# Patient Record
Sex: MALE | Race: WHITE | Employment: FULL TIME | URBAN - METROPOLITAN AREA
[De-identification: names, ages, dates, MRNs, and addresses within clinical notes are randomized per-mention and may not be internally consistent; named-entity substitution may affect disease eponyms.]

---

## 2017-07-28 ENCOUNTER — OP HISTORICAL/CONVERTED ENCOUNTER (OUTPATIENT)
Dept: OTHER | Age: 57
End: 2017-07-28

## 2018-04-28 ENCOUNTER — ED HISTORICAL/CONVERTED ENCOUNTER (OUTPATIENT)
Dept: OTHER | Age: 58
End: 2018-04-28

## 2018-05-31 ENCOUNTER — OP HISTORICAL/CONVERTED ENCOUNTER (OUTPATIENT)
Dept: OTHER | Age: 58
End: 2018-05-31

## 2018-07-23 ENCOUNTER — HOSPITAL ENCOUNTER (OUTPATIENT)
Dept: PREADMISSION TESTING | Age: 58
Discharge: HOME | End: 2018-07-23
Payer: OTHER MISCELLANEOUS

## 2018-07-23 VITALS
DIASTOLIC BLOOD PRESSURE: 76 MMHG | TEMPERATURE: 98.1 F | RESPIRATION RATE: 20 BRPM | HEART RATE: 62 BPM | HEIGHT: 71 IN | SYSTOLIC BLOOD PRESSURE: 137 MMHG | BODY MASS INDEX: 29.84 KG/M2 | WEIGHT: 213.13 LBS

## 2018-07-23 LAB
ABO + RH BLD: NORMAL
ANION GAP SERPL CALC-SCNC: 7 MMOL/L (ref 5–15)
APPEARANCE UR: CLEAR
ATRIAL RATE: 65 BPM
BACTERIA URNS QL MICRO: NEGATIVE /HPF
BILIRUB UR QL: NEGATIVE
BLOOD GROUP ANTIBODIES SERPL: NORMAL
BUN SERPL-MCNC: 12 MG/DL (ref 6–20)
BUN/CREAT SERPL: 13 (ref 12–20)
CALCIUM SERPL-MCNC: 8.4 MG/DL (ref 8.5–10.1)
CALCULATED P AXIS, ECG09: 16 DEGREES
CALCULATED R AXIS, ECG10: -7 DEGREES
CALCULATED T AXIS, ECG11: 28 DEGREES
CHLORIDE SERPL-SCNC: 106 MMOL/L (ref 97–108)
CO2 SERPL-SCNC: 29 MMOL/L (ref 21–32)
COLOR UR: NORMAL
CREAT SERPL-MCNC: 0.9 MG/DL (ref 0.7–1.3)
DIAGNOSIS, 93000: NORMAL
EPITH CASTS URNS QL MICRO: NORMAL /LPF
ERYTHROCYTE [DISTWIDTH] IN BLOOD BY AUTOMATED COUNT: 15.4 % (ref 11.5–14.5)
EST. AVERAGE GLUCOSE BLD GHB EST-MCNC: 108 MG/DL
GLUCOSE SERPL-MCNC: 89 MG/DL (ref 65–100)
GLUCOSE UR STRIP.AUTO-MCNC: NEGATIVE MG/DL
HBA1C MFR BLD: 5.4 % (ref 4.2–6.3)
HCT VFR BLD AUTO: 51.2 % (ref 36.6–50.3)
HGB BLD-MCNC: 16.4 G/DL (ref 12.1–17)
HGB UR QL STRIP: NEGATIVE
HYALINE CASTS URNS QL MICRO: NORMAL /LPF (ref 0–5)
INR PPP: 1 (ref 0.9–1.1)
KETONES UR QL STRIP.AUTO: NEGATIVE MG/DL
LEUKOCYTE ESTERASE UR QL STRIP.AUTO: NEGATIVE
MCH RBC QN AUTO: 31.3 PG (ref 26–34)
MCHC RBC AUTO-ENTMCNC: 32 G/DL (ref 30–36.5)
MCV RBC AUTO: 97.7 FL (ref 80–99)
NITRITE UR QL STRIP.AUTO: NEGATIVE
NRBC # BLD: 0 K/UL (ref 0–0.01)
NRBC BLD-RTO: 0 PER 100 WBC
P-R INTERVAL, ECG05: 166 MS
PH UR STRIP: 7 [PH] (ref 5–8)
PLATELET # BLD AUTO: 121 K/UL (ref 150–400)
POTASSIUM SERPL-SCNC: 4.4 MMOL/L (ref 3.5–5.1)
PROT UR STRIP-MCNC: NEGATIVE MG/DL
PROTHROMBIN TIME: 10 SEC (ref 9–11.1)
Q-T INTERVAL, ECG07: 386 MS
QRS DURATION, ECG06: 104 MS
QTC CALCULATION (BEZET), ECG08: 401 MS
RBC # BLD AUTO: 5.24 M/UL (ref 4.1–5.7)
RBC #/AREA URNS HPF: NORMAL /HPF (ref 0–5)
SODIUM SERPL-SCNC: 142 MMOL/L (ref 136–145)
SP GR UR REFRACTOMETRY: 1.02 (ref 1–1.03)
SPECIMEN EXP DATE BLD: NORMAL
UA: UC IF INDICATED,UAUC: NORMAL
UROBILINOGEN UR QL STRIP.AUTO: 0.2 EU/DL (ref 0.2–1)
VENTRICULAR RATE, ECG03: 65 BPM
WBC # BLD AUTO: 5.3 K/UL (ref 4.1–11.1)
WBC URNS QL MICRO: NORMAL /HPF (ref 0–4)

## 2018-07-23 PROCEDURE — 93005 ELECTROCARDIOGRAM TRACING: CPT

## 2018-07-23 PROCEDURE — 85610 PROTHROMBIN TIME: CPT | Performed by: ORTHOPAEDIC SURGERY

## 2018-07-23 PROCEDURE — 36415 COLL VENOUS BLD VENIPUNCTURE: CPT | Performed by: ORTHOPAEDIC SURGERY

## 2018-07-23 PROCEDURE — 81001 URINALYSIS AUTO W/SCOPE: CPT | Performed by: ORTHOPAEDIC SURGERY

## 2018-07-23 PROCEDURE — 83036 HEMOGLOBIN GLYCOSYLATED A1C: CPT | Performed by: ORTHOPAEDIC SURGERY

## 2018-07-23 PROCEDURE — 80048 BASIC METABOLIC PNL TOTAL CA: CPT | Performed by: ORTHOPAEDIC SURGERY

## 2018-07-23 PROCEDURE — 85027 COMPLETE CBC AUTOMATED: CPT | Performed by: ORTHOPAEDIC SURGERY

## 2018-07-23 PROCEDURE — 86900 BLOOD TYPING SEROLOGIC ABO: CPT | Performed by: ORTHOPAEDIC SURGERY

## 2018-07-23 RX ORDER — PRAMIPEXOLE DIHYDROCHLORIDE 0.5 MG/1
0.5 TABLET ORAL
COMMUNITY

## 2018-07-23 RX ORDER — OMEPRAZOLE 40 MG/1
40 CAPSULE, DELAYED RELEASE ORAL
COMMUNITY
End: 2020-01-15

## 2018-07-23 RX ORDER — AZATHIOPRINE 50 MG/1
100 TABLET ORAL
Status: ON HOLD | COMMUNITY
End: 2019-02-06

## 2018-07-24 LAB
BACTERIA SPEC CULT: NORMAL
BACTERIA SPEC CULT: NORMAL
SERVICE CMNT-IMP: NORMAL

## 2018-07-24 NOTE — PERIOP NOTES
Faxed preadmission testing reports (and fax confirmation received) to Dr. Tiffanie Hauser. Called on 7-24-18 at 1110 am ( left message with Emigdio Frost) RE: abnormal CBC.

## 2018-07-25 NOTE — H&P
[][facility] for attribution information  PCP: Gregoria Mckeon MD      Problem List      None          Subjective    Subjective: There is no problem list on file for this patient.        Chief Complaint: Follow-up of the Right Knee        HPI: Nena Bishop is a 65 y.o. male who comes back follow up of his right knee osteoarthritis. He suffered a work related injury in 2012 and underwent right knee arthroscopy. He was found to have significant degenerative arthritis at that time. He has continued to have difficulties with the knee and is had multiple steroid injections as well as HA injections. He is now developing similar symptoms in his left knee as well. He states that his knees limit his everyday activities. He is no longer able to walk for exercise. The right knee is worse than the left. He describes a sharp shooting pain medially. This occasionally causes his knee to buckle on him. He has intermittent swelling and stiffness.              Objective:      There were no vitals filed for this visit. Height: 5' 11\"       Wt Readings from Last 3 Encounters:   04/13/18 235 lb      Body mass index is 32.78 kg/m².     Musculoskeletal :  He has a full range of motion of bilateral hips and knees. He has varus deformity of bilateral knees with medial crepitus. He has a positive patellofemoral grind bilaterally. There is no significant effusion in either knee. His ligaments are stable. His skin is healthy and intact bilaterally. He has no apparent atrophy. He has a strong pedal pulse. He has no pedal edema.        Radiographs:       X-ray Knee Right 3 Views     Result Date: 4/13/2018  Views: AP, Lat, [place]. Notes Impression: Three view x-rays of the right knee show varus deformity with bone on bone arthritis of the medial compartment with subchondral sclerosis and medial osteophytes.   There are also severe degenerative changes of the patellofemoral joint.        Assessment:      1. Primary localized osteoarthritis of knees, bilateral          Plan:   I reviewed his x-ray findings with him and his wife. He has end-stage osteoarthritis of the right knee. He is exhausted all conservative management. It now affects his quality of life. I discuss knee replacement surgery at length including expected outcomes and postop recovery as well as risks and complications specifically DVT, PE, infection. After much discussion he would like to proceed.   We will set him up for right total knee replacement at his convenience.         Orders Placed This Encounter    X-ray knee right 3 views    BMI >=25 PATIENT INSTRUCTIONS & EDUCATION      Return for scheduled surgery.      Guillermo Fink MD   4/13/2018

## 2018-08-03 ENCOUNTER — ANESTHESIA EVENT (OUTPATIENT)
Dept: SURGERY | Age: 58
DRG: 470 | End: 2018-08-03
Payer: OTHER MISCELLANEOUS

## 2018-08-06 ENCOUNTER — ANESTHESIA (OUTPATIENT)
Dept: SURGERY | Age: 58
DRG: 470 | End: 2018-08-06
Payer: OTHER MISCELLANEOUS

## 2018-08-06 ENCOUNTER — HOSPITAL ENCOUNTER (INPATIENT)
Age: 58
LOS: 2 days | Discharge: HOME HEALTH | DRG: 470 | End: 2018-08-08
Attending: ORTHOPAEDIC SURGERY | Admitting: ORTHOPAEDIC SURGERY
Payer: OTHER MISCELLANEOUS

## 2018-08-06 VITALS
OXYGEN SATURATION: 93 % | RESPIRATION RATE: 16 BRPM | HEART RATE: 80 BPM | SYSTOLIC BLOOD PRESSURE: 104 MMHG | TEMPERATURE: 95.7 F | DIASTOLIC BLOOD PRESSURE: 61 MMHG

## 2018-08-06 DIAGNOSIS — M17.11 PRIMARY OSTEOARTHRITIS OF RIGHT KNEE: Primary | ICD-10-CM

## 2018-08-06 LAB
GLUCOSE BLD STRIP.AUTO-MCNC: 81 MG/DL (ref 65–100)
SERVICE CMNT-IMP: NORMAL

## 2018-08-06 PROCEDURE — 77030035236 HC SUT PDS STRATFX BARB J&J -B: Performed by: ORTHOPAEDIC SURGERY

## 2018-08-06 PROCEDURE — 77030018836 HC SOL IRR NACL ICUM -A: Performed by: ORTHOPAEDIC SURGERY

## 2018-08-06 PROCEDURE — 77030031139 HC SUT VCRL2 J&J -A: Performed by: ORTHOPAEDIC SURGERY

## 2018-08-06 PROCEDURE — 74011250636 HC RX REV CODE- 250/636: Performed by: ANESTHESIOLOGY

## 2018-08-06 PROCEDURE — 77030008467 HC STPLR SKN COVD -B: Performed by: ORTHOPAEDIC SURGERY

## 2018-08-06 PROCEDURE — 77030011640 HC PAD GRND REM COVD -A: Performed by: ORTHOPAEDIC SURGERY

## 2018-08-06 PROCEDURE — C1713 ANCHOR/SCREW BN/BN,TIS/BN: HCPCS | Performed by: ORTHOPAEDIC SURGERY

## 2018-08-06 PROCEDURE — C1776 JOINT DEVICE (IMPLANTABLE): HCPCS | Performed by: ORTHOPAEDIC SURGERY

## 2018-08-06 PROCEDURE — 82962 GLUCOSE BLOOD TEST: CPT

## 2018-08-06 PROCEDURE — 74011250637 HC RX REV CODE- 250/637: Performed by: PHYSICIAN ASSISTANT

## 2018-08-06 PROCEDURE — 77030028224 HC PDNG CST BSNM -A: Performed by: ORTHOPAEDIC SURGERY

## 2018-08-06 PROCEDURE — 74011250636 HC RX REV CODE- 250/636: Performed by: ORTHOPAEDIC SURGERY

## 2018-08-06 PROCEDURE — 74011000250 HC RX REV CODE- 250: Performed by: ORTHOPAEDIC SURGERY

## 2018-08-06 PROCEDURE — 77010033678 HC OXYGEN DAILY

## 2018-08-06 PROCEDURE — 65270000029 HC RM PRIVATE

## 2018-08-06 PROCEDURE — 77030038020 HC MANFLD NEPTUNE STRY -B: Performed by: ORTHOPAEDIC SURGERY

## 2018-08-06 PROCEDURE — 77030018846 HC SOL IRR STRL H20 ICUM -A: Performed by: ORTHOPAEDIC SURGERY

## 2018-08-06 PROCEDURE — 77030020782 HC GWN BAIR PAWS FLX 3M -B

## 2018-08-06 PROCEDURE — 76060000035 HC ANESTHESIA 2 TO 2.5 HR: Performed by: ORTHOPAEDIC SURGERY

## 2018-08-06 PROCEDURE — 77030034850: Performed by: ORTHOPAEDIC SURGERY

## 2018-08-06 PROCEDURE — 74011250636 HC RX REV CODE- 250/636: Performed by: PHYSICIAN ASSISTANT

## 2018-08-06 PROCEDURE — 77030006822 HC BLD SAW SAG BRSM -B: Performed by: ORTHOPAEDIC SURGERY

## 2018-08-06 PROCEDURE — 77030014077 HC TOWER MX CEM J&J -C: Performed by: ORTHOPAEDIC SURGERY

## 2018-08-06 PROCEDURE — 77030018846 HC SOL IRR STRL H20 ICUM -A

## 2018-08-06 PROCEDURE — 77030018822 HC SLV COMPR FT COVD -B

## 2018-08-06 PROCEDURE — 76210000016 HC OR PH I REC 1 TO 1.5 HR: Performed by: ORTHOPAEDIC SURGERY

## 2018-08-06 PROCEDURE — 0SRC0J9 REPLACEMENT OF RIGHT KNEE JOINT WITH SYNTHETIC SUBSTITUTE, CEMENTED, OPEN APPROACH: ICD-10-PCS | Performed by: ORTHOPAEDIC SURGERY

## 2018-08-06 PROCEDURE — 94760 N-INVAS EAR/PLS OXIMETRY 1: CPT

## 2018-08-06 PROCEDURE — 74011000250 HC RX REV CODE- 250: Performed by: PHYSICIAN ASSISTANT

## 2018-08-06 PROCEDURE — 76010000171 HC OR TIME 2 TO 2.5 HR INTENSV-TIER 1: Performed by: ORTHOPAEDIC SURGERY

## 2018-08-06 PROCEDURE — 74011250636 HC RX REV CODE- 250/636

## 2018-08-06 PROCEDURE — 77030003601 HC NDL NRV BLK BBMI -A

## 2018-08-06 DEVICE — Z DUP USE 2437985 COMPONENT FEM SZ G R KNEE POST STBL COMPATIBLE W/ LPS-FLEX: Type: IMPLANTABLE DEVICE | Site: KNEE | Status: FUNCTIONAL

## 2018-08-06 DEVICE — IMPLANTABLE DEVICE: Type: IMPLANTABLE DEVICE | Site: KNEE | Status: FUNCTIONAL

## 2018-08-06 DEVICE — Z DUP USE 2503045 BASEPLATE TIB SZ 7 AP51MM ML82MM KNEE PC STEM COMPLT SOL: Type: IMPLANTABLE DEVICE | Site: KNEE | Status: FUNCTIONAL

## 2018-08-06 DEVICE — CEMENT BNE 40GM FULL DOSE PMMA W/O ANTIBIO HI VISC N RADPQ: Type: IMPLANTABLE DEVICE | Site: KNEE | Status: FUNCTIONAL

## 2018-08-06 DEVICE — PLUG STEM TIV FLX TAPR FOR MG II ST BNE SCR NXGN: Type: IMPLANTABLE DEVICE | Site: KNEE | Status: FUNCTIONAL

## 2018-08-06 DEVICE — Z DUP USE 2438083 COMPONENT PAT DIA41MM THK10MM STD UHMWPE NXGN: Type: IMPLANTABLE DEVICE | Site: KNEE | Status: FUNCTIONAL

## 2018-08-06 DEVICE — KIT TKR CEM FLX: Type: IMPLANTABLE DEVICE | Site: KNEE | Status: FUNCTIONAL

## 2018-08-06 RX ORDER — SODIUM CHLORIDE 9 MG/ML
125 INJECTION, SOLUTION INTRAVENOUS CONTINUOUS
Status: DISPENSED | OUTPATIENT
Start: 2018-08-06 | End: 2018-08-07

## 2018-08-06 RX ORDER — MIDAZOLAM HYDROCHLORIDE 1 MG/ML
0.5 INJECTION, SOLUTION INTRAMUSCULAR; INTRAVENOUS
Status: DISCONTINUED | OUTPATIENT
Start: 2018-08-06 | End: 2018-08-06 | Stop reason: HOSPADM

## 2018-08-06 RX ORDER — PREGABALIN 75 MG/1
75 CAPSULE ORAL ONCE
Status: COMPLETED | OUTPATIENT
Start: 2018-08-06 | End: 2018-08-06

## 2018-08-06 RX ORDER — SODIUM CHLORIDE 0.9 % (FLUSH) 0.9 %
5-10 SYRINGE (ML) INJECTION AS NEEDED
Status: DISCONTINUED | OUTPATIENT
Start: 2018-08-06 | End: 2018-08-06 | Stop reason: HOSPADM

## 2018-08-06 RX ORDER — ONDANSETRON 2 MG/ML
4 INJECTION INTRAMUSCULAR; INTRAVENOUS AS NEEDED
Status: DISCONTINUED | OUTPATIENT
Start: 2018-08-06 | End: 2018-08-06 | Stop reason: HOSPADM

## 2018-08-06 RX ORDER — SODIUM CHLORIDE, SODIUM LACTATE, POTASSIUM CHLORIDE, CALCIUM CHLORIDE 600; 310; 30; 20 MG/100ML; MG/100ML; MG/100ML; MG/100ML
100 INJECTION, SOLUTION INTRAVENOUS CONTINUOUS
Status: DISCONTINUED | OUTPATIENT
Start: 2018-08-06 | End: 2018-08-06 | Stop reason: HOSPADM

## 2018-08-06 RX ORDER — OXYCODONE HYDROCHLORIDE 5 MG/1
5 TABLET ORAL
Status: DISCONTINUED | OUTPATIENT
Start: 2018-08-06 | End: 2018-08-08 | Stop reason: HOSPADM

## 2018-08-06 RX ORDER — DEXMEDETOMIDINE HYDROCHLORIDE 4 UG/ML
INJECTION, SOLUTION INTRAVENOUS
Status: DISCONTINUED | OUTPATIENT
Start: 2018-08-06 | End: 2018-08-06 | Stop reason: HOSPADM

## 2018-08-06 RX ORDER — FENTANYL CITRATE 50 UG/ML
50 INJECTION, SOLUTION INTRAMUSCULAR; INTRAVENOUS AS NEEDED
Status: DISCONTINUED | OUTPATIENT
Start: 2018-08-06 | End: 2018-08-06 | Stop reason: HOSPADM

## 2018-08-06 RX ORDER — MIDAZOLAM HYDROCHLORIDE 1 MG/ML
1 INJECTION, SOLUTION INTRAMUSCULAR; INTRAVENOUS AS NEEDED
Status: DISCONTINUED | OUTPATIENT
Start: 2018-08-06 | End: 2018-08-06 | Stop reason: HOSPADM

## 2018-08-06 RX ORDER — CEFAZOLIN SODIUM/WATER 2 G/20 ML
2 SYRINGE (ML) INTRAVENOUS EVERY 8 HOURS
Status: COMPLETED | OUTPATIENT
Start: 2018-08-06 | End: 2018-08-07

## 2018-08-06 RX ORDER — DEXAMETHASONE SODIUM PHOSPHATE 4 MG/ML
INJECTION, SOLUTION INTRA-ARTICULAR; INTRALESIONAL; INTRAMUSCULAR; INTRAVENOUS; SOFT TISSUE AS NEEDED
Status: DISCONTINUED | OUTPATIENT
Start: 2018-08-06 | End: 2018-08-06 | Stop reason: HOSPADM

## 2018-08-06 RX ORDER — PRAMIPEXOLE DIHYDROCHLORIDE 0.25 MG/1
0.5 TABLET ORAL
Status: DISCONTINUED | OUTPATIENT
Start: 2018-08-06 | End: 2018-08-08 | Stop reason: HOSPADM

## 2018-08-06 RX ORDER — HYDROXYZINE HYDROCHLORIDE 10 MG/1
10 TABLET, FILM COATED ORAL
Status: DISCONTINUED | OUTPATIENT
Start: 2018-08-06 | End: 2018-08-08 | Stop reason: HOSPADM

## 2018-08-06 RX ORDER — FENTANYL CITRATE 50 UG/ML
25 INJECTION, SOLUTION INTRAMUSCULAR; INTRAVENOUS
Status: DISCONTINUED | OUTPATIENT
Start: 2018-08-06 | End: 2018-08-06 | Stop reason: HOSPADM

## 2018-08-06 RX ORDER — NALOXONE HYDROCHLORIDE 0.4 MG/ML
0.4 INJECTION, SOLUTION INTRAMUSCULAR; INTRAVENOUS; SUBCUTANEOUS AS NEEDED
Status: DISCONTINUED | OUTPATIENT
Start: 2018-08-06 | End: 2018-08-08 | Stop reason: HOSPADM

## 2018-08-06 RX ORDER — SODIUM CHLORIDE 0.9 % (FLUSH) 0.9 %
5-10 SYRINGE (ML) INJECTION AS NEEDED
Status: DISCONTINUED | OUTPATIENT
Start: 2018-08-06 | End: 2018-08-08 | Stop reason: HOSPADM

## 2018-08-06 RX ORDER — BUPIVACAINE HYDROCHLORIDE 5 MG/ML
INJECTION, SOLUTION EPIDURAL; INTRACAUDAL AS NEEDED
Status: DISCONTINUED | OUTPATIENT
Start: 2018-08-06 | End: 2018-08-06 | Stop reason: HOSPADM

## 2018-08-06 RX ORDER — OMEPRAZOLE 40 MG/1
40 CAPSULE, DELAYED RELEASE ORAL
Status: DISCONTINUED | OUTPATIENT
Start: 2018-08-06 | End: 2018-08-06 | Stop reason: CLARIF

## 2018-08-06 RX ORDER — CELECOXIB 200 MG/1
200 CAPSULE ORAL 2 TIMES DAILY
Status: DISCONTINUED | OUTPATIENT
Start: 2018-08-06 | End: 2018-08-07

## 2018-08-06 RX ORDER — ONDANSETRON 2 MG/ML
4 INJECTION INTRAMUSCULAR; INTRAVENOUS
Status: ACTIVE | OUTPATIENT
Start: 2018-08-06 | End: 2018-08-07

## 2018-08-06 RX ORDER — MORPHINE SULFATE 10 MG/ML
2 INJECTION, SOLUTION INTRAMUSCULAR; INTRAVENOUS
Status: DISCONTINUED | OUTPATIENT
Start: 2018-08-06 | End: 2018-08-06 | Stop reason: HOSPADM

## 2018-08-06 RX ORDER — LIDOCAINE HYDROCHLORIDE 10 MG/ML
0.1 INJECTION, SOLUTION EPIDURAL; INFILTRATION; INTRACAUDAL; PERINEURAL AS NEEDED
Status: DISCONTINUED | OUTPATIENT
Start: 2018-08-06 | End: 2018-08-06 | Stop reason: HOSPADM

## 2018-08-06 RX ORDER — DIPHENHYDRAMINE HYDROCHLORIDE 50 MG/ML
12.5 INJECTION, SOLUTION INTRAMUSCULAR; INTRAVENOUS AS NEEDED
Status: DISCONTINUED | OUTPATIENT
Start: 2018-08-06 | End: 2018-08-06 | Stop reason: HOSPADM

## 2018-08-06 RX ORDER — MELOXICAM 7.5 MG/1
15 TABLET ORAL DAILY
Status: DISCONTINUED | OUTPATIENT
Start: 2018-08-07 | End: 2018-08-07

## 2018-08-06 RX ORDER — CELECOXIB 200 MG/1
200 CAPSULE ORAL ONCE
Status: COMPLETED | OUTPATIENT
Start: 2018-08-06 | End: 2018-08-06

## 2018-08-06 RX ORDER — SODIUM CHLORIDE 9 MG/ML
25 INJECTION, SOLUTION INTRAVENOUS CONTINUOUS
Status: DISCONTINUED | OUTPATIENT
Start: 2018-08-06 | End: 2018-08-06 | Stop reason: HOSPADM

## 2018-08-06 RX ORDER — CEFAZOLIN SODIUM/WATER 2 G/20 ML
2 SYRINGE (ML) INTRAVENOUS
Status: COMPLETED | OUTPATIENT
Start: 2018-08-06 | End: 2018-08-06

## 2018-08-06 RX ORDER — SODIUM CHLORIDE, SODIUM LACTATE, POTASSIUM CHLORIDE, CALCIUM CHLORIDE 600; 310; 30; 20 MG/100ML; MG/100ML; MG/100ML; MG/100ML
INJECTION, SOLUTION INTRAVENOUS
Status: DISCONTINUED | OUTPATIENT
Start: 2018-08-06 | End: 2018-08-06 | Stop reason: HOSPADM

## 2018-08-06 RX ORDER — DEXAMETHASONE SODIUM PHOSPHATE 4 MG/ML
4 INJECTION, SOLUTION INTRA-ARTICULAR; INTRALESIONAL; INTRAMUSCULAR; INTRAVENOUS; SOFT TISSUE
Status: DISCONTINUED | OUTPATIENT
Start: 2018-08-06 | End: 2018-08-08

## 2018-08-06 RX ORDER — SODIUM CHLORIDE 0.9 % (FLUSH) 0.9 %
5-10 SYRINGE (ML) INJECTION EVERY 8 HOURS
Status: DISCONTINUED | OUTPATIENT
Start: 2018-08-06 | End: 2018-08-06 | Stop reason: HOSPADM

## 2018-08-06 RX ORDER — HYDROMORPHONE HYDROCHLORIDE 1 MG/ML
0.5 INJECTION, SOLUTION INTRAMUSCULAR; INTRAVENOUS; SUBCUTANEOUS
Status: DISCONTINUED | OUTPATIENT
Start: 2018-08-06 | End: 2018-08-06 | Stop reason: HOSPADM

## 2018-08-06 RX ORDER — ACETAMINOPHEN 500 MG
1000 TABLET ORAL EVERY 6 HOURS
Status: DISCONTINUED | OUTPATIENT
Start: 2018-08-06 | End: 2018-08-08 | Stop reason: HOSPADM

## 2018-08-06 RX ORDER — ACETAMINOPHEN 500 MG
1000 TABLET ORAL ONCE
Status: COMPLETED | OUTPATIENT
Start: 2018-08-06 | End: 2018-08-06

## 2018-08-06 RX ORDER — MIDAZOLAM HYDROCHLORIDE 1 MG/ML
INJECTION, SOLUTION INTRAMUSCULAR; INTRAVENOUS AS NEEDED
Status: DISCONTINUED | OUTPATIENT
Start: 2018-08-06 | End: 2018-08-06 | Stop reason: HOSPADM

## 2018-08-06 RX ORDER — SODIUM CHLORIDE, SODIUM LACTATE, POTASSIUM CHLORIDE, CALCIUM CHLORIDE 600; 310; 30; 20 MG/100ML; MG/100ML; MG/100ML; MG/100ML
50 INJECTION, SOLUTION INTRAVENOUS CONTINUOUS
Status: DISCONTINUED | OUTPATIENT
Start: 2018-08-06 | End: 2018-08-06 | Stop reason: HOSPADM

## 2018-08-06 RX ORDER — FENTANYL CITRATE 50 UG/ML
10 INJECTION, SOLUTION INTRAMUSCULAR; INTRAVENOUS
Status: DISCONTINUED | OUTPATIENT
Start: 2018-08-06 | End: 2018-08-08

## 2018-08-06 RX ORDER — OXYCODONE HYDROCHLORIDE 5 MG/1
10 TABLET ORAL
Status: DISCONTINUED | OUTPATIENT
Start: 2018-08-06 | End: 2018-08-08 | Stop reason: HOSPADM

## 2018-08-06 RX ORDER — POLYETHYLENE GLYCOL 3350 17 G/17G
17 POWDER, FOR SOLUTION ORAL DAILY
Status: DISCONTINUED | OUTPATIENT
Start: 2018-08-07 | End: 2018-08-08 | Stop reason: HOSPADM

## 2018-08-06 RX ORDER — PROPOFOL 10 MG/ML
INJECTION, EMULSION INTRAVENOUS
Status: DISCONTINUED | OUTPATIENT
Start: 2018-08-06 | End: 2018-08-06 | Stop reason: HOSPADM

## 2018-08-06 RX ORDER — PANTOPRAZOLE SODIUM 40 MG/1
40 TABLET, DELAYED RELEASE ORAL
Status: DISCONTINUED | OUTPATIENT
Start: 2018-08-06 | End: 2018-08-08 | Stop reason: HOSPADM

## 2018-08-06 RX ORDER — AMOXICILLIN 250 MG
1 CAPSULE ORAL 2 TIMES DAILY
Status: DISCONTINUED | OUTPATIENT
Start: 2018-08-06 | End: 2018-08-08 | Stop reason: HOSPADM

## 2018-08-06 RX ORDER — AZATHIOPRINE 50 MG/1
100 TABLET ORAL
Status: DISCONTINUED | OUTPATIENT
Start: 2018-08-06 | End: 2018-08-08 | Stop reason: HOSPADM

## 2018-08-06 RX ORDER — SODIUM CHLORIDE 0.9 % (FLUSH) 0.9 %
5-10 SYRINGE (ML) INJECTION EVERY 8 HOURS
Status: DISCONTINUED | OUTPATIENT
Start: 2018-08-07 | End: 2018-08-08 | Stop reason: HOSPADM

## 2018-08-06 RX ORDER — FAMOTIDINE 20 MG/1
20 TABLET, FILM COATED ORAL 2 TIMES DAILY
Status: DISCONTINUED | OUTPATIENT
Start: 2018-08-06 | End: 2018-08-08 | Stop reason: HOSPADM

## 2018-08-06 RX ORDER — ONDANSETRON 2 MG/ML
INJECTION INTRAMUSCULAR; INTRAVENOUS AS NEEDED
Status: DISCONTINUED | OUTPATIENT
Start: 2018-08-06 | End: 2018-08-06 | Stop reason: HOSPADM

## 2018-08-06 RX ORDER — FACIAL-BODY WIPES
10 EACH TOPICAL DAILY PRN
Status: DISCONTINUED | OUTPATIENT
Start: 2018-08-08 | End: 2018-08-08 | Stop reason: HOSPADM

## 2018-08-06 RX ORDER — ROPIVACAINE HYDROCHLORIDE 5 MG/ML
30 INJECTION, SOLUTION EPIDURAL; INFILTRATION; PERINEURAL AS NEEDED
Status: DISCONTINUED | OUTPATIENT
Start: 2018-08-06 | End: 2018-08-06 | Stop reason: HOSPADM

## 2018-08-06 RX ORDER — DEXMEDETOMIDINE HYDROCHLORIDE 4 UG/ML
INJECTION, SOLUTION INTRAVENOUS AS NEEDED
Status: DISCONTINUED | OUTPATIENT
Start: 2018-08-06 | End: 2018-08-06 | Stop reason: HOSPADM

## 2018-08-06 RX ADMIN — SODIUM CHLORIDE, SODIUM LACTATE, POTASSIUM CHLORIDE, AND CALCIUM CHLORIDE 50 ML/HR: 600; 310; 30; 20 INJECTION, SOLUTION INTRAVENOUS at 11:40

## 2018-08-06 RX ADMIN — MIDAZOLAM HYDROCHLORIDE 2 MG: 1 INJECTION, SOLUTION INTRAMUSCULAR; INTRAVENOUS at 12:13

## 2018-08-06 RX ADMIN — FENTANYL CITRATE 50 MCG: 50 INJECTION, SOLUTION INTRAMUSCULAR; INTRAVENOUS at 11:46

## 2018-08-06 RX ADMIN — PANTOPRAZOLE SODIUM 40 MG: 40 TABLET, DELAYED RELEASE ORAL at 23:57

## 2018-08-06 RX ADMIN — STANDARDIZED SENNA CONCENTRATE AND DOCUSATE SODIUM 1 TABLET: 8.6; 5 TABLET, FILM COATED ORAL at 17:07

## 2018-08-06 RX ADMIN — Medication 2 G: at 19:34

## 2018-08-06 RX ADMIN — ONDANSETRON 4 MG: 2 INJECTION INTRAMUSCULAR; INTRAVENOUS at 12:37

## 2018-08-06 RX ADMIN — RIVAROXABAN 10 MG: 10 TABLET, FILM COATED ORAL at 23:57

## 2018-08-06 RX ADMIN — OXYCODONE HYDROCHLORIDE 10 MG: 5 TABLET ORAL at 23:57

## 2018-08-06 RX ADMIN — ACETAMINOPHEN 1000 MG: 500 TABLET, FILM COATED ORAL at 17:07

## 2018-08-06 RX ADMIN — LIDOCAINE HYDROCHLORIDE 0.1 ML: 10 INJECTION, SOLUTION EPIDURAL; INFILTRATION; INTRACAUDAL; PERINEURAL at 11:40

## 2018-08-06 RX ADMIN — OXYCODONE HYDROCHLORIDE 5 MG: 5 TABLET ORAL at 19:18

## 2018-08-06 RX ADMIN — SODIUM CHLORIDE, SODIUM LACTATE, POTASSIUM CHLORIDE, CALCIUM CHLORIDE: 600; 310; 30; 20 INJECTION, SOLUTION INTRAVENOUS at 12:11

## 2018-08-06 RX ADMIN — SODIUM CHLORIDE, SODIUM LACTATE, POTASSIUM CHLORIDE, CALCIUM CHLORIDE: 600; 310; 30; 20 INJECTION, SOLUTION INTRAVENOUS at 12:33

## 2018-08-06 RX ADMIN — PROPOFOL 40 MCG/KG/MIN: 10 INJECTION, EMULSION INTRAVENOUS at 12:15

## 2018-08-06 RX ADMIN — DEXMEDETOMIDINE HYDROCHLORIDE 4 MCG: 4 INJECTION, SOLUTION INTRAVENOUS at 12:19

## 2018-08-06 RX ADMIN — DEXAMETHASONE SODIUM PHOSPHATE 4 MG: 4 INJECTION, SOLUTION INTRA-ARTICULAR; INTRALESIONAL; INTRAMUSCULAR; INTRAVENOUS; SOFT TISSUE at 12:30

## 2018-08-06 RX ADMIN — BUPIVACAINE HYDROCHLORIDE 12 MG: 5 INJECTION, SOLUTION EPIDURAL; INTRACAUDAL at 12:17

## 2018-08-06 RX ADMIN — ACETAMINOPHEN 1000 MG: 500 TABLET, FILM COATED ORAL at 11:28

## 2018-08-06 RX ADMIN — PREGABALIN 75 MG: 75 CAPSULE ORAL at 11:28

## 2018-08-06 RX ADMIN — MIDAZOLAM HYDROCHLORIDE 2 MG: 1 INJECTION, SOLUTION INTRAMUSCULAR; INTRAVENOUS at 12:11

## 2018-08-06 RX ADMIN — CELECOXIB 200 MG: 200 CAPSULE ORAL at 11:28

## 2018-08-06 RX ADMIN — DEXMEDETOMIDINE HYDROCHLORIDE 4 MCG: 4 INJECTION, SOLUTION INTRAVENOUS at 12:15

## 2018-08-06 RX ADMIN — SODIUM CHLORIDE 125 ML/HR: 900 INJECTION, SOLUTION INTRAVENOUS at 15:23

## 2018-08-06 RX ADMIN — FAMOTIDINE 20 MG: 20 TABLET ORAL at 17:07

## 2018-08-06 RX ADMIN — PRAMIPEXOLE DIHYDROCHLORIDE 0.5 MG: 0.25 TABLET ORAL at 23:57

## 2018-08-06 RX ADMIN — Medication 2 G: at 12:15

## 2018-08-06 RX ADMIN — MIDAZOLAM HYDROCHLORIDE 4 MG: 1 INJECTION, SOLUTION INTRAMUSCULAR; INTRAVENOUS at 11:56

## 2018-08-06 RX ADMIN — DEXMEDETOMIDINE HYDROCHLORIDE 0.4 MCG/KG/HR: 4 INJECTION, SOLUTION INTRAVENOUS at 12:15

## 2018-08-06 RX ADMIN — DEXMEDETOMIDINE HYDROCHLORIDE 4 MCG: 4 INJECTION, SOLUTION INTRAVENOUS at 12:17

## 2018-08-06 NOTE — ANESTHESIA PROCEDURE NOTES
Peripheral Block    Start time: 8/6/2018 11:47 AM  End time: 8/6/2018 11:54 AM  Performed by: Carina Marroquin  Authorized by: Carina Marroquin       Pre-procedure: Indications: at surgeon's request and post-op pain management    Preanesthetic Checklist: patient identified, risks and benefits discussed, site marked, timeout performed, anesthesia consent given and patient being monitored      Block Type:   Block Type:   Adductor canal  Laterality:  Right  Monitoring:  Standard ASA monitoring, responsive to questions, oxygen, continuous pulse ox, frequent vital sign checks and heart rate  Injection Technique:  Single shot  Procedures: ultrasound guided    Patient Position: supine  Prep: chlorhexidine    Location:  Mid thigh  Needle Type:  Stimuplex  Needle Gauge:  22 G  Needle Localization:  Ultrasound guidance  Medication Injected:  0.5%  ropivacaine    Assessment:  Number of attempts:  1  Injection Assessment:  Incremental injection every 5 mL, local visualized surrounding nerve on ultrasound, negative aspiration for blood, no intravascular symptoms, no paresthesia and ultrasound image on chart  Patient tolerance:  Patient tolerated the procedure well with no immediate complications

## 2018-08-06 NOTE — BRIEF OP NOTE
BRIEF OPERATIVE NOTE    Date of Procedure: 8/6/2018   Preoperative Diagnosis: DJD RIGHT KNEE  Postoperative Diagnosis: DJD RIGHT KNEE    Procedure(s):  RIGHT TOTAL KNEE ARTHROPLASTY  (CHOICE ANESTH)  Surgeon(s) and Role:     * Jennifer Hampton MD - Primary         Surgical Assistant: Assistant: Simeon Siegel, [facility]    Surgical Staff:  Circ-1: Celestina Vanessa RN  Circ-Relief: Lino Elizalde RN  Physician Assistant: Christopher Chiang PA-C  Scrub Tech-1: Themarilynn Cushing  Surg Asst-1: Haslinda Ferrnolberto  Surg Asst-Relief: Recardoralia Nicol  Float Staff: Ever Mallory RN  Event Time In   Incision Start 1256   Incision Close      Anesthesia: Spinal   Estimated Blood Loss: 100cc  Specimens: * No specimens in log *   Findings: DJD   Complications: none  Implants:   Implant Name Type Inv.  Item Serial No.  Lot No. LRB No. Used Action   CEMENT BNE SMARTSET HV 40GM -- ORDER IN SETS OF 20 - SN/A  CEMENT BNE SMARTSET HV 40GM -- ORDER IN SETS OF 20 N/A [facility] [employer] [id number] Right 2 Implanted   BASEPLT TIB STEM PC NEXGN 7 --  - SN/A  BASEPLT TIB STEM PC NEXGN 7 --  N/A [employer] [id number] Right 1 Implanted   PLUG STEM TAPR NEXGEN --  - SN/A  PLUG STEM TAPR NEXGEN --  N/A [employer] [id number] Right 1 Implanted   INSERT TIB LPSFLX GH 7-10 12MM -- NEXGEN PROLONG ARTC SURF - SN/A  INSERT TIB LPSFLX GH 7-10 12MM -- NEXGEN PROLONG ARTC SURF N/A [employer] [id number] Right 1 Implanted   PAT INST NXGN 41MM POLYETH --  - SN/A  PAT INST NXGN 41MM POLYETH --  N/A [employer] [id number] Right 1 Implanted

## 2018-08-06 NOTE — PROGRESS NOTES
TRANSFER - IN REPORT:    Verbal report received from [employer]) on Alf Dec  being received from [employer]) for routine post - op      Report consisted of patients Situation, Background, Assessment and   Recommendations(SBAR). Information from the following report(s) SBAR, Kardex, Intake/Output, MAR and Recent Results was reviewed with the receiving nurse. Opportunity for questions and clarification was provided. Assessment completed upon patients arrival to unit and care assumed.

## 2018-08-06 NOTE — OP NOTES
8/6/2018  OPERATIVE REPORT      PREOPERATIVE DIAGNOSIS: Osteoarthritis, right knee. POSTOPERATIVE DIAGNOSIS: Osteoarthritis, right knee. OPERATIVE PROCEDURE: right total knee replacement. SURGEON: Emeli Sheldon MD    ASSISTANT SURGEON: Keyana Goldsmith, [facility]    ANESTHESIA: Spinal.    INDICATIONS: : A 63 y.o.  male  with end stage osteoarthritis to the right knee, not responsive to conservative management. COMPLICATIONS:None    PROCEDURE: The patient was taken to the operating room, underwent  placement of spinal anesthesia. The knee and leg were prepped and  draped in the usual fashion. The leg was exsanguinated with the Esmarch  bandage and tourniquet inflated to 350 mmHg. A longitudinal midline  incision made down through skin and subcutaneous tissue. A medial  parapatellar arthrotomy was performed, and extended proximally along the  medial border of the quadriceps tendon, distally along the medial border of  the insertion of the patella tendon. Medial release was performed. Retropatellar fat pad was sharply excised. The patella was subluxated  laterally, the knee was flexed to 90 degrees. Drill was used to enter the  intramedullary canal of the distal femur. The 5 degree intramedullary guide  was applied and the distal femoral cut was performed. The femur was sized  to a G. A G cutting block was applied, and the anterior, posterior,  chamfer cuts were performed. The posterior stabilized cut was performed. The extramedullary tibial guide was applied, and the tibia was cut in  neutral alignment. The tibia was sized to a 7. Knee was balanced to varus  and valgus stress. Flexion and extension gaps were equal. Trial reduction  was performed, using 10 mm insert. Excellent range of motion and stability  were noted. The patella was everted, and the patella was cut using freehand  technique. Patella was sized to a 41. Drill holes were placed, and patella  button applied.  The patella tracked normally. All trial components were  removed, and surfaces were prepared using drill and punch. All residual  meniscal tissue was sharply excised. Hemostasis was obtained using Bovie  apparatus. All components were cemented in place, taking care to remove all  excess cement. The knee was maintained in full extension while the cement  hardened. The tourniquet was let down after a total tourniquet time of 56  minutes. Hemostasis was obtained using the Bovie apparatus. The joint was  extensively irrigated with antibiotic solution. The arthrotomy was repaired  using #2 Vicryl in interrupted figure-of-eight fashion. Subcutaneous tissue  was approximated using 2-0 Vicryl in interrupted fashion. Skin edges were  approximated using stapling apparatus. Joint was infiltrated with 30 mL of  0.5% Marcaine with epinephrine. Bulky sterile dressing was applied, and the  patient was transferred to recovery room in stable condition. There were no  complications. ESTIMATED BLOOD LOSS: 100 cc.     SPECIMEN: Bone Dorian Essex, M.D.  8/6/2018  2:03 PM

## 2018-08-06 NOTE — ANESTHESIA PREPROCEDURE EVALUATION
Anesthetic History   No history of anesthetic complications            Review of Systems / Medical History  Patient summary reviewed, nursing notes reviewed and pertinent labs reviewed    Pulmonary        Sleep apnea    Asthma        Neuro/Psych   Within defined limits           Cardiovascular    Hypertension              Exercise tolerance: >4 METS     GI/Hepatic/Renal     GERD      Liver disease     Endo/Other        Arthritis     Other Findings            Physical Exam    Airway  Mallampati: II  TM Distance: 4 - 6 cm  Neck ROM: normal range of motion   Mouth opening: Normal     Cardiovascular  Regular rate and rhythm,  S1 and S2 normal,  no murmur, click, rub, or gallop             Dental  No notable dental hx       Pulmonary  Breath sounds clear to auscultation               Abdominal  GI exam deferred       Other Findings            Anesthetic Plan    ASA: 2  Anesthesia type: spinal      Post-op pain plan if not by surgeon: peripheral nerve block single    Induction: Intravenous  Anesthetic plan and risks discussed with: Patient

## 2018-08-06 NOTE — PERIOP NOTES
TRANSFER - OUT REPORT:    Verbal report given to Oliva Hart on Calderon Geiger  being transferred to [phone] for routine post - op       Report consisted of patients Situation, Background, Assessment and   Recommendations(SBAR). Time Pre op antibiotic given:1215  Anesthesia Stop time: 1430  Lamar Present on Transfer to floor:yes  Order for Lamar on Chart:yes  Discharge Prescriptions with Chart:no    Information from the following report(s) SBAR, OR Summary, Intake/Output and MAR was reviewed with the receiving nurse. Opportunity for questions and clarification was provided. Is the patient on 02? YES       L/Min 1       Other no    Is the patient on a monitor? NO    Is the nurse transporting with the patient? NO    Surgical Waiting Area notified of patient's transfer from PACU?  YES      The following personal items collected during your admission accompanied patient upon transfer:   Dental Appliance: Dental Appliances: None  Vision:    Hearing Aid:    Jewelry:    Clothing: Clothing:  (glasses on)  Other Valuables:    Valuables sent to safe:

## 2018-08-06 NOTE — IP AVS SNAPSHOT
[address] [facility] [address]son [address] 
[phone] Patient: Edwige Werner MRN: [id number] :1960 About your hospitalization You were admitted on:  2018 [facility]  [facility] Why you were hospitalized Your primary diagnosis was:  Primary Osteoarthritis Of Right Knee Follow-up Information Follow up With Details Comments Contact Info Larry Mahmood MD   [address] [facility] [address] [place], [place] [id number] [phone] Discharge Orders None A check jimena indicates which time of day the medication should be taken. My Medications START taking these medications Instructions Each Dose to Equal  
 Morning Noon Evening Bedtime  
 acetaminophen 500 mg tablet Commonly known as:  TYLENOL Your last dose was: Your next dose is: Take 2 Tabs by mouth every six (6) hours. 1000 mg  
    
   
   
   
  
 oxyCODONE IR 5 mg immediate release tablet Commonly known as:  Charlotte Rincon Your last dose was: Your next dose is: Take 1-2 Tabs by mouth every four (4) hours as needed. Max Daily Amount: 60 mg.  
 5-10 mg  
    
   
   
   
  
 rivaroxaban 10 mg tablet Commonly known as:  Remigio Phan Your last dose was: Your next dose is: Take 1 Tab by mouth daily (with lunch). Indications: KNEE REPLACEMENT DEEP VEIN THROMBOSIS PREVENTION  
 10 mg CONTINUE taking these medications Instructions Each Dose to Equal  
 Morning Noon Evening Bedtime  
 azaTHIOprine 50 mg tablet Commonly known as:  The Pepsi Your last dose was: Your next dose is: Take 100 mg by mouth nightly. 100 mg MESALAMINE PO Your last dose was: Your next dose is: Take 1,200 mg by mouth daily. 4.8 GRAMS DAILY  
 1200 mg  
    
   
   
   
  
 omeprazole 40 mg capsule Commonly known as:  PRILOSEC Your last dose was: Your next dose is: Take 40 mg by mouth nightly. 40 mg  
    
   
   
   
  
 pramipexole 0.5 mg tablet Commonly known as:  MIRAPEX Your last dose was: Your next dose is: Take 0.5 mg by mouth nightly. 0.5 mg  
    
   
   
   
  
 TESTOSTERONE IM Your last dose was: Your next dose is:    
   
   
 by IntraMUSCular route every seven (7) days. SUNDAYS Where to Get Your Medications Information on where to get these meds will be given to you by the nurse or doctor. ! Ask your nurse or doctor about these medications  
  acetaminophen 500 mg tablet  
 oxyCODONE IR 5 mg immediate release tablet  
 rivaroxaban 10 mg tablet Opioid Education Prescription Opioids: What You Need to Know: 
 
 
Follow up appointments 
-follow up with Dr. Zaid Sprague in 2 weeks. Call [phone] to make an appointment. Home Health Agency: _________________________   phone: ___________________ The agency will contact you to arrange dates/times for visits. Please call them if you do not hear from them within 24 hours after you are discharged. When to call your Orthopaedic Surgeon: 
-unrelieved pain 
-Signs of infection-if your incision is red; continues to have drainage; drainage has a foul odor or if you have a persistent fever over 101 degrees 
-Signs of a blood clot in your leg-calf pain, tenderness, redness, swelling of lower leg When to call your Primary Care Physician: 
-Concerns about medical conditions such as diabetes, high blood pressure, asthma, congestive heart failure 
-Call if blood sugars are elevated, persistent headache or dizziness, coughing or congestion, constipation or diarrhea, burning with urination, abnormal heart rate When to call 911and go to the nearest emergency room 
-acute onset of chest pain, shortness of breath, difficulty breathing Activity 
-weight bearing as tolerated with your walker or crutches. Refer to pages 23-31 of your handbook for instructions and pictures.  
-20 repetitions of each exercise as instructed by therapist 3 times each day. Refer to pages 32-40 of your handbook for exercise instructions and pictures 
-get up every one hour and walk (except at night when sleeping) 
-do not drive or operate heavy machinery Incision Care 
-you will have staples in your knee incision; they will be removed at the surgeons office visit in 2 weeks 
-Keep a dressing (ABD and paper tape) on your incision and change daily. Wash your hands thoroughly before changing the dressing. Once you incision is not draining, you may leave it open to air 
-You may shower and get your incision wet but do not submerge your incision under water in a bath tub, hot tub or swimming pool for 6 weeks after surgery. Preventing blood clots 
-take Xarelto at 12 noon daily as prescribed by Dr. Lux Deep Pain management 
-take pain medication as prescribed; decrease the amount you use as your pain lessens 
-avoid alcoholic beverages while taking pain medication 
-Please be aware that many medications contain Tylenol.   We do not want you to over medicate so please read the information below as a guide. Do not take more than 4 Grams of Tylenol in a 24 hour period. (There are 1000 milligrams in one Gram) -You may place an ice bag on your knee for 15-20 minutes after exercising Diet 
-resume usual diet; drink plenty of fluids; eat foods high in fiber 
-you may want to take a stool softener (such as Senokot-S or Colace) to prevent constipation while you are taking pain medication. If constipation occurs, take a laxative (such as Dulcolax tablets, Milk of Magnesia, or a suppository) [facility] Health Care [facility] Nursing-per physicians order 
-complete head to toe assessment, vital signs 
-staples will be removed in the surgeons office at 2 week visit 
-medication reconciliation 
-review pain management 
-manage chronic medical conditions Physical Therapy-per physicians order Weight bearing status: 
Precautions at Admission: WBAT, Fall Left Side Weight Bearing: Full Right Side Weight Bearing: As tolerated ? Mobility Status: 
Supine to Sit: Modified independent, Adaptive equipment, Additional time (using strap to assist RLE) Sit to Stand: Supervision, Adaptive equipment, Additional time Sit to Supine: Modified independent, Adaptive equipment, Additional time (using strap to assist RLE) Gait: 
Distance (ft): 120 Feet (ft) Ambulation - Level of Assistance: Contact guard assistance, Adaptive equipment, Additional time Assistive Device: Gait belt, Walker, rolling Gait Abnormalities: Antalgic, Decreased step clearance, Step to gait (partial step through with practice) ADL status overall composite: 
  
  
  
  
 
-Home Therapy 
-assessment and evaluation-bed mobility; functional transfers (bed, chair, bathroom, stairs); ambulation with equipment, car transfers, shower transfers, safety and ability to get out of house in the event of an emergency -review weight bearing as tolerated, wean from walker or crutches as tolerated 
-discuss pain management 
-review how to do ADLs 
 
-Home Exercise Program-refer to nvcq82-56 of the patient handbook for exercises 
-supine exercises-ankle pumps, hamstring sets, quad sets, heel slides, short arc quad sets, long arc quads-prop heel on pillow for stretch, straight leg raise-sitting exercises-active knee flexion, passive knee flexion, active knee extension, ankle pumps, bicep curls (use weights as appropriate), triceps pushups, shoulder flexion (use weights as appropriate) -standing exercises holding onto supportive counter-toe raises, heel raises, mini-squats, heel/toe touches with knee bend, marching in place, hamstring curls JumpIn Announcement We are excited to announce that we are making your provider's discharge notes available to you in JumpIn. You will see these notes when they are completed and signed by the physician that discharged you from your recent hospital stay. If you have any questions or concerns about any information you see in JumpIn, please call the Health Information Department where you were seen or reach out to your Primary Care Provider for more information about your plan of care. Introducing [facility]! Dear Dana Jones: 
Thank you for requesting a JumpIn account. Our records indicate that you already have an active JumpIn account. You can access your account anytime at [email or web address] Did you know that you can access your hospital and ER discharge instructions at any time in JumpIn? You can also review all of your test results from your hospital stay or ER visit. Additional Information If you have questions, please visit the Frequently Asked Questions section of the JumpIn website at [email or web address] Remember, JumpIn is NOT to be used for urgent needs. For medical emergencies, dial 911. Now available from your iPhone and Android! Introducing Esteban Dexter As a Nichole Malcolm patient, I wanted to make you aware of our electronic visit tool called Esteban Guerita. Nichole Malcolm 24/7 allows you to connect within minutes with a medical provider 24 hours a day, seven days a week via a mobile device or tablet or logging into a secure website from your computer. You can access Esteban Jose Davidrossifin from anywhere in the [place]. A virtual visit might be right for you when you have a simple condition and feel like you just dont want to get out of bed, or cant get away from work for an appointment, when your regular Nichole Malcolm provider is not available (evenings, weekends or holidays), or when youre out of town and need minor care. Electronic visits cost only $49 and if the Nichole Malcolm 24/7 provider determines a prescription is needed to treat your condition, one can be electronically transmitted to a nearby pharmacy*. Please take a moment to enroll today if you have not already done so. The enrollment process is free and takes just a few minutes. To enroll, please download the Nichole Drnolberto 24/7 sarah to your tablet or phone, or visit [email or web address] to enroll on your computer. And, as an [address] [place] [address] patient with a [employer] account, the results of your visits will be scanned into your electronic medical record and your primary care provider will be able to view the scanned results. We urge you to continue to see your regular Nichole Malcolm provider for your ongoing medical care. And while your primary care provider may not be the one available when you seek a Esteban Mainrossifin virtual visit, the peace of mind you get from getting a real diagnosis real time can be priceless. For more information on Esteban Jose Davidrossifin, view our Frequently Asked Questions (FAQs) at [email or web address]. Sincerely, 
 
Nohelia Mendes MD 
Chief Medical Officer [employer] *:  certain medications cannot be prescribed via Esteban Dexter Providers Seen During Your Hospitalization Provider Specialty Primary office phone Ney Valverde MD Orthopedic Surgery [phone] Your Primary Care Physician (PCP) Primary Care Physician Office Phone Office Fax Brooks Chavez [phone] You are allergic to the following No active allergies Recent Documentation Height Weight BMI Smoking Status 1.803 m 96.7 kg 29.73 kg/m2 Never Smoker Emergency Contacts Name Discharge Info Relation Home Work Mobile RosyHayley DISCHARGE CAREGIVER [3] Spouse [3] [phone] Patient Belongings The following personal items are in your possession at time of discharge: 
  Dental Appliances: None  Visual Aid: Glasses   Hearing Aids/Status: Does not own         Clothing:  (glasses on) Please provide this summary of care documentation to your next provider. Signatures-by signing, you are acknowledging that this After Visit Summary has been reviewed with you and you have received a copy. Patient Signature:  ____________________________________________________________ Date:  ____________________________________________________________  
  
Marbella Vial Provider Signature:  ____________________________________________________________ Date:  ____________________________________________________________

## 2018-08-06 NOTE — PROGRESS NOTES
Checked back- pt's legs still numb. Nurse's to mobilize pt later on this evening. Will initiate PT tomorrow morning.

## 2018-08-06 NOTE — PROGRESS NOTES
Patient recently arrived on the floor. Nurse stated that pt's legs are very numb at present. Will plan to check back on pt later this afternoon to see if it will be safe to mobilize pt POD #0.

## 2018-08-06 NOTE — PERIOP NOTES
1155: RT leg adductor canal nerve block done by Dr Neo Sharp using 30cc of 0.5% ropivicaine with US guidance, with pt monitored, with O2 @ 2l/m/nc and IV sedation given. Pt tolerated procedure fairly well. VSS post block: 128/82-80-14, SaO2=96% on 2l/m/nc. See anesthesia note.

## 2018-08-07 LAB
ANION GAP SERPL CALC-SCNC: 10 MMOL/L (ref 5–15)
BUN SERPL-MCNC: 11 MG/DL (ref 6–20)
BUN/CREAT SERPL: 11 (ref 12–20)
CALCIUM SERPL-MCNC: 7.7 MG/DL (ref 8.5–10.1)
CHLORIDE SERPL-SCNC: 108 MMOL/L (ref 97–108)
CO2 SERPL-SCNC: 22 MMOL/L (ref 21–32)
CREAT SERPL-MCNC: 0.97 MG/DL (ref 0.7–1.3)
GLUCOSE SERPL-MCNC: 141 MG/DL (ref 65–100)
HGB BLD-MCNC: 14.5 G/DL (ref 12.1–17)
POTASSIUM SERPL-SCNC: 4.2 MMOL/L (ref 3.5–5.1)
SODIUM SERPL-SCNC: 140 MMOL/L (ref 136–145)

## 2018-08-07 PROCEDURE — 80048 BASIC METABOLIC PNL TOTAL CA: CPT | Performed by: PHYSICIAN ASSISTANT

## 2018-08-07 PROCEDURE — G8988 SELF CARE GOAL STATUS: HCPCS

## 2018-08-07 PROCEDURE — 74011250636 HC RX REV CODE- 250/636: Performed by: PHYSICIAN ASSISTANT

## 2018-08-07 PROCEDURE — 97116 GAIT TRAINING THERAPY: CPT

## 2018-08-07 PROCEDURE — 85018 HEMOGLOBIN: CPT | Performed by: PHYSICIAN ASSISTANT

## 2018-08-07 PROCEDURE — G8987 SELF CARE CURRENT STATUS: HCPCS

## 2018-08-07 PROCEDURE — 65270000029 HC RM PRIVATE

## 2018-08-07 PROCEDURE — 36415 COLL VENOUS BLD VENIPUNCTURE: CPT | Performed by: PHYSICIAN ASSISTANT

## 2018-08-07 PROCEDURE — 97535 SELF CARE MNGMENT TRAINING: CPT

## 2018-08-07 PROCEDURE — 74011250637 HC RX REV CODE- 250/637: Performed by: PHYSICIAN ASSISTANT

## 2018-08-07 PROCEDURE — 97530 THERAPEUTIC ACTIVITIES: CPT

## 2018-08-07 PROCEDURE — 97161 PT EVAL LOW COMPLEX 20 MIN: CPT

## 2018-08-07 PROCEDURE — 97110 THERAPEUTIC EXERCISES: CPT

## 2018-08-07 PROCEDURE — 97165 OT EVAL LOW COMPLEX 30 MIN: CPT

## 2018-08-07 RX ORDER — OXYCODONE HYDROCHLORIDE 5 MG/1
5-10 TABLET ORAL
Qty: 50 TAB | Refills: 0 | Status: ON HOLD | OUTPATIENT
Start: 2018-08-07 | End: 2019-02-06

## 2018-08-07 RX ORDER — ACETAMINOPHEN 500 MG
1000 TABLET ORAL EVERY 6 HOURS
Qty: 120 TAB | Refills: 0 | Status: ON HOLD | OUTPATIENT
Start: 2018-08-07 | End: 2019-02-06

## 2018-08-07 RX ORDER — MESALAMINE 800 MG/1
1600 TABLET, DELAYED RELEASE ORAL 3 TIMES DAILY
Status: DISCONTINUED | OUTPATIENT
Start: 2018-08-07 | End: 2018-08-08 | Stop reason: HOSPADM

## 2018-08-07 RX ADMIN — STANDARDIZED SENNA CONCENTRATE AND DOCUSATE SODIUM 1 TABLET: 8.6; 5 TABLET, FILM COATED ORAL at 18:06

## 2018-08-07 RX ADMIN — FAMOTIDINE 20 MG: 20 TABLET ORAL at 08:51

## 2018-08-07 RX ADMIN — OXYCODONE HYDROCHLORIDE 10 MG: 5 TABLET ORAL at 22:12

## 2018-08-07 RX ADMIN — ACETAMINOPHEN 1000 MG: 500 TABLET, FILM COATED ORAL at 12:18

## 2018-08-07 RX ADMIN — MESALAMINE 1600 MG: 800 TABLET, DELAYED RELEASE ORAL at 18:06

## 2018-08-07 RX ADMIN — ACETAMINOPHEN 1000 MG: 500 TABLET, FILM COATED ORAL at 06:27

## 2018-08-07 RX ADMIN — OXYCODONE HYDROCHLORIDE 10 MG: 5 TABLET ORAL at 19:07

## 2018-08-07 RX ADMIN — OXYCODONE HYDROCHLORIDE 5 MG: 5 TABLET ORAL at 16:18

## 2018-08-07 RX ADMIN — PRAMIPEXOLE DIHYDROCHLORIDE 0.5 MG: 0.25 TABLET ORAL at 22:01

## 2018-08-07 RX ADMIN — SODIUM CHLORIDE 125 ML/HR: 900 INJECTION, SOLUTION INTRAVENOUS at 07:53

## 2018-08-07 RX ADMIN — PANTOPRAZOLE SODIUM 40 MG: 40 TABLET, DELAYED RELEASE ORAL at 22:01

## 2018-08-07 RX ADMIN — OXYCODONE HYDROCHLORIDE 5 MG: 5 TABLET ORAL at 08:51

## 2018-08-07 RX ADMIN — RIVAROXABAN 10 MG: 10 TABLET, FILM COATED ORAL at 12:19

## 2018-08-07 RX ADMIN — Medication 10 ML: at 05:08

## 2018-08-07 RX ADMIN — ACETAMINOPHEN 1000 MG: 500 TABLET, FILM COATED ORAL at 00:07

## 2018-08-07 RX ADMIN — OXYCODONE HYDROCHLORIDE 10 MG: 5 TABLET ORAL at 12:18

## 2018-08-07 RX ADMIN — OXYCODONE HYDROCHLORIDE 5 MG: 5 TABLET ORAL at 05:08

## 2018-08-07 RX ADMIN — Medication 2 G: at 05:08

## 2018-08-07 RX ADMIN — FAMOTIDINE 20 MG: 20 TABLET ORAL at 18:06

## 2018-08-07 RX ADMIN — Medication 10 ML: at 22:04

## 2018-08-07 RX ADMIN — ACETAMINOPHEN 1000 MG: 500 TABLET, FILM COATED ORAL at 18:05

## 2018-08-07 NOTE — PROGRESS NOTES
Problem: Mobility Impaired (Adult and Pediatric)  Goal: *Acute Goals and Plan of Care (Insert Text)  Physical Therapy Goals  Initiated 8/7/2018    1. Patient will move from supine to sit and sit to supine  in bed with modified independence within 4 days. 2. Patient will perform sit to stand with modified independence within 4 days. 3. Patient will ambulate with modified independence for 150 feet with the least restrictive device within 4 days. 4. Patient will ascend/descend 5 stairs with 1 handrail(s) with modified independence within 4 days. 5. Patient will perform home exercise program per protocol with modified independence within 4 days. 6. Patient will demonstrate AROM 0-90 degrees in operative joint within 4 days. physical Therapy TREATMENT  Patient: Ladean Brunner (56 y.o. male)  Date: 8/7/2018  Diagnosis: DJD RIGHT KNEE  Primary osteoarthritis of right knee  Primary osteoarthritis of right knee Primary osteoarthritis of right knee  Procedure(s) (LRB):  RIGHT TOTAL KNEE ARTHROPLASTY  (CHOICE ANESTH) (Right) 1 Day Post-Op  Precautions: WBAT, Fall  Chart, physical therapy assessment, plan of care and goals were reviewed. ASSESSMENT:  Patient making continued progress with his mobility and ROM this afternoon with improved pain control. He did have some minimal oozing noted through the dressing after therapy session, and RN is aware. He was able to ambulate with a step through gait pattern and was trained on stairs. He was able to asc/desc 4 steps with railing and cane without difficulty. Performed all exercises in bed prior to gait and his ROM was -10 to 80 with AAROM in sitting. Patient is clear for D/C home from a mobility standpoint once all DME is here. Discussed activity recommendations and restrictions with patient and wife and they have education binder from pre-op class.   If there is MD concern about drainage, and he is not discharged tonight, we will follow up tomorrow morning to ensure good incisional integrity is maintained with activity and exercises. Progression toward goals:  [x]      Improving appropriately and progressing toward goals  []      Improving slowly and progressing toward goals  []      Not making progress toward goals and plan of care will be adjusted     PLAN:  Patient continues to benefit from skilled intervention to address the above impairments. Continue treatment per established plan of care. Discharge Recommendations:  Home Health  Further Equipment Recommendations for Discharge: All DME has been ordered     SUBJECTIVE:   I feel better with the higher dose of pain medicine    OBJECTIVE DATA SUMMARY:   Range of Motion:  AROM:  (R knee -10 to 80 with AAROM)                       Functional Mobility Training:  Bed Mobility:     Supine to Sit: Modified independent; Adaptive equipment; Additional time (using strap to assist RLE)  Sit to Supine: Modified independent; Adaptive equipment; Additional time (using strap to assist RLE)  Scooting: Modified independent        Transfers:  Sit to Stand: Modified independent; Adaptive equipment; Additional time  Stand to Sit: Modified independent; Adaptive equipment; Additional time                             Ambulation/Gait Training:  Distance (ft): 175 Feet (ft)  Assistive Device: Gait belt;Walker, rolling  Ambulation - Level of Assistance: Contact guard assistance; Adaptive equipment; Additional time        Gait Abnormalities: Antalgic;Decreased step clearance; Step to gait (partial step through with practice)  Right Side Weight Bearing: As tolerated  Left Side Weight Bearing: Full  Base of Support: Widened;Shift to left  Stance: Right decreased  Speed/Sophia: Pace decreased (<100 feet/min)  Step Length: Left shortened;Right shortened  Swing Pattern: Right asymmetrical     Interventions: Safety awareness training; Tactile cues; Verbal cues; Visual/Demos           Stairs:  Number of Stairs Trained: 4  Stairs - Level of Assistance: Supervision; Adaptive equipment; Additional time  Rail Use: Left  (plus cane)  Therapeutic Exercises:   Exercises performed per protocol. See morning treatment note for description. Pain:  Pain Scale 1: Numeric (0 - 10)  Pain Intensity 1: 5  Pain Location 1: Knee  Pain Orientation 1: Right  Pain Description 1: Aching  Pain Intervention(s) 1: Medication (see MAR)  Activity Tolerance:   Good  Please refer to the flowsheet for vital signs taken during this treatment.   After treatment:   [] Patient left in no apparent distress sitting up in chair  [x] Patient left in no apparent distress in bed, ice in place  [x] Call bell left within reach  [x] Nursing notified  [x] Caregiver present  [] Bed alarm activated    COMMUNICATION/COLLABORATION:   The patients plan of care was discussed with: Registered Nurse and     Tammy Woods PT   Time Calculation: 40 mins

## 2018-08-07 NOTE — PROGRESS NOTES
Bedside report given to Jung Church by Tami Mcfarlane RN. Information from the following report(s) SBAR, Kardex, Intake/Output and MAR.

## 2018-08-07 NOTE — PROGRESS NOTES
Care Management Interventions  PCP Verified by CM: Yes Ariela Lewis MD)  Palliative Care Criteria Met (RRAT>21 & CHF Dx)?: No  Mode of Transport at Discharge: Other (see comment) (family)  Transition of Care Consult (CM Consult): Home Health, Discharge Planning  [facility] [address] [address]: No  Reason Outside [place]: Managed care specific requirement (Patient is worker's comp and must use agency's that they contract with)  MyChart Signup: No  Discharge Durable Medical Equipment: Yes (worker's comp to order DME)  Health Maintenance Reviewed: Yes  Physical Therapy Consult: Yes  Occupational Therapy Consult: Yes  Speech Therapy Consult: No  Current Support Network: Lives with Spouse, Own Home  Confirm Follow Up Transport: Family  Plan discussed with Pt/Family/Caregiver: Yes  Freedom of Choice Offered: Yes   Resource Information Provided?: No  Discharge Location  Discharge Placement: Home with home health     Reason for Admission:RIGHT TOTAL KNEE ARTHROPLASTY                       RRAT Score:       2              Plan for utilizing home health:  Patient is worker's comp and must use the agency that worker's comp contracts with. Likelihood of Readmission:  no                         Transition of Care Plan:  Home with home health. CM met with patient and wife Damian Bal at bedside. CM obtained worker's comp information. Company is [employer]. Claim #[id number]. Worker's comp [place] is Bronwyn Gutierrez ([phone]). Heriberto Nelson is out of the office this week, so covering for her is Kandice Briones (YO#[phone], ZXM#[phone]). CM called [employer] and left voicemail message. CM faxed home health orders and DME orders to [facility]. Awaiting response. 3:05 PM: No response from worker's comp CM as of yet. CM called again and left voicemail to find out if they received the fax containing orders for equipment and home care that was sent this morning. Awaiting response.     Huy Hardin BSW/CRM

## 2018-08-07 NOTE — PROGRESS NOTES
Problem: Mobility Impaired (Adult and Pediatric)  Goal: *Acute Goals and Plan of Care (Insert Text)  Physical Therapy Goals  Initiated 8/7/2018    1. Patient will move from supine to sit and sit to supine  in bed with modified independence within 4 days. 2. Patient will perform sit to stand with modified independence within 4 days. 3. Patient will ambulate with modified independence for 150 feet with the least restrictive device within 4 days. 4. Patient will ascend/descend 5 stairs with 1 handrail(s) with modified independence within 4 days. 5. Patient will perform home exercise program per protocol with modified independence within 4 days. 6. Patient will demonstrate AROM 0-90 degrees in operative joint within 4 days. physical Therapy knee EVALUATION  Patient: Darius Wilkerson (89 y.o. male)  Date: 8/7/2018  Primary Diagnosis: DJD RIGHT KNEE  Primary osteoarthritis of right knee  Primary osteoarthritis of right knee  Procedure(s) (LRB):  RIGHT TOTAL KNEE ARTHROPLASTY  (CHOICE ANESTH) (Right) 1 Day Post-Op   Precautions:   WBAT, Fall    ASSESSMENT :  Based on the objective data described below, the patient presents with decreased mobility after R TKA POD1. Patient maintained stable VS during mobility assessment but was limited by pain in the R knee after mobility. He has a moderate amount of drainage from the R knee through the post op dressing, but RN and MD are aware. His extension is quite limited on the R and has been for years, but worked on educating wife and patient about optimal positioning to promote extension. Deferred flexion stretching at this time until post op ace bandage is removed and the draining areas can be visualized. Expect that he will progress well with his mobility as long as pain is adequately managed. He will need a RW, cane, elevated commode seat and tub bench for home use and he will need to be able to manage 5 steps to enter the home.     Patient will benefit from skilled intervention to address the above impairments. Patients rehabilitation potential is considered to be Good  Factors which may influence rehabilitation potential include:   []         None noted  []         Mental ability/status  [x]         Medical condition  []         Home/family situation and support systems  []         Safety awareness  [x]         Pain tolerance/management  []         Other:      PLAN :  Recommendations and Planned Interventions:  [x]           Bed Mobility Training             []    Neuromuscular Re-Education  [x]           Transfer Training                   []    Orthotic/Prosthetic Training  [x]           Gait Training                         []    Modalities  [x]           Therapeutic Exercises           []    Edema Management/Control  [x]           Therapeutic Activities            [x]    Patient and Family Training/Education  []           Other (comment):    Frequency/Duration: Patient will be followed by physical therapy twice daily to address goals. Discharge Recommendations: Home Health  Further Equipment Recommendations for Discharge: straight cane, rolling walker and tub bench and elevated commode seat     SUBJECTIVE:   Patient stated I feel better getting up, I think.     OBJECTIVE DATA SUMMARY:   HISTORY:    Past Medical History:   Diagnosis Date    Asthma     Autoimmune disease ([facility] [address]     CROHNS DISEASE    Chronic pain     KNEES AND BACK    Fatty liver     GERD (gastroesophageal reflux disease)     HTN (hypertension)     NO MEDS    Hyperlipidemia     Ill-defined condition     HIATAL HERNIA    Migraine     Sleep apnea     USES CPAP     Past Surgical History:   Procedure Laterality Date    HX HERNIA REPAIR Left 1986    INGUINAL    HX KNEE ARTHROSCOPY Right 2010     Prior Level of Function/Home Situation: independent mobility, lives with wife and 5 steps to enter, has no DME at home  Personal factors and/or comorbidities impacting plan of care: R TKA with post op pain and weakness and limited ROM         EXAMINATION/PRESENTATION/DECISION MAKING:   Critical Behavior:  Neurologic State: Alert  Orientation Level: Oriented X4  Cognition: Appropriate for age attention/concentration     Hearing:     Skin:  Moderate serosanguinous drainage R knee  Range Of Motion:  AROM: Within functional limits (-20 ext R knee, flex not tested due to drainage)                       Strength:    Strength: Within functional limits (RLE limited by pain, otherwise [facility])                    Tone & Sensation:   Tone: Normal              Sensation: Intact               Coordination:  Coordination: Within functional limits  Vision:      Functional Mobility:  Bed Mobility:     Supine to Sit: Modified independent; Adaptive equipment; Additional time (using strap to assist RLE)  Sit to Supine: Modified independent; Adaptive equipment; Additional time (using strap to assist RLE)  Scooting: Modified independent  Transfers:  Sit to Stand: Supervision; Adaptive equipment; Additional time  Stand to Sit: Supervision; Adaptive equipment; Additional time                       Balance:   Sitting: Intact; Without support  Standing: Intact; With support  Ambulation/Gait Training:  Distance (ft): 120 Feet (ft)  Assistive Device: Gait belt;Walker, rolling  Ambulation - Level of Assistance: Contact guard assistance; Adaptive equipment; Additional time        Gait Abnormalities: Antalgic;Decreased step clearance; Step to gait (partial step through with practice)  Right Side Weight Bearing: As tolerated  Left Side Weight Bearing: Full  Base of Support: Widened;Shift to left  Stance: Right decreased  Speed/Sophia: Pace decreased (<100 feet/min)  Step Length: Left shortened;Right shortened  Swing Pattern: Right asymmetrical     Interventions: Safety awareness training; Tactile cues; Verbal cues; Visual/Demos            Stairs: Therapeutic Exercises:    Ankle pumps, quad sets    Functional Measure:  Barthel Index:    Bathing: 0  Bladder: 10  Bowels: 10  Groomin  Dressin  Feeding: 10  Mobility: 0  Stairs: 0  Toilet Use: 5  Transfer (Bed to Chair and Back): 10  Total: 55       Barthel and G-code impairment scale:  Percentage of impairment CH  0% CI  1-19% CJ  20-39% CK  40-59% CL  60-79% CM  80-99% CN  100%   Barthel Score 0-100 100 99-80 79-60 59-40 20-39 1-19   0   Barthel Score 0-20 20 17-19 13-16 9-12 5-8 1-4 0      The Barthel ADL Index: Guidelines  1. The index should be used as a record of what a patient does, not as a record of what a patient could do. 2. The main aim is to establish degree of independence from any help, physical or verbal, however minor and for whatever reason. 3. The need for supervision renders the patient not independent. 4. A patient's performance should be established using the best available evidence. Asking the patient, friends/relatives and nurses are the usual sources, but direct observation and common sense are also important. However direct testing is not needed. 5. Usually the patient's performance over the preceding 24-48 hours is important, but occasionally longer periods will be relevant. 6. Middle categories imply that the patient supplies over 50 per cent of the effort. 7. Use of aids to be independent is allowed. Fabrizio Linares., Barthel, D.W. (0695). Functional evaluation: the Barthel Index. [facility] (14)2. CAROLINA DejesusF, Alice Nolen., Roberto Marie., [place], [address] [place] [address] (). Measuring the change indisability after inpatient rehabilitation; comparison of the responsiveness of the Barthel Index and Functional [place] Measure. Journal of Neurology, Neurosurgery, and Psychiatry, 66(4), 499-494. Dong Grewal, N.RE.A, Tiki Delcid  W.J.M, & Elvis Chacon MFADI. (2004.) Assessment of post-stroke quality of life in cost-effectiveness studies: The usefulness of the Barthel Index and the EuroQoL-5D. Quality of Life Research, 13, 862-03         G codes:   In compliance with CMSs Claims Based Outcome Reporting, the following G-code set was chosen for this patient based on their primary functional limitation being treated: The outcome measure chosen to determine the severity of the functional limitation was the Barthel with a score of 55/100 which was correlated with the impairment scale. ? Mobility - Walking and Moving Around:     - CURRENT STATUS: CK - 40%-59% impaired, limited or restricted    - GOAL STATUS: CI - 1%-19% impaired, limited or restricted    - D/C STATUS:  ---------------To be determined---------------        Physical Therapy Evaluation Charge Determination   History Examination Presentation Decision-Making   MEDIUM  Complexity : 1-2 comorbidities / personal factors will impact the outcome/ POC  MEDIUM Complexity : 3 Standardized tests and measures addressing body structure, function, activity limitation and / or participation in recreation  MEDIUM Complexity : Evolving with changing characteristics  LOW Complexity : FOTO score of       Based on the above components, the patient evaluation is determined to be of the following complexity level: LOW     Pain:  Pain Scale 1: Numeric (0 - 10)  Pain Intensity 1: 3  Pain Location 1: Knee  Pain Orientation 1: Right  Pain Description 1: Sore  Pain Intervention(s) 1: Medication (see MAR)  Activity Tolerance:   Good, limited by pain  Please refer to the flowsheet for vital signs taken during this treatment. After treatment:   []         Patient left in no apparent distress sitting up in chair  [x]         Patient left in no apparent distress in bed, ice in place  [x]         Call bell left within reach  [x]         Nursing notified  [x]         Caregiver present  []         Bed alarm activated    COMMUNICATION/EDUCATION:   The patients plan of care was discussed with: Occupational Therapist, Registered Nurse,  and Rehabilitation Attendant.   [x]         Fall prevention education was provided and the patient/caregiver indicated understanding. [x]         Patient/family have participated as able in goal setting and plan of care. [x]         Patient/family agree to work toward stated goals and plan of care. []         Patient understands intent and goals of therapy, but is neutral about his/her participation. []         Patient is unable to participate in goal setting and plan of care.     Thank you for this referral.  Tg Álvarez, PT   Time Calculation: 35 mins

## 2018-08-07 NOTE — PROGRESS NOTES
Problem: Self Care Deficits Care Plan (Adult)  Goal: *Acute Goals and Plan of Care (Insert Text)  Occupational Therapy Goals  Initiated: 8/7/2018   1. Patient will perform grooming with supervision/set-up standing at sink within 3 day(s). 2.  Patient will perform bathing with supervision/set-up from chair within 3 day(s). 3.  Patient will perform upper body dressing and lower body dressing with supervision/set-up within 3 day(s). 4.  Patient will perform toilet transfers with supervision/set-up within 3 day(s). 5.  Patient will perform all aspects of toileting with supervision/set-up within 3 day(s). Occupational Therapy EVALUATION  Patient: Hansa Pool (30 y.o. male)  Date: 8/7/2018  Primary Diagnosis: DJD RIGHT KNEE  Primary osteoarthritis of right knee  Primary osteoarthritis of right knee  Procedure(s) (LRB):  RIGHT TOTAL KNEE ARTHROPLASTY  (CHOICE ANESTH) (Right) 1 Day Post-Op   Precautions:   Fall, WBAT    ASSESSMENT :  Based on the objective data described below, the patient presents with decreased independence with self care and functional mobility following R TKR. Pt with increased drainage in right knee following PT intervention and declined to progress OOB again as nursing just complete another dressing change. Pt discussed tub/transfer for home and use of tub transfer bench. Pt reports understanding of education provided. Pt in agreement for OT to follow up tomorrow for lower body dressing and toileting training. Recommend home with family support and assistance. Patient will benefit from skilled intervention to address the above impairments.   Patients rehabilitation potential is considered to be Good  Factors which may influence rehabilitation potential include:   [x]             None noted  []             Mental ability/status  []             Medical condition  []             Home/family situation and support systems  []             Safety awareness  []             Pain tolerance/management  []             Other:      PLAN :  Recommendations and Planned Interventions:  [x]               Self Care Training                  [x]        Therapeutic Activities  [x]               Functional Mobility Training    []        Cognitive Retraining  [x]               Therapeutic Exercises           [x]        Endurance Activities  [x]               Balance Training                   []        Neuromuscular Re-Education  []               Visual/Perceptual Training     [x]   Home Safety Training  [x]               Patient Education                 [x]        Family Training/Education  []               Other (comment):    Frequency/Duration: Patient will be followed by occupational therapy 5 times a week to address goals. Discharge Recommendations: None  Further Equipment Recommendations for Discharge: TBD     SUBJECTIVE:   Patient stated I am feeling alright.     OBJECTIVE DATA SUMMARY:   HISTORY:   Past Medical History:   Diagnosis Date    Asthma     Autoimmune disease ([facility] [address]     CROHNS DISEASE    Chronic pain     KNEES AND BACK    Fatty liver     GERD (gastroesophageal reflux disease)     HTN (hypertension)     NO MEDS    Hyperlipidemia     Ill-defined condition     HIATAL HERNIA    Migraine     Sleep apnea     USES CPAP     Past Surgical History:   Procedure Laterality Date    HX HERNIA REPAIR Left 1986    INGUINAL    HX KNEE ARTHROSCOPY Right 2010       Prior Level of Function/Environment/Context: pt was independent at home prior to surgery.       Expanded or extensive additional review of patient history:     Home Situation  Home Environment: Private residence  One/Two Story Residence: One story  Living Alone: No  Support Systems: Spouse/Significant Other/Partner  Patient Expects to be Discharged to[de-identified] Private residence  Current DME Used/Available at Home: None    Hand dominance: Right    EXAMINATION OF PERFORMANCE DEFICITS:  Cognitive/Behavioral Status:  Neurologic State: Alert  Orientation Level: Oriented X4  Cognition: Appropriate for age attention/concentration  Perception: Appears intact  Perseveration: No perseveration noted  Safety/Judgement: Good awareness of safety precautions    Skin: dressing intact; just changed prior to entry    Edema:  None noted    Hearing: Auditory  Auditory Impairment: None    Vision/Perceptual:                           Acuity: Within Defined Limits         Range of Motion:    AROM: Within functional limits  PROM: Within functional limits                      Strength:    Strength: Within functional limits                Coordination:  Coordination: Within functional limits  Fine Motor Skills-Upper: Right Intact; Left Intact    Gross Motor Skills-Upper: Right Intact; Left Intact    Tone & Sensation:    Tone: Normal  Sensation: Intact                      Balance:  Sitting: Intact  Standing:  (declined OOB due to drainage in knee)    Functional Mobility and Transfers for ADLs:  Bed Mobility:  Supine to Sit:  (declined OOB due to drainage in knee)  Sit to Supine:  (declined OOB due to drainage in knee)  Scooting: Modified independent    Transfers:  Sit to Stand:  (declined OOB due to drainage in knee)  Stand to Sit:  (declined OOB due to drainage in knee)  Bed to Chair:  (declined OOB due to drainage in knee)  Toilet Transfer :  (declined OOB due to drainage in knee)    ADL Assessment:  Feeding: Supervision    Oral Facial Hygiene/Grooming: Supervision    Bathing: Moderate assistance    Upper Body Dressing: Supervision    Lower Body Dressing: Moderate assistance    Toileting:  Moderate assistance                ADL Intervention and task modifications:                                     Cognitive Retraining  Safety/Judgement: Good awareness of safety precautions    Functional Measure:  Barthel Index:    Bathin  Bladder: 10  Bowels: 10  Groomin  Dressin  Feeding: 10  Mobility: 0  Stairs: 0  Toilet Use: 5  Transfer (Bed to Chair and Back): 10  Total: 55       Barthel and G-code impairment scale:  Percentage of impairment CH  0% CI  1-19% CJ  20-39% CK  40-59% CL  60-79% CM  80-99% CN  100%   Barthel Score 0-100 100 99-80 79-60 59-40 20-39 1-19   0   Barthel Score 0-20 20 17-19 13-16 9-12 5-8 1-4 0      The Barthel ADL Index: Guidelines  1. The index should be used as a record of what a patient does, not as a record of what a patient could do. 2. The main aim is to establish degree of independence from any help, physical or verbal, however minor and for whatever reason. 3. The need for supervision renders the patient not independent. 4. A patient's performance should be established using the best available evidence. Asking the patient, friends/relatives and nurses are the usual sources, but direct observation and common sense are also important. However direct testing is not needed. 5. Usually the patient's performance over the preceding 24-48 hours is important, but occasionally longer periods will be relevant. 6. Middle categories imply that the patient supplies over 50 per cent of the effort. 7. Use of aids to be independent is allowed. Sarah Almendarez., Barthel, D.W. ([id number]). Functional evaluation: the Barthel Index. [address] [facility] (14)2. Samara Sher maria luisa CAROLINA HirschF, Jess Garcia., Juan Wagoner., [place], [address] [place] [address] (1999). Measuring the change indisability after inpatient rehabilitation; comparison of the responsiveness of the Barthel Index and Functional [place] Measure. Journal of Neurology, Neurosurgery, and Psychiatry, 66(4), 723-306. Evie German, KATIE.RE.A, TOMASA Groves, & Cisco Yang, MTraeA. (2004.) Assessment of post-stroke quality of life in cost-effectiveness studies: The usefulness of the Barthel Index and the EuroQoL-5D. Quality of Life Research, 13, 904-78     G codes:   In compliance with CMSs Claims Based Outcome Reporting, the following G-code set was chosen for this patient based on their primary functional limitation being treated: The outcome measure chosen to determine the severity of the functional limitation was the Barthel Index with a score of 55/100 which was correlated with the impairment scale. ? Self Care:     - CURRENT STATUS: CK - 40%-59% impaired, limited or restricted    - GOAL STATUS: CI - 1%-19% impaired, limited or restricted    - D/C STATUS:  ---------------To be determined---------------     Occupational Therapy Evaluation Charge Determination   History Examination Decision-Making   LOW Complexity : Brief history review  MEDIUM Complexity : 3-5 performance deficits relating to physical, cognitive , or psychosocial skils that result in activity limitations and / or participation restrictions MEDIUM Complexity : Patient may present with comorbidities that affect occupational performnce. Miniml to moderate modification of tasks or assistance (eg, physical or verbal ) with assesment(s) is necessary to enable patient to complete evaluation       Based on the above components, the patient evaluation is determined to be of the following complexity level: LOW   Pain:  Pain Scale 1: Numeric (0 - 10)  Pain Intensity 1: 5  Pain Location 1: Knee  Pain Orientation 1: Right  Pain Description 1: Aching  Pain Intervention(s) 1: Medication (see MAR)  Activity Tolerance:   VSS throughout session. After treatment:   [x] Patient left in no apparent distress sitting up in chair  [] Patient left in no apparent distress in bed  [x] Call bell left within reach  [x] Nursing notified  [] Caregiver present  [] Bed alarm activated    COMMUNICATION/EDUCATION:   The patients plan of care was discussed with: Physical Therapist and Registered Nurse. [x] Home safety education was provided and the patient/caregiver indicated understanding. [x] Patient/family have participated as able in goal setting and plan of care. [] Patient/family agree to work toward stated goals and plan of care.   [] Patient understands intent and goals of therapy, but is neutral about his/her participation. [] Patient is unable to participate in goal setting and plan of care. This patients plan of care is appropriate for delegation to RE[facility].     Thank you for this referral.  eDbbie Gerber OT  Time Calculation: 13 mins

## 2018-08-07 NOTE — PROGRESS NOTES
Ortho Progress Note  1 Day Post-Op  Procedure(s):  RIGHT TOTAL KNEE ARTHROPLASTY  (CHOICE ANESTH)    Subjective: Pt doing well this am, c/o pain after working with PT and drainage from incision after working with PT. Pt otherwise doing well and pain is well controlled on current regimen. Pt denies N/V, lightheadedness, dizziness, SOB, or abdominal pain. Physical Exam:   Visit Vitals    /74 (BP 1 Location: Left arm, BP Patient Position: At rest)    Pulse 73    Temp 97.5 °F (36.4 °C)    Resp 16    Ht 5' 11\" (1.803 m)    Wt 96.7 kg (213 lb 3 oz)    SpO2 99%    BMI 29.73 kg/m2     General appearance: alert, cooperative, no distress, appears stated age  Abdomen: soft, non-tender.  Bowel sounds normal. No masses,  no organomegaly  Extremities: Homans sign is negative, no sign of DVT, limited ROM R knee 2/2 pain and bulky dressing, thigh is soft and nontender, calf is soft and nontender  Pulses: 2+ and symmetric  Wound: bulky dressing c/d/i, moderate sanguinous drainage noted on anterior bandage  Neurologic: Grossly normal, ankle dorsi/plantar flexion intact, heel raise intact    Recent Results (from the past 24 hour(s))   GLUCOSE, POC    Collection Time: 08/06/18 11:41 AM   Result Value Ref Range    Glucose (POC) 81 65 - 100 mg/dL    [facility]    METABOLIC PANEL, BASIC    Collection Time: 08/07/18  5:07 AM   Result Value Ref Range    Sodium 140 136 - 145 mmol/L    Potassium 4.2 3.5 - 5.1 mmol/L    Chloride 108 97 - 108 mmol/L    CO2 22 21 - 32 mmol/L    Anion gap 10 5 - 15 mmol/L    Glucose 141 (H) 65 - 100 mg/dL    BUN 11 6 - 20 MG/DL    Creatinine 0.97 0.70 - 1.30 MG/DL    BUN/Creatinine ratio 11 (L) 12 - 20      GFR est AA >60 >60 ml/min/1.73m2    GFR est non-AA >60 >60 ml/min/1.73m2    Calcium 7.7 (L) 8.5 - 10.1 MG/DL   HEMOGLOBIN    Collection Time: 08/07/18  5:07 AM   Result Value Ref Range    HGB 14.5 12.1 - 17.0 g/dL       Assessment:  Stable Post Op    Plan:  DVT Prophylaxis:Xarelto x 2 weeks  Physical Therapy: WBAT                 Pt will require extensive PT prior to discharge home due to pain, gait instability, and limited social support.   Discharge Planning: home with home health, possibly today if cleared by PT                                     F/u in office in 2 weeks  Pain control: tylenol, mobic, oxycodone 5-10mg

## 2018-08-07 NOTE — PROGRESS NOTES
Bedside and Verbal shift change report given to Aileen Maldonado (oncoming nurse) by Trixie Riedel (offgoing nurse). Report included the following information SBAR, Kardex, Intake/Output, MAR and Recent Results.

## 2018-08-08 VITALS
WEIGHT: 213.19 LBS | HEIGHT: 71 IN | HEART RATE: 87 BPM | DIASTOLIC BLOOD PRESSURE: 90 MMHG | SYSTOLIC BLOOD PRESSURE: 152 MMHG | RESPIRATION RATE: 16 BRPM | OXYGEN SATURATION: 97 % | TEMPERATURE: 98 F | BODY MASS INDEX: 29.85 KG/M2

## 2018-08-08 LAB — HGB BLD-MCNC: 13.2 G/DL (ref 12.1–17)

## 2018-08-08 PROCEDURE — 74011250637 HC RX REV CODE- 250/637: Performed by: PHYSICIAN ASSISTANT

## 2018-08-08 PROCEDURE — 85018 HEMOGLOBIN: CPT | Performed by: PHYSICIAN ASSISTANT

## 2018-08-08 PROCEDURE — 36415 COLL VENOUS BLD VENIPUNCTURE: CPT | Performed by: PHYSICIAN ASSISTANT

## 2018-08-08 PROCEDURE — 97535 SELF CARE MNGMENT TRAINING: CPT

## 2018-08-08 PROCEDURE — 97116 GAIT TRAINING THERAPY: CPT

## 2018-08-08 RX ADMIN — OXYCODONE HYDROCHLORIDE 10 MG: 5 TABLET ORAL at 07:31

## 2018-08-08 RX ADMIN — OXYCODONE HYDROCHLORIDE 10 MG: 5 TABLET ORAL at 01:31

## 2018-08-08 RX ADMIN — FAMOTIDINE 20 MG: 20 TABLET ORAL at 09:00

## 2018-08-08 RX ADMIN — OXYCODONE HYDROCHLORIDE 10 MG: 5 TABLET ORAL at 10:53

## 2018-08-08 RX ADMIN — STANDARDIZED SENNA CONCENTRATE AND DOCUSATE SODIUM 1 TABLET: 8.6; 5 TABLET, FILM COATED ORAL at 17:05

## 2018-08-08 RX ADMIN — RIVAROXABAN 10 MG: 10 TABLET, FILM COATED ORAL at 11:53

## 2018-08-08 RX ADMIN — STANDARDIZED SENNA CONCENTRATE AND DOCUSATE SODIUM 1 TABLET: 8.6; 5 TABLET, FILM COATED ORAL at 09:00

## 2018-08-08 RX ADMIN — ACETAMINOPHEN 1000 MG: 500 TABLET, FILM COATED ORAL at 14:07

## 2018-08-08 RX ADMIN — OXYCODONE HYDROCHLORIDE 10 MG: 5 TABLET ORAL at 17:05

## 2018-08-08 RX ADMIN — MESALAMINE 1600 MG: 800 TABLET, DELAYED RELEASE ORAL at 09:00

## 2018-08-08 RX ADMIN — ACETAMINOPHEN 1000 MG: 500 TABLET, FILM COATED ORAL at 00:26

## 2018-08-08 RX ADMIN — POLYETHYLENE GLYCOL 3350 17 G: 17 POWDER, FOR SOLUTION ORAL at 09:02

## 2018-08-08 RX ADMIN — OXYCODONE HYDROCHLORIDE 10 MG: 5 TABLET ORAL at 14:07

## 2018-08-08 RX ADMIN — OXYCODONE HYDROCHLORIDE 10 MG: 5 TABLET ORAL at 04:35

## 2018-08-08 RX ADMIN — Medication 10 ML: at 14:23

## 2018-08-08 RX ADMIN — Medication 10 ML: at 07:31

## 2018-08-08 RX ADMIN — ACETAMINOPHEN 1000 MG: 500 TABLET, FILM COATED ORAL at 07:31

## 2018-08-08 NOTE — PROGRESS NOTES
Family expressed concern that home medication dose and times are not the same as the hospital schedule. Family would like to have medications evaluated and adjusted to the home dose and times, if possible.

## 2018-08-08 NOTE — PROGRESS NOTES
Problem: Mobility Impaired (Adult and Pediatric)  Goal: *Acute Goals and Plan of Care (Insert Text)  Physical Therapy Goals  Initiated 8/7/2018    1. Patient will move from supine to sit and sit to supine  in bed with modified independence within 4 days. 2. Patient will perform sit to stand with modified independence within 4 days. 3. Patient will ambulate with modified independence for 150 feet with the least restrictive device within 4 days. 4. Patient will ascend/descend 5 stairs with 1 handrail(s) with modified independence within 4 days. 5. Patient will perform home exercise program per protocol with modified independence within 4 days. 6. Patient will demonstrate AROM 0-90 degrees in operative joint within 4 days. physical Therapy TREATMENT  Patient: Albina Babinski (64 y.o. male)  Date: 8/8/2018  Diagnosis: DJD RIGHT KNEE  Primary osteoarthritis of right knee  Primary osteoarthritis of right knee Primary osteoarthritis of right knee  Procedure(s) (LRB):  RIGHT TOTAL KNEE ARTHROPLASTY  (CHOICE ANESTH) (Right) 2 Days Post-Op  Precautions: Fall, WBAT  Chart, physical therapy assessment, plan of care and goals were reviewed. ASSESSMENT:  Patient received standing in the room with RW with spouse present in room. Patient agreeable to therapy despite complaints of increased stiffness in R knee. Patient has been up ad forest in the room with RW. Patient ambulated in the hallway with CGA demonstrated decreased liu, and varied between step to and step through gait pattern. Verbal cues provided for knee extension during R stance phase. Patient declined stair training as he was cleared yesterday and felt comfortable. Patient performed seated LE therex and able to recall supine therex. Patient has made excellent progress with acute PT goals. Pt educated on importance of mobilizing 1x every hour he is at home and continue HEP. Patient is cleared from a PT standpoint to discharge home with HHPT.       Progression toward goals:  [x]      Improving appropriately and progressing toward goals  []      Improving slowly and progressing toward goals  []      Not making progress toward goals and plan of care will be adjusted     PLAN:  Patient continues to benefit from skilled intervention to address the above impairments. Continue treatment per established plan of care. Discharge Recommendations:  Home Health  Further Equipment Recommendations for Discharge:  Equipment has been ordered     SUBJECTIVE:   Patient stated I'm more stiff today than I was yesterday.     OBJECTIVE DATA SUMMARY:   Critical Behavior:  Neurologic State: Alert, Appropriate for age  Orientation Level: Oriented X4  Cognition: Appropriate safety awareness, Appropriate decision making, Appropriate for age attention/concentration  Safety/Judgement: Good awareness of safety precautions  Range of Motion:                          Functional Mobility Training:  Bed Mobility:                    Transfers:  Sit to Stand: Stand-by assistance  Stand to Sit: Stand-by assistance                             Balance:  Sitting: Intact  Standing: Intact; With support  Ambulation/Gait Training:  Distance (ft): 100 Feet (ft)  Assistive Device: Gait belt;Walker, rolling  Ambulation - Level of Assistance: Stand-by assistance        Gait Abnormalities: Decreased step clearance; Antalgic        Base of Support: Widened;Shift to left  Stance: Right decreased  Speed/Sophia: Pace decreased (<100 feet/min)  Step Length: Right shortened;Left shortened                    Stairs:            Therapeutic Exercises:     EXERCISE   Sets   Reps   Active Active Assist   Passive Self ROM   Comments   Ankle Pumps  10 [x]                                        []                                        []                                        []                                           Short Arc Quads  10 [x]                                        []                                        [] []                                           Heel Slides  10 [x]                                        []                                        []                                        []                                             Pain:  Pain Scale 1: Numeric (0 - 10)  Pain Intensity 1: 5  Pain Location 1: Knee  Pain Orientation 1: Right  Pain Description 1: Aching  Pain Intervention(s) 1: Medication (see MAR); Distraction; Family Support; Exercise  Activity Tolerance:   Good. VSS despite c/o pain  Please refer to the flowsheet for vital signs taken during this treatment.   After treatment:   [x] Patient left in no apparent distress sitting up in chair  [] Patient left in no apparent distress in bed  [x] Call bell left within reach  [x] Nursing notified  [x] Caregiver present  [] Bed alarm activated    COMMUNICATION/COLLABORATION:   The patients plan of care was discussed with: Occupational Therapist and Registered Nurse    Morro Gonzales, PT, DPT   Time Calculation: 10 mins

## 2018-08-08 NOTE — PROGRESS NOTES
Ortho Progress Note  2 Days Post-Op  Procedure(s):  RIGHT TOTAL KNEE ARTHROPLASTY  (CHOICE ANESTH)    Subjective: Complains of pain    Physical Exam:   Visit Vitals    /87 (BP 1 Location: Right arm, BP Patient Position: At rest)    Pulse 83    Temp 98.1 °F (36.7 °C)    Resp 16    Ht 5' 11\" (1.803 m)    Wt 96.7 kg (213 lb 3 oz)    SpO2 97%    BMI 29.73 kg/m2     General appearance: alert, cooperative, no distress, appears stated age  Abdomen: soft, non-tender.  Bowel sounds normal. No masses,  no organomegaly  Extremities: no edema, redness or tenderness in the calves or thighs,  Pulses: 2+ and symmetric  Wound: Dressing intact  Neurologic: Grossly normal    Recent Results (from the past 24 hour(s))   HEMOGLOBIN    Collection Time: 08/08/18  3:31 AM   Result Value Ref Range    HGB 13.2 12.1 - 17.0 g/dL       Assessment:  Stable Post Op    Plan:  DVT Prophylaxis:Xarelto  Physical Therapy: WBAT  Discharge Planning  Pain control: Adequate

## 2018-08-08 NOTE — PROGRESS NOTES
NUTRITION       Nutrition screening referral was triggered based on results obtained during nursing admission assessment for 14-23# wt loss, poor appetite. S/p DJD right knee on 8/6. Wt has been relatively stable. Around 213-19# over past 4 years. Wt Readings from Last 4 Encounters:   08/07/18 96.7 kg (213 lb 3 oz)   07/23/18 96.7 kg (213 lb 2 oz)   07/24/14 99.3 kg (219 lb)   01/23/14 105.7 kg (233 lb)     Appetite has been good this admit. Patient Vitals for the past 100 hrs:   % Diet Eaten   08/08/18 0852 100 %   08/07/18 1618 100 %   08/07/18 0856 100 %     The patient's chart was reviewed and nutrition assessment is not indicated at this time. Plan to see patient for rescreen as indicated. Thank you.      Mathieu Marie RD

## 2018-08-08 NOTE — DISCHARGE SUMMARY
Orthopedic Service Discharge Summary    Patient ID:  Rk Villalobos  [id number]  male  63 y.o.  1960    Admit date: 8/6/2018    Discharge date and time: No discharge date for patient encounter. Admitting Physician: Yanni Vega MD     Discharge Physician: Yanni Vega MD    Consulting Physician(s): Treatment Team: Attending Provider: Yanni Vega MD; Care Manager: Chandni Pretty    Date of Surgery: 8/6/2018     Preoperative Diagnosis:  DJD RIGHT KNEE    Postoperative Diagnosis: DJD RIGHT KNEE    Procedure(s): Procedure(s):  RIGHT TOTAL KNEE ARTHROPLASTY  (CHOICE ANESTH)    Surgeon: Surgeon(s) and Role:     Marleni Farrell MD - Primary      Anesthesia:  Spinal    Preoperative Medical Clearance:                           HPI:  Pt is a 63 y.o. male who has a history of DJD RIGHT KNEE  Primary osteoarthritis of right knee  Primary osteoarthritis of right knee  with pain and limitations of activities of daily living who presents at this time for a right TKR following the failure of conservative management. PMH:   Past Medical History:   Diagnosis Date    Asthma     Autoimmune disease ([facility] [address]     CROHNS DISEASE    Chronic pain     KNEES AND BACK    Fatty liver     GERD (gastroesophageal reflux disease)     HTN (hypertension)     NO MEDS    Hyperlipidemia     Ill-defined condition     HIATAL HERNIA    Migraine     Sleep apnea     USES CPAP       Medications upon admission :   Prior to Admission Medications   Prescriptions Last Dose Informant Patient Reported? Taking? MESALAMINE PO 8/5/2018 at Unknown time  Yes Yes   Sig: Take 1,200 mg by mouth daily. 4.8 GRAMS DAILY    TESTOSTERONE IM 8/5/2018 at Unknown time  Yes Yes   Sig: by IntraMUSCular route every seven (7) days. SUNDAYS   azaTHIOprine (IMURAN) 50 mg tablet 8/5/2018 at Unknown time  Yes Yes   Sig: Take 100 mg by mouth nightly.    omeprazole (PRILOSEC) 40 mg capsule 8/5/2018 at Unknown time  Yes Yes   Sig: Take 40 mg by mouth nightly. pramipexole (MIRAPEX) 0.5 mg tablet 8/5/2018 at Unknown time  Yes Yes   Sig: Take 0.5 mg by mouth nightly. Facility-Administered Medications: None        Allergies:  No Known Allergies     Hospital Course: The patient underwent surgery. Complications:  None; patient tolerated the procedure well. Was taken to the PACU in stable condition and then transferred to the Orthopedics floor. Condition on discharge:  Stable    Postoperative Pain Management:  Oxycodone, Meloxicam    DVT Prophylaxis: Xarelto Sequential Compression Devices    Postoperative transfusions:     none Banked PRBCs     Post Op complications: none    Hemoglobin at discharge:    Lab Results   Component Value Date/Time    HGB 13.2 08/08/2018 03:31 AM    INR 1.0 07/23/2018 11:30 AM         Incision - clean, dry and intact. No significant erythema or swelling. Neurovascular exam within normal limits. Wound appears to be healing without any evidence of infection. Physical Therapy started on the day following surgery and progressed to ambulation with the aid of a rolling walker for distances of **50 feet with modified independence. Range of motion  limited by pain. At the time of discharge, able to go up and down stairs and had understanding of precautions needed following surgery. Discharged to: Home. Discharge instructions:  -See Full Summary of discharge instructions attached  -Anticoagulate with Xarelto  -Resume pre hospital diet            -Resume home medications   Current Discharge Medication List      START taking these medications    Details   acetaminophen (TYLENOL) 500 mg tablet Take 2 Tabs by mouth every six (6) hours. Qty: 120 Tab, Refills: 0    Associated Diagnoses: Primary osteoarthritis of right knee      oxyCODONE IR (ROXICODONE) 5 mg immediate release tablet Take 1-2 Tabs by mouth every four (4) hours as needed.  Max Daily Amount: 60 mg.  Qty: 50 Tab, Refills: 0    Associated Diagnoses: Primary osteoarthritis of right knee      rivaroxaban (XARELTO) 10 mg tablet Take 1 Tab by mouth daily (with lunch). Indications: KNEE REPLACEMENT DEEP VEIN THROMBOSIS PREVENTION  Qty: 13 Tab, Refills: 0    Associated Diagnoses: Primary osteoarthritis of right knee         CONTINUE these medications which have NOT CHANGED    Details   azaTHIOprine (IMURAN) 50 mg tablet Take 100 mg by mouth nightly. MESALAMINE PO Take 1,200 mg by mouth daily. 4.8 GRAMS DAILY       pramipexole (MIRAPEX) 0.5 mg tablet Take 0.5 mg by mouth nightly. omeprazole (PRILOSEC) 40 mg capsule Take 40 mg by mouth nightly. TESTOSTERONE IM by IntraMUSCular route every seven (7) days.  SUNDAYS          per medical continuation form  -Discharge activity: Activity as tolerated  -Ambulate with Walkers, Type:  Walker, appropriate total joint protocol  -Wound Care Keep wound clean and dry, Reinforce dressing PRN, Ice to area for comfort and As directed  -Follow up in office in 2 weeks      Signed:  Abilio Hernandez MD  8/8/2018  8:06 Morgan Del Toro MD

## 2018-08-08 NOTE — PROGRESS NOTES
Problem: Self Care Deficits Care Plan (Adult)  Goal: *Acute Goals and Plan of Care (Insert Text)  Occupational Therapy Goals  Initiated: 8/7/2018   1. Patient will perform grooming with supervision/set-up standing at sink within 3 day(s). 2.  Patient will perform bathing with supervision/set-up from chair within 3 day(s). 3.  Patient will perform upper body dressing and lower body dressing with supervision/set-up within 3 day(s). 4.  Patient will perform toilet transfers with supervision/set-up within 3 day(s). 5.  Patient will perform all aspects of toileting with supervision/set-up within 3 day(s). Occupational Therapy TREATMENT  Patient: Kelly Rodgers (42 y.o. male)  Date: 8/8/2018  Diagnosis: DJD RIGHT KNEE  Primary osteoarthritis of right knee  Primary osteoarthritis of right knee Primary osteoarthritis of right knee  Procedure(s) (LRB):  RIGHT TOTAL KNEE ARTHROPLASTY  (CHOICE ANESTH) (Right) 2 Days Post-Op  Precautions: Fall, WBAT  Chart, occupational therapy assessment, plan of care, and goals were reviewed. ASSESSMENT:  Pt received supine in bed and agreeable to sitting EOB for lower body dressing training and education. Pt reports increased pain overnight and limited sleep. Pt does have ice to knee and is resting comfortably at this time. Pt reporting ADL activity did help to loosen his knee and help with some pain from stiffness. Pt completed dressing with min A and toileting with supervision. Pt did good with all activities. Recommend home with family support. He will continue to require raised toilet seat for home due to difficulty with low commode home and pain management. He will also require a tub transfer bench for home to maximize independence and safety for shower transfers for home as he is not yet able to step over the edge of the tub independently.    Progression toward goals:  [x]       Improving appropriately and progressing toward goals  []       Improving slowly and progressing toward goals  []       Not making progress toward goals and plan of care will be adjusted     PLAN:  Patient continues to benefit from skilled intervention to address the above impairments. Continue treatment per established plan of care. Discharge Recommendations:  None  Further Equipment Recommendations for Discharge:  transfer bench and raised toilet seat     SUBJECTIVE:   Patient stated I am having a lot of pain.     OBJECTIVE DATA SUMMARY:   Cognitive/Behavioral Status:  Neurologic State: Alert  Orientation Level: Oriented X4  Cognition: Appropriate for age attention/concentration  Perception: Appears intact  Perseveration: No perseveration noted  Safety/Judgement: Good awareness of safety precautions    Functional Mobility and Transfers for ADLs:  Bed Mobility:  Supine to Sit: Supervision  Sit to Supine:  (remained sitting up in chair)    Transfers:  Sit to Stand: Stand-by assistance     Bed to Chair: Supervision    Balance:  Sitting: Intact  Standing: Intact; With support    ADL Intervention:       Grooming  Grooming Assistance: Supervision/set up (standing at the sink)              Upper Body Dressing Assistance  Dressing Assistance: Supervision/set-up  Pullover Shirt: Supervision/set-up    Lower Body Dressing Assistance  Dressing Assistance: Minimum assistance (decreased ROM and access to RLE)  Underpants: Minimum assistance  Pants With Elastic Waist: Minimum assistance  Socks: Moderate assistance  Position Performed: Seated edge of bed  Cues: Verbal cues provided    Toileting  Toileting Assistance: Supervision/set up  Bladder Hygiene: Supervision/set-up  Clothing Management: Supervision/set-up    Cognitive Retraining  Safety/Judgement: Good awareness of safety precautions    Pain:  Pain Scale 1: Numeric (0 - 10)  Pain Intensity 1: 5  Pain Location 1: Knee  Pain Orientation 1: Right  Pain Description 1: Aching  Pain Intervention(s) 1: Medication (see MAR); Distraction; Family Support; Exercise  Activity Tolerance:   VSS throughout session.      After treatment:   [x] Patient left in no apparent distress sitting up in chair  [] Patient left in no apparent distress in bed  [x] Call bell left within reach  [x] Nursing notified  [] Caregiver present  [] Bed alarm activated    COMMUNICATION/COLLABORATION:   The patients plan of care was discussed with: Physical Therapist and Registered Nurse    Damian Garcia OT  Time Calculation: 8 mins

## 2018-08-08 NOTE — PROGRESS NOTES
5:20 PM  The Joint Replacement Discharge Education video was reviewed by the patient/family. The content was discussed utilizing teach back, questions were answered. The patient verbalized an understanding of the instructions. Patient dc home with spouse, rx, and all personal belongings.

## 2018-08-08 NOTE — PROGRESS NOTES
Bedside shift change report given to Angeles LEMUS (oncoming nurse) by Carolyn Johnson (offgoing nurse). Report included the following information SBAR, Kardex, Intake/Output and MAR.

## 2018-08-08 NOTE — PROGRESS NOTES
Problem: Falls - Risk of  Goal: *Absence of Falls  Document Owen Fall Risk and appropriate interventions in the flowsheet.    Outcome: Progressing Towards Goal  Fall Risk Interventions:  Mobility Interventions: Patient to call before getting OOB, Utilize walker, cane, or other assistive device         Medication Interventions: Patient to call before getting OOB, Teach patient to arise slowly    Elimination Interventions: Call light in reach, Patient to call for help with toileting needs, Toileting schedule/hourly rounds

## 2018-08-08 NOTE — PROGRESS NOTES
Spiritual Care Partner Volunteer visited patient in [phone] on 8.8. 18.   Documented by:    Patrick Gibbons M.Div, M.S, Steff 601 available at [address] [place] [address]([phone])

## 2018-08-08 NOTE — PROGRESS NOTES
Chart reviewed. CM spoke with Frandy Comer (#[phone]), worker's comp CM. Confirmed she received the orders yesterday for home health and DME. Gerlean Holstein is working on arranging the home health. [employer] ([phone]) will be the company to supply patient's DME. Gerlean Holstein stated that she will forward the orders to [employer] [facility] along with this CM's contact info and they will be calling this CM today. Updated patient/wife. Spoke with Rolly Bautista with [employer] (#[phone]). She is working on arranging home care and DME. CM requested that Rolly Bautista notify CM who these providers will be. Updated patient/wife. CM received call from Petrona with [facility] ([phone]) who stated that they will be ordering the patient's DME. DME will be delivered to the hospital prior to discharge. Petrona will follow-up with this CM regarding status.     Ruthann Cesar, RAYNEW/CRM

## 2018-08-08 NOTE — ROUTINE PROCESS
Bedside shift change report given to Lee Ann (oncoming nurse) by Elliott Gee (offgoing nurse). Report included the following information SBAR.

## 2019-01-03 ENCOUNTER — IP HISTORICAL/CONVERTED ENCOUNTER (OUTPATIENT)
Dept: OTHER | Age: 59
End: 2019-01-03

## 2019-02-04 ENCOUNTER — OP HISTORICAL/CONVERTED ENCOUNTER (OUTPATIENT)
Dept: OTHER | Age: 59
End: 2019-02-04

## 2019-02-05 ENCOUNTER — HOSPITAL ENCOUNTER (INPATIENT)
Age: 59
LOS: 9 days | Discharge: HOME HEALTH | DRG: 486 | End: 2019-02-14
Attending: ORTHOPAEDIC SURGERY | Admitting: ORTHOPAEDIC SURGERY
Payer: OTHER MISCELLANEOUS

## 2019-02-05 DIAGNOSIS — M17.11 PRIMARY OSTEOARTHRITIS OF RIGHT KNEE: Primary | ICD-10-CM

## 2019-02-05 PROBLEM — T84.59XA INFECTION OF TOTAL KNEE REPLACEMENT (HCC): Status: ACTIVE | Noted: 2019-02-05

## 2019-02-05 PROBLEM — T84.53XA INFECTION OF TOTAL RIGHT KNEE REPLACEMENT (HCC): Status: ACTIVE | Noted: 2019-02-05

## 2019-02-05 PROBLEM — Z96.659 INFECTION OF TOTAL KNEE REPLACEMENT (HCC): Status: ACTIVE | Noted: 2019-02-05

## 2019-02-05 PROCEDURE — 77030032490 HC SLV COMPR SCD KNE COVD -B

## 2019-02-05 PROCEDURE — 65270000029 HC RM PRIVATE

## 2019-02-05 RX ORDER — ONDANSETRON 2 MG/ML
4 INJECTION INTRAMUSCULAR; INTRAVENOUS
Status: DISCONTINUED | OUTPATIENT
Start: 2019-02-05 | End: 2019-02-14 | Stop reason: HOSPADM

## 2019-02-05 RX ORDER — SODIUM CHLORIDE 0.9 % (FLUSH) 0.9 %
5-40 SYRINGE (ML) INJECTION EVERY 8 HOURS
Status: DISCONTINUED | OUTPATIENT
Start: 2019-02-06 | End: 2019-02-14 | Stop reason: HOSPADM

## 2019-02-05 RX ORDER — NALOXONE HYDROCHLORIDE 0.4 MG/ML
0.4 INJECTION, SOLUTION INTRAMUSCULAR; INTRAVENOUS; SUBCUTANEOUS AS NEEDED
Status: DISCONTINUED | OUTPATIENT
Start: 2019-02-05 | End: 2019-02-14 | Stop reason: HOSPADM

## 2019-02-05 RX ORDER — SODIUM CHLORIDE 0.9 % (FLUSH) 0.9 %
5-40 SYRINGE (ML) INJECTION AS NEEDED
Status: DISCONTINUED | OUTPATIENT
Start: 2019-02-05 | End: 2019-02-14 | Stop reason: HOSPADM

## 2019-02-05 RX ORDER — HYDROMORPHONE HYDROCHLORIDE 2 MG/ML
2 INJECTION, SOLUTION INTRAMUSCULAR; INTRAVENOUS; SUBCUTANEOUS
Status: DISCONTINUED | OUTPATIENT
Start: 2019-02-05 | End: 2019-02-08 | Stop reason: SDUPTHER

## 2019-02-06 ENCOUNTER — ANESTHESIA EVENT (OUTPATIENT)
Dept: SURGERY | Age: 59
DRG: 486 | End: 2019-02-06
Payer: OTHER MISCELLANEOUS

## 2019-02-06 ENCOUNTER — APPOINTMENT (OUTPATIENT)
Dept: GENERAL RADIOLOGY | Age: 59
DRG: 486 | End: 2019-02-06
Attending: HOSPITALIST
Payer: OTHER MISCELLANEOUS

## 2019-02-06 ENCOUNTER — ANESTHESIA (OUTPATIENT)
Dept: SURGERY | Age: 59
DRG: 486 | End: 2019-02-06
Payer: OTHER MISCELLANEOUS

## 2019-02-06 LAB
ALBUMIN SERPL-MCNC: 2.3 G/DL (ref 3.5–5)
ALBUMIN/GLOB SERPL: 0.5 {RATIO} (ref 1.1–2.2)
ALP SERPL-CCNC: 54 U/L (ref 45–117)
ALT SERPL-CCNC: 35 U/L (ref 12–78)
ANION GAP SERPL CALC-SCNC: 8 MMOL/L (ref 5–15)
APTT PPP: 37 SEC (ref 22.1–32)
AST SERPL-CCNC: 32 U/L (ref 15–37)
ATRIAL RATE: 114 BPM
BASOPHILS # BLD: 0 K/UL (ref 0–0.1)
BASOPHILS NFR BLD: 0 % (ref 0–1)
BILIRUB DIRECT SERPL-MCNC: 0.4 MG/DL (ref 0–0.2)
BILIRUB SERPL-MCNC: 1.4 MG/DL (ref 0.2–1)
BUN SERPL-MCNC: 13 MG/DL (ref 6–20)
BUN/CREAT SERPL: 15 (ref 12–20)
CALCIUM SERPL-MCNC: 7.8 MG/DL (ref 8.5–10.1)
CALCULATED P AXIS, ECG09: 44 DEGREES
CALCULATED R AXIS, ECG10: -14 DEGREES
CALCULATED T AXIS, ECG11: 48 DEGREES
CHLORIDE SERPL-SCNC: 108 MMOL/L (ref 97–108)
CO2 SERPL-SCNC: 21 MMOL/L (ref 21–32)
CREAT SERPL-MCNC: 0.88 MG/DL (ref 0.7–1.3)
CRP SERPL-MCNC: 18.4 MG/DL (ref 0–0.6)
DIAGNOSIS, 93000: NORMAL
DIFFERENTIAL METHOD BLD: ABNORMAL
EOSINOPHIL # BLD: 0 K/UL (ref 0–0.4)
EOSINOPHIL NFR BLD: 0 % (ref 0–7)
ERYTHROCYTE [DISTWIDTH] IN BLOOD BY AUTOMATED COUNT: 17.9 % (ref 11.5–14.5)
ERYTHROCYTE [SEDIMENTATION RATE] IN BLOOD: 37 MM/HR (ref 0–20)
GLOBULIN SER CALC-MCNC: 4.5 G/DL (ref 2–4)
GLUCOSE SERPL-MCNC: 125 MG/DL (ref 65–100)
HCT VFR BLD AUTO: 38.4 % (ref 36.6–50.3)
HGB BLD-MCNC: 12.8 G/DL (ref 12.1–17)
IMM GRANULOCYTES # BLD AUTO: 0 K/UL
IMM GRANULOCYTES NFR BLD AUTO: 0 %
LYMPHOCYTES # BLD: 0.3 K/UL (ref 0.8–3.5)
LYMPHOCYTES NFR BLD: 7 % (ref 12–49)
MCH RBC QN AUTO: 31.3 PG (ref 26–34)
MCHC RBC AUTO-ENTMCNC: 33.3 G/DL (ref 30–36.5)
MCV RBC AUTO: 93.9 FL (ref 80–99)
MONOCYTES # BLD: 0.2 K/UL (ref 0–1)
MONOCYTES NFR BLD: 4 % (ref 5–13)
NEUTS BAND NFR BLD MANUAL: 1 % (ref 0–6)
NEUTS SEG # BLD: 3.9 K/UL (ref 1.8–8)
NEUTS SEG NFR BLD: 88 % (ref 32–75)
NRBC # BLD: 0 K/UL (ref 0–0.01)
NRBC BLD-RTO: 0 PER 100 WBC
P-R INTERVAL, ECG05: 172 MS
PLATELET # BLD AUTO: 101 K/UL (ref 150–400)
PLATELET COMMENTS,PCOM: ABNORMAL
PMV BLD AUTO: 10.9 FL (ref 8.9–12.9)
POTASSIUM SERPL-SCNC: 3.8 MMOL/L (ref 3.5–5.1)
PROT SERPL-MCNC: 6.8 G/DL (ref 6.4–8.2)
Q-T INTERVAL, ECG07: 320 MS
QRS DURATION, ECG06: 90 MS
QTC CALCULATION (BEZET), ECG08: 441 MS
RBC # BLD AUTO: 4.09 M/UL (ref 4.1–5.7)
RBC MORPH BLD: ABNORMAL
SODIUM SERPL-SCNC: 137 MMOL/L (ref 136–145)
THERAPEUTIC RANGE,PTTT: ABNORMAL SECS (ref 58–77)
VENTRICULAR RATE, ECG03: 114 BPM
WBC # BLD AUTO: 4.4 K/UL (ref 4.1–11.1)

## 2019-02-06 PROCEDURE — C1713 ANCHOR/SCREW BN/BN,TIS/BN: HCPCS | Performed by: ORTHOPAEDIC SURGERY

## 2019-02-06 PROCEDURE — 65270000029 HC RM PRIVATE

## 2019-02-06 PROCEDURE — 76060000035 HC ANESTHESIA 2 TO 2.5 HR: Performed by: ORTHOPAEDIC SURGERY

## 2019-02-06 PROCEDURE — 0SUV09Z SUPPLEMENT RIGHT KNEE JOINT, TIBIAL SURFACE WITH LINER, OPEN APPROACH: ICD-10-PCS | Performed by: ORTHOPAEDIC SURGERY

## 2019-02-06 PROCEDURE — 74011250636 HC RX REV CODE- 250/636: Performed by: PHYSICIAN ASSISTANT

## 2019-02-06 PROCEDURE — 80048 BASIC METABOLIC PNL TOTAL CA: CPT

## 2019-02-06 PROCEDURE — 74011000250 HC RX REV CODE- 250: Performed by: ORTHOPAEDIC SURGERY

## 2019-02-06 PROCEDURE — C1776 JOINT DEVICE (IMPLANTABLE): HCPCS | Performed by: ORTHOPAEDIC SURGERY

## 2019-02-06 PROCEDURE — 77030019908 HC STETH ESOPH SIMS -A: Performed by: ANESTHESIOLOGY

## 2019-02-06 PROCEDURE — 77030026438 HC STYL ET INTUB CARD -A: Performed by: ANESTHESIOLOGY

## 2019-02-06 PROCEDURE — 74011000258 HC RX REV CODE- 258: Performed by: INTERNAL MEDICINE

## 2019-02-06 PROCEDURE — 87077 CULTURE AEROBIC IDENTIFY: CPT

## 2019-02-06 PROCEDURE — 94660 CPAP INITIATION&MGMT: CPT

## 2019-02-06 PROCEDURE — 87102 FUNGUS ISOLATION CULTURE: CPT

## 2019-02-06 PROCEDURE — 87205 SMEAR GRAM STAIN: CPT

## 2019-02-06 PROCEDURE — 74011250637 HC RX REV CODE- 250/637: Performed by: HOSPITALIST

## 2019-02-06 PROCEDURE — 76210000016 HC OR PH I REC 1 TO 1.5 HR: Performed by: ORTHOPAEDIC SURGERY

## 2019-02-06 PROCEDURE — 85730 THROMBOPLASTIN TIME PARTIAL: CPT

## 2019-02-06 PROCEDURE — 76010000171 HC OR TIME 2 TO 2.5 HR INTENSV-TIER 1: Performed by: ORTHOPAEDIC SURGERY

## 2019-02-06 PROCEDURE — 87186 SC STD MICRODIL/AGAR DIL: CPT

## 2019-02-06 PROCEDURE — 74011250637 HC RX REV CODE- 250/637: Performed by: PHYSICIAN ASSISTANT

## 2019-02-06 PROCEDURE — 74011250637 HC RX REV CODE- 250/637: Performed by: ANESTHESIOLOGY

## 2019-02-06 PROCEDURE — 74011000250 HC RX REV CODE- 250

## 2019-02-06 PROCEDURE — 77030008467 HC STPLR SKN COVD -B: Performed by: ORTHOPAEDIC SURGERY

## 2019-02-06 PROCEDURE — 77030031139 HC SUT VCRL2 J&J -A: Performed by: ORTHOPAEDIC SURGERY

## 2019-02-06 PROCEDURE — 85025 COMPLETE CBC W/AUTO DIFF WBC: CPT

## 2019-02-06 PROCEDURE — 36415 COLL VENOUS BLD VENIPUNCTURE: CPT

## 2019-02-06 PROCEDURE — 87075 CULTR BACTERIA EXCEPT BLOOD: CPT

## 2019-02-06 PROCEDURE — 74011250636 HC RX REV CODE- 250/636: Performed by: HOSPITALIST

## 2019-02-06 PROCEDURE — 77030018822 HC SLV COMPR FT COVD -B

## 2019-02-06 PROCEDURE — 85652 RBC SED RATE AUTOMATED: CPT

## 2019-02-06 PROCEDURE — 77030018836 HC SOL IRR NACL ICUM -A: Performed by: ORTHOPAEDIC SURGERY

## 2019-02-06 PROCEDURE — 93005 ELECTROCARDIOGRAM TRACING: CPT

## 2019-02-06 PROCEDURE — 74011250636 HC RX REV CODE- 250/636: Performed by: ANESTHESIOLOGY

## 2019-02-06 PROCEDURE — 77030008684 HC TU ET CUF COVD -B: Performed by: ANESTHESIOLOGY

## 2019-02-06 PROCEDURE — P9045 ALBUMIN (HUMAN), 5%, 250 ML: HCPCS

## 2019-02-06 PROCEDURE — 77030035236 HC SUT PDS STRATFX BARB J&J -B: Performed by: ORTHOPAEDIC SURGERY

## 2019-02-06 PROCEDURE — 0SPC09Z REMOVAL OF LINER FROM RIGHT KNEE JOINT, OPEN APPROACH: ICD-10-PCS | Performed by: ORTHOPAEDIC SURGERY

## 2019-02-06 PROCEDURE — 74011250636 HC RX REV CODE- 250/636: Performed by: ORTHOPAEDIC SURGERY

## 2019-02-06 PROCEDURE — 74011250636 HC RX REV CODE- 250/636

## 2019-02-06 PROCEDURE — 77030034850: Performed by: ORTHOPAEDIC SURGERY

## 2019-02-06 PROCEDURE — 77030011640 HC PAD GRND REM COVD -A: Performed by: ORTHOPAEDIC SURGERY

## 2019-02-06 PROCEDURE — 71045 X-RAY EXAM CHEST 1 VIEW: CPT

## 2019-02-06 PROCEDURE — 80076 HEPATIC FUNCTION PANEL: CPT

## 2019-02-06 PROCEDURE — 74011250636 HC RX REV CODE- 250/636: Performed by: INTERNAL MEDICINE

## 2019-02-06 PROCEDURE — 86140 C-REACTIVE PROTEIN: CPT

## 2019-02-06 DEVICE — STIMULAN® RAPID CURE PROVIDED STERILE FOR SINGLE PATIENT USE. STIMULAN® RAPID CURE CONTAINS CALCIUM SULFATE POWDER AND MIXING SOLUTION IN PRE-MEASURED QUANTITIES SO THAT WHEN MIXED TOGETHER IN A STERILE MIXING BOWL, THE RESULTANT PASTE IS TO BE DIGITALLY PACKED INTO OPEN BONE VOID/GAP TO SET INSITU OR PLACED INTO THE MOULD PROVIDED, THE MIXTURE SETS TO FORM BEADS. THE BIODEGRADABLE, RADIOPAQUE BEADS ARE RESORBED IN APPROXIMATELY 30 – 60 DAYS WHEN USED IN ACCORDANCE WITH THE DEVICE LABELLING. STIMULAN® RAPID CURE IS MANUFACTURED FROM SYNTHETIC IMPLANT GRADE CALCIUM SULFATE DIHYDRATE(CASO4.2H2O) THAT RESORBS AND IS REPLACED WITH BONE DURING THE HEALING PROCESS. ALSO, AS THE BONE VOID FILLER BEADS ARE BIODEGRADABLE AND BIOCOMPATIBLE, THEY MAY BE USED AT AN INFECTED SITE.
Type: IMPLANTABLE DEVICE | Site: KNEE | Status: FUNCTIONAL
Brand: STIMULAN® RAPID CURE

## 2019-02-06 RX ORDER — GLYCOPYRROLATE 0.2 MG/ML
INJECTION INTRAMUSCULAR; INTRAVENOUS AS NEEDED
Status: DISCONTINUED | OUTPATIENT
Start: 2019-02-06 | End: 2019-02-06 | Stop reason: HOSPADM

## 2019-02-06 RX ORDER — SODIUM CHLORIDE, SODIUM LACTATE, POTASSIUM CHLORIDE, CALCIUM CHLORIDE 600; 310; 30; 20 MG/100ML; MG/100ML; MG/100ML; MG/100ML
125 INJECTION, SOLUTION INTRAVENOUS CONTINUOUS
Status: DISCONTINUED | OUTPATIENT
Start: 2019-02-06 | End: 2019-02-06 | Stop reason: HOSPADM

## 2019-02-06 RX ORDER — PREDNISONE 10 MG/1
10 TABLET ORAL
COMMUNITY
End: 2019-07-10

## 2019-02-06 RX ORDER — SODIUM CHLORIDE 0.9 % (FLUSH) 0.9 %
5-40 SYRINGE (ML) INJECTION AS NEEDED
Status: DISCONTINUED | OUTPATIENT
Start: 2019-02-06 | End: 2019-02-06 | Stop reason: HOSPADM

## 2019-02-06 RX ORDER — SODIUM CHLORIDE 0.9 % (FLUSH) 0.9 %
5-40 SYRINGE (ML) INJECTION EVERY 8 HOURS
Status: DISCONTINUED | OUTPATIENT
Start: 2019-02-06 | End: 2019-02-14 | Stop reason: HOSPADM

## 2019-02-06 RX ORDER — FENTANYL CITRATE 50 UG/ML
25 INJECTION, SOLUTION INTRAMUSCULAR; INTRAVENOUS
Status: DISCONTINUED | OUTPATIENT
Start: 2019-02-06 | End: 2019-02-06 | Stop reason: HOSPADM

## 2019-02-06 RX ORDER — PHENYLEPHRINE HCL IN 0.9% NACL 0.4MG/10ML
SYRINGE (ML) INTRAVENOUS AS NEEDED
Status: DISCONTINUED | OUTPATIENT
Start: 2019-02-06 | End: 2019-02-06 | Stop reason: HOSPADM

## 2019-02-06 RX ORDER — OXYCODONE HYDROCHLORIDE 5 MG/1
5 TABLET ORAL
Status: DISCONTINUED | OUTPATIENT
Start: 2019-02-06 | End: 2019-02-08 | Stop reason: SDUPTHER

## 2019-02-06 RX ORDER — VANCOMYCIN 2 GRAM/500 ML IN 0.9 % SODIUM CHLORIDE INTRAVENOUS
2000 ONCE
Status: COMPLETED | OUTPATIENT
Start: 2019-02-06 | End: 2019-02-06

## 2019-02-06 RX ORDER — ONDANSETRON 4 MG/1
4 TABLET, ORALLY DISINTEGRATING ORAL
Status: DISCONTINUED | OUTPATIENT
Start: 2019-02-06 | End: 2019-02-14 | Stop reason: HOSPADM

## 2019-02-06 RX ORDER — FENTANYL CITRATE 50 UG/ML
INJECTION, SOLUTION INTRAMUSCULAR; INTRAVENOUS AS NEEDED
Status: DISCONTINUED | OUTPATIENT
Start: 2019-02-06 | End: 2019-02-06 | Stop reason: HOSPADM

## 2019-02-06 RX ORDER — FENTANYL CITRATE 50 UG/ML
25 INJECTION, SOLUTION INTRAMUSCULAR; INTRAVENOUS
Status: DISCONTINUED | OUTPATIENT
Start: 2019-02-06 | End: 2019-02-14 | Stop reason: HOSPADM

## 2019-02-06 RX ORDER — SUCCINYLCHOLINE CHLORIDE 20 MG/ML
INJECTION INTRAMUSCULAR; INTRAVENOUS AS NEEDED
Status: DISCONTINUED | OUTPATIENT
Start: 2019-02-06 | End: 2019-02-06 | Stop reason: HOSPADM

## 2019-02-06 RX ORDER — ONDANSETRON 2 MG/ML
INJECTION INTRAMUSCULAR; INTRAVENOUS AS NEEDED
Status: DISCONTINUED | OUTPATIENT
Start: 2019-02-06 | End: 2019-02-06 | Stop reason: HOSPADM

## 2019-02-06 RX ORDER — VANCOMYCIN HYDROCHLORIDE 1 G/20ML
INJECTION, POWDER, LYOPHILIZED, FOR SOLUTION INTRAVENOUS AS NEEDED
Status: DISCONTINUED | OUTPATIENT
Start: 2019-02-06 | End: 2019-02-06 | Stop reason: HOSPADM

## 2019-02-06 RX ORDER — SODIUM CHLORIDE 0.9 % (FLUSH) 0.9 %
5-40 SYRINGE (ML) INJECTION AS NEEDED
Status: DISCONTINUED | OUTPATIENT
Start: 2019-02-06 | End: 2019-02-14 | Stop reason: HOSPADM

## 2019-02-06 RX ORDER — OXYCODONE HYDROCHLORIDE 5 MG/1
5-10 TABLET ORAL
Status: DISCONTINUED | OUTPATIENT
Start: 2019-02-06 | End: 2019-02-14 | Stop reason: HOSPADM

## 2019-02-06 RX ORDER — AMOXICILLIN 250 MG
1 CAPSULE ORAL 2 TIMES DAILY
Status: DISCONTINUED | OUTPATIENT
Start: 2019-02-06 | End: 2019-02-14 | Stop reason: HOSPADM

## 2019-02-06 RX ORDER — SODIUM CHLORIDE 0.9 % (FLUSH) 0.9 %
5-40 SYRINGE (ML) INJECTION EVERY 8 HOURS
Status: DISCONTINUED | OUTPATIENT
Start: 2019-02-06 | End: 2019-02-06 | Stop reason: HOSPADM

## 2019-02-06 RX ORDER — SODIUM CHLORIDE 9 MG/ML
125 INJECTION, SOLUTION INTRAVENOUS CONTINUOUS
Status: DISCONTINUED | OUTPATIENT
Start: 2019-02-06 | End: 2019-02-07

## 2019-02-06 RX ORDER — CALCIUM CHLORIDE INJECTION 100 MG/ML
INJECTION, SOLUTION INTRAVENOUS AS NEEDED
Status: DISCONTINUED | OUTPATIENT
Start: 2019-02-06 | End: 2019-02-06 | Stop reason: HOSPADM

## 2019-02-06 RX ORDER — SODIUM CHLORIDE, SODIUM LACTATE, POTASSIUM CHLORIDE, CALCIUM CHLORIDE 600; 310; 30; 20 MG/100ML; MG/100ML; MG/100ML; MG/100ML
INJECTION, SOLUTION INTRAVENOUS
Status: DISCONTINUED | OUTPATIENT
Start: 2019-02-06 | End: 2019-02-06 | Stop reason: HOSPADM

## 2019-02-06 RX ORDER — OXYCODONE AND ACETAMINOPHEN 5; 325 MG/1; MG/1
1 TABLET ORAL AS NEEDED
Status: DISCONTINUED | OUTPATIENT
Start: 2019-02-06 | End: 2019-02-06 | Stop reason: HOSPADM

## 2019-02-06 RX ORDER — MIDAZOLAM HYDROCHLORIDE 1 MG/ML
INJECTION, SOLUTION INTRAMUSCULAR; INTRAVENOUS AS NEEDED
Status: DISCONTINUED | OUTPATIENT
Start: 2019-02-06 | End: 2019-02-06 | Stop reason: HOSPADM

## 2019-02-06 RX ORDER — HYDROCORTISONE SODIUM SUCCINATE 100 MG/2ML
INJECTION, POWDER, FOR SOLUTION INTRAMUSCULAR; INTRAVENOUS AS NEEDED
Status: DISCONTINUED | OUTPATIENT
Start: 2019-02-06 | End: 2019-02-06 | Stop reason: HOSPADM

## 2019-02-06 RX ORDER — MIDAZOLAM HYDROCHLORIDE 1 MG/ML
0.5 INJECTION, SOLUTION INTRAMUSCULAR; INTRAVENOUS
Status: DISCONTINUED | OUTPATIENT
Start: 2019-02-06 | End: 2019-02-06 | Stop reason: HOSPADM

## 2019-02-06 RX ORDER — TESTOSTERONE CYPIONATE 200 MG/ML
100 INJECTION INTRAMUSCULAR
COMMUNITY

## 2019-02-06 RX ORDER — CYCLOBENZAPRINE HCL 10 MG
5 TABLET ORAL
Status: DISCONTINUED | OUTPATIENT
Start: 2019-02-06 | End: 2019-02-14 | Stop reason: HOSPADM

## 2019-02-06 RX ORDER — SODIUM CHLORIDE 9 MG/ML
75 INJECTION, SOLUTION INTRAVENOUS CONTINUOUS
Status: DISCONTINUED | OUTPATIENT
Start: 2019-02-06 | End: 2019-02-14 | Stop reason: HOSPADM

## 2019-02-06 RX ORDER — NYSTATIN 100000 [USP'U]/ML
500000 SUSPENSION ORAL 4 TIMES DAILY
Status: DISCONTINUED | OUTPATIENT
Start: 2019-02-06 | End: 2019-02-14 | Stop reason: HOSPADM

## 2019-02-06 RX ORDER — ACETAMINOPHEN 500 MG
1000 TABLET ORAL ONCE
Status: COMPLETED | OUTPATIENT
Start: 2019-02-06 | End: 2019-02-06

## 2019-02-06 RX ORDER — NALOXONE HYDROCHLORIDE 0.4 MG/ML
0.4 INJECTION, SOLUTION INTRAMUSCULAR; INTRAVENOUS; SUBCUTANEOUS AS NEEDED
Status: DISCONTINUED | OUTPATIENT
Start: 2019-02-06 | End: 2019-02-06 | Stop reason: SDUPTHER

## 2019-02-06 RX ORDER — MORPHINE SULFATE 10 MG/ML
2 INJECTION, SOLUTION INTRAMUSCULAR; INTRAVENOUS
Status: DISCONTINUED | OUTPATIENT
Start: 2019-02-06 | End: 2019-02-06 | Stop reason: HOSPADM

## 2019-02-06 RX ORDER — FAMOTIDINE 20 MG/1
20 TABLET, FILM COATED ORAL 2 TIMES DAILY
Status: DISCONTINUED | OUTPATIENT
Start: 2019-02-06 | End: 2019-02-14 | Stop reason: HOSPADM

## 2019-02-06 RX ORDER — LIDOCAINE HYDROCHLORIDE 10 MG/ML
0.1 INJECTION, SOLUTION EPIDURAL; INFILTRATION; INTRACAUDAL; PERINEURAL AS NEEDED
Status: DISCONTINUED | OUTPATIENT
Start: 2019-02-06 | End: 2019-02-06 | Stop reason: HOSPADM

## 2019-02-06 RX ORDER — PRAMIPEXOLE DIHYDROCHLORIDE 1 MG/1
0.5 TABLET ORAL
Status: DISCONTINUED | OUTPATIENT
Start: 2019-02-07 | End: 2019-02-14 | Stop reason: HOSPADM

## 2019-02-06 RX ORDER — NEOSTIGMINE METHYLSULFATE 1 MG/ML
INJECTION INTRAVENOUS AS NEEDED
Status: DISCONTINUED | OUTPATIENT
Start: 2019-02-06 | End: 2019-02-06 | Stop reason: HOSPADM

## 2019-02-06 RX ORDER — MIDAZOLAM HYDROCHLORIDE 1 MG/ML
1 INJECTION, SOLUTION INTRAMUSCULAR; INTRAVENOUS AS NEEDED
Status: DISCONTINUED | OUTPATIENT
Start: 2019-02-06 | End: 2019-02-06 | Stop reason: HOSPADM

## 2019-02-06 RX ORDER — OXYCODONE HYDROCHLORIDE 5 MG/1
10 TABLET ORAL
Status: DISCONTINUED | OUTPATIENT
Start: 2019-02-06 | End: 2019-02-14 | Stop reason: HOSPADM

## 2019-02-06 RX ORDER — PREDNISONE 10 MG/1
10 TABLET ORAL
Status: DISCONTINUED | OUTPATIENT
Start: 2019-02-08 | End: 2019-02-06 | Stop reason: SDUPTHER

## 2019-02-06 RX ORDER — PROPOFOL 10 MG/ML
INJECTION, EMULSION INTRAVENOUS AS NEEDED
Status: DISCONTINUED | OUTPATIENT
Start: 2019-02-06 | End: 2019-02-06 | Stop reason: HOSPADM

## 2019-02-06 RX ORDER — ALBUMIN HUMAN 50 G/1000ML
SOLUTION INTRAVENOUS AS NEEDED
Status: DISCONTINUED | OUTPATIENT
Start: 2019-02-06 | End: 2019-02-06 | Stop reason: HOSPADM

## 2019-02-06 RX ORDER — POLYETHYLENE GLYCOL 3350 17 G/17G
17 POWDER, FOR SOLUTION ORAL DAILY
Status: DISCONTINUED | OUTPATIENT
Start: 2019-02-07 | End: 2019-02-14 | Stop reason: HOSPADM

## 2019-02-06 RX ORDER — ONDANSETRON 2 MG/ML
4 INJECTION INTRAMUSCULAR; INTRAVENOUS
Status: ACTIVE | OUTPATIENT
Start: 2019-02-06 | End: 2019-02-07

## 2019-02-06 RX ORDER — ONDANSETRON 2 MG/ML
4 INJECTION INTRAMUSCULAR; INTRAVENOUS AS NEEDED
Status: DISCONTINUED | OUTPATIENT
Start: 2019-02-06 | End: 2019-02-06 | Stop reason: HOSPADM

## 2019-02-06 RX ORDER — THERA TABS 400 MCG
1 TAB ORAL DAILY
COMMUNITY

## 2019-02-06 RX ORDER — VANCOMYCIN/0.9 % SOD CHLORIDE 1.5G/250ML
1500 PLASTIC BAG, INJECTION (ML) INTRAVENOUS EVERY 12 HOURS
Status: DISCONTINUED | OUTPATIENT
Start: 2019-02-06 | End: 2019-02-07

## 2019-02-06 RX ORDER — HYDROXYZINE HYDROCHLORIDE 10 MG/1
10 TABLET, FILM COATED ORAL
Status: DISCONTINUED | OUTPATIENT
Start: 2019-02-06 | End: 2019-02-14 | Stop reason: HOSPADM

## 2019-02-06 RX ORDER — CETIRIZINE HCL 10 MG
10 TABLET ORAL
COMMUNITY

## 2019-02-06 RX ORDER — CETIRIZINE HCL 10 MG
10 TABLET ORAL
Status: DISCONTINUED | OUTPATIENT
Start: 2019-02-06 | End: 2019-02-14 | Stop reason: HOSPADM

## 2019-02-06 RX ORDER — PREDNISONE 10 MG/1
10 TABLET ORAL
Status: DISCONTINUED | OUTPATIENT
Start: 2019-02-07 | End: 2019-02-14 | Stop reason: HOSPADM

## 2019-02-06 RX ORDER — LIDOCAINE HYDROCHLORIDE 20 MG/ML
INJECTION, SOLUTION EPIDURAL; INFILTRATION; INTRACAUDAL; PERINEURAL AS NEEDED
Status: DISCONTINUED | OUTPATIENT
Start: 2019-02-06 | End: 2019-02-06 | Stop reason: HOSPADM

## 2019-02-06 RX ORDER — ROCURONIUM BROMIDE 10 MG/ML
INJECTION, SOLUTION INTRAVENOUS AS NEEDED
Status: DISCONTINUED | OUTPATIENT
Start: 2019-02-06 | End: 2019-02-06 | Stop reason: HOSPADM

## 2019-02-06 RX ORDER — FENTANYL CITRATE 50 UG/ML
50 INJECTION, SOLUTION INTRAMUSCULAR; INTRAVENOUS AS NEEDED
Status: DISCONTINUED | OUTPATIENT
Start: 2019-02-06 | End: 2019-02-06 | Stop reason: HOSPADM

## 2019-02-06 RX ORDER — SODIUM CHLORIDE 9 MG/ML
25 INJECTION, SOLUTION INTRAVENOUS CONTINUOUS
Status: DISCONTINUED | OUTPATIENT
Start: 2019-02-06 | End: 2019-02-06 | Stop reason: HOSPADM

## 2019-02-06 RX ORDER — ACETAMINOPHEN 325 MG/1
650 TABLET ORAL EVERY 6 HOURS
Status: DISCONTINUED | OUTPATIENT
Start: 2019-02-07 | End: 2019-02-14 | Stop reason: HOSPADM

## 2019-02-06 RX ORDER — FLUCONAZOLE 2 MG/ML
200 INJECTION, SOLUTION INTRAVENOUS EVERY 24 HOURS
Status: COMPLETED | OUTPATIENT
Start: 2019-02-06 | End: 2019-02-08

## 2019-02-06 RX ORDER — FACIAL-BODY WIPES
10 EACH TOPICAL DAILY PRN
Status: DISCONTINUED | OUTPATIENT
Start: 2019-02-08 | End: 2019-02-14 | Stop reason: HOSPADM

## 2019-02-06 RX ADMIN — FENTANYL CITRATE 50 MCG: 50 INJECTION, SOLUTION INTRAMUSCULAR; INTRAVENOUS at 17:08

## 2019-02-06 RX ADMIN — CALCIUM CHLORIDE INJECTION 200 MG: 100 INJECTION, SOLUTION INTRAVENOUS at 17:52

## 2019-02-06 RX ADMIN — ROCURONIUM BROMIDE 5 MG: 10 INJECTION, SOLUTION INTRAVENOUS at 17:08

## 2019-02-06 RX ADMIN — NYSTATIN 500000 UNITS: 100000 SUSPENSION ORAL at 12:53

## 2019-02-06 RX ADMIN — VANCOMYCIN HYDROCHLORIDE 1500 MG: 10 INJECTION, POWDER, LYOPHILIZED, FOR SOLUTION INTRAVENOUS at 12:53

## 2019-02-06 RX ADMIN — SODIUM CHLORIDE 500 ML: 0.9 INJECTION, SOLUTION INTRAVENOUS at 16:49

## 2019-02-06 RX ADMIN — SODIUM CHLORIDE, SODIUM LACTATE, POTASSIUM CHLORIDE, CALCIUM CHLORIDE: 600; 310; 30; 20 INJECTION, SOLUTION INTRAVENOUS at 17:04

## 2019-02-06 RX ADMIN — ONDANSETRON 4 MG: 2 INJECTION INTRAMUSCULAR; INTRAVENOUS at 18:45

## 2019-02-06 RX ADMIN — OXYCODONE HYDROCHLORIDE 10 MG: 5 TABLET ORAL at 09:01

## 2019-02-06 RX ADMIN — FAMOTIDINE 20 MG: 20 TABLET ORAL at 21:05

## 2019-02-06 RX ADMIN — ROCURONIUM BROMIDE 25 MG: 10 INJECTION, SOLUTION INTRAVENOUS at 17:22

## 2019-02-06 RX ADMIN — ROCURONIUM BROMIDE 20 MG: 10 INJECTION, SOLUTION INTRAVENOUS at 18:25

## 2019-02-06 RX ADMIN — PROPOFOL 80 MG: 10 INJECTION, EMULSION INTRAVENOUS at 17:08

## 2019-02-06 RX ADMIN — SODIUM CHLORIDE 75 ML/HR: 900 INJECTION, SOLUTION INTRAVENOUS at 08:50

## 2019-02-06 RX ADMIN — CYCLOBENZAPRINE HYDROCHLORIDE 5 MG: 10 TABLET, FILM COATED ORAL at 09:01

## 2019-02-06 RX ADMIN — SUCCINYLCHOLINE CHLORIDE 140 MG: 20 INJECTION INTRAMUSCULAR; INTRAVENOUS at 17:08

## 2019-02-06 RX ADMIN — OXYCODONE HYDROCHLORIDE 10 MG: 5 TABLET ORAL at 21:05

## 2019-02-06 RX ADMIN — OXYCODONE HYDROCHLORIDE 10 MG: 5 TABLET ORAL at 12:53

## 2019-02-06 RX ADMIN — ROCURONIUM BROMIDE 20 MG: 10 INJECTION, SOLUTION INTRAVENOUS at 18:00

## 2019-02-06 RX ADMIN — SODIUM CHLORIDE, SODIUM LACTATE, POTASSIUM CHLORIDE, CALCIUM CHLORIDE: 600; 310; 30; 20 INJECTION, SOLUTION INTRAVENOUS at 17:15

## 2019-02-06 RX ADMIN — METHYLPREDNISOLONE SODIUM SUCCINATE 20 MG: 40 INJECTION, POWDER, FOR SOLUTION INTRAMUSCULAR; INTRAVENOUS at 12:53

## 2019-02-06 RX ADMIN — HYDROMORPHONE HYDROCHLORIDE 0.5 MG: 2 INJECTION, SOLUTION INTRAMUSCULAR; INTRAVENOUS; SUBCUTANEOUS at 18:50

## 2019-02-06 RX ADMIN — FENTANYL CITRATE 50 MCG: 50 INJECTION, SOLUTION INTRAMUSCULAR; INTRAVENOUS at 17:50

## 2019-02-06 RX ADMIN — LIDOCAINE HYDROCHLORIDE 60 MG: 20 INJECTION, SOLUTION EPIDURAL; INFILTRATION; INTRACAUDAL; PERINEURAL at 17:08

## 2019-02-06 RX ADMIN — HYDROCORTISONE SODIUM SUCCINATE 100 MG: 100 INJECTION, POWDER, FOR SOLUTION INTRAMUSCULAR; INTRAVENOUS at 17:29

## 2019-02-06 RX ADMIN — Medication 10 ML: at 21:05

## 2019-02-06 RX ADMIN — CYCLOBENZAPRINE HYDROCHLORIDE 5 MG: 10 TABLET, FILM COATED ORAL at 12:53

## 2019-02-06 RX ADMIN — PROPOFOL 40 MG: 10 INJECTION, EMULSION INTRAVENOUS at 17:10

## 2019-02-06 RX ADMIN — SODIUM CHLORIDE 125 ML/HR: 900 INJECTION, SOLUTION INTRAVENOUS at 19:00

## 2019-02-06 RX ADMIN — ALBUMIN HUMAN 250 ML: 50 SOLUTION INTRAVENOUS at 17:36

## 2019-02-06 RX ADMIN — CALCIUM CHLORIDE INJECTION 300 MG: 100 INJECTION, SOLUTION INTRAVENOUS at 17:08

## 2019-02-06 RX ADMIN — GLYCOPYRROLATE 0.4 MG: 0.2 INJECTION INTRAMUSCULAR; INTRAVENOUS at 18:56

## 2019-02-06 RX ADMIN — NYSTATIN 500000 UNITS: 100000 SUSPENSION ORAL at 21:05

## 2019-02-06 RX ADMIN — NEOSTIGMINE METHYLSULFATE 2.5 MG: 1 INJECTION INTRAVENOUS at 18:56

## 2019-02-06 RX ADMIN — ACETAMINOPHEN 650 MG: 325 TABLET ORAL at 23:02

## 2019-02-06 RX ADMIN — FENTANYL CITRATE 50 MCG: 50 INJECTION, SOLUTION INTRAMUSCULAR; INTRAVENOUS at 17:34

## 2019-02-06 RX ADMIN — ACETAMINOPHEN 1000 MG: 500 TABLET ORAL at 16:48

## 2019-02-06 RX ADMIN — VANCOMYCIN HYDROCHLORIDE 2000 MG: 10 INJECTION, POWDER, LYOPHILIZED, FOR SOLUTION INTRAVENOUS at 01:00

## 2019-02-06 RX ADMIN — FLUCONAZOLE, SODIUM CHLORIDE 200 MG: 2 INJECTION INTRAVENOUS at 17:45

## 2019-02-06 RX ADMIN — FENTANYL CITRATE 100 MCG: 50 INJECTION, SOLUTION INTRAMUSCULAR; INTRAVENOUS at 18:23

## 2019-02-06 RX ADMIN — SENNOSIDES AND DOCUSATE SODIUM 1 TABLET: 8.6; 5 TABLET ORAL at 21:05

## 2019-02-06 RX ADMIN — MIDAZOLAM HYDROCHLORIDE 2 MG: 1 INJECTION, SOLUTION INTRAMUSCULAR; INTRAVENOUS at 17:03

## 2019-02-06 RX ADMIN — ROCURONIUM BROMIDE 20 MG: 10 INJECTION, SOLUTION INTRAVENOUS at 17:37

## 2019-02-06 RX ADMIN — CEFEPIME HYDROCHLORIDE 2 G: 2 INJECTION, POWDER, FOR SOLUTION INTRAVENOUS at 17:43

## 2019-02-06 RX ADMIN — Medication 80 MCG: at 17:10

## 2019-02-06 NOTE — PROGRESS NOTES
Paged Dr. Charley Bailon to let him know of the lab results for pre-op clearance. 46  Dr. Charley Bailon has cleared him for surgery. Will page Dr. Sabrina Oswald to let him know. [phone]  Left message with Dr. Sabrina Oswald.

## 2019-02-06 NOTE — ANESTHESIA PREPROCEDURE EVALUATION
Anesthetic History No history of anesthetic complications Review of Systems / Medical History Patient summary reviewed, nursing notes reviewed and pertinent labs reviewed Pulmonary Within defined limits Sleep apnea: CPAP Asthma : well controlled Neuro/Psych Within defined limits Cardiovascular Within defined limits Hypertension: well controlled Exercise tolerance: <4 METS Comments: Tachyc., BPs in 110-125 syst  
GI/Hepatic/Renal 
Within defined limits GERD: well controlled Comments: Crohns 
cholelith Endo/Other Within defined limits Arthritis Other Findings Comments: Asthma    
  CROHNS DISEASE 
GERD (gastroesophageal reflux disease)   
 HIATAL HERNIA  Migraine   
 Sleep apnea   
  USES CPAP 
HIV + Infected prosth knee Physical Exam 
 
Airway Mallampati: II 
TM Distance: > 6 cm Neck ROM: normal range of motion Mouth opening: Normal 
 
 Cardiovascular Regular rate and rhythm,  S1 and S2 normal,  no murmur, click, rub, or gallop Dental 
 
Dentition: Poor dentition Pulmonary Breath sounds clear to auscultation Abdominal 
GI exam deferred Other Findings Anesthetic Plan ASA: 3 Anesthesia type: general 
 
 
 
 
Induction: Intravenous Anesthetic plan and risks discussed with: Patient

## 2019-02-06 NOTE — PROGRESS NOTES
02/06/19 1507   Vital Signs   Temp (!) 103 °F (39.4 °C)   Temp Source Oral   Pulse (Heart Rate) (!) 117   Heart Rate Source Monitor   Resp Rate 15   O2 Sat (%) 99 %   Level of Consciousness Alert   /59   MAP (Calculated) 74   MEWS Score 5     Paged Dr. Balwinder Burnett to notify him of patient's MEWS score. [address] [facility] [address]. Balwinder Burnett. [facility] called and said that the anesthesiologist would address it downstairs. 1600  Dr. Balwinder Burnett told me to contact Dr. Shakira Rivas. Dr. Shakira Rivas said it was okay for the patient to be brought downstairs even with his temperature and heart rate being elevated and that it was okay for the anesthesiologist to address his high temperature. [address] to [employer] from OR holding. Could not get in touch with Rachana Zurita to figure out a tylenol dosage and bolus order. Julisa said she would take care of it when he got downstairs. 1620  [facility] called back and said they would address his medical issues downstairs. 1625  TRANSFER - OUT REPORT:    Verbal report given to MARIAH Egan(name) on Chayo Barroso  being transferred to OR(unit) for ordered procedure       Report consisted of patients Situation, Background, Assessment and   Recommendations(SBAR). Information from the following report(s) SBAR, Kardex and Recent Results was reviewed with the receiving nurse.     Lines:   Peripheral IV 02/05/19 Left Hand (Active)   Site Assessment Clean, dry, & intact 2/6/2019 11:57 AM   Phlebitis Assessment 0 2/6/2019 11:57 AM   Infiltration Assessment 0 2/6/2019 11:57 AM   Dressing Status Clean, dry, & intact 2/6/2019 11:57 AM   Dressing Type Transparent 2/6/2019 11:57 AM   Hub Color/Line Status Infusing 2/6/2019 11:57 AM   Action Taken Open ports on tubing capped 2/6/2019 11:57 AM   Alcohol Cap Used Yes 2/6/2019  8:00 AM       Peripheral IV 02/05/19 Left Antecubital (Active)   Site Assessment Clean, dry, & intact 2/6/2019 11:57 AM   Phlebitis Assessment 0 2/6/2019 11:57 AM Infiltration Assessment 0 2/6/2019 11:57 AM   Dressing Status Clean, dry, & intact 2/6/2019 11:57 AM   Dressing Type Transparent 2/6/2019 11:57 AM   Hub Color/Line Status Capped 2/6/2019 11:57 AM   Action Taken Open ports on tubing capped 2/6/2019 11:57 AM   Alcohol Cap Used Yes 2/6/2019 11:57 AM        Opportunity for questions and clarification was provided.       Patient transported with:   [employer]

## 2019-02-06 NOTE — CONSULTS
Infectious Disease Consult    Today's Date: 2/6/2019   Admit Date: 2/5/2019    Impression:   · Septic right knee  · Newly diagnosed HIV, possibly advanced  · UTI  · Thrush     Plan:   · Broaden antibiotic therapy for now  · Follow up HIV studies  · Fluconazole for thrush    Anti-infectives:   · Vancomycin   · Cefepime   · Fluconazole     Subjective:   Date of Consultation:  February 6, 2019  Referring Physician: Dr Pedro Bray    Patient is a 63 y.o. male admitted with picture of sepsis. He has a history of right knee replacement over the summer and was doing ok from that standpoint. He has been losing weight and has had low-grade fevers and was recently diagnosed with HIV. He has been having fevers and chills and states that he has knee pain, also. He is for washout of the knee and we are asked to see him in consultation.       Patient Active Problem List   Diagnosis Code    Primary osteoarthritis of right knee M17.11    Infection of total right knee replacement ([facility]) T84.53XA    Infection of total knee replacement ([facility] [address] T84.59XA, Z96.659     Past Medical History:   Diagnosis Date    Asthma     Autoimmune disease ([facility] [address]     CROHNS DISEASE    Chronic pain     KNEES AND BACK    Fatty liver     GERD (gastroesophageal reflux disease)     HTN (hypertension)     NO MEDS    Hyperlipidemia     Ill-defined condition     HIATAL HERNIA, HIV    Migraine     Sleep apnea     USES CPAP      Family History   Problem Relation Age of Onset    Diabetes Mother     Heart Disease Mother     Kidney Disease Mother         POLYCYSTIC    Heart Surgery Mother     Cancer Father         LUNG    Diabetes Sister     Hypertension Sister     Heart Disease Sister     Heart Surgery Sister     Kidney Disease Sister         HAD KIDNEY TRANSPLANT    Heart Disease Brother     Kidney Disease Brother         POLYCYSTIC DISEASE HAD 2 TRANSPLANTS    Hypertension Brother     Arthritis-osteo Sister     Hypertension Sister     No Known Problems Brother     Heart Attack Brother     Kidney Disease Brother         POLYCYSTIC DISEASE    Anesth Problems Neg Hx       Social History     Tobacco Use    Smoking status: Never Smoker    Smokeless tobacco: Never Used   Substance Use Topics    Alcohol use: Yes     Comment: OCCASIONALLY     Past Surgical History:   Procedure Laterality Date    HX HERNIA REPAIR Left 1986    INGUINAL    HX KNEE ARTHROSCOPY Right 2010      Prior to Admission medications    Medication Sig Start Date End Date Taking? Authorizing Provider   testosterone cypionate (DEPOTESTOTERONE CYPIONATE) 200 mg/mL injection 100 mg by IntraMUSCular route every . Yes Provider, Historical   cetirizine (ZYRTEC) 10 mg tablet Take 10 mg by mouth daily as needed for Allergies or Rhinitis. Yes Provider, Historical   predniSONE (DELTASONE) 10 mg tablet Take 10 mg by mouth daily (with breakfast). Replaces azathioprine (Imuran)   Yes Provider, Historical   therapeutic multivitamin (THERA) tablet Take 1 Tab by mouth daily. Yes Provider, Historical   fish oil-omega-3 fatty acids 340-1,000 mg capsule Take 1 Cap by mouth two (2) times a day. Yes Provider, Historical   pramipexole (MIRAPEX) 0.5 mg tablet Take 0.5 mg by mouth nightly. Yes Provider, Historical   omeprazole (PRILOSEC) 40 mg capsule Take 40 mg by mouth nightly. Yes Provider, Historical       No Known Allergies     Review of Systems:  Pertinent items are noted in the History of Present Illness. Objective:     Visit Vitals  /72   Pulse (!) 109   Temp (!) 100.8 °F (38.2 °C)   Resp 20   Ht 5' 11\" (1.803 m)   Wt 89.3 kg (196 lb 13.9 oz)   SpO2 94%   BMI 27.46 kg/m²     Temp (24hrs), Av.5 °F (38.1 °C), Min:97.5 °F (36.4 °C), Max:103 °F (39.4 °C)       Lines:  Peripheral IV:       Physical Exam:  General:  mild distress, flushed  Lungs:  clear to auscultation bilaterally  Heart:  regular rate and rhythm  Abdomen:  soft, non-tender.  Bowel sounds normal. No masses,  no organomegaly  Knee with effusion and heat    Data Review:     CBC:  Recent Labs     02/06/19  1234   WBC 4.4   GRANS 88*   MONOS 4*   EOS 0   ANEU 3.9   ABL 0.3*   HGB 12.8   HCT 38.4   *       BMP:  Recent Labs     02/06/19  0452   CREA 0.88   BUN 13      K 3.8      CO2 21   AGAP 8   *       LFTS:  Recent Labs     02/06/19  0452   TBILI 1.4*   ALT 35   SGOT 32   AP 54   TP 6.8   ALB 2.3*       Microbiology:     All Micro Results     None          Imaging:   Reviewed     Signed By: Roland Kilgore MD     February 6, 2019

## 2019-02-06 NOTE — BRIEF OP NOTE
BRIEF OPERATIVE NOTE    Date of Procedure: 2/6/2019   Preoperative Diagnosis: INFECTED RIGHT TOTAL KNEE  Postoperative Diagnosis: INFECTED RIGHT TOTAL KNEE    Procedure(s):  RIGHT KNEE INCISION AND DRAINAGE WITH POLYETHYLENE EXCHANGE  Surgeon(s) and Role:     * Lake Seaman MD - Primary         Surgical Assistant: Assistant: Sekou Phillips, [facility]    Surgical Staff:  Pravin Tejeda: Charlotte Ladd RN  Circ-Relief: Tobias Montes  Physician Assistant: Geo Cody PA-C  Scrub Tech-1: Gila Granados  Surg Asst-1: Tatyana Duran Staff: Carmen Perry RN  Event Time In Time Out   Incision Start 02/06/2019 1747    Incision Close       Anesthesia: General   Estimated Blood Loss: 200cc  Specimens:   ID Type Source Tests Collected by Time Destination   1 : Right Knee Joint Wound Knee, right CULTURE, AEROBIC AND ANAEROBIC, AFB CULTURE, CULTURE, FUNGUS, Fabricio Stallings MD 2/6/2019 1748 Microbiology      Findings: gross purulence   Complications: none  Implants:   Implant Name Type Inv.  Item Serial No.  Lot No. LRB No. Used Action   INSERT TIB LPSFLX GH 7-10 12MM -- NEXGEN PROLONG ARTC SURF - SN/A  INSERT TIB LPSFLX GH 7-10 12MM -- NEXGEN PROLONG ARTC SURF N/A [employer] [id number] Right 1 Implanted   GRAFT BNE PASTE RAPID CUR 10ML -- STIMULAN - SN/A  GRAFT BNE PASTE RAPID CUR 10ML -- STIMULAN N/A [employer] [id number] Right 1 Implanted

## 2019-02-06 NOTE — PROGRESS NOTES
Admission Medication Reconciliation:    Information obtained from:  Patient/Patient's Nurse/RxQuery    Comments/Recommendations: Updated PTA meds/reviewed patient's allergies. 1)  Patient followed by Dr. Laurie Guzmán for his Crohn's. Was on azathioprine 100 mg QHS, now on prednisone 10 mg daily    2)  Medication changes (since last review): Added  - Prednisone 10 mg daily   - Cetirizine 10 mg PRN  - MVI  - Omega 3    Adjusted  - none    Removed  - Lialda  - Bactrim  - Xarelto, oxycodone, APAP (post op discharge meds)  - Bactrim  - Flagyl  - Trazodone    3)  Team informed of changes       Allergies:  Patient has no known allergies. Significant PMH/Disease States:   Past Medical History:   Diagnosis Date    Asthma     Autoimmune disease ([facility] [address]     CROHNS DISEASE    Chronic pain     KNEES AND BACK    Fatty liver     GERD (gastroesophageal reflux disease)     HTN (hypertension)     NO MEDS    Hyperlipidemia     Ill-defined condition     HIATAL HERNIA    Migraine     Sleep apnea     USES CPAP     Chief Complaint for this Admission:  Septic Joint    Prior to Admission Medications:   Prior to Admission Medications   Prescriptions Last Dose Informant Patient Reported? Taking? cetirizine (ZYRTEC) 10 mg tablet   Yes Yes   Sig: Take 10 mg by mouth daily as needed for Allergies or Rhinitis. fish oil-omega-3 fatty acids 340-1,000 mg capsule 2019 at AM  Yes Yes   Sig: Take 1 Cap by mouth two (2) times a day. omeprazole (PRILOSEC) 40 mg capsule 2019 at QHS  Yes Yes   Sig: Take 40 mg by mouth nightly. pramipexole (MIRAPEX) 0.5 mg tablet 2019 at QHS  Yes Yes   Sig: Take 0.5 mg by mouth nightly. predniSONE (DELTASONE) 10 mg tablet 2019 at AM  Yes Yes   Sig: Take 10 mg by mouth daily (with breakfast). Replaces azathioprine (Imuran)   testosterone cypionate (DEPOTESTOTERONE CYPIONATE) 200 mg/mL injection 2019  Yes Yes   Si mg by IntraMUSCular route every .    therapeutic multivitamin (THERA) tablet 2/5/2019 at AM  Yes Yes   Sig: Take 1 Tab by mouth daily.       Facility-Administered Medications: None

## 2019-02-06 NOTE — PERIOP NOTES
Patient: Ana Solomon MRN: [id number]  SSN: [id number]   YOB: 1960  Age: 63 y.o. Sex: male     Patient is status post Procedure(s):  RIGHT KNEE INCISION AND DRAINAGE WITH POLYETHYLENE EXCHANGE.     Surgeon(s) and Role:     * Bia Cooper MD - Primary    Local/Dose/Irrigation:  n/a                  Peripheral IV 02/05/19 Left Hand (Active)   Site Assessment Clean, dry, & intact 2/6/2019 11:57 AM   Phlebitis Assessment 0 2/6/2019 11:57 AM   Infiltration Assessment 0 2/6/2019 11:57 AM   Dressing Status Clean, dry, & intact 2/6/2019 11:57 AM   Dressing Type Transparent 2/6/2019 11:57 AM   Hub Color/Line Status Infusing 2/6/2019 11:57 AM   Action Taken Open ports on tubing capped 2/6/2019 11:57 AM   Alcohol Cap Used Yes 2/6/2019  8:00 AM       Peripheral IV 02/05/19 Left Antecubital (Active)   Site Assessment Clean, dry, & intact 2/6/2019 11:57 AM   Phlebitis Assessment 0 2/6/2019 11:57 AM   Infiltration Assessment 0 2/6/2019 11:57 AM   Dressing Status Clean, dry, & intact 2/6/2019 11:57 AM   Dressing Type Transparent 2/6/2019 11:57 AM   Hub Color/Line Status Capped 2/6/2019 11:57 AM   Action Taken Open ports on tubing capped 2/6/2019 11:57 AM   Alcohol Cap Used Yes 2/6/2019 11:57 AM       Peripheral IV 02/06/19 Right Hand (Active)          Hemovac Right Knee (Active)      Airway - Endotracheal Tube 02/06/19 Oral (Active)                   Dressing/Packing:  Wound Knee Right-Dressing Type : Petroleum gauze;4 x 4;ABD pad;Cast padding;Elastic bandage (02/06/19 1700)  Splint/Cast:  ]    Other:  Lamar

## 2019-02-06 NOTE — CONSULTS
[address]  [address] [facility], [address]  [phone]                     GI CONSULTATION NOTE  Riley Dela Cruz AGACNP-BC  Work Cell: [phone]      NAME:  Vivian Vasquez   :   1960   MRN:   [id number]       Referring Provider: Dr. Daniela Fu    Consult Date: 2019     Chief Complaint: R knee swelling    History of Present Illness:  Patient is a 63 y.o. who is seen in consultation at the request of Dr. Daniela Fu for Crohn's disease. Mr. Earlene Sepulveda has a PMH as detailed below including Crohn's colitis (diagnosed ). He presented to the hospital with R knee pain and swelling. Suspected to have septic hardware from previous replacement. In terms of his Crohn's colitis, he has been taking prednisone 10 mg once daily at home. He had been on Imuran but this was discontinued due CBC abnormalities and elevated liver enzymes. He has been seen by Hematology and recently diagnosed with HIV. He was also seen by surgery for symptomatic cholelithiasis and had surgery scheduled along with liver biopsy later this month. He has had significant weight loss. He reports mild RUQ pain. Denies any diarrhea. Febrile (103) today. He was started on antibiotics and Ortho planning to do washout of knee today. EGD 2018 w/ gastritis. Gastric and duodenal biopsies were unremarkable. Colonoscopy 2018 w/ hemorrhoids, diverticulosis and normal mucosa. Biopsies were all unremarkable.        PMH:  Past Medical History:   Diagnosis Date    Asthma     Autoimmune disease ([address]     CROHNS DISEASE    Chronic pain     KNEES AND BACK    Fatty liver     GERD (gastroesophageal reflux disease)     HTN (hypertension)     NO MEDS    Hyperlipidemia     Ill-defined condition     HIATAL HERNIA    Migraine     Sleep apnea     USES CPAP       PSH:  Past Surgical History:   Procedure Laterality Date    HX HERNIA REPAIR Left     INGUINAL    HX KNEE ARTHROSCOPY Right        Allergies:  No Known Allergies    Home Medications:  Prior to Admission Medications   Prescriptions Last Dose Informant Patient Reported? Taking? cetirizine (ZYRTEC) 10 mg tablet   Yes Yes   Sig: Take 10 mg by mouth daily as needed for Allergies or Rhinitis. fish oil-omega-3 fatty acids 340-1,000 mg capsule 2019 at AM  Yes Yes   Sig: Take 1 Cap by mouth two (2) times a day. omeprazole (PRILOSEC) 40 mg capsule 2019 at QHS  Yes Yes   Sig: Take 40 mg by mouth nightly. pramipexole (MIRAPEX) 0.5 mg tablet 2019 at QHS  Yes Yes   Sig: Take 0.5 mg by mouth nightly. predniSONE (DELTASONE) 10 mg tablet 2019 at AM  Yes Yes   Sig: Take 10 mg by mouth daily (with breakfast). Replaces azathioprine (Imuran)   testosterone cypionate (DEPOTESTOTERONE CYPIONATE) 200 mg/mL injection 2019  Yes Yes   Si mg by IntraMUSCular route every . therapeutic multivitamin (THERA) tablet 2019 at AM  Yes Yes   Sig: Take 1 Tab by mouth daily.       Facility-Administered Medications: None       Hospital Medications:  Current Facility-Administered Medications   Medication Dose Route Frequency    vancomycin (VANCOCIN) 1500 mg in  ml infusion  1,500 mg IntraVENous Q12H    0.9% sodium chloride infusion  75 mL/hr IntraVENous CONTINUOUS    oxyCODONE IR (ROXICODONE) tablet 5-10 mg  5-10 mg Oral Q4H PRN    cyclobenzaprine (FLEXERIL) tablet 5 mg  5 mg Oral TID PRN    nystatin (MYCOSTATIN) 100,000 unit/mL oral suspension 500,000 Units  500,000 Units Oral QID    HYDROmorphone (PF) (DILAUDID) injection 2 mg  2 mg IntraVENous Q3H PRN    naloxone (NARCAN) injection 0.4 mg  0.4 mg IntraVENous PRN    ondansetron (ZOFRAN) injection 4 mg  4 mg IntraVENous Q8H PRN    sodium chloride (NS) flush 5-40 mL  5-40 mL IntraVENous Q8H    sodium chloride (NS) flush 5-40 mL  5-40 mL IntraVENous PRN    Vancomycin Pharmacy to Dose   Other Rx Dosing/Monitoring       Social History:  Social History     Tobacco Use    Smoking status: Never Smoker    Smokeless tobacco: Never Used   Substance Use Topics    Alcohol use: Yes     Comment: OCCASIONALLY       Family History:  Family History   Problem Relation Age of Onset    Diabetes Mother     Heart Disease Mother     Kidney Disease Mother         POLYCYSTIC    Heart Surgery Mother     Cancer Father         LUNG    Diabetes Sister     Hypertension Sister     Heart Disease Sister     Heart Surgery Sister     Kidney Disease Sister         HAD KIDNEY TRANSPLANT    Heart Disease Brother     Kidney Disease Brother         POLYCYSTIC DISEASE HAD 2 TRANSPLANTS    Hypertension Brother     Arthritis-osteo Sister     Hypertension Sister     No Known Problems Brother     Heart Attack Brother     Kidney Disease Brother         POLYCYSTIC DISEASE    Anesth Problems Neg Hx        Review of Systems:    Constitutional: negative fever, negative chills, positive weight loss  Eyes:   negative visual changes  ENT:   negative sore throat, tongue or lip swelling  Respiratory:  negative cough, negative dyspnea  Cards:  negative for chest pain, palpitations, lower extremity edema  GI:   See HPI  :  negative for frequency, dysuria  Integument:  negative for rash and pruritus  Heme:  negative for easy bruising and gum/nose bleeding  Musculoskel: negative for myalgias, back pain and muscle weakness  Neuro: negative for headaches, dizziness, vertigo  Psych:  negative for feelings of anxiety, depression      Objective:     Patient Vitals for the past 8 hrs:   BP Temp Pulse Resp SpO2   02/06/19 1507 103/59 (!) 103 °F (39.4 °C) (!) 117 15 99 %   02/06/19 0747 125/61 100.3 °F (37.9 °C) (!) 110 15 94 %     02/06 0701 - 02/06 1900  In: -   Out: 600 [Urine:600]  No intake/output data recorded. PHYSICAL EXAM:  General: WD, WN. Alert, cooperative, flushed, no acute distress.    HEENT: NC, Atraumatic. PERRLA, EOMI. Anicteric sclerae. Lungs:  CTA Bilaterally. No Wheezing/Rhonchi/Rales.   Heart:  Regular rate and rhythm, No murmur, No Rubs, No Gallops  Abdomen: Soft, non-distended, non-tender.  +Bowel sounds, no HSM  Extremities: No c/c/e  Neurologic:  Alert and oriented X 3. No acute neurological distress. Psych:   Good insight. Not anxious nor agitated. Data Review     Recent Labs     02/06/19  1234   WBC 4.4   HGB 12.8   HCT 38.4   *     Recent Labs     02/06/19  0452      K 3.8      CO2 21   BUN 13   CREA 0.88   *   CA 7.8*     Recent Labs     02/06/19  0452   SGOT 32   AP 54   TP 6.8   ALB 2.3*   GLOB 4.5*     Recent Labs     02/06/19  1234   APTT 37.0*        Imaging studies reviewed      Assessment:   1. Crohn's colitis - appears stable with current regimen  2. RUQ pain - suspect related to symptomatic cholelithiasis, has upcoming cholecystectomy w/ liver biopsy scheduled   3. Septic R knee  4.  HIV    Patient Active Problem List   Diagnosis Code    Primary osteoarthritis of right knee M17.11    Infection of total right knee replacement (HCC) T84.53XA    Infection of total knee replacement ([facility] [address] T84.59XA, Z96.659              Plan:   -Supportive management per primary team  -Resume prednisone 10 mg daily   -Agree with ID consult  -Discussed with Dr. Matilde Brush   -Will follow along with you  -Thank you kindly for allowing us to participate in the care of this patient

## 2019-02-06 NOTE — CONSULTS
Medical Consultation Report    Patient:  Tessy Marcum  MRN:  [id number]  YOB: 1960  Age:  63 y.o. Date of consultation:  2/6/2019                                 Reason for consultation:pre operative evaluation. History of present illness  Tessy Marcum is a 63 y.o. male who was admitted on 2/5/2019 for left septic knee. Patient is known to have chron;s diease and was on Imuran until about a month ago when it was stopped and started on prednisone. He is recently diagnosed with HIV,not on treatment yet as work up is underway. He says he has h/o low platelet,sleep apnea. He notes significant weight loss over the last 6 months . Past Medical History:   Diagnosis Date    Asthma     Autoimmune disease ([facility] [address]     CROHNS DISEASE    Chronic pain     KNEES AND BACK    Fatty liver     GERD (gastroesophageal reflux disease)     HTN (hypertension)     NO MEDS    Hyperlipidemia     Ill-defined condition     HIATAL HERNIA    Migraine     Sleep apnea     USES CPAP      Past Surgical History:   Procedure Laterality Date    HX HERNIA REPAIR Left 1986    INGUINAL    HX KNEE ARTHROSCOPY Right 2010        Prior to Admission medications    Medication Sig Start Date End Date Taking? Authorizing Provider   testosterone cypionate (DEPOTESTOTERONE CYPIONATE) 200 mg/mL injection 100 mg by IntraMUSCular route every Sunday. Yes Provider, Historical   cetirizine (ZYRTEC) 10 mg tablet Take 10 mg by mouth daily as needed for Allergies or Rhinitis. Yes Provider, Historical   predniSONE (DELTASONE) 10 mg tablet Take 10 mg by mouth daily (with breakfast). Replaces azathioprine (Imuran)   Yes Provider, Historical   therapeutic multivitamin (THERA) tablet Take 1 Tab by mouth daily. Yes Provider, Historical   fish oil-omega-3 fatty acids 340-1,000 mg capsule Take 1 Cap by mouth two (2) times a day.    Yes Provider, Historical pramipexole (MIRAPEX) 0.5 mg tablet Take 0.5 mg by mouth nightly. Yes Provider, Historical   omeprazole (PRILOSEC) 40 mg capsule Take 40 mg by mouth nightly.    Yes Provider, Historical     Current Facility-Administered Medications   Medication Dose Route Frequency    vancomycin (VANCOCIN) 1500 mg in  ml infusion  1,500 mg IntraVENous Q12H    0.9% sodium chloride infusion  75 mL/hr IntraVENous CONTINUOUS    oxyCODONE IR (ROXICODONE) tablet 5-10 mg  5-10 mg Oral Q4H PRN    cyclobenzaprine (FLEXERIL) tablet 5 mg  5 mg Oral TID PRN    HYDROmorphone (PF) (DILAUDID) injection 2 mg  2 mg IntraVENous Q3H PRN    naloxone (NARCAN) injection 0.4 mg  0.4 mg IntraVENous PRN    ondansetron (ZOFRAN) injection 4 mg  4 mg IntraVENous Q8H PRN    sodium chloride (NS) flush 5-40 mL  5-40 mL IntraVENous Q8H    sodium chloride (NS) flush 5-40 mL  5-40 mL IntraVENous PRN    Vancomycin Pharmacy to Dose   Other Rx Dosing/Monitoring     No Known Allergies   Family History   Problem Relation Age of Onset    Diabetes Mother     Heart Disease Mother     Kidney Disease Mother         POLYCYSTIC    Heart Surgery Mother     Cancer Father         LUNG    Diabetes Sister     Hypertension Sister     Heart Disease Sister     Heart Surgery Sister     Kidney Disease Sister         HAD KIDNEY TRANSPLANT    Heart Disease Brother     Kidney Disease Brother         POLYCYSTIC DISEASE HAD 2 TRANSPLANTS    Hypertension Brother     Arthritis-osteo Sister     Hypertension Sister     No Known Problems Brother     Heart Attack Brother     Kidney Disease Brother         POLYCYSTIC DISEASE    Anesth Problems Neg Hx           Social history     Social History     Socioeconomic History    Marital status:      Spouse name: Not on file    Number of children: Not on file    Years of education: Not on file    Highest education level: Not on file   Tobacco Use    Smoking status: Never Smoker    Smokeless tobacco: Never Used   Substance and Sexual Activity    Alcohol use: Yes     Comment: OCCASIONALLY    Drug use: No    Sexual activity: Yes         Review of systems  The patient denies any fever, chills, vision changes, difficulty swallowing, cough, congestion, chest pain, palpitations, rashes, bleeding, focal weakness, or dysuria. A comprehensive review of systems was negative except for that written in the History of Present Illness. The remainder of the review of systems was reviewed and is non-contributory. Physical Examination   Visit Vitals  /61 (BP 1 Location: Left arm, BP Patient Position: At rest)   Pulse (!) 110   Temp 100.3 °F (37.9 °C)   Resp 15   Ht 5' 11\" (1.803 m)   Wt 89.3 kg (196 lb 13.9 oz)   SpO2 94%   BMI 27.46 kg/m²           O2 Device: Room air    GENERAL:  Alert, oriented, cooperative, no apparent distress   HEENT:  Normocephalic, atraumatic, non icteric sclerae, non pallor conjuctivae, EOMs intact, PERRLA. NECK: Supple, trachea midline, no adenopathy, no thyromegally or tenderness, no carotid bruit and no JVD. LUNGS:   Vesicular breath sounds bilaterally, no added sounds. HEART:   S1 and S2 well heard,RRR,  no murmur, click, rub or gallop. ABDOMEB:   Soft, non-tender. Normoactive bowel sounds. No masses,  No organomegaly. EXTREMETIES: R knee bandaged. PULSES: 2+ and symmetric all extremities. SKIN:  No rashes or lesions   NEUROLOGY: Alert and oriented to PPT, CNII-XII intact. Motor and sensory exam grossly intact.              Data Review    EKG: reviewed    Imaging  CXR reviwed    24 Hour Results:  Recent Results (from the past 24 hour(s))   METABOLIC PANEL, BASIC    Collection Time: 02/06/19  4:52 AM   Result Value Ref Range    Sodium 137 136 - 145 mmol/L    Potassium 3.8 3.5 - 5.1 mmol/L    Chloride 108 97 - 108 mmol/L    CO2 21 21 - 32 mmol/L    Anion gap 8 5 - 15 mmol/L    Glucose 125 (H) 65 - 100 mg/dL    BUN 13 6 - 20 MG/DL    Creatinine 0.88 0.70 - 1.30 MG/DL BUN/Creatinine ratio 15 12 - 20      GFR est AA >60 >60 ml/min/1.73m2    GFR est non-AA >60 >60 ml/min/1.73m2    Calcium 7.8 (L) 8.5 - 10.1 MG/DL     No results for input(s): WBC, HGB, HCT, PLT, HGBEXT, HCTEXT, PLTEXT in the last 72 hours. Recent Labs     02/06/19  0452      K 3.8      CO2 21   *   BUN 13   CREA 0.88   CA 7.8*         Impression/Recomendations   Active Problems:    Infection of total right knee replacement ([facility] [address] (2/5/2019)      Infection of total knee replacement ([facility] [address] (2/5/2019)      Newly diagnosed HIV,work up underway  Oral thrush  Chron;s disease. On prednisone for a month tapered down from 20 mg daily to 10 mg now. H/o thrombocytopenia  BRIDGER  Low T  · IV antibiotics per ID  · Add nystatin  · Solumedrol 20 mg iv pre-op to prevent adrenal crisis,may resume 20 mg prednisone from tomorrow  · HIV tx per out patient ID once work is completed. · Ordered [email or web address](he does not home setting )       Thank you very much for allowing us to participate in the care of this pleasant patient. The hospitalist service will continue to follow the patient's medical progress along with you. Please do not hesitate to page with any questions or to discuss the case. Signed by:      Rosalinda Natarajan MD     February 6, 2019 at 11:47 AM

## 2019-02-06 NOTE — CONSULTS
Consult received. Patient admitted for left septic knee. He has PMH of chron's disease ,currently on prednisone,he was on Imuran previously;low platelet, abnormal LFTs,low T,and recent diagnosis of HIV,work up underway,not on POST yet. He has BRIDGER on cpap(does not know setting)    S: knee pain,chronic cough,fever    On exam  -on room air,not in distress  Visit Vitals  /61 (BP 1 Location: Left arm, BP Patient Position: At rest)   Pulse (!) 110   Temp 100.3 °F (37.9 °C)   Resp 15   Ht 5' 11\" (1.803 m)   Wt 89.3 kg (196 lb 13.9 oz)   SpO2 94%   BMI 27.46 kg/m²     HEENT: oral thrush  Chest:crepitation left posterior lungs  Abd: no organomegaly detected. Left knee wrapped,limited ROM      Plan  -stat cbc,INT/PTT,cxr,ekg preop. Full note to follow    Derotha Severe, MD     Addendum  . Labs,EKG and CXR reviewed. He has mild thrombocytopenia. He is given a one time dose of solumedrol preoperatively as he has been on prednisone for about a month  Patient intermediate risk and can got for planned surgery.

## 2019-02-06 NOTE — PROGRESS NOTES
Primary Nurse Margarita Gomez and Tianna Rod, RN performed a dual skin assessment on this patient No impairment noted  Parker score is 23

## 2019-02-06 NOTE — PROGRESS NOTES
Pharmacist Note - Vancomycin Dosing    Consult provided for this 63 y.o. male for indication of Infection of total knee replacement ([address] Antibiotic regimen(s): vancomycin    Recent Labs     19  0452   CREA 0.88   BUN 13     Frequency of BMP: daily  Height: 180.3 cm  Weight: 89.3 kg  Est CrCl: ~98 ml/min  Temp (24hrs), Av.3 °F (36.8 °C), Min:97.5 °F (36.4 °C), Max:99.1 °F (37.3 °C)    Cultures:  No current    Goal trough = 15 - 20 mcg/mL    Therapy will be initiated with a loading dose of 2000 mg IV x 1 to be followed by a maintenance dose of 1500 mg IV every 12 hours. Pharmacy to follow patient daily and order levels / make dose adjustments as appropriate.

## 2019-02-06 NOTE — PROGRESS NOTES
Ortho Progress    Patient complains of returning pain. Was told that he could not have pain medication until after surgery (?). Is awaiting washout with poly exchange today. No new complaints.  NPO at this time    Awake and alert; NAD but appears flushed  Right knee wrapped in compression bandage  TTP about the joint lines with limited ROM  No significant erythema at this time    Labs from [facility]  WBC 3.46  Hgb 13.1    Suspected septic right knee    Will order sed rate and CRP as well as repeat CBC  Complicated medical picture with HIV, Crohn's and cholecystitis - unclear how to proceed with medical treatments in setting of joint infection and HIV  Requesting Hospitalist, GI and ID to see patient  Need to confirm home meds  Consented for I&D of right knee with poly exchange  To OR later today  NPO except for meds  Fluids started  On Vanc right now  Will need to determine culture results from [facility]    Dr Parisa Morton aware of patient and agrees with plan of care    Tanner Cisneros PA-C  Orthopedic Trauma Service  [address] [place] [address]

## 2019-02-06 NOTE — PROGRESS NOTES
[address], [address]  [phone]        Thank you for requesting this consultation but this patient has been seen by [facility] in the past.  We will inform them of this request.    Sincerely,  Skylar Dale NP

## 2019-02-06 NOTE — PROGRESS NOTES
Care Management Interventions  PCP Verified by CM: Yes  Palliative Care Criteria Met (RRAT>21 & CHF Dx)?: No  Mode of Transport at Discharge: Self  Transition of Care Consult (CM Consult): Discharge Planning  MyChart Signup: No  Discharge Durable Medical Equipment: No  Health Maintenance Reviewed: Yes  Physical Therapy Consult: Yes(recommended post op)  Occupational Therapy Consult: Yes(Recommeded post op)  Speech Therapy Consult: No  Current Support Network: Lives with Spouse  Confirm Follow Up Transport: Family  Plan discussed with Pt/Family/Caregiver: Yes  Freedom of Choice Offered: Yes   Resource Information Provided?: No  Discharge Location  Discharge Placement: Home(TBD)    Reason for Admission:   Rigth Knee Infection/I &D with Poly exchange (OR today 2/6)                   RRAT Score:    10                 Plan for utilizing home health:      TBD                    Likelihood of Readmission:  low                         Transition of Care Plan:     Home/TBD post op discharge planning. The patient stated that he should be workers comp. CRM will follow for the information and add it to the chart. CHRISTIANO      GI and ID consults noted.  CHRISTIANO

## 2019-02-06 NOTE — PROGRESS NOTES
Bedside and Verbal shift change report given to Suyapa LEMUS (oncoming nurse) by Vi Hall (offgoing nurse). Report included the following information SBAR, Kardex and MAR.

## 2019-02-06 NOTE — H&P
ORTHOPEDIC PRE-OP HISTORY AND PHYSICAL    Subjective:     Segundo [place] is a 63 y.o.  male presented with a history of right knee pain. He had a right total knee replacement by Dr Sylvie Flood in August 2018. He was in his usual state of health yesterday morning. When he sat down to have lunch, he started having knee pain and swelling. He denies injury. He called to make an appointment with Dr. Sylvie Flood and was scheduled to be seen Friday. His pain got progressively worse and was seen at the ER at [facility]. He was subsequently admitted there. Serum WBC was 3, ESR was 10 and CRP was 4. Ultimately he had an aspiration of the knee. Aspiration showed no crystals, 17K nucleated cells, and gram stain was positive. There is obvious concern for infection of his total knee and he has been transferred to [facility] for surgical management of this condition. Notably he has been recently diagnosed with HIV. He reports a significant (>30lbs) unintentional weight loss over the last several months    Patient Active Problem List    Diagnosis Date Noted    Infection of total right knee replacement ([facility] 02/05/2019    Infection of total knee replacement ([facility] 02/05/2019    Primary osteoarthritis of right knee 08/06/2018     Past Medical History:   Diagnosis Date    Asthma     Autoimmune disease ([facility]     CROHNS DISEASE    Chronic pain     KNEES AND BACK    Fatty liver     GERD (gastroesophageal reflux disease)     HTN (hypertension)     NO MEDS    Hyperlipidemia     Ill-defined condition     HIATAL HERNIA    Migraine     Sleep apnea     USES CPAP      Past Surgical History:   Procedure Laterality Date    HX HERNIA REPAIR Left 1986    INGUINAL    HX KNEE ARTHROSCOPY Right 2010      Prior to Admission medications    Medication Sig Start Date End Date Taking? Authorizing Provider   acetaminophen (TYLENOL) 500 mg tablet Take 2 Tabs by mouth every six (6) hours.  8/7/18   Chanelle Infante PA-C oxyCODONE IR (ROXICODONE) 5 mg immediate release tablet Take 1-2 Tabs by mouth every four (4) hours as needed. Max Daily Amount: 60 mg. 8/7/18   Shy MCGRATH PA-C   rivaroxaban (XARELTO) 10 mg tablet Take 1 Tab by mouth daily (with lunch). Indications: KNEE REPLACEMENT DEEP VEIN THROMBOSIS PREVENTION 8/7/18   Shy MCGRATH PA-C   azaTHIOprine [facility]) 50 mg tablet Take 100 mg by mouth nightly. Provider, Historical   MESALAMINE PO Take 1,200 mg by mouth daily. 4.8 GRAMS DAILY     Provider, Historical   pramipexole (MIRAPEX) 0.5 mg tablet Take 0.5 mg by mouth nightly. Provider, Historical   omeprazole (PRILOSEC) 40 mg capsule Take 40 mg by mouth nightly. Provider, Historical   TESTOSTERONE IM by IntraMUSCular route every seven (7) days. SUNDAYS    Provider, Historical     No Known Allergies   Social History     Tobacco Use    Smoking status: Never Smoker    Smokeless tobacco: Never Used   Substance Use Topics    Alcohol use: Yes     Comment: OCCASIONALLY      Family History   Problem Relation Age of Onset    Diabetes Mother     Heart Disease Mother     Kidney Disease Mother         POLYCYSTIC    Heart Surgery Mother     Cancer Father         LUNG    Diabetes Sister     Hypertension Sister     Heart Disease Sister     Heart Surgery Sister     Kidney Disease Sister         HAD KIDNEY TRANSPLANT    Heart Disease Brother     Kidney Disease Brother         POLYCYSTIC DISEASE HAD 2 TRANSPLANTS    Hypertension Brother    [facility] Arthritis-osteo Sister     Hypertension Sister     No Known Problems Brother     Heart Attack Brother     Kidney Disease Brother         POLYCYSTIC DISEASE    Anesth Problems Neg Hx       Review of Systems  A comprehensive review of systems was negative except for that written in the HPI. Objective:     No data found.   General appearance: alert, cooperative, no distress, appears stated age  Head: atraumatic  Lungs: clear to auscultation bilaterally  Heart: regular rate and rhythm  Extremities: moderate right knee effusion, tender, painful ROM  Pulses: 2+ and symmetric  Skin: right knee warm, dressing from recent aspiration  Neurologic: Grossly normal      Assessment:     Active Problems:    Infection of total right knee replacement ([facility]) (2/5/2019)      Infection of total knee replacement ([address] (2/5/2019)        Plan:     The various methods of treatment have been discussed with the patient and family. Recommend surgical I&D with poly exchange tomorrow  Will start IV Vancomycin  Consult ID tomorrow  Will need prolonged IV antibiotics  May ultimately need resection arthroplasty if infection persists  NPO  Dr. Xiomara Clemens aware and in agreement with above assessment and plan.     [address] [place] [address]

## 2019-02-06 NOTE — PROGRESS NOTES
Bedside shift change report given to Ridge Valero (oncoming nurse) by Delvis Waters (offgoing nurse). Report included the following information SBAR, Kardex, Intake/Output, MAR and Recent Results.

## 2019-02-07 LAB
ALBUMIN SERPL-MCNC: 2 G/DL (ref 3.5–5)
ALBUMIN/GLOB SERPL: 0.5 {RATIO} (ref 1.1–2.2)
ALP SERPL-CCNC: 46 U/L (ref 45–117)
ALT SERPL-CCNC: 25 U/L (ref 12–78)
ANION GAP SERPL CALC-SCNC: 9 MMOL/L (ref 5–15)
APPEARANCE UR: ABNORMAL
AST SERPL-CCNC: 18 U/L (ref 15–37)
BACTERIA URNS QL MICRO: ABNORMAL /HPF
BILIRUB DIRECT SERPL-MCNC: 0.2 MG/DL (ref 0–0.2)
BILIRUB SERPL-MCNC: 0.9 MG/DL (ref 0.2–1)
BILIRUB UR QL CFM: NEGATIVE
BUN SERPL-MCNC: 11 MG/DL (ref 6–20)
BUN/CREAT SERPL: 14 (ref 12–20)
CALCIUM SERPL-MCNC: 7.9 MG/DL (ref 8.5–10.1)
CHLORIDE SERPL-SCNC: 109 MMOL/L (ref 97–108)
CO2 SERPL-SCNC: 22 MMOL/L (ref 21–32)
COLOR UR: ABNORMAL
CREAT SERPL-MCNC: 0.78 MG/DL (ref 0.7–1.3)
EPITH CASTS URNS QL MICRO: ABNORMAL /LPF
GLOBULIN SER CALC-MCNC: 4.1 G/DL (ref 2–4)
GLUCOSE SERPL-MCNC: 167 MG/DL (ref 65–100)
GLUCOSE UR STRIP.AUTO-MCNC: NEGATIVE MG/DL
GRAN CASTS URNS QL MICRO: ABNORMAL /LPF
HGB BLD-MCNC: 11.2 G/DL (ref 12.1–17)
HGB UR QL STRIP: ABNORMAL
HYALINE CASTS URNS QL MICRO: ABNORMAL /LPF (ref 0–5)
INR PPP: 1.1 (ref 0.9–1.1)
KETONES UR QL STRIP.AUTO: ABNORMAL MG/DL
LEUKOCYTE ESTERASE UR QL STRIP.AUTO: ABNORMAL
MUCOUS THREADS URNS QL MICRO: ABNORMAL /LPF
NITRITE UR QL STRIP.AUTO: NEGATIVE
PH UR STRIP: 5.5 [PH] (ref 5–8)
POTASSIUM SERPL-SCNC: 4.4 MMOL/L (ref 3.5–5.1)
PROT SERPL-MCNC: 6.1 G/DL (ref 6.4–8.2)
PROT UR STRIP-MCNC: 100 MG/DL
PROTHROMBIN TIME: 10.9 SEC (ref 9–11.1)
RBC #/AREA URNS HPF: ABNORMAL /HPF (ref 0–5)
SODIUM SERPL-SCNC: 140 MMOL/L (ref 136–145)
SP GR UR REFRACTOMETRY: >1.03 (ref 1–1.03)
UA: UC IF INDICATED,UAUC: ABNORMAL
UROBILINOGEN UR QL STRIP.AUTO: 0.2 EU/DL (ref 0.2–1)
WBC URNS QL MICRO: ABNORMAL /HPF (ref 0–4)

## 2019-02-07 PROCEDURE — 74011636637 HC RX REV CODE- 636/637: Performed by: NURSE PRACTITIONER

## 2019-02-07 PROCEDURE — 36415 COLL VENOUS BLD VENIPUNCTURE: CPT

## 2019-02-07 PROCEDURE — 74011250636 HC RX REV CODE- 250/636: Performed by: HOSPITALIST

## 2019-02-07 PROCEDURE — 74011250637 HC RX REV CODE- 250/637: Performed by: PHYSICIAN ASSISTANT

## 2019-02-07 PROCEDURE — 97116 GAIT TRAINING THERAPY: CPT

## 2019-02-07 PROCEDURE — 74011250637 HC RX REV CODE- 250/637: Performed by: HOSPITALIST

## 2019-02-07 PROCEDURE — 80048 BASIC METABOLIC PNL TOTAL CA: CPT

## 2019-02-07 PROCEDURE — 74011250636 HC RX REV CODE- 250/636: Performed by: INTERNAL MEDICINE

## 2019-02-07 PROCEDURE — 86360 T CELL ABSOLUTE COUNT/RATIO: CPT

## 2019-02-07 PROCEDURE — 87086 URINE CULTURE/COLONY COUNT: CPT

## 2019-02-07 PROCEDURE — 81001 URINALYSIS AUTO W/SCOPE: CPT

## 2019-02-07 PROCEDURE — 85018 HEMOGLOBIN: CPT

## 2019-02-07 PROCEDURE — 74011000258 HC RX REV CODE- 258: Performed by: INTERNAL MEDICINE

## 2019-02-07 PROCEDURE — 97161 PT EVAL LOW COMPLEX 20 MIN: CPT

## 2019-02-07 PROCEDURE — 74011250636 HC RX REV CODE- 250/636: Performed by: PHYSICIAN ASSISTANT

## 2019-02-07 PROCEDURE — 80076 HEPATIC FUNCTION PANEL: CPT

## 2019-02-07 PROCEDURE — 97165 OT EVAL LOW COMPLEX 30 MIN: CPT

## 2019-02-07 PROCEDURE — 85610 PROTHROMBIN TIME: CPT

## 2019-02-07 PROCEDURE — 97535 SELF CARE MNGMENT TRAINING: CPT

## 2019-02-07 PROCEDURE — 65270000029 HC RM PRIVATE

## 2019-02-07 RX ORDER — VANCOMYCIN/0.9 % SOD CHLORIDE 1.5G/250ML
1500 PLASTIC BAG, INJECTION (ML) INTRAVENOUS EVERY 12 HOURS
Status: DISCONTINUED | OUTPATIENT
Start: 2019-02-07 | End: 2019-02-08 | Stop reason: DRUGHIGH

## 2019-02-07 RX ORDER — DIPHENHYDRAMINE HCL 25 MG
25 CAPSULE ORAL
Status: DISCONTINUED | OUTPATIENT
Start: 2019-02-07 | End: 2019-02-14 | Stop reason: HOSPADM

## 2019-02-07 RX ADMIN — CEFEPIME HYDROCHLORIDE 2 G: 2 INJECTION, POWDER, FOR SOLUTION INTRAVENOUS at 18:10

## 2019-02-07 RX ADMIN — RIVAROXABAN 10 MG: 10 TABLET, FILM COATED ORAL at 06:17

## 2019-02-07 RX ADMIN — PREDNISONE 10 MG: 10 TABLET ORAL at 08:54

## 2019-02-07 RX ADMIN — FLUCONAZOLE, SODIUM CHLORIDE 200 MG: 2 INJECTION INTRAVENOUS at 18:23

## 2019-02-07 RX ADMIN — OXYCODONE HYDROCHLORIDE 5 MG: 5 TABLET ORAL at 09:56

## 2019-02-07 RX ADMIN — ACETAMINOPHEN 650 MG: 325 TABLET ORAL at 18:10

## 2019-02-07 RX ADMIN — SENNOSIDES AND DOCUSATE SODIUM 1 TABLET: 8.6; 5 TABLET ORAL at 08:54

## 2019-02-07 RX ADMIN — POLYETHYLENE GLYCOL 3350 17 G: 17 POWDER, FOR SOLUTION ORAL at 08:54

## 2019-02-07 RX ADMIN — OXYCODONE HYDROCHLORIDE 10 MG: 5 TABLET ORAL at 21:53

## 2019-02-07 RX ADMIN — DIPHENHYDRAMINE HYDROCHLORIDE 25 MG: 25 CAPSULE ORAL at 12:33

## 2019-02-07 RX ADMIN — SODIUM CHLORIDE 125 ML/HR: 900 INJECTION, SOLUTION INTRAVENOUS at 06:21

## 2019-02-07 RX ADMIN — ACETAMINOPHEN 650 MG: 325 TABLET ORAL at 06:17

## 2019-02-07 RX ADMIN — NYSTATIN 500000 UNITS: 100000 SUSPENSION ORAL at 08:54

## 2019-02-07 RX ADMIN — CEFEPIME HYDROCHLORIDE 2 G: 2 INJECTION, POWDER, FOR SOLUTION INTRAVENOUS at 09:45

## 2019-02-07 RX ADMIN — VANCOMYCIN HYDROCHLORIDE 1500 MG: 10 INJECTION, POWDER, LYOPHILIZED, FOR SOLUTION INTRAVENOUS at 00:24

## 2019-02-07 RX ADMIN — NYSTATIN 500000 UNITS: 100000 SUSPENSION ORAL at 21:53

## 2019-02-07 RX ADMIN — FAMOTIDINE 20 MG: 20 TABLET ORAL at 08:54

## 2019-02-07 RX ADMIN — NYSTATIN 500000 UNITS: 100000 SUSPENSION ORAL at 18:11

## 2019-02-07 RX ADMIN — CEFEPIME HYDROCHLORIDE 2 G: 2 INJECTION, POWDER, FOR SOLUTION INTRAVENOUS at 02:40

## 2019-02-07 RX ADMIN — PRAMIPEXOLE DIHYDROCHLORIDE 0.5 MG: 1 TABLET ORAL at 21:53

## 2019-02-07 RX ADMIN — ACETAMINOPHEN 650 MG: 325 TABLET ORAL at 12:33

## 2019-02-07 RX ADMIN — FAMOTIDINE 20 MG: 20 TABLET ORAL at 18:11

## 2019-02-07 RX ADMIN — OXYCODONE HYDROCHLORIDE 5 MG: 5 TABLET ORAL at 10:32

## 2019-02-07 RX ADMIN — NYSTATIN 500000 UNITS: 100000 SUSPENSION ORAL at 12:33

## 2019-02-07 RX ADMIN — OXYCODONE HYDROCHLORIDE 10 MG: 5 TABLET ORAL at 18:33

## 2019-02-07 RX ADMIN — VANCOMYCIN HYDROCHLORIDE 1500 MG: 10 INJECTION, POWDER, LYOPHILIZED, FOR SOLUTION INTRAVENOUS at 12:33

## 2019-02-07 RX ADMIN — SENNOSIDES AND DOCUSATE SODIUM 1 TABLET: 8.6; 5 TABLET ORAL at 18:10

## 2019-02-07 RX ADMIN — OXYCODONE HYDROCHLORIDE 10 MG: 5 TABLET ORAL at 00:23

## 2019-02-07 NOTE — PROGRESS NOTES
Spoke to MD Marlene Chavarria. Gave MD an update about patient.  MD stated someone will come see him tomorrow

## 2019-02-07 NOTE — PROGRESS NOTES
2000: Called to answering service for [facility] [address] for clarification of transfer orders. Patients family states they were told by physician that he will go to the ICU. No orders in place for ICU transfer. 2010: Per Dr. Can Peters, Anesthesia to decide on if patient requires an ICU level of care. Called to Dr. Pari Johnston, he will come to assess patient. 2011: Dr. Pari Johnston at bedside. Patient vital signs stable (NSR 80's, RR 12, temp 97.6 oral), pain controlled. Unless patient changes, patient OK to transfer back to [address] [place] [address].

## 2019-02-07 NOTE — PROGRESS NOTES
Day #2 of Vancomycin  Indication:  Total knee replacement ([address] Current regimen:  1500 mg IV Q 12hrs  Abx regimen:  Vancomycin, cefepime, and Diflucan (x3 days)  ID Following ?: YES  Concomitant nephrotoxic drugs (requires more frequent monitoring): None  Frequency of BMP?: Daily (through )  Recent Labs     19  0524 19  1234 19  0452   WBC  --  4.4  --    CREA 0.78  --  0.88   BUN 11  --  13   Est CrCl: >100 ml/min; UO: 0.9 ml/kg/hr  Temp (24hrs), Av.9 °F (37.2 °C), Min:97.4 °F (36.3 °C), Max:103 °F (39.4 °C)    Cultures:    RIGHT KNEE JOINT - Rare GPC in clusters - pending     Goal trough = 15 - 20 mcg/mL    Recent trough history (date/time/level/dose/action taken):  None thus far    Plan: Continue current regimen. Trough prior to 0100 dose Tomorrow AM.  Pharmacy will continue to monitor this patient daily for changes in clinical status and renal function.

## 2019-02-07 NOTE — PROGRESS NOTES
Occupational Therapy EVALUATION/discharge  Patient: Matilda Mullins (22 y.o. male)  Date: 2/7/2019  Primary Diagnosis: Infection of total right knee replacement ([facility] [T84.53XA]  Infection of total knee replacement ([facility] [T84.59XA, Z96.659]  Procedure(s) (LRB):  RIGHT KNEE INCISION AND DRAINAGE WITH POLYETHYLENE EXCHANGE (Right) 1 Day Post-Op   Precautions: Fall       ASSESSMENT:   Based on the objective data described below, the patient presents with anticipated impairments following R knee I & D with exchange POD 1. Hx of R TKR August 2018 with good recovery, recent dx of HIV. Provided ed on home safety, dressing techniques, toileting transfers and when to attempt tub transfers(when cleared to shower). He is able to complete UB care with set up, toileting with supervision, and LB dressing with max A. Denies need for dressing aides as wife will assist with distal LB dressing tasks. Patient and wife with good understanding. Cleared from OT standpoint, home with wife support. Further skilled acute occupational therapy is not indicated at this time. Discharge Recommendations: None- support from wife  Further Equipment Recommendations for Discharge: dressing aides PRN      SUBJECTIVE:   Patient stated You know I had knee surgery before.     OBJECTIVE DATA SUMMARY:   HISTORY:   Past Medical History:   Diagnosis Date    Asthma     Autoimmune disease ([facility]     CROHNS DISEASE    Chronic pain     KNEES AND BACK    Fatty liver     GERD (gastroesophageal reflux disease)     HTN (hypertension)     NO MEDS    Hyperlipidemia     Ill-defined condition     HIATAL HERNIA, HIV    Migraine     Sleep apnea     USES CPAP     Past Surgical History:   Procedure Laterality Date    HX HERNIA REPAIR Left 1986    INGUINAL    HX KNEE ARTHROSCOPY Right 2010       Prior Level of Function/Environment/Context:Home with wife. Indep ADL tasks.   Works in construction  Occupations in which the patient is/was successful, what are the barriers preventing that success:   Performance Patterns (routines, roles, habits, and rituals):   Personal Interests and/or values:   Expanded or extensive additional review of patient history:     Home Situation  Home Environment: Private residence  # Steps to Enter: 4  Rails to Enter: Yes  Hand Rails : Left  One/Two Story Residence: One story  Living Alone: Yes  Support Systems: Spouse/Significant Other/Partner  Current DME Used/Available at Home: Walker, rolling, Cane, straight, Shower chair, Commode, bedside  Tub or Shower Type: Tub/Shower combination    Hand dominance: Right    EXAMINATION OF PERFORMANCE DEFICITS:  Cognitive/Behavioral Status:  Neurologic State: Appropriate for age  Orientation Level: Oriented X4  Cognition: Appropriate decision making; Appropriate for age attention/concentration; Appropriate safety awareness; Follows commands      Hearing: Auditory  Auditory Impairment: None    Vision/Perceptual:                                Corrective Lenses: Glasses    Range of Motion:  BUE WDL                            Strength:  BUE WDL                   Coordination:     Fine Motor Skills-Upper: Left Intact; Right Intact    Gross Motor Skills-Upper: Left Intact; Right Intact    Tone & Sensation:  BUE WDL                Balance:  Sitting: Intact    Functional Mobility and Transfers for ADLs:  Bed Mobility:  Supine to Sit: Supervision(HOB elevated)    Transfers:  Sit to Stand: Stand-by assistance  Stand to Sit: Stand-by assistance  Bathroom Mobility: Stand-by assistance  Toilet Transfer : Supervision    ADL Assessment:  Feeding: Independent    Oral Facial Hygiene/Grooming: Supervision    Bathing: Minimum assistance    Upper Body Dressing: Supervision    Lower Body Dressing: Minimum assistance    Toileting: Stand by assistance(RW)                ADL Intervention and task modifications:       Lower Body Dressing Assistance  Underpants: Compensatory technique training  Socks: Maximum assistance(wife to assist)       Toileting transfer with supervision with RW       Bathing: Patient instructed and indicated understanding when bathing to not submerge wound in water, stand to shower or sponge bathe, and follow dc instructions on when to return to showering. Dressing joint: Patient instructed to don/doff Right LE first/last.  Patient instructed and demonstrated to don all clothing while sitting prior to standing, doff all clothing to knees while standing, then sit to doff clothing off from knees to feet in order to facilitate fall prevention, pain management, and energy conservation with Supervision. Home safety: Patient instructed and indicated understanding on home modifications and safety (raise height of ADL objects, appropriate height of chair surfaces, recliner safety, change of floor surfaces, clear pathways) to increase independence and fall prevention. Standing: Patient instructed and demonstrated during ADLs to walk up to sink/counter top/surfaces, step into walker to increase safety of joint and fall prevention with Supervision. Patient educated about knee anatomy verbally and with pictures and educated to avoid rotation of Right LE. Instructed to apply concept to ADLs within the home (no twisting of knee during reaching across body, square off while using objects, slide objects along surfaces). Patient instructed and indicated understanding to increase amount of time standing, observe standing position during ADLs in order to increase even weight bearing through bilateral LEs in order to increase independence with ADLs. Goal to be reached 30 days post - op, per orthopedic surgeon or per PT. Tub/shower transfer: Patient instructed and indicated understanding regarding when it is safe to begin transfer into tub/shower (complete stairs with PT, advance exercises with PT high enough to clear tub/shower height).   Patient instructed to use the same technique as used with stairs when entering and exiting tub/shower (\"up with the non-surgical, down with the surgical leg\"). Functional Measure:  Barthel Index:    Bathin  Bladder: 10  Bowels: 10  Groomin  Dressin  Feeding: 10  Mobility: 5  Stairs: 5  Toilet Use: 10  Transfer (Bed to Chair and Back): 10  Total: 70        The Barthel ADL Index: Guidelines  1. The index should be used as a record of what a patient does, not as a record of what a patient could do. 2. The main aim is to establish degree of independence from any help, physical or verbal, however minor and for whatever reason. 3. The need for supervision renders the patient not independent. 4. A patient's performance should be established using the best available evidence. Asking the patient, friends/relatives and nurses are the usual sources, but direct observation and common sense are also important. However direct testing is not needed. 5. Usually the patient's performance over the preceding 24-48 hours is important, but occasionally longer periods will be relevant. 6. Middle categories imply that the patient supplies over 50 per cent of the effort. 7. Use of aids to be independent is allowed. Cecy Wen., Barthel, D.W. ([id number]). Functional evaluation: the Barthel Index. [facility] (14)2. Jessi Villagran maria luisa DUYEN Hirsch, Martha Handley., [place], [address] [place] [address] (). Measuring the change indisability after inpatient rehabilitation; comparison of the responsiveness of the Barthel Index and Functional Colbert Measure. Journal of Neurology, Neurosurgery, and Psychiatry, 66(4), 869-210. Mario Escoto, N.J.A, CHRISTIANE GrovesJ.RAMILA, & Benito Kearney MTraeA. (2004.) Assessment of post-stroke quality of life in cost-effectiveness studies: The usefulness of the Barthel Index and the EuroQoL-5D.  Quality of Life Research, 15, 900-88          Occupational Therapy Evaluation Charge Determination   History Examination Decision-Making   LOW Complexity : Brief history review  LOW Complexity : 1-3 performance deficits relating to physical, cognitive , or psychosocial skils that result in activity limitations and / or participation restrictions  LOW Complexity : No comorbidities that affect functional and no verbal or physical assistance needed to complete eval tasks       Based on the above components, the patient evaluation is determined to be of the following complexity level: LOW   Pain:  Pain Scale 1: Numeric (0 - 10)  Pain Intensity 1: 3  Pain Location 1: Knee  Pain Orientation 1: Right  Pain Description 1: Aching  Pain Intervention(s) 1: Rest;Repositioned  Activity Tolerance:   Vitals:    02/07/19 0831 02/07/19 0900 02/07/19 0936 02/07/19 0937   BP: 116/67 121/85 118/75 119/76   BP 1 Location:       BP Patient Position: At rest Sitting;Pre-activity Standing Sitting;Post activity   Pulse: 82 81 80 74   Resp: 16      Temp: 97.8 °F (36.6 °C)      SpO2: 96%      Weight:       Height:           Please refer to the flowsheet for vital signs taken during this treatment. After treatment:   [x]  Patient left in no apparent distress sitting up in chair  []  Patient left in no apparent distress in bed  [x]  Call bell left within reach  [x]  Nursing notified  [x]  Caregiver present  []  Bed alarm activated    COMMUNICATION/EDUCATION:   Communication/Collaboration:  [x]      Home safety education was provided and the patient/caregiver indicated understanding. [x]      Patient/family have participated as able and agree with findings and recommendations. []      Patient is unable to participate in plan of care at this time.   Findings and recommendations were discussed with: Physical Therapist and Registered Nurse    Jeff Stoner OT  Time Calculation: 15 mins

## 2019-02-07 NOTE — ANESTHESIA POSTPROCEDURE EVALUATION
Procedure(s): RIGHT KNEE INCISION AND DRAINAGE WITH POLYETHYLENE EXCHANGE. 
 
<BSHSIANPOST> Visit Vitals /88 Pulse 81 Temp 36.7 °C (98.1 °F) Resp 16 Ht 5' 11\" (1.803 m) Wt 89.3 kg (196 lb 13.9 oz) SpO2 98% BMI 27.46 kg/m² Post-op pt. Is no longer tachypneic, tachcardic or febrile. Spo2= 95% w/ NC O2., hemodynamically stable. Stable for transfer to floor.

## 2019-02-07 NOTE — PROGRESS NOTES
ID Progress Note  2019    Subjective:     Feeling better today    Objective:     Antibiotics:  1. Vancomycin   2. Cefepime       Vitals:   Visit Vitals  /73 (BP 1 Location: Right arm, BP Patient Position: At rest)   Pulse 82   Temp 98 °F (36.7 °C)   Resp 16   Ht 5' 11\" (1.803 m)   Wt 89.3 kg (196 lb 13.9 oz)   SpO2 97%   BMI 27.46 kg/m²        Tmax:  Temp (24hrs), Av.8 °F (36.6 °C), Min:97.4 °F (36.3 °C), Max:98.2 °F (36.8 °C)      Exam:  Lungs:  clear to auscultation bilaterally  Heart:  regular rate and rhythm  Abdomen:  soft, non-tender. Bowel sounds normal. No masses,  no organomegaly  Wound is dressed    Labs:      Recent Labs     19  0524 19  1234 19  0452   WBC  --  4.4  --    HGB 11.2* 12.8  --    PLT  --  101*  --    BUN 11  --  13   CREA 0.78  --  0.88   SGOT 18  --  32   AP 46  --  54   TBILI 0.9  --  1.4*       Cultures:     No results found for: [facility]  Lab Results   Component Value Date/Time    Culture result: LIGHT POSSIBLE STAPHYLOCOCCUS AUREUS (A) 2019 05:48 PM    Culture result: MRSA NOT PRESENT 2018 11:30 AM    Culture result:  2018 11:30 AM         Screening of patient nares for MRSA is for surveillance purposes and, if positive, to facilitate isolation considerations in high risk settings. It is not intended for automatic decolonization interventions per se as regimens are not sufficiently effective to warrant routine use. Radiology:     Line/Insert Date:           Assessment:     1. Septic knee  2. HIV studies pending    Objective:     1.  Continue current therapy    Carmen Marsh MD

## 2019-02-07 NOTE — PROGRESS NOTES
Problem: Mobility Impaired (Adult and Pediatric)  Goal: *Acute Goals and Plan of Care (Insert Text)  Physical Therapy Goals  Initiated 2/7/2019  1. Patient will move from supine to sit and sit to supine , scoot up and down and roll side to side in bed with modified independence within 7 day(s). 2.  Patient will transfer from bed to chair and chair to bed with modified independence using the least restrictive device within 7 day(s). 3.  Patient will perform sit to stand with modified independence within 7 day(s). 4.  Patient will ambulate with modified independence for 150 feet with the least restrictive device within 7 day(s). 5.  Patient will ascend/descend 4 stairs with 1 handrail(s) with minimal assistance/contact guard assist within 7 day(s). physical Therapy TREATMENT  Patient: Matilda Mullins (21 y.o. male)  Date: 2/7/2019  Diagnosis: Infection of total right knee replacement ([address] [T84.53XA]  Infection of total knee replacement ([address] [T84.59XA, Z96.659] <principal problem not specified>  Procedure(s) (LRB):  RIGHT KNEE INCISION AND DRAINAGE WITH POLYETHYLENE EXCHANGE (Right) 1 Day Post-Op  Precautions:  WBAT, FALL  Chart, physical therapy assessment, plan of care and goals were reviewed. ASSESSMENT:  Patient seen for pm mobility session, received in bed agreeable to work with therapy. Session focused on stairs. Patient able to navigate 4 stairs with CGA using 1 handrail and SPC. Patient SBA for bed mobility, transfers, and ambulation. Patient has equipment needs for discharge. Patient cleared for discharge from a PT standpoint. Recommending [facility] PT upon discharge. Progression toward goals:  [x]    Improving appropriately and progressing toward goals  []    Improving slowly and progressing toward goals  []    Not making progress toward goals and plan of care will be adjusted     PLAN:  Patient continues to benefit from skilled intervention to address the above impairments.   Continue treatment per established plan of care. Discharge Recommendations:  Home Health  Further Equipment Recommendations for Discharge:  None     SUBJECTIVE:   Patient stated My pain isn't too bad.     OBJECTIVE DATA SUMMARY:   Critical Behavior:  Neurologic State: Appropriate for age  Orientation Level: Oriented X4  Cognition: Appropriate decision making, Appropriate for age attention/concentration, Appropriate safety awareness, Follows commands     Functional Mobility Training:  Bed Mobility:     Supine to Sit: Stand-by assistance(HOB elevated)  Sit to Supine: Stand-by assistance  Scooting: Stand-by assistance        Transfers:  Sit to Stand: Stand-by assistance  Stand to Sit: Stand-by assistance                             Balance:  Sitting: Intact  Standing: Intact; With support  Ambulation/Gait Training:  Distance (ft): 50 Feet (ft)  Assistive Device: Gait belt;Walker, rolling  Ambulation - Level of Assistance: Stand-by assistance        Gait Abnormalities: Antalgic; Step to gait  Right Side Weight Bearing: As tolerated     Base of Support: Widened  Stance: Right decreased                          Stairs:  Number of Stairs Trained: 4  Stairs - Level of Assistance: Contact guard assistance   Rail Use: Left (SPC)    Neuro Re-Education:    Therapeutic Exercises:     Pain:  Pain Scale 1: Numeric (0 - 10)  Pain Intensity 1: 1  Pain Location 1: Knee  Pain Orientation 1: Right  Pain Description 1: Aching  Pain Intervention(s) 1: Medication (see MAR)  Activity Tolerance:   VSS  Please refer to the flowsheet for vital signs taken during this treatment.   After treatment:   []    Patient left in no apparent distress sitting up in chair  [x]    Patient left in no apparent distress in bed  [x]    Call bell left within reach  [x]    Nursing notified  [x]    Caregiver present  []    Bed alarm activated    COMMUNICATION/COLLABORATION:   The patients plan of care was discussed with: Registered Nurse    Mildred Kaminski, PT, DPT   Time Calculation: 13 mins

## 2019-02-07 NOTE — PROGRESS NOTES
TRANSFER - IN REPORT:    Verbal report received from MARIAH Santacruz(name) on Kayy Stairs  being received from [employer]) for routine post - op      Report consisted of patients Situation, Background, Assessment and   Recommendations(SBAR). Information from the following report(s) SBAR, Kardex and Recent Results was reviewed with the receiving nurse. Opportunity for questions and clarification was provided. Assessment completed upon patients arrival to unit and care assumed. Primary Nurse Nick Read RN and Irais Hong RN performed a dual skin assessment on this patient No impairment noted  Parker score is 20    2237  Microbiology said they cannot run the AFB culture because they do not have the lab supplies. Informed them I would relay the message to nightshift who can pass it on to Dr. Deon Enciso in the morning.

## 2019-02-07 NOTE — PROGRESS NOTES
[address]  [address]  [address] [place] [address]   [phone]                GI PROGRESS NOTE  Raj Dash, AGACNP-BC  Work Cell: [phone]      NAME:   Alexa Foster   :    1960   MRN:    [id number]     Assessment/Plan   1. Crohn's colitis - appears stable with current regimen  - Continue prednisone   2. RUQ pain - suspect related to symptomatic cholelithiasis, has upcoming cholecystectomy w/ liver biopsy scheduled, pain does not appear to be significant issue as of current, LFTs normal  - Repeat ultrasound of gallbladder  3. Septic R knee - management per Ortho  4. HIV - ID consulted      Patient Active Problem List   Diagnosis Code    Primary osteoarthritis of right knee M17.11    Infection of total right knee replacement (HCC) T84.53XA    Infection of total knee replacement (HCC) T84.59XA, Z96.659       Subjective:     Reports improvement in R knee pain. Denies any abdominal pain. Tolerating diet. Objective:     VITALS:   Last 24hrs VS reviewed since prior hospitalist progress note. Most recent are:  Visit Vitals  /76 (BP Patient Position: Sitting;Post activity)   Pulse 74   Temp 97.8 °F (36.6 °C)   Resp 16   Ht 5' 11\" (1.803 m)   Wt 89.3 kg (196 lb 13.9 oz)   SpO2 96%   BMI 27.46 kg/m²       Intake/Output Summary (Last 24 hours) at 2019 1222  Last data filed at 2019 1007  Gross per 24 hour   Intake 3243 ml   Output 2275 ml   Net 968 ml        PHYSICAL EXAM:  General   well developed, alert, in no acute distress  EENT  Normocephalic, Atraumatic, PERRLA, EOMI, sclera clear  Respiratory   Clear To Auscultation bilaterally - no wheezes, rales, rhonchi, or crackles  Cardiology  Regular Rate and Rythmn  - no murmurs, rubs or gallops  Abdominal  Soft, non-tender, non-distended, positive bowel sounds, no hepatosplenomegaly, no palpable mass  Neurological  No focal neurological deficits noted  Psychological  Oriented x 3. Normal affect.        Lab Data Recent Results (from the past 12 hour(s))   METABOLIC PANEL, BASIC    Collection Time: 02/07/19  5:24 AM   Result Value Ref Range    Sodium 140 136 - 145 mmol/L    Potassium 4.4 3.5 - 5.1 mmol/L    Chloride 109 (H) 97 - 108 mmol/L    CO2 22 21 - 32 mmol/L    Anion gap 9 5 - 15 mmol/L    Glucose 167 (H) 65 - 100 mg/dL    BUN 11 6 - 20 MG/DL    Creatinine 0.78 0.70 - 1.30 MG/DL    BUN/Creatinine ratio 14 12 - 20      GFR est AA >60 >60 ml/min/1.73m2    GFR est non-AA >60 >60 ml/min/1.73m2    Calcium 7.9 (L) 8.5 - 10.1 MG/DL   HEMOGLOBIN    Collection Time: 02/07/19  5:24 AM   Result Value Ref Range    HGB 11.2 (L) 12.1 - 17.0 g/dL   PROTHROMBIN TIME + INR    Collection Time: 02/07/19  5:24 AM   Result Value Ref Range    INR 1.1 0.9 - 1.1      Prothrombin time 10.9 9.0 - 11.1 sec         Medications: Reviewed    PMH/ reviewed - no change compared to H&P  Mid-Level Provider: Caryle Fitch, NP   Date/Time:  2/7/2019

## 2019-02-07 NOTE — PROGRESS NOTES
Ortho POD#1  Feels much better  afeb  Dressing intact, N/V intact  Drain- 360cc  Plan: PT           IV abx           GI follow up re: liver enzymes, gall stones- ?  Source of infection, do we need gen surg eval? Crohns management/ steroids in light of immunocompromise, septic knee             to see re: prior rx and plans for UTI and hematuria

## 2019-02-07 NOTE — ROUTINE PROCESS
TRANSFER - OUT REPORT:    Verbal report given to MARIAH HUNTER(name) on Alexa Foster  being transferred to [address] [place] [address](unit) for routine post - op       Report consisted of patients Situation, Background, Assessment and   Recommendations(SBAR). Time Pre op antibiotic given:SCHEDULED  Anesthesia Stop time: 1915  Lamar Present on Transfer to floor:YES  Order for Lamar on Chart:YES  Discharge Prescriptions with Chart:NO    Information from the following report(s) SBAR, Kardex, OR Summary, Procedure Summary and Cardiac Rhythm NSR was reviewed with the receiving nurse. Opportunity for questions and clarification was provided. Is the patient on 02? YES       L/Min 2    Is the patient on a monitor? NO    Is the nurse transporting with the patient? NO    Surgical Waiting Area notified of patient's transfer from PACU? YES      The following personal items collected during your admission accompanied patient upon transfer:   Dental Appliance: Dental Appliances: None  Vision:    Hearing Aid:    Jewelry: Jewelry: None  Clothing: Clothing: With patient  Other Valuables:  Other Valuables: None  Valuables sent to safe:

## 2019-02-07 NOTE — PROGRESS NOTES
Bedside shift change report given to Jose Manuel Hitchcock RN (oncoming nurse) by GERRI Christianson RN (offgoing nurse). Report included the following information SBAR, Kardex and Recent Results.

## 2019-02-07 NOTE — PROGRESS NOTES
Chart reviewed.  orders noted for PT and nursing for IV abx. CRM met with the patient and his wife Edgardo Tejeda [phone] at the bedside. The patient is workers comp. His original surgery was in July of 2018 (Right Total Knee Replacement.) The patient is post op day # 1 for I & D of right knee with poly exchange. Dr. Margareth Godoy is following for IV abx and this patient will need an extended IV course at discharge . CRM called the ana Lynne 6 , Workers comp CM is [employer] [phone]. [facility]. Claim # [id number]. Will follow for call back. Picc and final IV orders are pending at this time.  CHRISTIANO

## 2019-02-07 NOTE — OP NOTES
[address] [facility]  OPERATIVE REPORT    Name:  Romina Koo  MR#:  [id number]  :  1960  ACCOUNT #:  [de-identified]  DATE OF SERVICE:  2019      ATTENDING PHYSICIAN:  Juanita Arcos MD    PREOPERATIVE DIAGNOSES:  1. Acute hematogenous seeding. 2.  Right total knee replacement. POSTOPERATIVE DIAGNOSES:  1. Acute hematogenous seeding. 2.  Right total knee replacement. PROCEDURE PERFORMED:  Open extensive synovectomy,  debridement, lavage, revision of poly liner, placement of  vancomycin-impregnated absorbable beads. SURGEON:  Juanita Arcos MD    ASSISTANT:  Major Rios    ANESTHESIA:  General.    COMPLICATIONS:  None. SPECIMENS REMOVED:  Old poly and synovium. IMPLANTS:  _____. ESTIMATED BLOOD LOSS:  200 mL. INDICATIONS:  This is a 76-year-old gentleman who underwent  a right total knee replacement approximately 5 or 6 months  ago without complications. He has done extremely well up  until 3 days ago when he developed acute onset of swelling  and pain in his right knee. Prior to this, over the past  month, he was recently diagnosed with a urinary tract  infection with hematuria as well as cholecystitis. He was  also newly diagnosed with HIV infection. Due to the acute  onset of pain in his right knee, he presented to an outlying  hospital and was noted to be febrile with a swollen  erythematous knee. Aspiration of the right knee showed  gram-positive cocci and today was identified as a staph  species with sensitivities still pending. He was  transferred to [facility] late last night and is  now taken to the operating room for urgent irrigation and  debridement and exchange of the poly liner in hopes of  salvaging the prosthesis. This is felt to be an acute  infection of only several days' duration and is felt  reasonable to attempt prosthetic salvage.     PROCEDURE:  The patient was taken to the operating room and  underwent general inhalational anesthesia. Right knee and  leg were prepped and draped in the usual fashion. The leg  was elevated for several minutes and the tourniquet inflated  to 300 mmHg. The original incision was utilized and taken  down through the skin and subcutaneous tissue. A medial  parapatellar arthrotomy was performed and gross brownish  purulence was noted. This was suctioned out. Cultures were  obtained. The joint was extensively irrigated out. Extensive releases were performed. I then proceeded with a  meticulous synovectomy. I then flexed the knee up and  subluxated the patella laterally. With further releases, we  were able to mobilize the tibia anteriorly. The  polyethylene insert was removed with an osteotome. Pulsatile lavage with bacitracin was again used to  extensively irrigate the joint and prosthesis. We then  brought the knee to full extension and used dilute Betadine  solution to soak in the knee for approximately 10 minutes. After this, all of the Betadine solution was suctioned out  and again, the joint was cleansed using the pulsatile lavage  system. A 10 mm size 7 polyethylene insert was reinserted  into the tibial component and the knee was reduced. Again,  the joint was extensively irrigated. The tourniquet was let  down after a total tourniquet time of 40 minutes. A medium  Hemovac drain was placed. Vancomycin-impregnated absorbable  beads were placed in the joint space. The arthrotomy was  repaired using #1 Stratafix suture in a running fashion. Skin edges were approximated using stapling apparatus. Bulky sterile dressing was applied and the patient was  awakened, extubated, and transported to recovery room in  stable condition. The physician assistant was critical  throughout the procedure to assist with retraction and  manipulating the leg.         Meri Dillard MD      [id number]  D:  02/06/2019 18:58  T:  02/07/2019 6:27  JOB #:  [id number]

## 2019-02-07 NOTE — PROGRESS NOTES
Hospitalist Progress Note  Tisha Franz MD  Answering service: [phone] from in house phone      Date of Service:  2019  NAME:  Lisa Alcantar  :  1960  MRN:  [id number]      Admission Summary:   Lisa Alcantar is a 63 y.o. male who was admitted on 2019 for left septic knee. Patient is known to have chron;s diease and was on Imuran until about a month ago when it was stopped and started on prednisone. He is recently diagnosed with HIV,not on treatment yet as work up is underway. He says he has h/o low platelet,sleep apnea. Interval history / Subjective:     Pt seen in followup of right knee arthritis  He was seen in room, he denies pain  Denies fever  Reports rash and flushing of face and upper trunk     Assessment & Plan:     1. Left Knee Septic arthritis - on broad spectrum abx. Continue the same. ID consulted and monitor cultures  2. Recent Diagnosis of HIV- not on HAART. Check cd4 count. Obtain Records from [facility]. 3. Elevated LFT with gallstones  - GI consulted, monitor LFT. No pain at present. 4. Rash on body - ?due to vancomycin, Decrease Rate and monitor. Benadryl prn  5. Monitor For Post Op blood losses  6. Pancytopenia - could be related to HIV status, Check b12 and iron panel      Code status: Full  DVT prophylaxis: SCD        Risk of deterioration: high      Total time spent with patient: [address] discussed with: Patient/Family and Nurse  Disposition: TBD     Hospital Problems  Date Reviewed: 2019          Codes Class Noted POA    Infection of total right knee replacement ([facility] [address] ICD-10-CM: N32.60KI  ICD-9-CM: 996.66, V43.65  2019 Unknown        Infection of total knee replacement ([facility] [address] ICD-10-CM: T84.59XA, Z96.659  ICD-9-CM: 996.66, V43.65  2019 Unknown                Review of Systems:   A comprehensive review of systems was negative.        Vital Signs:    Last 24hrs VS reviewed since prior progress note. Most recent are:  Visit Vitals  /76 (BP Patient Position: Sitting;Post activity)   Pulse 74   Temp 97.8 °F (36.6 °C)   Resp 16   Ht 5' 11\" (1.803 m)   Wt 89.3 kg (196 lb 13.9 oz)   SpO2 96%   BMI 27.46 kg/m²         Intake/Output Summary (Last 24 hours) at 2/7/2019 1040  Last data filed at 2/7/2019 1007  Gross per 24 hour   Intake 3243 ml   Output 2275 ml   Net 968 ml        Physical Examination:             Constitutional:  No acute distress, cooperative, pleasant    ENT:  Oral mucous moist, oropharynx benign. Neck supple,    Resp:  CTA bilaterally. No wheezing/rhonchi/rales. No accessory muscle use   CV:  Regular rhythm, normal rate, no murmurs, gallops, rubs    GI:  Soft, non distended, non tender. normoactive bowel sounds, no hepatosplenomegaly     Musculoskeletal:  No edema, warm, 2+ pulses throughout    Neurologic:  Moves all extremities. AAOx3, CN II-XII reviewed     Skin:  Blancheable rash noted on face and upper chest area. No Cervical,axillary or inguinal LAP noted. Data Review:    Review and/or order of clinical lab test  Review and/or order of tests in the radiology section of CPT  Review and/or order of tests in the medicine section of CPT      Labs:     Recent Labs     02/07/19 0524 02/06/19  1234   WBC  --  4.4   HGB 11.2* 12.8   HCT  --  38.4   PLT  --  101*     Recent Labs     02/07/19  0524 02/06/19  0452    137   K 4.4 3.8   * 108   CO2 22 21   BUN 11 13   CREA 0.78 0.88   * 125*   CA 7.9* 7.8*     Recent Labs     02/06/19  0452   SGOT 32   ALT 35   AP 54   TBILI 1.4*   TP 6.8   ALB 2.3*   GLOB 4.5*     Recent Labs     02/07/19  0524 02/06/19  1234   INR 1.1  --    PTP 10.9  --    APTT  --  37.0*      No results for input(s): FE, TIBC, PSAT, FERR in the last 72 hours. No results found for: FOL, RBCF   No results for input(s): PH, PCO2, PO2 in the last 72 hours.   No results for input(s): CPK, CKNDX, TROIQ in the last 72 hours.    No lab exists for component: CPKMB  Lab Results   Component Value Date/Time    Cholesterol, total 164 06/04/2013 01:39 PM    HDL Cholesterol 32 (L) 06/04/2013 01:39 PM    LDL, calculated 87 06/04/2013 01:39 PM    Triglyceride 227 (H) 06/04/2013 01:39 PM     Lab Results   Component Value Date/Time    Glucose (POC) 81 08/06/2018 11:41 AM     Lab Results   Component Value Date/Time    Color YELLOW/STRAW 07/23/2018 11:30 AM    Appearance CLEAR 07/23/2018 11:30 AM    Specific gravity 1.021 07/23/2018 11:30 AM    pH (UA) 7.0 07/23/2018 11:30 AM    Protein NEGATIVE  07/23/2018 11:30 AM    Glucose NEGATIVE  07/23/2018 11:30 AM    Ketone NEGATIVE  07/23/2018 11:30 AM    Bilirubin NEGATIVE  07/23/2018 11:30 AM    Urobilinogen 0.2 07/23/2018 11:30 AM    Nitrites NEGATIVE  07/23/2018 11:30 AM    Leukocyte Esterase NEGATIVE  07/23/2018 11:30 AM    Epithelial cells FEW 07/23/2018 11:30 AM    Bacteria NEGATIVE  07/23/2018 11:30 AM    WBC 0-4 07/23/2018 11:30 AM    RBC 0-5 07/23/2018 11:30 AM         Medications Reviewed:     Current Facility-Administered Medications   Medication Dose Route Frequency    [START ON 2/8/2019] Vancomycin, Trough - Please draw IMMEDIATELY PRIOR to starting 0100 dose on Friday 2/08/2019.    Other ONCE    vancomycin (VANCOCIN) 1500 mg in  ml infusion  1,500 mg IntraVENous Q12H    0.9% sodium chloride infusion  75 mL/hr IntraVENous CONTINUOUS    oxyCODONE IR (ROXICODONE) tablet 5-10 mg  5-10 mg Oral Q4H PRN    cyclobenzaprine (FLEXERIL) tablet 5 mg  5 mg Oral TID PRN    nystatin (MYCOSTATIN) 100,000 unit/mL oral suspension 500,000 Units  500,000 Units Oral QID    predniSONE (DELTASONE) tablet 10 mg  10 mg Oral DAILY WITH BREAKFAST    cefepime (MAXIPIME) 2 g in 0.9% sodium chloride (MBP/ADV) 100 mL  2 g IntraVENous Q8H    fluconazole (DIFLUCAN) 200mg/100 mL IVPB (premix)  200 mg IntraVENous Q24H    cetirizine (ZYRTEC) tablet 10 mg  10 mg Oral DAILY PRN    pramipexole (MIRAPEX) tablet 0.5 mg  0.5 mg Oral QHS    0.9% sodium chloride infusion  125 mL/hr IntraVENous CONTINUOUS    sodium chloride 0.9 % bolus infusion 500 mL  500 mL IntraVENous ONCE PRN    sodium chloride (NS) flush 5-40 mL  5-40 mL IntraVENous Q8H    sodium chloride (NS) flush 5-40 mL  5-40 mL IntraVENous PRN    acetaminophen (TYLENOL) tablet 650 mg  650 mg Oral Q6H    ondansetron (ZOFRAN) injection 4 mg  4 mg IntraVENous Q4H PRN    prochlorperazine (COMPAZINE) with saline injection 5 mg  5 mg IntraVENous Q6H PRN    ondansetron (ZOFRAN ODT) tablet 4 mg  4 mg Oral Q6H PRN    hydrOXYzine HCl (ATARAX) tablet 10 mg  10 mg Oral Q8H PRN    famotidine (PEPCID) tablet 20 mg  20 mg Oral BID    senna-docusate (PERICOLACE) 8.6-50 mg per tablet 1 Tab  1 Tab Oral BID    polyethylene glycol (MIRALAX) packet 17 g  17 g Oral DAILY    [START ON 2/8/2019] bisacodyl (DULCOLAX) suppository 10 mg  10 mg Rectal DAILY PRN    oxyCODONE IR (ROXICODONE) tablet 5 mg  5 mg Oral Q3H PRN    oxyCODONE IR (ROXICODONE) tablet 10 mg  10 mg Oral Q3H PRN    fentaNYL citrate (PF) injection 25 mcg  25 mcg IntraVENous Q4H PRN    [START ON 2/8/2019] rivaroxaban (XARELTO) tablet 10 mg  10 mg Oral DAILY WITH LUNCH    HYDROmorphone (PF) (DILAUDID) injection 2 mg  2 mg IntraVENous Q3H PRN    naloxone (NARCAN) injection 0.4 mg  0.4 mg IntraVENous PRN    ondansetron (ZOFRAN) injection 4 mg  4 mg IntraVENous Q8H PRN    sodium chloride (NS) flush 5-40 mL  5-40 mL IntraVENous Q8H    sodium chloride (NS) flush 5-40 mL  5-40 mL IntraVENous PRN    Vancomycin Pharmacy to Dose   Other Rx Dosing/Monitoring     ______________________________________________________________________  EXPECTED LENGTH OF STAY: 2d 12h  ACTUAL LENGTH OF STAY:          2                 Gio Ramirez MD   Patient has given Verbal permission to discuss medical care with   persons present in the room and and also with contact as listed on face sheet.

## 2019-02-07 NOTE — ROUTINE PROCESS
Bedside shift change report given to Deniz Winters (oncoming nurse) by Odell Alfonso (offgoing nurse). Report included the following information SBAR.

## 2019-02-08 ENCOUNTER — APPOINTMENT (OUTPATIENT)
Dept: NUCLEAR MEDICINE | Age: 59
DRG: 486 | End: 2019-02-08
Attending: NURSE PRACTITIONER
Payer: OTHER MISCELLANEOUS

## 2019-02-08 ENCOUNTER — APPOINTMENT (OUTPATIENT)
Dept: ULTRASOUND IMAGING | Age: 59
DRG: 486 | End: 2019-02-08
Attending: NURSE PRACTITIONER
Payer: OTHER MISCELLANEOUS

## 2019-02-08 LAB
ANION GAP SERPL CALC-SCNC: 11 MMOL/L (ref 5–15)
ANION GAP SERPL CALC-SCNC: 8 MMOL/L (ref 5–15)
BACTERIA SPEC CULT: ABNORMAL
BACTERIA SPEC CULT: NORMAL
BACTERIA SPEC CULT: NORMAL
BASOPHILS # BLD AUTO: 0 X10E3/UL (ref 0–0.2)
BASOPHILS # BLD: 0 K/UL (ref 0–0.1)
BASOPHILS NFR BLD AUTO: 0 %
BASOPHILS NFR BLD: 0 % (ref 0–1)
BUN SERPL-MCNC: 12 MG/DL (ref 6–20)
BUN SERPL-MCNC: 7 MG/DL (ref 6–20)
BUN/CREAT SERPL: 17 (ref 12–20)
BUN/CREAT SERPL: 9 (ref 12–20)
CALCIUM SERPL-MCNC: 7.5 MG/DL (ref 8.5–10.1)
CALCIUM SERPL-MCNC: 8.3 MG/DL (ref 8.5–10.1)
CC UR VC: NORMAL
CD3+CD4+ CELLS # BLD: 43 /UL (ref 359–1519)
CD3+CD4+ CELLS NFR BLD: 10.8 % (ref 30.8–58.5)
CD3+CD4+ CELLS/CD3+CD8+ CLL BLD: 0.16 % (ref 0.92–3.72)
CD3+CD8+ CELLS # BLD: 268 /UL (ref 109–897)
CD3+CD8+ CELLS NFR BLD: 67 % (ref 12–35.5)
CHLORIDE SERPL-SCNC: 109 MMOL/L (ref 97–108)
CHLORIDE SERPL-SCNC: 111 MMOL/L (ref 97–108)
CO2 SERPL-SCNC: 22 MMOL/L (ref 21–32)
CO2 SERPL-SCNC: 23 MMOL/L (ref 21–32)
CREAT SERPL-MCNC: 0.69 MG/DL (ref 0.7–1.3)
CREAT SERPL-MCNC: 0.8 MG/DL (ref 0.7–1.3)
DATE LAST DOSE: ABNORMAL
DIFFERENTIAL METHOD BLD: ABNORMAL
EOSINOPHIL # BLD AUTO: 0.1 X10E3/UL (ref 0–0.4)
EOSINOPHIL # BLD: 0 K/UL (ref 0–0.4)
EOSINOPHIL NFR BLD AUTO: 1 %
EOSINOPHIL NFR BLD: 0 % (ref 0–7)
ERYTHROCYTE [DISTWIDTH] IN BLOOD BY AUTOMATED COUNT: 17.9 % (ref 12.3–15.4)
ERYTHROCYTE [DISTWIDTH] IN BLOOD BY AUTOMATED COUNT: 18.7 % (ref 11.5–14.5)
GLUCOSE BLD STRIP.AUTO-MCNC: 74 MG/DL (ref 65–100)
GLUCOSE BLD STRIP.AUTO-MCNC: 82 MG/DL (ref 65–100)
GLUCOSE SERPL-MCNC: 142 MG/DL (ref 65–100)
GLUCOSE SERPL-MCNC: 73 MG/DL (ref 65–100)
GRAM STN SPEC: ABNORMAL
GRAM STN SPEC: ABNORMAL
HCT VFR BLD AUTO: 34 % (ref 37.5–51)
HCT VFR BLD AUTO: 38.1 % (ref 36.6–50.3)
HGB BLD-MCNC: 11.2 G/DL (ref 13–17.7)
HGB BLD-MCNC: 12.1 G/DL (ref 12.1–17)
IMM GRANULOCYTES # BLD AUTO: 0 K/UL
IMM GRANULOCYTES # BLD: 0 X10E3/UL (ref 0–0.1)
IMM GRANULOCYTES NFR BLD AUTO: 0 %
IMM GRANULOCYTES NFR BLD: 1 %
LACTATE SERPL-SCNC: 1.8 MMOL/L (ref 0.4–2)
LYMPHOCYTES # BLD AUTO: 0.4 X10E3/UL (ref 0.7–3.1)
LYMPHOCYTES # BLD: 0 K/UL (ref 0.8–3.5)
LYMPHOCYTES NFR BLD AUTO: 10 %
LYMPHOCYTES NFR BLD: 1 % (ref 12–49)
MCH RBC QN AUTO: 30.7 PG (ref 26.6–33)
MCH RBC QN AUTO: 30.8 PG (ref 26–34)
MCHC RBC AUTO-ENTMCNC: 31.8 G/DL (ref 30–36.5)
MCHC RBC AUTO-ENTMCNC: 32.9 G/DL (ref 31.5–35.7)
MCV RBC AUTO: 93 FL (ref 79–97)
MCV RBC AUTO: 96.9 FL (ref 80–99)
MONOCYTES # BLD AUTO: 0.3 X10E3/UL (ref 0.1–0.9)
MONOCYTES # BLD: 0.1 K/UL (ref 0–1)
MONOCYTES NFR BLD AUTO: 7 %
MONOCYTES NFR BLD: 3 % (ref 5–13)
MORPHOLOGY BLD-IMP: ABNORMAL
NEUTROPHILS # BLD AUTO: 3.1 X10E3/UL (ref 1.4–7)
NEUTROPHILS NFR BLD AUTO: 81 %
NEUTS BAND NFR BLD MANUAL: 1 % (ref 0–6)
NEUTS SEG # BLD: 3.5 K/UL (ref 1.8–8)
NEUTS SEG NFR BLD: 95 % (ref 32–75)
NRBC # BLD: 0 K/UL (ref 0–0.01)
NRBC BLD-RTO: 0 PER 100 WBC
PLATELET # BLD AUTO: 130 K/UL (ref 150–400)
PLATELET # BLD AUTO: 94 X10E3/UL (ref 150–379)
PLATELET COMMENTS,PCOM: ABNORMAL
PMV BLD AUTO: 10.4 FL (ref 8.9–12.9)
POTASSIUM SERPL-SCNC: 3.1 MMOL/L (ref 3.5–5.1)
POTASSIUM SERPL-SCNC: 3.7 MMOL/L (ref 3.5–5.1)
RBC # BLD AUTO: 3.65 X10E6/UL (ref 4.14–5.8)
RBC # BLD AUTO: 3.93 M/UL (ref 4.1–5.7)
RBC MORPH BLD: ABNORMAL
REPORTED DOSE,DOSE: ABNORMAL UNITS
REPORTED DOSE/TIME,TMG: ABNORMAL
SERVICE CMNT-IMP: ABNORMAL
SERVICE CMNT-IMP: NORMAL
SODIUM SERPL-SCNC: 141 MMOL/L (ref 136–145)
SODIUM SERPL-SCNC: 143 MMOL/L (ref 136–145)
VANCOMYCIN TROUGH SERPL-MCNC: 11.5 UG/ML (ref 5–10)
WBC # BLD AUTO: 3.6 K/UL (ref 4.1–11.1)
WBC # BLD AUTO: 3.8 X10E3/UL (ref 3.4–10.8)

## 2019-02-08 PROCEDURE — 74011250637 HC RX REV CODE- 250/637: Performed by: PHYSICIAN ASSISTANT

## 2019-02-08 PROCEDURE — 80202 ASSAY OF VANCOMYCIN: CPT

## 2019-02-08 PROCEDURE — 74011250636 HC RX REV CODE- 250/636: Performed by: ORTHOPAEDIC SURGERY

## 2019-02-08 PROCEDURE — 74011000258 HC RX REV CODE- 258: Performed by: ORTHOPAEDIC SURGERY

## 2019-02-08 PROCEDURE — 74011250636 HC RX REV CODE- 250/636: Performed by: HOSPITALIST

## 2019-02-08 PROCEDURE — 80048 BASIC METABOLIC PNL TOTAL CA: CPT

## 2019-02-08 PROCEDURE — 85025 COMPLETE CBC W/AUTO DIFF WBC: CPT

## 2019-02-08 PROCEDURE — 74011250637 HC RX REV CODE- 250/637: Performed by: HOSPITALIST

## 2019-02-08 PROCEDURE — 76705 ECHO EXAM OF ABDOMEN: CPT

## 2019-02-08 PROCEDURE — 74011000258 HC RX REV CODE- 258: Performed by: INTERNAL MEDICINE

## 2019-02-08 PROCEDURE — 74011250636 HC RX REV CODE- 250/636: Performed by: PHYSICIAN ASSISTANT

## 2019-02-08 PROCEDURE — 82962 GLUCOSE BLOOD TEST: CPT

## 2019-02-08 PROCEDURE — 74011250636 HC RX REV CODE- 250/636: Performed by: INTERNAL MEDICINE

## 2019-02-08 PROCEDURE — 83605 ASSAY OF LACTIC ACID: CPT

## 2019-02-08 PROCEDURE — 74011250637 HC RX REV CODE- 250/637: Performed by: FAMILY MEDICINE

## 2019-02-08 PROCEDURE — 65660000001 HC RM ICU INTERMED STEPDOWN

## 2019-02-08 PROCEDURE — 87040 BLOOD CULTURE FOR BACTERIA: CPT

## 2019-02-08 PROCEDURE — A9537 TC99M MEBROFENIN: HCPCS

## 2019-02-08 PROCEDURE — 74011636637 HC RX REV CODE- 636/637: Performed by: NURSE PRACTITIONER

## 2019-02-08 PROCEDURE — 36415 COLL VENOUS BLD VENIPUNCTURE: CPT

## 2019-02-08 RX ORDER — SODIUM CHLORIDE 9 MG/ML
125 INJECTION, SOLUTION INTRAVENOUS CONTINUOUS
Status: DISCONTINUED | OUTPATIENT
Start: 2019-02-08 | End: 2019-02-13

## 2019-02-08 RX ORDER — LORAZEPAM 2 MG/ML
1 INJECTION INTRAMUSCULAR
Status: COMPLETED | OUTPATIENT
Start: 2019-02-08 | End: 2019-02-08

## 2019-02-08 RX ORDER — POTASSIUM CHLORIDE 750 MG/1
40 TABLET, FILM COATED, EXTENDED RELEASE ORAL
Status: COMPLETED | OUTPATIENT
Start: 2019-02-08 | End: 2019-02-08

## 2019-02-08 RX ORDER — VANCOMYCIN 1.75 GRAM/500 ML IN 0.9 % SODIUM CHLORIDE INTRAVENOUS
1750 EVERY 12 HOURS
Status: DISCONTINUED | OUTPATIENT
Start: 2019-02-08 | End: 2019-02-08

## 2019-02-08 RX ORDER — SODIUM CHLORIDE 0.9 % (FLUSH) 0.9 %
10 SYRINGE (ML) INJECTION
Status: COMPLETED | OUTPATIENT
Start: 2019-02-08 | End: 2019-02-08

## 2019-02-08 RX ORDER — CEFAZOLIN SODIUM/WATER 2 G/20 ML
2 SYRINGE (ML) INTRAVENOUS EVERY 8 HOURS
Status: DISCONTINUED | OUTPATIENT
Start: 2019-02-08 | End: 2019-02-08

## 2019-02-08 RX ORDER — SULFAMETHOXAZOLE AND TRIMETHOPRIM 800; 160 MG/1; MG/1
1 TABLET ORAL DAILY
Status: DISCONTINUED | OUTPATIENT
Start: 2019-02-08 | End: 2019-02-14 | Stop reason: HOSPADM

## 2019-02-08 RX ORDER — AZITHROMYCIN 250 MG/1
1000 TABLET, FILM COATED ORAL
Status: DISCONTINUED | OUTPATIENT
Start: 2019-02-08 | End: 2019-02-14 | Stop reason: HOSPADM

## 2019-02-08 RX ORDER — SODIUM CHLORIDE 9 MG/ML
25 INJECTION, SOLUTION INTRAVENOUS
Status: COMPLETED | OUTPATIENT
Start: 2019-02-08 | End: 2019-02-08

## 2019-02-08 RX ORDER — CEFAZOLIN SODIUM/WATER 2 G/20 ML
2 SYRINGE (ML) INTRAVENOUS EVERY 8 HOURS
Status: DISCONTINUED | OUTPATIENT
Start: 2019-02-09 | End: 2019-02-14 | Stop reason: HOSPADM

## 2019-02-08 RX ORDER — LORAZEPAM 2 MG/ML
1 INJECTION INTRAMUSCULAR
Status: DISCONTINUED | OUTPATIENT
Start: 2019-02-08 | End: 2019-02-14 | Stop reason: HOSPADM

## 2019-02-08 RX ORDER — DIPHENHYDRAMINE HYDROCHLORIDE 50 MG/ML
50 INJECTION, SOLUTION INTRAMUSCULAR; INTRAVENOUS ONCE
Status: COMPLETED | OUTPATIENT
Start: 2019-02-08 | End: 2019-02-08

## 2019-02-08 RX ADMIN — ACETAMINOPHEN 650 MG: 325 TABLET ORAL at 14:32

## 2019-02-08 RX ADMIN — SULFAMETHOXAZOLE AND TRIMETHOPRIM 1 TABLET: 800; 160 TABLET ORAL at 14:24

## 2019-02-08 RX ADMIN — DIPHENHYDRAMINE HYDROCHLORIDE 25 MG: 25 CAPSULE ORAL at 19:51

## 2019-02-08 RX ADMIN — NYSTATIN 500000 UNITS: 100000 SUSPENSION ORAL at 18:31

## 2019-02-08 RX ADMIN — LORAZEPAM 1 MG: 2 INJECTION INTRAMUSCULAR; INTRAVENOUS at 19:21

## 2019-02-08 RX ADMIN — NYSTATIN 500000 UNITS: 100000 SUSPENSION ORAL at 21:47

## 2019-02-08 RX ADMIN — CEFEPIME HYDROCHLORIDE 2 G: 2 INJECTION, POWDER, FOR SOLUTION INTRAVENOUS at 09:42

## 2019-02-08 RX ADMIN — OXYCODONE HYDROCHLORIDE 10 MG: 5 TABLET ORAL at 06:32

## 2019-02-08 RX ADMIN — Medication 10 ML: at 21:47

## 2019-02-08 RX ADMIN — Medication 10 ML: at 11:09

## 2019-02-08 RX ADMIN — DIPHENHYDRAMINE HYDROCHLORIDE 50 MG: 50 INJECTION, SOLUTION INTRAMUSCULAR; INTRAVENOUS at 18:52

## 2019-02-08 RX ADMIN — Medication 10 ML: at 14:26

## 2019-02-08 RX ADMIN — PREDNISONE 10 MG: 10 TABLET ORAL at 07:49

## 2019-02-08 RX ADMIN — ACETAMINOPHEN 650 MG: 325 TABLET ORAL at 18:32

## 2019-02-08 RX ADMIN — SODIUM CHLORIDE 125 ML/HR: 900 INJECTION, SOLUTION INTRAVENOUS at 19:04

## 2019-02-08 RX ADMIN — VANCOMYCIN HYDROCHLORIDE 1750 MG: 10 INJECTION, POWDER, LYOPHILIZED, FOR SOLUTION INTRAVENOUS at 14:32

## 2019-02-08 RX ADMIN — VANCOMYCIN HYDROCHLORIDE 1500 MG: 10 INJECTION, POWDER, LYOPHILIZED, FOR SOLUTION INTRAVENOUS at 00:39

## 2019-02-08 RX ADMIN — ACETAMINOPHEN 650 MG: 325 TABLET ORAL at 06:32

## 2019-02-08 RX ADMIN — FAMOTIDINE 20 MG: 20 TABLET ORAL at 09:39

## 2019-02-08 RX ADMIN — ONDANSETRON 4 MG: 2 INJECTION INTRAMUSCULAR; INTRAVENOUS at 20:29

## 2019-02-08 RX ADMIN — FLUCONAZOLE, SODIUM CHLORIDE 200 MG: 2 INJECTION INTRAVENOUS at 18:31

## 2019-02-08 RX ADMIN — FAMOTIDINE 20 MG: 20 TABLET ORAL at 18:32

## 2019-02-08 RX ADMIN — SINCALIDE 1.79 MCG: 5 INJECTION, POWDER, LYOPHILIZED, FOR SOLUTION INTRAVENOUS at 12:20

## 2019-02-08 RX ADMIN — METHYLPREDNISOLONE SODIUM SUCCINATE 125 MG: 125 INJECTION, POWDER, FOR SOLUTION INTRAMUSCULAR; INTRAVENOUS at 19:00

## 2019-02-08 RX ADMIN — NYSTATIN 500000 UNITS: 100000 SUSPENSION ORAL at 14:26

## 2019-02-08 RX ADMIN — AZITHROMYCIN 1000 MG: 250 TABLET, FILM COATED ORAL at 21:46

## 2019-02-08 RX ADMIN — PRAMIPEXOLE DIHYDROCHLORIDE 0.5 MG: 1 TABLET ORAL at 21:46

## 2019-02-08 RX ADMIN — OXYCODONE HYDROCHLORIDE 10 MG: 5 TABLET ORAL at 14:24

## 2019-02-08 RX ADMIN — NYSTATIN 500000 UNITS: 100000 SUSPENSION ORAL at 09:39

## 2019-02-08 RX ADMIN — POTASSIUM CHLORIDE 40 MEQ: 750 TABLET, EXTENDED RELEASE ORAL at 21:57

## 2019-02-08 RX ADMIN — CEFEPIME HYDROCHLORIDE 2 G: 2 INJECTION, POWDER, FOR SOLUTION INTRAVENOUS at 03:10

## 2019-02-08 RX ADMIN — RIVAROXABAN 10 MG: 10 TABLET, FILM COATED ORAL at 14:32

## 2019-02-08 RX ADMIN — SODIUM CHLORIDE 25 ML: 900 INJECTION, SOLUTION INTRAVENOUS at 12:20

## 2019-02-08 NOTE — PROGRESS NOTES
Was notified about flushing after vancomycin dosing - possible Crow syndrome. Ordered benadryl and solumedrol now. Sondra Quiles

## 2019-02-08 NOTE — PROGRESS NOTES
ORTH PROGRESS NOTE    2019  Admit Date:   2019    Post Op day: 2 Days Post-Op    Subjective:    Marlo Holedn has complaints of anterior knee pain along his incision. Drain output 180mL ovenight. Remains on Vanc and cefepime per ID with cultures from knee showing light staph. Urine cultures pending. HIV blood work pending. Awaiting a HIDA scan today.   NPO this morning for HIDA  Denies N/V/SOB or CP     PT/OT:   Gait:  Gait  Base of Support: Widened  Stance: Right decreased  Gait Abnormalities: Antalgic, Step to gait  Ambulation - Level of Assistance: Stand-by assistance  Distance (ft): 50 Feet (ft)  Assistive Device: Gait belt, Walker, rolling  Rail Use: Left (SPC)  Stairs - Level of Assistance: Contact guard assistance  Number of Stairs Trained: 4                 Vital Signs:    Patient Vitals for the past 8 hrs:   BP Temp Pulse Resp SpO2   19 1100 138/88 98.1 °F (36.7 °C) 66 15 96 %   19 0824 138/88 98.1 °F (36.7 °C) 66 15 97 %     Temp (24hrs), Av °F (36.7 °C), Min:97.6 °F (36.4 °C), Max:98.1 °F (36.7 °C)      Pain Control:   Pain Assessment  Pain Scale 1: Numeric (0 - 10)  Pain Intensity 1: 3  Pain Onset 1: post op  Pain Location 1: Knee  Pain Orientation 1: Right  Pain Description 1: Aching, Sore  Pain Intervention(s) 1: Rest, Ice, Medication (see MAR)    Meds:    Current Facility-Administered Medications   Medication Dose Route Frequency    vancomycin (VANCOCIN) 1750 mg in  ml infusion  1,750 mg IntraVENous Q12H    trimethoprim-sulfamethoxazole (BACTRIM DS, SEPTRA DS) 160-800 mg per tablet 1 Tab  1 Tab Oral DAILY    azithromycin (ZITHROMAX) tablet 1,000 mg  1,000 mg Oral Q7D    diphenhydrAMINE (BENADRYL) capsule 25 mg  25 mg Oral Q6H PRN    0.9% sodium chloride infusion  75 mL/hr IntraVENous CONTINUOUS    oxyCODONE IR (ROXICODONE) tablet 5-10 mg  5-10 mg Oral Q4H PRN    cyclobenzaprine (FLEXERIL) tablet 5 mg  5 mg Oral TID PRN    nystatin (MYCOSTATIN) 100,000 unit/mL oral suspension 500,000 Units  500,000 Units Oral QID    predniSONE (DELTASONE) tablet 10 mg  10 mg Oral DAILY WITH BREAKFAST    cefepime (MAXIPIME) 2 g in 0.9% sodium chloride (MBP/ADV) 100 mL  2 g IntraVENous Q8H    fluconazole (DIFLUCAN) 200mg/100 mL IVPB (premix)  200 mg IntraVENous Q24H    cetirizine (ZYRTEC) tablet 10 mg  10 mg Oral DAILY PRN    pramipexole (MIRAPEX) tablet 0.5 mg  0.5 mg Oral QHS    sodium chloride (NS) flush 5-40 mL  5-40 mL IntraVENous Q8H    sodium chloride (NS) flush 5-40 mL  5-40 mL IntraVENous PRN    acetaminophen (TYLENOL) tablet 650 mg  650 mg Oral Q6H    ondansetron (ZOFRAN ODT) tablet 4 mg  4 mg Oral Q6H PRN    hydrOXYzine HCl (ATARAX) tablet 10 mg  10 mg Oral Q8H PRN    famotidine (PEPCID) tablet 20 mg  20 mg Oral BID    senna-docusate (PERICOLACE) 8.6-50 mg per tablet 1 Tab  1 Tab Oral BID    polyethylene glycol (MIRALAX) packet 17 g  17 g Oral DAILY    bisacodyl (DULCOLAX) suppository 10 mg  10 mg Rectal DAILY PRN    oxyCODONE IR (ROXICODONE) tablet 5 mg  5 mg Oral Q3H PRN    oxyCODONE IR (ROXICODONE) tablet 10 mg  10 mg Oral Q3H PRN    fentaNYL citrate (PF) injection 25 mcg  25 mcg IntraVENous Q4H PRN    rivaroxaban (XARELTO) tablet 10 mg  10 mg Oral DAILY WITH LUNCH    HYDROmorphone (PF) (DILAUDID) injection 2 mg  2 mg IntraVENous Q3H PRN    naloxone (NARCAN) injection 0.4 mg  0.4 mg IntraVENous PRN    ondansetron (ZOFRAN) injection 4 mg  4 mg IntraVENous Q8H PRN    sodium chloride (NS) flush 5-40 mL  5-40 mL IntraVENous Q8H    sodium chloride (NS) flush 5-40 mL  5-40 mL IntraVENous PRN    Vancomycin Pharmacy to Dose   Other Rx Dosing/Monitoring       LAB:    Recent Labs     02/07/19  1225 02/07/19  0524   HCT 34.0*  --    HGB 11.2* 11.2*   INR  --  1.1       Transfuse PRBC's:      Assessment & Physician's Comment:  Dressing is clean, dry, and intact  Neurovascular checks within normal limits  Orientation:  Oriented    Active Problems: Infection of total right knee replacement ([facility] [address] (2/5/2019)      Infection of total knee replacement ([facility] [address] (2/5/2019)        Plan:    Cont PT/OT  Will take down dressing today and replace with ACE for compression  Tylenol for pain with oxycodone PRN  Xarelto for DVT prophylaxis to start today  Abx per ID - awaiting final culture results  Appreciate GI/Urology input  Case management for disposition planning    Chelsey Gonzalez PA-C   Orthopedic Trauma Service  [address] [facility]

## 2019-02-08 NOTE — PROGRESS NOTES
Pharmacist Note - Vancomycin Dosing  Therapy day 3  Indication: Septic, R knee replacement. Current regimen: 1500 mg q12h    A Trough Level resulted at 11.5 mcg/mL which was obtained 12 hrs post-dose. The extrapolated \"true\" trough is approximately 11.5 mcg/mL based on the patient's known kinetics. Goal trough: 15 - 20 mcg/mL     Plan: Change to 1750 mg q12h . Pharmacy will continue to monitor this patient daily for changes in clinical status and renal function.

## 2019-02-08 NOTE — PROGRESS NOTES
Bedside and Verbal shift change report given to [facility] (oncoming nurse) by [employer] (offgoing nurse). Report included the following information SBAR, Kardex, MAR and Recent Results.

## 2019-02-08 NOTE — PROGRESS NOTES
ID Progress Note  2019    Subjective:     Feeling better today    Objective:     Antibiotics:  1. Vancomycin   2. Cefepime       Vitals:   Visit Vitals  /88 (BP 1 Location: Right arm, BP Patient Position: At rest)   Pulse 66   Temp 98.1 °F (36.7 °C)   Resp 15   Ht 5' 11\" (1.803 m)   Wt 89.3 kg (196 lb 13.9 oz)   SpO2 97%   BMI 27.46 kg/m²        Tmax:  Temp (24hrs), Av.9 °F (36.6 °C), Min:97.6 °F (36.4 °C), Max:98.1 °F (36.7 °C)      Exam:  Lungs:  clear to auscultation bilaterally  Heart:  regular rate and rhythm  Abdomen:  soft, non-tender. Bowel sounds normal. No masses,  no organomegaly  Wound is dressed    Labs:      Recent Labs     19  0034 19  1225 19  0524 19  1234 19  0452   WBC  --  3.8  --  4.4  --    HGB  --  11.2* 11.2* 12.8  --    PLT  --  94*  --  101*  --    BUN 12  --  11  --  13   CREA 0.69*  --  0.78  --  0.88   SGOT  --   --  18  --  32   AP  --   --  46  --  54   TBILI  --   --  0.9  --  1.4*       Cultures:     No results found for: [facility]  Lab Results   Component Value Date/Time    Culture result: NO GROWTH 1 DAY 2019 01:21 PM    Culture result: NO ANAEROBES ISOLATED 2019 05:48 PM    Culture result: LIGHT STAPHYLOCOCCUS AUREUS (A) 2019 05:48 PM       Radiology:     Line/Insert Date:           Assessment:     1. Septic knee--MSSA from culture  2. HIV--advanced with CD4 of 43 (11%)--will need HAART, but would like to get him over the acute knee infection first  3. Gallbladder disease--would like this addressed if at all possible, while he is here    Objective:     1. Change to cefazolin  2. Will need PICC, etc  3. ?surgical opinion for gallbladder  4.  Group will see over the weekend if asked    Rosy Beavers MD

## 2019-02-08 NOTE — PROGRESS NOTES
Hospitalist Progress Note  Jasen Adamson MD  Answering service: [phone] from in house phone      Date of Service:  2019  NAME:  Kaley Pettit  :  1960  MRN:  [id number]      Admission Summary:   Kaley Pettit is a 63 y.o. male who was admitted on 2019 for left septic knee. Patient is known to have chron;s diease and was on Imuran until about a month ago when it was stopped and started on prednisone. He is recently diagnosed with HIV,not on treatment yet as work up is underway. He says he has h/o low platelet,sleep apnea. Interval history / Subjective:   Pt seen in followup of right knee arthritis  Denies fever  Reports rash and flushing of face and upper trunk is improving     Assessment & Plan:     1. Left Knee Septic arthritis - on broad spectrum abx. Continue the same. ID consulted Cultures growing Staph, Would need PICC line. 2. Recent Diagnosis of HIV- not on HAART. Check cd4 count - low -start on bactrim and zithromax for ppx. D/w ID. Shalom Liriano Obtain Records from [facility]. 3. Elevated LFT with gallstones  - GI consulted, monitor LFT. No pain at present. 4. Rash on body - ?due to vancomycin, Decrease Rate and monitor. Benadryl prn - improved  5. Monitor For Post Op blood losses  6.  Pancytopenia - could be related to HIV status, Check b12 and iron panel    Code status: Full  DVT prophylaxis: SCD    Risk of deterioration: high      Total time spent with patient: [address] [facility] discussed with: Patient/Family and Nurse  Disposition: Per primary     Hospital Problems  Date Reviewed: 2019          Codes Class Noted POA    Infection of total right knee replacement ([facility] ICD-10-CM: M95.88OK  ICD-9-CM: 996.66, V43.65  2019 Unknown        Infection of total knee replacement ([facility] ICD-10-CM: T84.59XA, Z96.659  ICD-9-CM: 996.66, V43.65  2019 Unknown                Review of Systems:   A comprehensive review of systems was negative. Vital Signs:    Last 24hrs VS reviewed since prior progress note. Most recent are:  Visit Vitals  /88 (BP 1 Location: Right leg, BP Patient Position: At rest)   Pulse 66   Temp 98.1 °F (36.7 °C)   Resp 15   Ht 5' 11\" (1.803 m)   Wt 89.3 kg (196 lb 13.9 oz)   SpO2 96%   BMI 27.46 kg/m²         Intake/Output Summary (Last 24 hours) at 2/8/2019 1111  Last data filed at 2/8/2019 0847  Gross per 24 hour   Intake 1550 ml   Output 380 ml   Net 1170 ml        Physical Examination:             Constitutional:  No acute distress, cooperative, pleasant    ENT:  Oral mucous moist, oropharynx benign. Neck supple,    Resp:  CTA bilaterally. No wheezing/rhonchi/rales. No accessory muscle use   CV:  Regular rhythm, normal rate, no murmurs, gallops, rubs    GI:  Soft, non distended, non tender. normoactive bowel sounds, no hepatosplenomegaly     Musculoskeletal:  No edema, warm, 2+ pulses throughout,RT knee Drain in place with Bloody discharge in container    Neurologic:  Moves all extremities. AAOx3, CN II-XII reviewed     Psych:  Good insight, Not anxious nor agitated.         Data Review:    Review and/or order of clinical lab test  Review and/or order of tests in the radiology section of CPT  Review and/or order of tests in the medicine section of CPT      Labs:     Recent Labs     02/07/19  1225 02/07/19  0524 02/06/19  1234   WBC 3.8  --  4.4   HGB 11.2* 11.2* 12.8   HCT 34.0*  --  38.4   PLT 94*  --  101*     Recent Labs     02/08/19  0034 02/07/19  0524 02/06/19  0452    140 137   K 3.7 4.4 3.8   * 109* 108   CO2 22 22 21   BUN 12 11 13   CREA 0.69* 0.78 0.88   * 167* 125*   CA 7.5* 7.9* 7.8*     Recent Labs     02/07/19  0524 02/06/19  0452   SGOT 18 32   ALT 25 35   AP 46 54   TBILI 0.9 1.4*   TP 6.1* 6.8   ALB 2.0* 2.3*   GLOB 4.1* 4.5*     Recent Labs     02/07/19  0524 02/06/19  1234   INR 1.1  --    PTP 10.9  --    APTT  --  37.0*      No results for input(s): FE, TIBC, PSAT, FERR in the last 72 hours. No results found for: FOL, RBCF   No results for input(s): PH, PCO2, PO2 in the last 72 hours. No results for input(s): CPK, CKNDX, TROIQ in the last 72 hours.     No lab exists for component: CPKMB  Lab Results   Component Value Date/Time    Cholesterol, total 164 06/04/2013 01:39 PM    HDL Cholesterol 32 (L) 06/04/2013 01:39 PM    LDL, calculated 87 06/04/2013 01:39 PM    Triglyceride 227 (H) 06/04/2013 01:39 PM     Lab Results   Component Value Date/Time    Glucose (POC) 81 08/06/2018 11:41 AM     Lab Results   Component Value Date/Time    Color ELSA 02/07/2019 01:21 PM    Appearance CLOUDY (A) 02/07/2019 01:21 PM    Specific gravity >1.030 (H) 02/07/2019 01:21 PM    Specific gravity 1.021 07/23/2018 11:30 AM    pH (UA) 5.5 02/07/2019 01:21 PM    Protein 100 (A) 02/07/2019 01:21 PM    Glucose NEGATIVE  02/07/2019 01:21 PM    Ketone TRACE (A) 02/07/2019 01:21 PM    Bilirubin NEGATIVE  07/23/2018 11:30 AM    Urobilinogen 0.2 02/07/2019 01:21 PM    Nitrites NEGATIVE  02/07/2019 01:21 PM    Leukocyte Esterase SMALL (A) 02/07/2019 01:21 PM    Epithelial cells FEW 02/07/2019 01:21 PM    Bacteria 1+ (A) 02/07/2019 01:21 PM    WBC 20-50 02/07/2019 01:21 PM    RBC 10-20 02/07/2019 01:21 PM         Medications Reviewed:     Current Facility-Administered Medications   Medication Dose Route Frequency    vancomycin (VANCOCIN) 1750 mg in  ml infusion  1,750 mg IntraVENous Q12H    trimethoprim-sulfamethoxazole (BACTRIM DS, SEPTRA DS) 160-800 mg per tablet 1 Tab  1 Tab Oral DAILY    azithromycin (ZITHROMAX) tablet 1,000 mg  1,000 mg Oral Q7D    diphenhydrAMINE (BENADRYL) capsule 25 mg  25 mg Oral Q6H PRN    0.9% sodium chloride infusion  75 mL/hr IntraVENous CONTINUOUS    oxyCODONE IR (ROXICODONE) tablet 5-10 mg  5-10 mg Oral Q4H PRN    cyclobenzaprine (FLEXERIL) tablet 5 mg  5 mg Oral TID PRN    nystatin (MYCOSTATIN) 100,000 unit/mL oral suspension 500,000 Units  500,000 Units Oral QID    predniSONE (DELTASONE) tablet 10 mg  10 mg Oral DAILY WITH BREAKFAST    cefepime (MAXIPIME) 2 g in 0.9% sodium chloride (MBP/ADV) 100 mL  2 g IntraVENous Q8H    fluconazole (DIFLUCAN) 200mg/100 mL IVPB (premix)  200 mg IntraVENous Q24H    cetirizine (ZYRTEC) tablet 10 mg  10 mg Oral DAILY PRN    pramipexole (MIRAPEX) tablet 0.5 mg  0.5 mg Oral QHS    sodium chloride (NS) flush 5-40 mL  5-40 mL IntraVENous Q8H    sodium chloride (NS) flush 5-40 mL  5-40 mL IntraVENous PRN    acetaminophen (TYLENOL) tablet 650 mg  650 mg Oral Q6H    ondansetron (ZOFRAN ODT) tablet 4 mg  4 mg Oral Q6H PRN    hydrOXYzine HCl (ATARAX) tablet 10 mg  10 mg Oral Q8H PRN    famotidine (PEPCID) tablet 20 mg  20 mg Oral BID    senna-docusate (PERICOLACE) 8.6-50 mg per tablet 1 Tab  1 Tab Oral BID    polyethylene glycol (MIRALAX) packet 17 g  17 g Oral DAILY    bisacodyl (DULCOLAX) suppository 10 mg  10 mg Rectal DAILY PRN    oxyCODONE IR (ROXICODONE) tablet 5 mg  5 mg Oral Q3H PRN    oxyCODONE IR (ROXICODONE) tablet 10 mg  10 mg Oral Q3H PRN    fentaNYL citrate (PF) injection 25 mcg  25 mcg IntraVENous Q4H PRN    rivaroxaban (XARELTO) tablet 10 mg  10 mg Oral DAILY WITH LUNCH    HYDROmorphone (PF) (DILAUDID) injection 2 mg  2 mg IntraVENous Q3H PRN    naloxone (NARCAN) injection 0.4 mg  0.4 mg IntraVENous PRN    ondansetron (ZOFRAN) injection 4 mg  4 mg IntraVENous Q8H PRN    sodium chloride (NS) flush 5-40 mL  5-40 mL IntraVENous Q8H    sodium chloride (NS) flush 5-40 mL  5-40 mL IntraVENous PRN    Vancomycin Pharmacy to Dose   Other Rx Dosing/Monitoring     ______________________________________________________________________  EXPECTED LENGTH OF STAY: 2d 12h  ACTUAL LENGTH OF STAY:          3                 Nivia Mendez MD   Patient has given Verbal permission to discuss medical care with   persons present in the room and and also with contact as listed on face sheet.

## 2019-02-08 NOTE — PROGRESS NOTES
Physical Therapy:    Checked on patient, he has no further questions or mobility concerns at this time. Patient cleared PT yesterday and is cleared to ambulate with nursing and family. Will sign off. Thank you.   Patrizia Ruff, PT, DPT

## 2019-02-08 NOTE — PROGRESS NOTES
CRM received a call from Dane Vázquez for workers comp [phone]. She requested clinicals to be faxed to [phone] to determine if they are going to cover this case. CRM faxed the clinicals with [employer] orders. ID plan/orders/picc line pending. The patient will not be ready for discharge over the weekend. CRM will need to determine if workers comp will cover the hospital stay/home care/IV infusion. The patient has [facility] [employer] as well.   CHRISTIANO

## 2019-02-08 NOTE — CONSULTS
Urology Consult    Patient: Kaley Pettit MRN: [id number]  SSN: [id number]    YOB: 1960  Age: 63 y.o. Sex: male          Date of Consultation:  February 8, 2019  Requesting Physician: Uzair Rankin MD  Reason for Consultation:  Hematuria , UTI         History of Present Illness:  Kaley Pettit is a 63 y.o. male admitted 2/5/2019 to the hospital for Infection of total right knee replacement ([facility]) [T84.53XA]  Infection of total knee replacement ([facility]) Valeria Garcia, [address]. He is a pt of  followed by Dr. Ashwin Aguilar for Low testosterone , hx of renal cyst  and hematospermia. On admission microscopic hematuria was noted on  , culture currently pending . Pt denies any symptoms of dysuria, urgency or frequency or gross hematuria. Pt previously had imaging done at [facility] which showed 16 mm lesion on the anterior Kidney consisitent with cyst , a 2 cm mid posterior cyst on the Left Kidney , as well as 1 cm L upper pole nodule . These are all stable as pt has a strong family hx of PCKD. Pt is scheduled for a Cystoscopy and retrograde under anesthesia on the 13th with Dr. Gloria Borjas to complete evaluation of possible fistula due to his Crohns      Past Medical History:   Diagnosis Date    Asthma     Autoimmune disease ([facility])     CROHNS DISEASE    Chronic pain     KNEES AND BACK    Fatty liver     GERD (gastroesophageal reflux disease)     HTN (hypertension)     NO MEDS    Hyperlipidemia     Ill-defined condition     HIATAL HERNIA, HIV    Migraine     Sleep apnea     USES CPAP        Past Surgical History:   Procedure Laterality Date    HX HERNIA REPAIR Left 1986    INGUINAL    HX KNEE ARTHROSCOPY Right 2010       No Known Allergies     Prior to Admission medications    Medication Sig Start Date End Date Taking? Authorizing Provider   testosterone cypionate (DEPOTESTOTERONE CYPIONATE) 200 mg/mL injection 100 mg by IntraMUSCular route every Sunday.    Yes Provider, Historical cetirizine (ZYRTEC) 10 mg tablet Take 10 mg by mouth daily as needed for Allergies or Rhinitis. Yes Provider, Historical   predniSONE (DELTASONE) 10 mg tablet Take 10 mg by mouth daily (with breakfast). Replaces azathioprine (Imuran)   Yes Provider, Historical   therapeutic multivitamin (THERA) tablet Take 1 Tab by mouth daily. Yes Provider, Historical   fish oil-omega-3 fatty acids 340-1,000 mg capsule Take 1 Cap by mouth two (2) times a day. Yes Provider, Historical   pramipexole (MIRAPEX) 0.5 mg tablet Take 0.5 mg by mouth nightly. Yes Provider, Historical   omeprazole (PRILOSEC) 40 mg capsule Take 40 mg by mouth nightly. Yes Provider, Historical       Social History     Tobacco Use    Smoking status: Never Smoker    Smokeless tobacco: Never Used   Substance Use Topics    Alcohol use: Yes     Comment: OCCASIONALLY        Family History   Problem Relation Age of Onset    Diabetes Mother     Heart Disease Mother     Kidney Disease Mother         POLYCYSTIC    Heart Surgery Mother     Cancer Father         LUNG    Diabetes Sister     Hypertension Sister     Heart Disease Sister     Heart Surgery Sister     Kidney Disease Sister         HAD KIDNEY TRANSPLANT    Heart Disease Brother     Kidney Disease Brother         POLYCYSTIC DISEASE HAD 2 TRANSPLANTS    Hypertension Brother    Quintana Arthritis-osteo Sister     Hypertension Sister     No Known Problems Brother     Heart Attack Brother     Kidney Disease Brother         POLYCYSTIC DISEASE    Anesth Problems Neg Hx         ROS:  Admission ROS by Le Kolb MD from 2019 was reviewed and changes (other than per HPI) include: none. Physical Exam:            Vitals[de-identified]    Temp (24hrs), Av.9 °F (36.6 °C), Min:97.6 °F (36.4 °C), Max:98.1 °F (36.7 °C)   Blood pressure 138/88, pulse 66, temperature 98.1 °F (36.7 °C), resp. rate 15, height 5' 11\" (1.803 m), weight 89.3 kg (196 lb 13.9 oz), SpO2 97 %.              I&O's:     9912 - 02/08 1900  In: 1550 [P.O.:150;  I.V.:1400]  Out: 70 [Drains:70]             General:    alert and oriented, appears nontoxic, no acute distress                     Skin:   Warm and dry                HEENT:  NCAT, anicteric sclerae, moist mucus membranes                 Chest[de-identified]  normal respiratory effort      CV[de-identified]  Regular rhythm, no LE edema             Abdomen/Flank[de-identified]  no CVAT, soft, non-tender abdomen without masses, organomegaly or hernia             Bladder[de-identified]  bladder not palpable   Lymph nodes:  no cervical or supraclavicular LAD   Musculoskeletal:  R knee limited       Genitalia:  deferred   Neuro/Psychiatric:   normal affect     Lab Review:     CBC   Recent Labs     02/07/19  0524 02/06/19  1234   WBC  --  4.4   HGB 11.2* 12.8   HCT  --  38.4   PLT  --  101*     CMP   Recent Labs     02/08/19  0034 02/07/19  0524 02/06/19  0452    140 137   K 3.7 4.4 3.8   * 109* 108   CO2 22 22 21   * 167* 125*   BUN 12 11 13   CREA 0.69* 0.78 0.88   CA 7.5* 7.9* 7.8*   ALB  --  2.0* 2.3*   TBILI  --  0.9 1.4*   SGOT  --  18 32   ALT  --  25 35       Other   Recent Labs     02/07/19  0524   INR 1.1       UA:   Lab Results   Component Value Date/Time    Color ELSA 02/07/2019 01:21 PM    Appearance CLOUDY (A) 02/07/2019 01:21 PM    Specific gravity >1.030 (H) 02/07/2019 01:21 PM    Specific gravity 1.021 07/23/2018 11:30 AM    pH (UA) 5.5 02/07/2019 01:21 PM    Protein 100 (A) 02/07/2019 01:21 PM    Glucose NEGATIVE  02/07/2019 01:21 PM    Ketone TRACE (A) 02/07/2019 01:21 PM    Bilirubin NEGATIVE  07/23/2018 11:30 AM    Urobilinogen 0.2 02/07/2019 01:21 PM    Nitrites NEGATIVE  02/07/2019 01:21 PM    Leukocyte Esterase SMALL (A) 02/07/2019 01:21 PM    Epithelial cells FEW 02/07/2019 01:21 PM    Bacteria 1+ (A) 02/07/2019 01:21 PM    WBC 20-50 02/07/2019 01:21 PM    RBC 10-20 02/07/2019 01:21 PM       Cultures:      Imaging:      Assessment:     Active Problems:    Infection of total right knee replacement ([facility] (2/5/2019)      Infection of total knee replacement ([facility] (2/5/2019)          Plan:     1. Asymptomatic Microscopic Hematuria - pt currently scheduled for follow up and evaluation   2. Urine cx - would recommend hospitalist input for management of UTI once cx result   Plan discussed with Dr. Warren Wright   Signed By: Va Gregory.  Mali Rodriguez, CECIL  - February 8, 2019

## 2019-02-08 NOTE — PROGRESS NOTES
Bedside and Verbal shift change report given to Tess Armenta RN (oncoming nurse) by Valda Nageotte, RN (offgoing nurse). Report included the following information SBAR.

## 2019-02-08 NOTE — PROGRESS NOTES
[address]  [address]   [phone]                GI PROGRESS NOTE  MARYA Stallworth-BC  Work Cell: [phone]      NAME:   Jewel Joy   :    1960   MRN:    [id number]     Assessment/Plan   1. Crohn's colitis - appears stable with current regimen  - Continue prednisone   2. RUQ pain - suspect related to symptomatic cholelithiasis, has upcoming cholecystectomy w/ liver biopsy scheduled, pain does not appear to be significant issue as of current, LFTs normal. Repeat ultrasound with stones, sludge and GB wall thickening.   - HIDA scan today   - Consider surgical consult depending upon results   3. Septic R knee - management per Ortho  4. HIV - ID consulted      Patient Active Problem List   Diagnosis Code    Primary osteoarthritis of right knee M17.11    Infection of total right knee replacement (HCC) T84.53XA    Infection of total knee replacement (HCC) T84.59XA, Z96.659       Subjective:     Feeling well this AM. Denies any significant abdominal pain. Tolerating diet. States knee is feeling better. No fever or chills. Objective:     VITALS:   Last 24hrs VS reviewed since prior hospitalist progress note.  Most recent are:  Visit Vitals  /88 (BP 1 Location: Right arm, BP Patient Position: At rest)   Pulse 66   Temp 98.1 °F (36.7 °C)   Resp 15   Ht 5' 11\" (1.803 m)   Wt 89.3 kg (196 lb 13.9 oz)   SpO2 97%   BMI 27.46 kg/m²       Intake/Output Summary (Last 24 hours) at 2019 09  Last data filed at 2019 0847  Gross per 24 hour   Intake 1550 ml   Output 650 ml   Net 900 ml        PHYSICAL EXAM:  General   well developed, alert, in no acute distress  EENT  Normocephalic, Atraumatic, PERRLA, EOMI, sclera clear  Respiratory   Clear To Auscultation bilaterally - no wheezes, rales, rhonchi, or crackles  Cardiology  Regular Rate and Rythmn  - no murmurs, rubs or gallops  Abdominal  Soft, non-tender, non-distended, positive bowel sounds, no hepatosplenomegaly, no palpable mass  Neurological  No focal neurological deficits noted  Psychological  Oriented x 3. Normal affect.        Lab Data   Recent Results (from the past 12 hour(s))   VANCOMYCIN, TROUGH    Collection Time: 02/08/19 12:34 AM   Result Value Ref Range    Vancomycin,trough 11.5 (H) 5.0 - 10.0 ug/mL    Reported dose date: NOT PROVIDED      Reported dose time: NOT PROVIDED      Reported dose: NOT PROVIDED UNITS   METABOLIC PANEL, BASIC    Collection Time: 02/08/19 12:34 AM   Result Value Ref Range    Sodium 141 136 - 145 mmol/L    Potassium 3.7 3.5 - 5.1 mmol/L    Chloride 111 (H) 97 - 108 mmol/L    CO2 22 21 - 32 mmol/L    Anion gap 8 5 - 15 mmol/L    Glucose 142 (H) 65 - 100 mg/dL    BUN 12 6 - 20 MG/DL    Creatinine 0.69 (L) 0.70 - 1.30 MG/DL    BUN/Creatinine ratio 17 12 - 20      GFR est AA >60 >60 ml/min/1.73m2    GFR est non-AA >60 >60 ml/min/1.73m2    Calcium 7.5 (L) 8.5 - 10.1 MG/DL         Medications: Reviewed    PMH/ reviewed - no change compared to H&P  Mid-Level Provider: Jb Rodriguez NP   Date/Time:  2/8/2019

## 2019-02-08 NOTE — PROGRESS NOTES
A Spiritual Care Partner Volunteer visited patient in Rm 576 on 2/08/2019.   Documented by:  Chaplain Khan MDiv, MS, [address] ([id number])

## 2019-02-09 LAB
ANION GAP SERPL CALC-SCNC: 10 MMOL/L (ref 5–15)
BUN SERPL-MCNC: 10 MG/DL (ref 6–20)
BUN/CREAT SERPL: 10 (ref 12–20)
CALCIUM SERPL-MCNC: 8.2 MG/DL (ref 8.5–10.1)
CHLORIDE SERPL-SCNC: 110 MMOL/L (ref 97–108)
CO2 SERPL-SCNC: 21 MMOL/L (ref 21–32)
CREAT SERPL-MCNC: 0.96 MG/DL (ref 0.7–1.3)
GLUCOSE BLD STRIP.AUTO-MCNC: 141 MG/DL (ref 65–100)
GLUCOSE BLD STRIP.AUTO-MCNC: 151 MG/DL (ref 65–100)
GLUCOSE BLD STRIP.AUTO-MCNC: 156 MG/DL (ref 65–100)
GLUCOSE BLD STRIP.AUTO-MCNC: 170 MG/DL (ref 65–100)
GLUCOSE SERPL-MCNC: 142 MG/DL (ref 65–100)
IRON SATN MFR SERPL: 12 % (ref 20–50)
IRON SERPL-MCNC: 21 UG/DL (ref 35–150)
POTASSIUM SERPL-SCNC: 3.9 MMOL/L (ref 3.5–5.1)
SERVICE CMNT-IMP: ABNORMAL
SODIUM SERPL-SCNC: 141 MMOL/L (ref 136–145)
TIBC SERPL-MCNC: 175 UG/DL (ref 250–450)
VIT B12 SERPL-MCNC: 335 PG/ML (ref 193–986)

## 2019-02-09 PROCEDURE — 74011636637 HC RX REV CODE- 636/637: Performed by: NURSE PRACTITIONER

## 2019-02-09 PROCEDURE — 36415 COLL VENOUS BLD VENIPUNCTURE: CPT

## 2019-02-09 PROCEDURE — 83540 ASSAY OF IRON: CPT

## 2019-02-09 PROCEDURE — 82962 GLUCOSE BLOOD TEST: CPT

## 2019-02-09 PROCEDURE — 82607 VITAMIN B-12: CPT

## 2019-02-09 PROCEDURE — 74011250637 HC RX REV CODE- 250/637: Performed by: PHYSICIAN ASSISTANT

## 2019-02-09 PROCEDURE — 94660 CPAP INITIATION&MGMT: CPT

## 2019-02-09 PROCEDURE — 65660000001 HC RM ICU INTERMED STEPDOWN

## 2019-02-09 PROCEDURE — 80048 BASIC METABOLIC PNL TOTAL CA: CPT

## 2019-02-09 PROCEDURE — 74011250636 HC RX REV CODE- 250/636: Performed by: HOSPITALIST

## 2019-02-09 PROCEDURE — 74011250637 HC RX REV CODE- 250/637: Performed by: HOSPITALIST

## 2019-02-09 RX ADMIN — METHYLPREDNISOLONE SODIUM SUCCINATE 60 MG: 125 INJECTION, POWDER, FOR SOLUTION INTRAMUSCULAR; INTRAVENOUS at 12:17

## 2019-02-09 RX ADMIN — ACETAMINOPHEN 650 MG: 325 TABLET ORAL at 17:40

## 2019-02-09 RX ADMIN — Medication 10 ML: at 21:09

## 2019-02-09 RX ADMIN — NYSTATIN 500000 UNITS: 100000 SUSPENSION ORAL at 08:34

## 2019-02-09 RX ADMIN — METHYLPREDNISOLONE SODIUM SUCCINATE 60 MG: 125 INJECTION, POWDER, FOR SOLUTION INTRAMUSCULAR; INTRAVENOUS at 19:06

## 2019-02-09 RX ADMIN — FAMOTIDINE 20 MG: 20 TABLET ORAL at 08:34

## 2019-02-09 RX ADMIN — Medication 10 ML: at 14:00

## 2019-02-09 RX ADMIN — METHYLPREDNISOLONE SODIUM SUCCINATE 60 MG: 125 INJECTION, POWDER, FOR SOLUTION INTRAMUSCULAR; INTRAVENOUS at 06:22

## 2019-02-09 RX ADMIN — Medication 10 ML: at 06:22

## 2019-02-09 RX ADMIN — SENNOSIDES AND DOCUSATE SODIUM 1 TABLET: 8.6; 5 TABLET ORAL at 08:34

## 2019-02-09 RX ADMIN — Medication 2 G: at 08:34

## 2019-02-09 RX ADMIN — NYSTATIN 500000 UNITS: 100000 SUSPENSION ORAL at 12:17

## 2019-02-09 RX ADMIN — ACETAMINOPHEN 650 MG: 325 TABLET ORAL at 06:21

## 2019-02-09 RX ADMIN — ACETAMINOPHEN 650 MG: 325 TABLET ORAL at 00:04

## 2019-02-09 RX ADMIN — RIVAROXABAN 10 MG: 10 TABLET, FILM COATED ORAL at 12:17

## 2019-02-09 RX ADMIN — SODIUM CHLORIDE 125 ML/HR: 900 INJECTION, SOLUTION INTRAVENOUS at 08:42

## 2019-02-09 RX ADMIN — PREDNISONE 10 MG: 10 TABLET ORAL at 08:34

## 2019-02-09 RX ADMIN — SULFAMETHOXAZOLE AND TRIMETHOPRIM 1 TABLET: 800; 160 TABLET ORAL at 08:34

## 2019-02-09 RX ADMIN — ACETAMINOPHEN 650 MG: 325 TABLET ORAL at 23:31

## 2019-02-09 RX ADMIN — METHYLPREDNISOLONE SODIUM SUCCINATE 60 MG: 125 INJECTION, POWDER, FOR SOLUTION INTRAMUSCULAR; INTRAVENOUS at 00:04

## 2019-02-09 RX ADMIN — Medication 2 G: at 15:36

## 2019-02-09 RX ADMIN — Medication 2 G: at 23:59

## 2019-02-09 RX ADMIN — SODIUM CHLORIDE 125 ML/HR: 900 INJECTION, SOLUTION INTRAVENOUS at 17:57

## 2019-02-09 RX ADMIN — NYSTATIN 500000 UNITS: 100000 SUSPENSION ORAL at 17:40

## 2019-02-09 RX ADMIN — PRAMIPEXOLE DIHYDROCHLORIDE 0.5 MG: 1 TABLET ORAL at 21:08

## 2019-02-09 RX ADMIN — FAMOTIDINE 20 MG: 20 TABLET ORAL at 17:40

## 2019-02-09 RX ADMIN — NYSTATIN 500000 UNITS: 100000 SUSPENSION ORAL at 21:08

## 2019-02-09 NOTE — PROGRESS NOTES
On-call MD (Dr. Rafita Solo) with [facility] informed of patient's condition and transfer including patient's new room number and reason for transfer; attending physician still listed as Dr. Destiny Lacey although Dr. Dustin Rodriguez managed patient's care with transfer tonight. On-call MD returned page and acknowledged situation.

## 2019-02-09 NOTE — PROGRESS NOTES
Responded toPage from nurse. Patient having extensive macular rash noted from head down to chest as well as back. Last dose of vancomycin was at 3 PM. He was also tachycardic. No fevers. No chest pains. No oral ulcers. 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 Advised on solumedrol 125 iv now and 60 q6. Benadryl and pepcid. Transfer to [facility]. Family at bedside updated.

## 2019-02-09 NOTE — PROGRESS NOTES
Problem: Falls - Risk of  Goal: *Absence of Falls  Document Owen Fall Risk and appropriate interventions in the flowsheet.   Outcome: Progressing Towards Goal  Fall Risk Interventions:  Mobility Interventions: Communicate number of staff needed for ambulation/transfer, Patient to call before getting OOB         Medication Interventions: Evaluate medications/consider consulting pharmacy, Patient to call before getting OOB    Elimination Interventions: Call light in reach, Elevated toilet seat, Patient to call for help with toileting needs, Toileting schedule/hourly rounds

## 2019-02-09 NOTE — PROGRESS NOTES
TRANSFER - IN REPORT:    Verbal report received from Gary Quintero (name) on Jeanne Yañez  being received from 5S (unit) for urgent transfer      Report consisted of patients Situation, Background, Assessment and   Recommendations(SBAR). Information from the following report(s) SBAR, Kardex, Procedure Summary, Intake/Output, MAR and Recent Results was reviewed with the receiving nurse. Opportunity for questions and clarification was provided. Assessment completed upon patients arrival to unit and care assumed. 1940: Temp 103 on arrival, ice packs applied, benadryl given, HR 130s, pt bright red especially in face and chest, whites of eyes are pink     1950: Dr. Talha Gusman paged    Labs and cultures drawn    Pt vomited, zofran given, appears to be helping    Pt shivering, denies pain, discussed redness with wife- wife states this has happened on and off regardless of vanc administration    Primary Nurse Mario Wilson and Tegan Orlando RN performed a dual skin assessment on this patient No impairment noted  Parker score is 21    Skin assessment: red, incision with drain on R knee    Discussed issues with Dr. Talha Gusman, PO potassium ordered     Bedside and Verbal shift change report given to Jeet Chiang 83 (oncoming nurse) by Carlos Barry RN (offgoing nurse). Report included the following information SBAR, Kardex, Procedure Summary, Intake/Output, MAR and Recent Results.

## 2019-02-09 NOTE — PROGRESS NOTES
Vascular Access Team: Order received for PICC line insertion for long term antibiotics. Patient has a working PIV. Spoke with Dr. Mozell Cabot and the plan is for the patient to discharge the beginning of the week (Monday or Tuesday). With MD's approval, PICC deferred until day of discharge. Patient and patient care nurse, Sofi Benitez, updated with plan.  Bishnu Patten RN

## 2019-02-09 NOTE — PROGRESS NOTES
Hospitalist Progress Note  Hannah Davison MD  Answering service: [phone] from in house phone      Date of Service:  2019  NAME:  Jeanne Yañez  :  1960  MRN:  [id number]      Admission Summary:   Jeanne Yañez is a 63 y.o. male who was admitted on 2019 for left septic knee. Patient is known to have chron;s diease and was on Imuran until about a month ago when it was stopped and started on prednisone. He is recently diagnosed with HIV,not on treatment yet as work up is underway. He says he has h/o low platelet,sleep apnea. Interval history / Subjective:   Pt seen in followup of right knee arthritis  Denies fever  Reports rash and flushing of face and upper trunk is resolved  So far no reactions to ancef  Denies fevers or chills     Assessment & Plan:     1. Left Knee Septic arthritis - MSSA - on ancef. Off vanc - supected redmans syndrome. 2. ID consulted D/W Dr Ольга Schofield - Would need PICC line, ordered it. D/W pt about pros and cons of picc and he agrees. Recent Diagnosis of HIV- not on HAART. cd4 count - <50 - 2/8 on bactrim and zithromax for ppx. D/w ID. Linnette Giron Obtain Records from [address] [place] [address]. 3. Elevated LFT with gallstones  - GI consulted, monitor LFT. No pain at present. HIDA scan neg. Consider surgery consult if ok with primary. 4. Rash on body - ?due to vancomycin - off vac. On steroids, benadryl and pepcid. -resolved. Monitor on IMCU.  5. S/p I and D of the Right knee - Monitor For Post Op blood losses  6.  Pancytopenia - could be related to HIV/AIDS status, Check b12 and iron panel  Code status: Full       DVT prophylaxis: SCD    Risk of deterioration: high      Total time spent with patient: [address] discussed with: Patient/Family and Nurse  Disposition: Per primary     Hospital Problems  Date Reviewed: 2019          Codes Class Noted POA    Infection of total right knee replacement ([facility] ICD-10-CM: T84. 53XA  ICD-9-CM: 996.66, V43.65  2/5/2019 Unknown        Infection of total knee replacement ([facility] ICD-10-CM: T84.59XA, Z96.659  ICD-9-CM: 996.66, V43.65  2/5/2019 Unknown                Review of Systems:   A comprehensive review of systems was negative. Vital Signs:    Last 24hrs VS reviewed since prior progress note. Most recent are:  Visit Vitals  /89 (BP 1 Location: Right arm, BP Patient Position: At rest;Supine)   Pulse 66   Temp 98 °F (36.7 °C)   Resp 18   Ht 5' 11\" (1.803 m)   Wt 61.4 kg (135 lb 5.8 oz)   SpO2 100%   BMI 18.88 kg/m²         Intake/Output Summary (Last 24 hours) at 2/9/2019 0901  Last data filed at 2/9/2019 0358  Gross per 24 hour   Intake --   Output 1100 ml   Net -1100 ml        Physical Examination:             Constitutional:  No acute distress, cooperative, pleasant    ENT:  Oral mucous moist, oropharynx benign. Neck supple,    Resp:  CTA bilaterally. No wheezing/rhonchi/rales. No accessory muscle use   CV:  Regular rhythm, normal rate, no murmurs, gallops, rubs    GI:  Soft, non distended, non tender. normoactive bowel sounds, no hepatosplenomegaly     Musculoskeletal:  No edema, warm, 2+ pulses throughout,RT knee Drain in place with Bloody discharge in container    Neurologic:  Moves all extremities.   AAOx3, CN II-XII reviewed     Skin:  Good turgor, no rashes or ulcers        Data Review:    Review and/or order of clinical lab test  Review and/or order of tests in the radiology section of CPT  Review and/or order of tests in the medicine section of CPT      Labs:     Recent Labs     02/08/19 2001 02/07/19  1225   WBC 3.6* 3.8   HGB 12.1 11.2*   HCT 38.1 34.0*   * 94*     Recent Labs     02/09/19  0527 02/08/19 2001 02/08/19  0034    143 141   K 3.9 3.1* 3.7   * 109* 111*   CO2 21 23 22   BUN 10 7 12   CREA 0.96 0.80 0.69*   * 73 142*   CA 8.2* 8.3* 7.5*     Recent Labs     02/07/19  0524   SGOT 18   ALT 25   AP 46   TBILI 0.9   TP 6.1*   ALB 2.0*   GLOB 4.1*     Recent Labs     02/07/19  0524 02/06/19  1234   INR 1.1  --    PTP 10.9  --    APTT  --  37.0*      Recent Labs     02/09/19  0527   TIBC 175*   PSAT 12*      No results found for: FOL, RBCF   No results for input(s): PH, PCO2, PO2 in the last 72 hours. No results for input(s): CPK, CKNDX, TROIQ in the last 72 hours.     No lab exists for component: CPKMB  Lab Results   Component Value Date/Time    Cholesterol, total 164 06/04/2013 01:39 PM    HDL Cholesterol 32 (L) 06/04/2013 01:39 PM    LDL, calculated 87 06/04/2013 01:39 PM    Triglyceride 227 (H) 06/04/2013 01:39 PM     Lab Results   Component Value Date/Time    Glucose (POC) 141 (H) 02/09/2019 06:35 AM    Glucose (POC) 82 02/08/2019 09:55 PM    Glucose (POC) 74 02/08/2019 09:40 PM    Glucose (POC) 81 08/06/2018 11:41 AM     Lab Results   Component Value Date/Time    Color ELSA 02/07/2019 01:21 PM    Appearance CLOUDY (A) 02/07/2019 01:21 PM    Specific gravity >1.030 (H) 02/07/2019 01:21 PM    Specific gravity 1.021 07/23/2018 11:30 AM    pH (UA) 5.5 02/07/2019 01:21 PM    Protein 100 (A) 02/07/2019 01:21 PM    Glucose NEGATIVE  02/07/2019 01:21 PM    Ketone TRACE (A) 02/07/2019 01:21 PM    Bilirubin NEGATIVE  07/23/2018 11:30 AM    Urobilinogen 0.2 02/07/2019 01:21 PM    Nitrites NEGATIVE  02/07/2019 01:21 PM    Leukocyte Esterase SMALL (A) 02/07/2019 01:21 PM    Epithelial cells FEW 02/07/2019 01:21 PM    Bacteria 1+ (A) 02/07/2019 01:21 PM    WBC 20-50 02/07/2019 01:21 PM    RBC 10-20 02/07/2019 01:21 PM         Medications Reviewed:     Current Facility-Administered Medications   Medication Dose Route Frequency    trimethoprim-sulfamethoxazole (BACTRIM DS, SEPTRA DS) 160-800 mg per tablet 1 Tab  1 Tab Oral DAILY    azithromycin (ZITHROMAX) tablet 1,000 mg  1,000 mg Oral Q7D    methylPREDNISolone (PF) (Solu-MEDROL) injection 60 mg  60 mg IntraVENous Q6H    0.9% sodium chloride infusion  125 mL/hr IntraVENous CONTINUOUS    aluminum-magnesium hydroxide (MAALOX) oral suspension 15 mL  15 mL Oral QID PRN    LORazepam (ATIVAN) injection 1 mg  1 mg IntraVENous Q6H PRN    ceFAZolin (ANCEF) 2 g/20 mL in sterile water IV syringe  2 g IntraVENous Q8H    diphenhydrAMINE (BENADRYL) capsule 25 mg  25 mg Oral Q6H PRN    0.9% sodium chloride infusion  75 mL/hr IntraVENous CONTINUOUS    oxyCODONE IR (ROXICODONE) tablet 5-10 mg  5-10 mg Oral Q4H PRN    cyclobenzaprine (FLEXERIL) tablet 5 mg  5 mg Oral TID PRN    nystatin (MYCOSTATIN) 100,000 unit/mL oral suspension 500,000 Units  500,000 Units Oral QID    predniSONE (DELTASONE) tablet 10 mg  10 mg Oral DAILY WITH BREAKFAST    cetirizine (ZYRTEC) tablet 10 mg  10 mg Oral DAILY PRN    pramipexole (MIRAPEX) tablet 0.5 mg  0.5 mg Oral QHS    sodium chloride (NS) flush 5-40 mL  5-40 mL IntraVENous Q8H    sodium chloride (NS) flush 5-40 mL  5-40 mL IntraVENous PRN    acetaminophen (TYLENOL) tablet 650 mg  650 mg Oral Q6H    ondansetron (ZOFRAN ODT) tablet 4 mg  4 mg Oral Q6H PRN    hydrOXYzine HCl (ATARAX) tablet 10 mg  10 mg Oral Q8H PRN    famotidine (PEPCID) tablet 20 mg  20 mg Oral BID    senna-docusate (PERICOLACE) 8.6-50 mg per tablet 1 Tab  1 Tab Oral BID    polyethylene glycol (MIRALAX) packet 17 g  17 g Oral DAILY    bisacodyl (DULCOLAX) suppository 10 mg  10 mg Rectal DAILY PRN    oxyCODONE IR (ROXICODONE) tablet 10 mg  10 mg Oral Q3H PRN    fentaNYL citrate (PF) injection 25 mcg  25 mcg IntraVENous Q4H PRN    rivaroxaban (XARELTO) tablet 10 mg  10 mg Oral DAILY WITH LUNCH    naloxone (NARCAN) injection 0.4 mg  0.4 mg IntraVENous PRN    ondansetron (ZOFRAN) injection 4 mg  4 mg IntraVENous Q8H PRN    sodium chloride (NS) flush 5-40 mL  5-40 mL IntraVENous Q8H    sodium chloride (NS) flush 5-40 mL  5-40 mL IntraVENous PRN     ______________________________________________________________________  EXPECTED LENGTH OF STAY: 2d 12h  ACTUAL LENGTH OF STAY: Via Kaylan Guadarrama MD   Patient has given Verbal permission to discuss medical care with   persons present in the room and and also with contact as listed on face sheet.

## 2019-02-09 NOTE — PROGRESS NOTES
ORTH PROGRESS NOTE    2019  Admit Date:   2019    Post Op day: 3 Days Post-Op    Subjective:    Matilda Mullins states that he is feeling well overall. 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 Erythema improved since having steroids and benadryl while stopping the Vanc and changing to Ancef. Remains on bactrim due to low CD4 count. No knee pain complaints. Drain output 170 yesterday.    Tolerating diet  Denies N/V/SOB or CP    PT/OT:   Gait:  Gait  Base of Support: Widened  Stance: Right decreased  Gait Abnormalities: Antalgic, Step to gait  Ambulation - Level of Assistance: Stand-by assistance  Distance (ft): 50 Feet (ft)  Assistive Device: Gait belt, Walker, rolling  Rail Use: Left (SPC)  Stairs - Level of Assistance: Contact guard assistance  Number of Stairs Trained: 4                 Vital Signs:    Patient Vitals for the past 8 hrs:   BP Temp Pulse Resp SpO2 Weight   19 0757 125/89 98 °F (36.7 °C) 66 18 100 % --   19 0358 125/90 97.9 °F (36.6 °C) 96 22 98 % 61.4 kg (135 lb 5.8 oz)     Temp (24hrs), Av.2 °F (37.3 °C), Min:97.9 °F (36.6 °C), Max:103 °F (39.4 °C)      Pain Control:   Pain Assessment  Pain Scale 1: Numeric (0 - 10)  Pain Intensity 1: 0  Pain Onset 1: post op  Pain Location 1: Knee  Pain Orientation 1: Right  Pain Description 1: Aching, Sore  Pain Intervention(s) 1: Rest, Distraction, Environmental changes    Meds:    Current Facility-Administered Medications   Medication Dose Route Frequency    trimethoprim-sulfamethoxazole (BACTRIM DS, SEPTRA DS) 160-800 mg per tablet 1 Tab  1 Tab Oral DAILY    azithromycin (ZITHROMAX) tablet 1,000 mg  1,000 mg Oral Q7D    methylPREDNISolone (PF) (Solu-MEDROL) injection 60 mg  60 mg IntraVENous Q6H    0.9% sodium chloride infusion  125 mL/hr IntraVENous CONTINUOUS    aluminum-magnesium hydroxide (MAALOX) oral suspension 15 mL  15 mL Oral QID PRN    LORazepam (ATIVAN) injection 1 mg  1 mg IntraVENous Q6H PRN    ceFAZolin (ANCEF) 2 g/20 mL in sterile water IV syringe  2 g IntraVENous Q8H    diphenhydrAMINE (BENADRYL) capsule 25 mg  25 mg Oral Q6H PRN    0.9% sodium chloride infusion  75 mL/hr IntraVENous CONTINUOUS    oxyCODONE IR (ROXICODONE) tablet 5-10 mg  5-10 mg Oral Q4H PRN    cyclobenzaprine (FLEXERIL) tablet 5 mg  5 mg Oral TID PRN    nystatin (MYCOSTATIN) 100,000 unit/mL oral suspension 500,000 Units  500,000 Units Oral QID    predniSONE (DELTASONE) tablet 10 mg  10 mg Oral DAILY WITH BREAKFAST    cetirizine (ZYRTEC) tablet 10 mg  10 mg Oral DAILY PRN    pramipexole (MIRAPEX) tablet 0.5 mg  0.5 mg Oral QHS    sodium chloride (NS) flush 5-40 mL  5-40 mL IntraVENous Q8H    sodium chloride (NS) flush 5-40 mL  5-40 mL IntraVENous PRN    acetaminophen (TYLENOL) tablet 650 mg  650 mg Oral Q6H    ondansetron (ZOFRAN ODT) tablet 4 mg  4 mg Oral Q6H PRN    hydrOXYzine HCl (ATARAX) tablet 10 mg  10 mg Oral Q8H PRN    famotidine (PEPCID) tablet 20 mg  20 mg Oral BID    senna-docusate (PERICOLACE) 8.6-50 mg per tablet 1 Tab  1 Tab Oral BID    polyethylene glycol (MIRALAX) packet 17 g  17 g Oral DAILY    bisacodyl (DULCOLAX) suppository 10 mg  10 mg Rectal DAILY PRN    oxyCODONE IR (ROXICODONE) tablet 10 mg  10 mg Oral Q3H PRN    fentaNYL citrate (PF) injection 25 mcg  25 mcg IntraVENous Q4H PRN    rivaroxaban (XARELTO) tablet 10 mg  10 mg Oral DAILY WITH LUNCH    naloxone (NARCAN) injection 0.4 mg  0.4 mg IntraVENous PRN    ondansetron (ZOFRAN) injection 4 mg  4 mg IntraVENous Q8H PRN    sodium chloride (NS) flush 5-40 mL  5-40 mL IntraVENous Q8H    sodium chloride (NS) flush 5-40 mL  5-40 mL IntraVENous PRN       LAB:    Recent Labs     02/08/19 2001 02/07/19  0524   HCT 38.1   < >  --    HGB 12.1   < > 11.2*   INR  --   --  1.1    < > = values in this interval not displayed.        Transfuse PRBC's:      Assessment & Physician's Comment:  Dressing is clean, dry, and intact  Neurovascular checks within normal limits  Orientation:  Oriented    Active Problems:    Infection of total right knee replacement (HCC) (2/5/2019)      Infection of total knee replacement ([address] (2/5/2019)        Plan:    Cont PT/OT as needed - WBAT  Abx per ID and Medicine team  Leave drain in place for now  Appreciate other services input  Question if need for Gen Surg input for gallbladder  Looking at discharge Mon if all stays the same    Discussed current course and overnight changes with Dr Steven Aguayo with Hospitalist service.     FLORINDA DillonC   Orthopedic Trauma Service  [address] [facility]

## 2019-02-09 NOTE — PROGRESS NOTES
Progress Note    Patient: Carron Closs MRN: [id number]  SSN: [id number]    YOB: 1960  Age: 63 y.o. Sex: male      Admit Date: 2019    3 Days Post-Op    Procedure:  Procedure(s):  RIGHT KNEE INCISION AND DRAINAGE WITH POLYETHYLENE EXCHANGE    Subjective:     Patient doing well. Rash resolved after moving to intermediate care unit and stopping vancomycin. Patient states he feels better in terms of pain and motion. Denies CP, SOB, fevers, chills, calf pain, N/V/D, rash. Objective:     Visit Vitals  /89 (BP 1 Location: Right arm, BP Patient Position: At rest;Supine)   Pulse 66   Temp 98 °F (36.7 °C)   Resp 18   Ht 5' 11\" (1.803 m)   Wt 61.4 kg (135 lb 5.8 oz)   SpO2 100%   BMI 18.88 kg/m²       Temp (24hrs), Av.2 °F (37.3 °C), Min:97.9 °F (36.6 °C), Max:103 °F (39.4 °C)      Physical Exam:    GENERAL: alert, cooperative, no distress, EXTREMITIES: right knee with bulky compression dressing intact. Dressing clean and dry. hemovac with no active drainage but in place. Moves toes well. EHL 5/5. Pedal pulse full, equal, symmetrical. Light touch sensation intact. AROM 0-100 degrees. Data Review: reviewed  Consultants documentation, Nursing documentation and vitals, labs    Lab Review: All lab results for the last 24 hours reviewed. Assessment:     Hospital Problems  Date Reviewed: 2019          Codes Class Noted POA    Infection of total right knee replacement ([facility] ICD-10-CM: J35.67WQ  ICD-9-CM: 996.66, V43.65  2019 Unknown        Infection of total knee replacement ([facility] ICD-10-CM: T84.59XA, Z96.659  ICD-9-CM: 996.66, V43.65  2019 Unknown              Plan/Recommendations/Medical Decision Making: Will continue per plan and will continue to monitor. PT to work with patient on ROM and ambulation today.     Signed By: DOMINIC Jasmine     2019

## 2019-02-09 NOTE — PROGRESS NOTES
At approximately Cleven Killings RN walked into patient room to administer medications and complete hourly round. Patient was in bed tremourous, with reddened skin on face, neck, chest, abdomen, and back, red sclera in both eyes, tachycardic, and anxious. The patient's wife informed Jose Naqvi that this had began around 15 minutes prior, but patient would not let his wife call for help as he continuously stated he was \"fine, just stressed\". RN assisted patient to bathroom as he stated he needed to void urgently, the patient then voided and proceeded to vomit twice in a row looking to be approximately 100-200 mL in the toilet. The patient's urine remained rhoda throughout the day today and was still rhoda at this time; emesis was tan/clear. The patient was assisted in ambulating back to bed where vital signs were performed. Patient remained tachycardic and hypertensive while continuing to have tremors. Hospitalist Dr. Bishop Clarke paged as he was consulted and saw patient earlier in the day where he ordered Vanc at slow rate and other antibiotics. Dr. Bishop Clarke informed of situation and ordered IV benadryl and solumedrol to treat possible reaction to Vancomycin. Patient had wife and charge RN at bedside, Jose Naqvi then returned to patient room and administered new stat medications while continuing to assess vital signs for changes. Dr. Bishop Clarke arrived to the patient's room within 10 minutes of telephone discussion about patient's status. 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 Orders placed for NS to be infused at 125 mL/hour and for patient to be transferred to [facility] where he can have closer monitoring throughout the night. The patient was transferred in his bed to room [address] [facility] with House Supervisors Jai Leon RN and Haim Valdez RN while Jose Naqvi gave report to receiving RN Lizy Ortiz.

## 2019-02-09 NOTE — PROGRESS NOTES
TRANSFER - OUT REPORT:    Verbal report given to MARIAH Canseco(name) on Rose Marie Cortes  being transferred to [facility]) for change in patient condition(suspected allergic reaction to Vancomycin)       Report consisted of patients Situation, Background, Assessment and   Recommendations(SBAR). Information from the following report(s) SBAR, Kardex, Procedure Summary, Intake/Output, MAR, Recent Results and Med Rec Status was reviewed with the receiving nurse. Lines:   Peripheral IV 02/07/19 Left;Proximal;Mid Arm (Active)   Site Assessment Clean, dry, & intact 2/8/2019  7:41 PM   Phlebitis Assessment 0 2/8/2019  7:41 PM   Infiltration Assessment 0 2/8/2019  7:41 PM   Dressing Status Clean, dry, & intact 2/8/2019  7:41 PM   Dressing Type Transparent;Tape 2/8/2019  7:41 PM   Hub Color/Line Status Blue 2/8/2019  7:41 PM   Action Taken Open ports on tubing capped 2/8/2019  7:41 PM   Alcohol Cap Used Yes 2/8/2019  7:41 PM        Opportunity for questions and clarification was provided.       Patient transported with:   Registered Nurse (Supervisors Shavonne Layton)

## 2019-02-10 LAB
ANION GAP SERPL CALC-SCNC: 8 MMOL/L (ref 5–15)
BASOPHILS # BLD: 0 K/UL (ref 0–0.1)
BASOPHILS NFR BLD: 0 % (ref 0–1)
BUN SERPL-MCNC: 15 MG/DL (ref 6–20)
BUN/CREAT SERPL: 20 (ref 12–20)
CALCIUM SERPL-MCNC: 8.4 MG/DL (ref 8.5–10.1)
CHLORIDE SERPL-SCNC: 110 MMOL/L (ref 97–108)
CO2 SERPL-SCNC: 21 MMOL/L (ref 21–32)
CREAT SERPL-MCNC: 0.76 MG/DL (ref 0.7–1.3)
DIFFERENTIAL METHOD BLD: ABNORMAL
EOSINOPHIL # BLD: 0 K/UL (ref 0–0.4)
EOSINOPHIL NFR BLD: 0 % (ref 0–7)
ERYTHROCYTE [DISTWIDTH] IN BLOOD BY AUTOMATED COUNT: 18.5 % (ref 11.5–14.5)
GLUCOSE BLD STRIP.AUTO-MCNC: 152 MG/DL (ref 65–100)
GLUCOSE BLD STRIP.AUTO-MCNC: 153 MG/DL (ref 65–100)
GLUCOSE BLD STRIP.AUTO-MCNC: 191 MG/DL (ref 65–100)
GLUCOSE SERPL-MCNC: 155 MG/DL (ref 65–100)
HCT VFR BLD AUTO: 33 % (ref 36.6–50.3)
HGB BLD-MCNC: 10.6 G/DL (ref 12.1–17)
IMM GRANULOCYTES # BLD AUTO: 0 K/UL
IMM GRANULOCYTES NFR BLD AUTO: 0 %
LYMPHOCYTES # BLD: 0.3 K/UL (ref 0.8–3.5)
LYMPHOCYTES NFR BLD: 5 % (ref 12–49)
MCH RBC QN AUTO: 30.8 PG (ref 26–34)
MCHC RBC AUTO-ENTMCNC: 32.1 G/DL (ref 30–36.5)
MCV RBC AUTO: 95.9 FL (ref 80–99)
MONOCYTES # BLD: 0.1 K/UL (ref 0–1)
MONOCYTES NFR BLD: 2 % (ref 5–13)
MYELOCYTES NFR BLD MANUAL: 1 %
NEUTS BAND NFR BLD MANUAL: 1 % (ref 0–6)
NEUTS SEG # BLD: 5 K/UL (ref 1.8–8)
NEUTS SEG NFR BLD: 91 % (ref 32–75)
NRBC # BLD: 0 K/UL (ref 0–0.01)
NRBC BLD-RTO: 0 PER 100 WBC
PLATELET # BLD AUTO: 126 K/UL (ref 150–400)
PLATELET COMMENTS,PCOM: ABNORMAL
PMV BLD AUTO: 11.4 FL (ref 8.9–12.9)
POTASSIUM SERPL-SCNC: 3.5 MMOL/L (ref 3.5–5.1)
RBC # BLD AUTO: 3.44 M/UL (ref 4.1–5.7)
RBC MORPH BLD: ABNORMAL
SERVICE CMNT-IMP: ABNORMAL
SODIUM SERPL-SCNC: 139 MMOL/L (ref 136–145)
WBC # BLD AUTO: 5.4 K/UL (ref 4.1–11.1)

## 2019-02-10 PROCEDURE — 74011250637 HC RX REV CODE- 250/637: Performed by: HOSPITALIST

## 2019-02-10 PROCEDURE — 80048 BASIC METABOLIC PNL TOTAL CA: CPT

## 2019-02-10 PROCEDURE — 74011636637 HC RX REV CODE- 636/637: Performed by: HOSPITALIST

## 2019-02-10 PROCEDURE — 74011000250 HC RX REV CODE- 250

## 2019-02-10 PROCEDURE — 74011000258 HC RX REV CODE- 258: Performed by: HOSPITALIST

## 2019-02-10 PROCEDURE — 74011250636 HC RX REV CODE- 250/636: Performed by: HOSPITALIST

## 2019-02-10 PROCEDURE — 82962 GLUCOSE BLOOD TEST: CPT

## 2019-02-10 PROCEDURE — 36415 COLL VENOUS BLD VENIPUNCTURE: CPT

## 2019-02-10 PROCEDURE — 74011250637 HC RX REV CODE- 250/637: Performed by: PHYSICIAN ASSISTANT

## 2019-02-10 PROCEDURE — 85025 COMPLETE CBC W/AUTO DIFF WBC: CPT

## 2019-02-10 PROCEDURE — 65270000029 HC RM PRIVATE

## 2019-02-10 RX ORDER — BACITRACIN 500 UNIT/G
PACKET (EA) TOPICAL
Status: COMPLETED
Start: 2019-02-10 | End: 2019-02-10

## 2019-02-10 RX ADMIN — NYSTATIN 500000 UNITS: 100000 SUSPENSION ORAL at 22:44

## 2019-02-10 RX ADMIN — SULFAMETHOXAZOLE AND TRIMETHOPRIM 1 TABLET: 800; 160 TABLET ORAL at 08:46

## 2019-02-10 RX ADMIN — LORAZEPAM 1 MG: 2 INJECTION INTRAMUSCULAR; INTRAVENOUS at 11:24

## 2019-02-10 RX ADMIN — RIVAROXABAN 10 MG: 10 TABLET, FILM COATED ORAL at 11:24

## 2019-02-10 RX ADMIN — Medication 10 ML: at 14:00

## 2019-02-10 RX ADMIN — PRAMIPEXOLE DIHYDROCHLORIDE 0.5 MG: 1 TABLET ORAL at 22:44

## 2019-02-10 RX ADMIN — NYSTATIN 500000 UNITS: 100000 SUSPENSION ORAL at 13:42

## 2019-02-10 RX ADMIN — FAMOTIDINE 20 MG: 20 TABLET ORAL at 08:46

## 2019-02-10 RX ADMIN — Medication 2 G: at 23:50

## 2019-02-10 RX ADMIN — ACETAMINOPHEN 650 MG: 325 TABLET ORAL at 05:59

## 2019-02-10 RX ADMIN — Medication 10 ML: at 05:57

## 2019-02-10 RX ADMIN — SODIUM CHLORIDE 125 ML/HR: 900 INJECTION, SOLUTION INTRAVENOUS at 10:22

## 2019-02-10 RX ADMIN — NYSTATIN 500000 UNITS: 100000 SUSPENSION ORAL at 17:12

## 2019-02-10 RX ADMIN — IRON SUCROSE 200 MG: 20 INJECTION, SOLUTION INTRAVENOUS at 15:36

## 2019-02-10 RX ADMIN — NYSTATIN 500000 UNITS: 100000 SUSPENSION ORAL at 08:46

## 2019-02-10 RX ADMIN — PREDNISONE 10 MG: 10 TABLET ORAL at 08:45

## 2019-02-10 RX ADMIN — FAMOTIDINE 20 MG: 20 TABLET ORAL at 17:12

## 2019-02-10 RX ADMIN — Medication 2 G: at 15:36

## 2019-02-10 RX ADMIN — Medication 10 ML: at 22:46

## 2019-02-10 RX ADMIN — BACITRACIN: 500 OINTMENT TOPICAL at 13:00

## 2019-02-10 RX ADMIN — Medication 2 G: at 08:46

## 2019-02-10 NOTE — ROUTINE PROCESS
Last 3 Recorded Weights in this Encounter    02/08/19 1941 02/09/19 0358 02/10/19 0352   Weight: 61.4 kg (135 lb 5.8 oz) 61.4 kg (135 lb 5.8 oz) 91 kg (200 lb 9.9 oz)     Pt on continuous fluids - NS @ 125mL/hr. Weight on 2-8-19 and 2-9-19 were bed weights. Weight on 2-10-19 was standing scale weight.

## 2019-02-10 NOTE — PROGRESS NOTES
3400 Nurse in room to assess pt. Hemovac drain out. Tubing clamped and taped to skin for now. Dressing on R LLL still on. Paged MD.No orders received for now. Will notify day shift.      Problem: Pain  Goal: *Control of Pain  Outcome: Progressing Towards Goal  Assessed pain charecteristics Q4 using numeric scale, monitored for change in patient's condition, pain relief reassessment for patient's stated pain goal.

## 2019-02-10 NOTE — PROGRESS NOTES
TRANSFER - IN REPORT:    Verbal report received from Carolina Stallings (name) on Tianna Parks  being received from [address] [place] [address] (unit) for routine post - op      Report consisted of patients Situation, Background, Assessment and   Recommendations(SBAR). Information from the following report(s) SBAR, Kardex, [facility] and Recent Results was reviewed with the receiving nurse. Opportunity for questions and clarification was provided. Assessment completed upon patients arrival to unit and care assumed.

## 2019-02-10 NOTE — PROGRESS NOTES
Hospitalist Progress Note  Mirian Tong MD  Answering service: [phone] from in house phone      Date of Service:  2/10/2019  NAME:  Kayy Mattson  :  1960  MRN:  [id number]      Admission Summary:   Kayy Mattson is a 63 y.o. male who was admitted on 2019 for left septic knee. Patient is known to have chron;s diease and was on Imuran until about a month ago when it was stopped and started on prednisone. He is recently diagnosed with HIV,not on treatment yet as work up is underway. He says he has h/o low platelet,sleep apnea. Interval history / Subjective:   Pt seen in followup of right knee arthritis  Denies fever  Reports rash and flushing of face and upper trunk is resolved  So far no reactions to ancef  Anxious about being in hospital      Assessment & Plan:     1. Left Knee Septic arthritis - MSSA - on ancef. Off vanc - supected redmans syndrome. ID consulted D/W Dr Aida Valladares - Would need PICC line, ordered it. D/W pt about pros and cons of picc and he agrees. 2. Recent Diagnosis of HIV- not on HAART. cd4 count - <50 -  on bactrim and zithromax for ppx. D/w ID. Elian Dials Obtain Records from [facility]. 3. Elevated LFT with gallstones  - GI consulted, monitor LFT. No pain at present. HIDA scan neg. Surgery consult - D/W primary. 4. Rash on body  - ?due to vancomycin - off vac. S/P steroids, benadryl and pepcid. -resolved. 5. S/p I and D of the Right knee - Monitor For Post Op blood losses    6. Pancytopenia - could be related to HIV/AIDS status    7.  Iron Def Anemia - ordered IV iron     Code status: Full       DVT prophylaxis: SCD    Risk of deterioration: high      Total time spent with patient: [address] [facility] discussed with: Patient/Family and Nurse  Disposition: Per primary     Hospital Problems  Date Reviewed: 2019          Codes Class Noted POA    Infection of total right knee replacement ([address] ICD-10-CM: T84.53XA  ICD-9-CM: 996.66, V43.65  2/5/2019 Unknown        Infection of total knee replacement ([facility] [address] ICD-10-CM: T84.59XA, Z96.659  ICD-9-CM: 996.66, V43.65  2/5/2019 Unknown                Review of Systems:   A comprehensive review of systems was negative. Vital Signs:    Last 24hrs VS reviewed since prior progress note. Most recent are:  Visit Vitals  BP (!) 163/104 (BP 1 Location: Right arm, BP Patient Position: Sitting)   Pulse 62   Temp 97.7 °F (36.5 °C)   Resp 16   Ht 5' 11\" (1.803 m)   Wt 91 kg (200 lb 9.9 oz)   SpO2 100%   BMI 27.98 kg/m²         Intake/Output Summary (Last 24 hours) at 2/10/2019 1037  Last data filed at 2/10/2019 0606  Gross per 24 hour   Intake --   Output 850 ml   Net -850 ml        Physical Examination:             Constitutional:  No acute distress, cooperative, pleasant    ENT:  Oral mucous moist, oropharynx benign. Neck supple,    Resp:  CTA bilaterally. No wheezing/rhonchi/rales. No accessory muscle use   CV:  Regular rhythm, normal rate, no murmurs, gallops, rubs    GI:  Soft, non distended, non tender. normoactive bowel sounds, no hepatosplenomegaly     Musculoskeletal:  No edema, warm, 2+ pulses throughout,RT knee Drain in place with Bloody discharge in container    Neurologic:  Moves all extremities.   AAOx3, CN II-XII reviewed     Skin:  Good turgor, no rashes or ulcers        Data Review:    Review and/or order of clinical lab test  Review and/or order of tests in the radiology section of CPT  Review and/or order of tests in the medicine section of CPT      Labs:     Recent Labs     02/10/19  0601 02/08/19 2001   WBC 5.4 3.6*   HGB 10.6* 12.1   HCT 33.0* 38.1   * 130*     Recent Labs     02/10/19  0601 02/09/19 0527 02/08/19 2001    141 143   K 3.5 3.9 3.1*   * 110* 109*   CO2 21 21 23   BUN 15 10 7   CREA 0.76 0.96 0.80   * 142* 73   CA 8.4* 8.2* 8.3*     No results for input(s): SGOT, GPT, ALT, AP, TBIL, TBILI, TP, ALB, GLOB, GGT, AML, LPSE in the last 72 hours. No lab exists for component: AMYP, HLPSE  No results for input(s): INR, PTP, APTT in the last 72 hours. No lab exists for component: INREXT, INREXT   Recent Labs     02/09/19  0527   TIBC 175*   PSAT 12*      No results found for: FOL, RBCF   No results for input(s): PH, PCO2, PO2 in the last 72 hours. No results for input(s): CPK, CKNDX, TROIQ in the last 72 hours.     No lab exists for component: CPKMB  Lab Results   Component Value Date/Time    Cholesterol, total 164 06/04/2013 01:39 PM    HDL Cholesterol 32 (L) 06/04/2013 01:39 PM    LDL, calculated 87 06/04/2013 01:39 PM    Triglyceride 227 (H) 06/04/2013 01:39 PM     Lab Results   Component Value Date/Time    Glucose (POC) 152 (H) 02/10/2019 06:05 AM    Glucose (POC) 156 (H) 02/09/2019 09:18 PM    Glucose (POC) 170 (H) 02/09/2019 04:56 PM    Glucose (POC) 151 (H) 02/09/2019 11:15 AM    Glucose (POC) 141 (H) 02/09/2019 06:35 AM     Lab Results   Component Value Date/Time    Color ELSA 02/07/2019 01:21 PM    Appearance CLOUDY (A) 02/07/2019 01:21 PM    Specific gravity >1.030 (H) 02/07/2019 01:21 PM    Specific gravity 1.021 07/23/2018 11:30 AM    pH (UA) 5.5 02/07/2019 01:21 PM    Protein 100 (A) 02/07/2019 01:21 PM    Glucose NEGATIVE  02/07/2019 01:21 PM    Ketone TRACE (A) 02/07/2019 01:21 PM    Bilirubin NEGATIVE  07/23/2018 11:30 AM    Urobilinogen 0.2 02/07/2019 01:21 PM    Nitrites NEGATIVE  02/07/2019 01:21 PM    Leukocyte Esterase SMALL (A) 02/07/2019 01:21 PM    Epithelial cells FEW 02/07/2019 01:21 PM    Bacteria 1+ (A) 02/07/2019 01:21 PM    WBC 20-50 02/07/2019 01:21 PM    RBC 10-20 02/07/2019 01:21 PM         Medications Reviewed:     Current Facility-Administered Medications   Medication Dose Route Frequency    trimethoprim-sulfamethoxazole (BACTRIM DS, SEPTRA DS) 160-800 mg per tablet 1 Tab  1 Tab Oral DAILY    azithromycin (ZITHROMAX) tablet 1,000 mg  1,000 mg Oral Q7D    0.9% sodium chloride infusion 125 mL/hr IntraVENous CONTINUOUS    aluminum-magnesium hydroxide (MAALOX) oral suspension 15 mL  15 mL Oral QID PRN    LORazepam (ATIVAN) injection 1 mg  1 mg IntraVENous Q6H PRN    ceFAZolin (ANCEF) 2 g/20 mL in sterile water IV syringe  2 g IntraVENous Q8H    diphenhydrAMINE (BENADRYL) capsule 25 mg  25 mg Oral Q6H PRN    0.9% sodium chloride infusion  75 mL/hr IntraVENous CONTINUOUS    oxyCODONE IR (ROXICODONE) tablet 5-10 mg  5-10 mg Oral Q4H PRN    cyclobenzaprine (FLEXERIL) tablet 5 mg  5 mg Oral TID PRN    nystatin (MYCOSTATIN) 100,000 unit/mL oral suspension 500,000 Units  500,000 Units Oral QID    predniSONE (DELTASONE) tablet 10 mg  10 mg Oral DAILY WITH BREAKFAST    cetirizine (ZYRTEC) tablet 10 mg  10 mg Oral DAILY PRN    pramipexole (MIRAPEX) tablet 0.5 mg  0.5 mg Oral QHS    sodium chloride (NS) flush 5-40 mL  5-40 mL IntraVENous Q8H    sodium chloride (NS) flush 5-40 mL  5-40 mL IntraVENous PRN    acetaminophen (TYLENOL) tablet 650 mg  650 mg Oral Q6H    ondansetron (ZOFRAN ODT) tablet 4 mg  4 mg Oral Q6H PRN    hydrOXYzine HCl (ATARAX) tablet 10 mg  10 mg Oral Q8H PRN    famotidine (PEPCID) tablet 20 mg  20 mg Oral BID    senna-docusate (PERICOLACE) 8.6-50 mg per tablet 1 Tab  1 Tab Oral BID    polyethylene glycol (MIRALAX) packet 17 g  17 g Oral DAILY    bisacodyl (DULCOLAX) suppository 10 mg  10 mg Rectal DAILY PRN    oxyCODONE IR (ROXICODONE) tablet 10 mg  10 mg Oral Q3H PRN    fentaNYL citrate (PF) injection 25 mcg  25 mcg IntraVENous Q4H PRN    rivaroxaban (XARELTO) tablet 10 mg  10 mg Oral DAILY WITH LUNCH    naloxone (NARCAN) injection 0.4 mg  0.4 mg IntraVENous PRN    ondansetron (ZOFRAN) injection 4 mg  4 mg IntraVENous Q8H PRN    sodium chloride (NS) flush 5-40 mL  5-40 mL IntraVENous Q8H    sodium chloride (NS) flush 5-40 mL  5-40 mL IntraVENous PRN     ______________________________________________________________________  EXPECTED LENGTH OF STAY: 2d 12h  ACTUAL LENGTH OF STAY:          5                 Yaw Spencer MD   Patient has given Verbal permission to discuss medical care with   persons present in the room and and also with contact as listed on face sheet.

## 2019-02-10 NOTE — PROGRESS NOTES
Infectious Diseases Progress Note          Subjective:     He is resting comfortably. Denies HA, SOB. Objective:     Vitals:   Visit Vitals  /85 (BP 1 Location: Right arm, BP Patient Position: At rest)   Pulse 66   Temp 97.6 °F (36.4 °C)   Resp 19   Ht 5' 11\" (1.803 m)   Wt 61.4 kg (135 lb 5.8 oz)   SpO2 96%   BMI 18.88 kg/m²        Tmax:  Temp (24hrs), Av °F (36.7 °C), Min:97.6 °F (36.4 °C), Max:98.7 °F (37.1 °C)      Exam:  General appearance: alert, no distress  Lungs: clear to auscultation bilaterally  Heart: regular rate and rhythm  Abdomen: soft, non-tender. Bowel sounds normal. No masses,  no organomegaly  Right knee: dressed    IV Lines: peripheral    Labs:    Recent Labs     19  0527 19  0034 19  1225 19  0524   WBC  --  3.6*  --  3.8  --    HGB  --  12.1  --  11.2* 11.2*   PLT  --  130*  --  94*  --    BUN 10 7 12  --  11   CREA 0.96 0.80 0.69*  --  0.78   TBILI  --   --   --   --  0.9   SGOT  --   --   --   --  18   AP  --   --   --   --  46       Assessment:     1. Infected right TKR -- Staph aureus (MSSA)    2. HIV -- advanced with CD4 of 43 (11%)    3. Gallbladder disease--would like this addressed if at all possible, while he is here    4. Recent erythroderma -- he thinks this has been going on intermittently for weeks or months and predates Vanc and Bactrim      Plan:     1. Continue Ancef    2.   Nidia Campa MD

## 2019-02-10 NOTE — PROGRESS NOTES
Problem: Falls - Risk of  Goal: *Absence of Falls  Document Owen Fall Risk and appropriate interventions in the flowsheet. Outcome: Progressing Towards Goal  Fall Risk Interventions:  Mobility Interventions: Communicate number of staff needed for ambulation/transfer, Patient to call before getting OOB         Medication Interventions: Patient to call before getting OOB, Teach patient to arise slowly    Elimination Interventions: Call light in reach, Elevated toilet seat, Patient to call for help with toileting needs, Toileting schedule/hourly rounds             Problem: Pressure Injury - Risk of  Goal: *Prevention of pressure injury  Document Parker Scale and appropriate interventions in the flowsheet. Outcome: Progressing Towards Goal  Pressure Injury Interventions:  Sensory Interventions: Assess changes in LOC    Moisture Interventions: Absorbent underpads    Activity Interventions: Assess need for specialty bed, Increase time out of bed    Mobility Interventions: Assess need for specialty bed, Float heels, HOB 30 degrees or less    Nutrition Interventions: Document food/fluid/supplement intake    Friction and Shear Interventions: HOB 30 degrees or less, Lift sheet               Problem: Pain  Goal: *Control of Pain  Outcome: Progressing Towards Goal  Pt only complains of pain once in the evening. Pain rated 2/10 and was given Tylenol for pain relief. Pain relieved achieved.

## 2019-02-10 NOTE — PROGRESS NOTES
Discussed wound care orders with DOMINIC Barrett.   Instructions received to use 4x4 gauze, ABD pads and ACE wrap to cover incision and bilateral open areas from hemovac removal.

## 2019-02-10 NOTE — PROGRESS NOTES
Orthopedic Joint Progress Note    February 10, 2019  Admit Date: 2019  Admit Diagnosis: Infection of total right knee replacement ([facility] [address] [T84.53XA]  Infection of total knee replacement ([facility] [address] [T84.59XA, Z96.659]    Post Op day: 4 Days Post-Op    Subjective:     Fitz Dawkins states that his knee has no pain at all. Drain pulled out at some point overnight and is fully discharged now. No new Ortho complaints. ID following and has decreased concerns about Crow's syndrome. Remains on Ancef and Bactrim. Patient and wife have some frustrations regarding plan for gall bladder. Would like Surgery to weigh in. Tolerating diet  Denies N/V/SOB or CP      Objective:     PT/OT:     PATIENT MOBILITY    Bed Mobility Training  Supine to Sit: Stand-by assistance(HOB elevated)  Sit to Supine: Stand-by assistance  Scooting: Stand-by assistance  Transfer Training  Sit to Stand: Stand-by assistance  Stand to Sit: Stand-by assistance      Gait Training  Assistive Device: Gait belt, Walker, rolling  Ambulation - Level of Assistance: Stand-by assistance  Distance (ft): 50 Feet (ft)  Stairs - Level of Assistance: Contact guard assistance  Number of Stairs Trained: 4  Rail Use: Left (SPC)   Weight Bearing Status  Right Side Weight Bearing: As tolerated        Vital Signs:    Blood pressure (!) 163/104, pulse 62, temperature 97.7 °F (36.5 °C), resp. rate 16, height 5' 11\" (1.803 m), weight 91 kg (200 lb 9.9 oz), SpO2 100 %.   Temp (24hrs), Av.5 °F (36.4 °C), Min:96.8 °F (36 °C), Max:98.2 °F (36.8 °C)      Pain Control:   Pain Assessment  Pain Scale 1: Numeric (0 - 10)  Pain Intensity 1: 0  Pain Onset 1: post op  Pain Location 1: Leg  Pain Orientation 1: Right  Pain Description 1: Sore  Pain Intervention(s) 1: Medication (see MAR)    Meds:  Current Facility-Administered Medications   Medication Dose Route Frequency    trimethoprim-sulfamethoxazole (BACTRIM DS, SEPTRA DS) 160-800 mg per tablet 1 Tab  1 Tab Oral DAILY    azithromycin (ZITHROMAX) tablet 1,000 mg  1,000 mg Oral Q7D    0.9% sodium chloride infusion  125 mL/hr IntraVENous CONTINUOUS    aluminum-magnesium hydroxide (MAALOX) oral suspension 15 mL  15 mL Oral QID PRN    LORazepam (ATIVAN) injection 1 mg  1 mg IntraVENous Q6H PRN    ceFAZolin (ANCEF) 2 g/20 mL in sterile water IV syringe  2 g IntraVENous Q8H    diphenhydrAMINE (BENADRYL) capsule 25 mg  25 mg Oral Q6H PRN    0.9% sodium chloride infusion  75 mL/hr IntraVENous CONTINUOUS    oxyCODONE IR (ROXICODONE) tablet 5-10 mg  5-10 mg Oral Q4H PRN    cyclobenzaprine (FLEXERIL) tablet 5 mg  5 mg Oral TID PRN    nystatin (MYCOSTATIN) 100,000 unit/mL oral suspension 500,000 Units  500,000 Units Oral QID    predniSONE (DELTASONE) tablet 10 mg  10 mg Oral DAILY WITH BREAKFAST    cetirizine (ZYRTEC) tablet 10 mg  10 mg Oral DAILY PRN    pramipexole (MIRAPEX) tablet 0.5 mg  0.5 mg Oral QHS    sodium chloride (NS) flush 5-40 mL  5-40 mL IntraVENous Q8H    sodium chloride (NS) flush 5-40 mL  5-40 mL IntraVENous PRN    acetaminophen (TYLENOL) tablet 650 mg  650 mg Oral Q6H    ondansetron (ZOFRAN ODT) tablet 4 mg  4 mg Oral Q6H PRN    hydrOXYzine HCl (ATARAX) tablet 10 mg  10 mg Oral Q8H PRN    famotidine (PEPCID) tablet 20 mg  20 mg Oral BID    senna-docusate (PERICOLACE) 8.6-50 mg per tablet 1 Tab  1 Tab Oral BID    polyethylene glycol (MIRALAX) packet 17 g  17 g Oral DAILY    bisacodyl (DULCOLAX) suppository 10 mg  10 mg Rectal DAILY PRN    oxyCODONE IR (ROXICODONE) tablet 10 mg  10 mg Oral Q3H PRN    fentaNYL citrate (PF) injection 25 mcg  25 mcg IntraVENous Q4H PRN    rivaroxaban (XARELTO) tablet 10 mg  10 mg Oral DAILY WITH LUNCH    naloxone (NARCAN) injection 0.4 mg  0.4 mg IntraVENous PRN    ondansetron (ZOFRAN) injection 4 mg  4 mg IntraVENous Q8H PRN    sodium chloride (NS) flush 5-40 mL  5-40 mL IntraVENous Q8H    sodium chloride (NS) flush 5-40 mL  5-40 mL IntraVENous PRN        LAB:    Lab Results Component Value Date/Time    INR 1.1 02/07/2019 05:24 AM    INR 1.0 07/23/2018 11:30 AM     Lab Results   Component Value Date/Time    HGB 10.6 (L) 02/10/2019 06:01 AM    HGB 12.1 02/08/2019 08:01 PM    HGB 11.2 (L) 02/07/2019 12:25 PM         Dressing:      Wound:       Physical Exam:    Dressing is clean, dry, and intact  Neurovascular checks within normal limits  Orientation:  Oriented    Assessment:      Active Problems:    Infection of total right knee replacement ([address] (2/5/2019)      Infection of total knee replacement ([address] (2/5/2019)         Plan:     Pull drains and apply dry dressing with ACE  Cont current pain management  Ice packs to knee PRN  Xarelto for DVT prophylaxis  Abx per ID team  Would like to see if Gen Surg can weigh in on gallbladder plan while admitted  Appreciate Medicine assistance  Disposition planning      Signed By: Swati Gill PA-C   Orthopedic Trauma Service  [facility]

## 2019-02-11 LAB
ANION GAP SERPL CALC-SCNC: 7 MMOL/L (ref 5–15)
BASOPHILS # BLD: 0 K/UL (ref 0–0.1)
BASOPHILS NFR BLD: 0 % (ref 0–1)
BUN SERPL-MCNC: 12 MG/DL (ref 6–20)
BUN/CREAT SERPL: 15 (ref 12–20)
CALCIUM SERPL-MCNC: 8.5 MG/DL (ref 8.5–10.1)
CHLORIDE SERPL-SCNC: 106 MMOL/L (ref 97–108)
CO2 SERPL-SCNC: 26 MMOL/L (ref 21–32)
CREAT SERPL-MCNC: 0.79 MG/DL (ref 0.7–1.3)
DIFFERENTIAL METHOD BLD: ABNORMAL
EOSINOPHIL # BLD: 0.1 K/UL (ref 0–0.4)
EOSINOPHIL NFR BLD: 1 % (ref 0–7)
ERYTHROCYTE [DISTWIDTH] IN BLOOD BY AUTOMATED COUNT: 17.7 % (ref 11.5–14.5)
GLUCOSE SERPL-MCNC: 81 MG/DL (ref 65–100)
HCT VFR BLD AUTO: 33.7 % (ref 36.6–50.3)
HGB BLD-MCNC: 11.4 G/DL (ref 12.1–17)
IMM GRANULOCYTES # BLD AUTO: 0.1 K/UL (ref 0–0.04)
IMM GRANULOCYTES NFR BLD AUTO: 1 % (ref 0–0.5)
LYMPHOCYTES # BLD: 0.3 K/UL (ref 0.8–3.5)
LYMPHOCYTES NFR BLD: 5 % (ref 12–49)
MCH RBC QN AUTO: 31.8 PG (ref 26–34)
MCHC RBC AUTO-ENTMCNC: 33.8 G/DL (ref 30–36.5)
MCV RBC AUTO: 93.9 FL (ref 80–99)
MONOCYTES # BLD: 0.4 K/UL (ref 0–1)
MONOCYTES NFR BLD: 7 % (ref 5–13)
NEUTS SEG # BLD: 4.8 K/UL (ref 1.8–8)
NEUTS SEG NFR BLD: 86 % (ref 32–75)
NRBC # BLD: 0 K/UL (ref 0–0.01)
NRBC BLD-RTO: 0 PER 100 WBC
PLATELET # BLD AUTO: 146 K/UL (ref 150–400)
PLATELET COMMENTS,PCOM: ABNORMAL
PMV BLD AUTO: 11.1 FL (ref 8.9–12.9)
POTASSIUM SERPL-SCNC: 3.2 MMOL/L (ref 3.5–5.1)
RBC # BLD AUTO: 3.59 M/UL (ref 4.1–5.7)
RBC MORPH BLD: ABNORMAL
SODIUM SERPL-SCNC: 139 MMOL/L (ref 136–145)
WBC # BLD AUTO: 5.7 K/UL (ref 4.1–11.1)

## 2019-02-11 PROCEDURE — 65270000029 HC RM PRIVATE

## 2019-02-11 PROCEDURE — 77030018719 HC DRSG PTCH ANTIMIC J&J -A

## 2019-02-11 PROCEDURE — 77030020847 HC STATLOK BARD -A

## 2019-02-11 PROCEDURE — 74011000258 HC RX REV CODE- 258: Performed by: HOSPITALIST

## 2019-02-11 PROCEDURE — 77030020365 HC SOL INJ SOD CL 0.9% 50ML

## 2019-02-11 PROCEDURE — 02HV33Z INSERTION OF INFUSION DEVICE INTO SUPERIOR VENA CAVA, PERCUTANEOUS APPROACH: ICD-10-PCS | Performed by: FAMILY MEDICINE

## 2019-02-11 PROCEDURE — 76937 US GUIDE VASCULAR ACCESS: CPT

## 2019-02-11 PROCEDURE — 74011636637 HC RX REV CODE- 636/637: Performed by: HOSPITALIST

## 2019-02-11 PROCEDURE — 80048 BASIC METABOLIC PNL TOTAL CA: CPT

## 2019-02-11 PROCEDURE — 36569 INSJ PICC 5 YR+ W/O IMAGING: CPT | Performed by: HOSPITALIST

## 2019-02-11 PROCEDURE — 74011250636 HC RX REV CODE- 250/636: Performed by: HOSPITALIST

## 2019-02-11 PROCEDURE — C1751 CATH, INF, PER/CENT/MIDLINE: HCPCS

## 2019-02-11 PROCEDURE — 85025 COMPLETE CBC W/AUTO DIFF WBC: CPT

## 2019-02-11 PROCEDURE — 74011250637 HC RX REV CODE- 250/637: Performed by: PHYSICIAN ASSISTANT

## 2019-02-11 PROCEDURE — 36415 COLL VENOUS BLD VENIPUNCTURE: CPT

## 2019-02-11 PROCEDURE — 74011250637 HC RX REV CODE- 250/637: Performed by: HOSPITALIST

## 2019-02-11 RX ORDER — IRON POLYSACCHARIDE COMPLEX 150 MG
150 CAPSULE ORAL 2 TIMES DAILY
Qty: 30 CAP | Refills: 0 | Status: SHIPPED
Start: 2019-02-11 | End: 2019-02-13 | Stop reason: SDUPTHER

## 2019-02-11 RX ADMIN — IRON SUCROSE 200 MG: 20 INJECTION, SOLUTION INTRAVENOUS at 16:22

## 2019-02-11 RX ADMIN — FAMOTIDINE 20 MG: 20 TABLET ORAL at 17:24

## 2019-02-11 RX ADMIN — NYSTATIN 500000 UNITS: 100000 SUSPENSION ORAL at 12:18

## 2019-02-11 RX ADMIN — FAMOTIDINE 20 MG: 20 TABLET ORAL at 08:49

## 2019-02-11 RX ADMIN — Medication 2 G: at 16:23

## 2019-02-11 RX ADMIN — PREDNISONE 10 MG: 10 TABLET ORAL at 08:07

## 2019-02-11 RX ADMIN — Medication 2 G: at 23:45

## 2019-02-11 RX ADMIN — Medication 5 ML: at 13:50

## 2019-02-11 RX ADMIN — Medication 10 ML: at 13:51

## 2019-02-11 RX ADMIN — Medication 2 G: at 08:07

## 2019-02-11 RX ADMIN — NYSTATIN 500000 UNITS: 100000 SUSPENSION ORAL at 08:48

## 2019-02-11 RX ADMIN — PRAMIPEXOLE DIHYDROCHLORIDE 0.5 MG: 1 TABLET ORAL at 21:52

## 2019-02-11 RX ADMIN — SULFAMETHOXAZOLE AND TRIMETHOPRIM 1 TABLET: 800; 160 TABLET ORAL at 08:49

## 2019-02-11 RX ADMIN — RIVAROXABAN 10 MG: 10 TABLET, FILM COATED ORAL at 11:50

## 2019-02-11 NOTE — PROGRESS NOTES
Ortho Daily Progress Note      Patient: Kayy Mattson                   MRN: [id number]  Sex: male  YOB: 1960           Age: 63 y.o.    5 Days Post-Op    Procedure(s): RIGHT KNEE INCISION AND DRAINAGE WITH POLYETHYLENE EXCHANGE    Subjective: No complaints, knee feeling well     Visit Vitals  /82   Pulse 71   Temp 97 °F (36.1 °C)   Resp 16   Ht 5' 11\" (1.803 m)   Wt 91 kg (200 lb 9.9 oz)   SpO2 97%   BMI 27.98 kg/m²        Lab Results:  HGB   Date/Time Value Ref Range Status   02/11/2019 06:18 AM 11.4 (L) 12.1 - 17.0 g/dL Final     INR   Date/Time Value Ref Range Status   02/07/2019 05:24 AM 1.1 0.9 - 1.1   Final     Comment:     A single therapeutic range for Vit K antagonists may not be optimal for all indications - see June, 2008 issue of Chest, American College of Chest [employer]icians Evidence-Based Clinical Practice Guidelines, 8th Edition.        Physical Exam:    DRESSING: dressing changed  SWELLING: mild  NEUROLOGICAL: intact  PULSE:yes   GENERAL: alert, cooperative, no distress, appears stated age  WOUND: No Drainage and Wound Intact  DVT Exam: No evidence of DVT seen on physical exam.      Plan:    No plan for cholecystectomy  PICC in place  ABX per ID  DVT prophylaxisXarelto  Weight bearing restrictionWBAT  Pain Control:no current joint or muscle symptoms, essentially pain-free  F/U with Dr. Deon Enciso in 2 weeks    DOMINIC Sainz  2/11/2019   12:35 PM      .

## 2019-02-11 NOTE — PROCEDURES
PICC Placement Note    PRE-PROCEDURE VERIFICATION  Correct Procedure: yes  Correct Site:  yes  Temperature: Temp: 97 °F (36.1 °C), Temperature Source: Temp Source: Oral  Recent Labs     02/11/19  0618   BUN 12   CREA 0.79   *   WBC 5.7     Allergies: Vancomycin  Education materials, including PICC Booklet, for PICC Care given to patient: yes. See Patient Education activity for further details. PROCEDURE DETAIL  A double lumen PICC line was started for Home IV Therapy. The following documentation is in addition to the PICC properties in the lines/airways flowsheet :  Lot #: [id number]  Was xylocaine 1% used intradermally:  yes  Catheter Length: 39 (cm)  Vein Selection for PICC:right basilic  Central Line Bundle followed yes  Complication Related to Insertion: none    The placement was verified by ECG/Sapiens technology: The  tip location is on the right side and the tip is in the  superior vena cava. See ECG results for PICC tip placement. Report given to nurse Violetta Essex. Line is okay to use.     Barrington Wisdom RN

## 2019-02-11 NOTE — PROGRESS NOTES
Home IV Antibiotic Orders    1. Diagnosis:  MSSA right knee infection  2. Routine PICC care  3. Antibiotic:  Cefazolin 2 grams IV Q 8 hours  4. Lab each Monday:   CBC/diff/platelets   BMP   ESR  5. Lab each Thursday:   CBC/diff/platelets   BMP  6. Fax lab to Dr. Zeke Morel @ [phone].  7.  Call Dr. Randi Duval @ [phone] for WBC under 2 or creatinine over 2  8. Duration of therapy: 6 weeks   Please call Dr. Zeke Morel @ [phone] before stopping therapy. 9.  Allergies:  Vancomycin   10.  Call [phone] with name of [address] [place] [address] and to arrange follow up appointment in 15 days    Carmen Bermudez MD

## 2019-02-11 NOTE — PROGRESS NOTES
Infectious Diseases Progress Note          Subjective:     He felt better today with no rash or skin reaction. No SOB. No N, V, abd pain. Objective:     Vitals:   Visit Vitals  /79 (BP 1 Location: Right arm, BP Patient Position: At rest)   Pulse 71   Temp 97.8 °F (36.6 °C)   Resp 16   Ht 5' 11\" (1.803 m)   Wt 91 kg (200 lb 9.9 oz) Comment: rechecked, previous wt incorrect   SpO2 97%   BMI 27.98 kg/m²        Tmax:  Temp (24hrs), Av.5 °F (36.4 °C), Min:96.8 °F (36 °C), Max:97.8 °F (36.6 °C)      Exam:  General appearance: alert, no distress  Lungs: clear to auscultation bilaterally  Heart: regular rate and rhythm  Abdomen: soft, non-tender. Bowel sounds normal.   Right knee: dressed    IV Lines: peripheral    Labs:    Recent Labs     02/10/19  0601 19  0527 19  0034   WBC 5.4  --  3.6*  --    HGB 10.6*  --  12.1  --    *  --  130*  --    BUN 15 10 7 12   CREA 0.76 0.96 0.80 0.69*       Assessment:     1. Infected right TKR -- Staph aureus (MSSA)    2. HIV -- advanced with CD4 of 43 (11%)    3. Gallbladder disease    4. Recent erythroderma -- he thinks this has been going on intermittently for weeks or months and predates Vanc and Bactrim. He is tolerating Bactrim . Plan:     1. Continue Ancef    2.   Dennie Engman., MD

## 2019-02-11 NOTE — PROGRESS NOTES
Bedside and Verbal shift change report given to Matilda Yoder RN (oncoming nurse) by Tammy Bee RN (offgoing nurse). Report included the following information SBAR.

## 2019-02-11 NOTE — CONSULTS
Chief Complaint:  RUQ pain and early satiety    HPI:  62 yo man with hx right knee replacement complicated by staph infection, recently diagnosed HIV > AIDS consulted for RUQ pain. Pt reported intermittent RUQ pain and early satiety for several months. He had had RUQ US performed which showed cholelithiasis. A HIDA scan was performed which showed normal gallbladder ejection fraction. Past Medical History:   Diagnosis Date    Asthma     Autoimmune disease ([address]     CROHNS DISEASE    Chronic pain     KNEES AND BACK    Fatty liver     GERD (gastroesophageal reflux disease)     HTN (hypertension)     NO MEDS    Hyperlipidemia     Ill-defined condition     HIATAL HERNIA, HIV    Migraine     Sleep apnea     USES CPAP       Past Surgical History:   Procedure Laterality Date    HX HERNIA REPAIR Left 1986    INGUINAL    HX KNEE ARTHROSCOPY Right 2010       No current facility-administered medications on file prior to encounter. Current Outpatient Medications on File Prior to Encounter   Medication Sig Dispense Refill    testosterone cypionate (DEPOTESTOTERONE CYPIONATE) 200 mg/mL injection 100 mg by IntraMUSCular route every Sunday.  cetirizine (ZYRTEC) 10 mg tablet Take 10 mg by mouth daily as needed for Allergies or Rhinitis.  predniSONE (DELTASONE) 10 mg tablet Take 10 mg by mouth daily (with breakfast). Replaces azathioprine (Imuran)      therapeutic multivitamin (THERA) tablet Take 1 Tab by mouth daily.  fish oil-omega-3 fatty acids 340-1,000 mg capsule Take 1 Cap by mouth two (2) times a day.  pramipexole (MIRAPEX) 0.5 mg tablet Take 0.5 mg by mouth nightly.  omeprazole (PRILOSEC) 40 mg capsule Take 40 mg by mouth nightly.          Allergies   Allergen Reactions    Vancomycin Other (comments)     Red man syndrome with 2 hour administration, requires a 4 hour administration for tolerance       Review of Systems - General ROS: negative  Psychological ROS: negative  Respiratory ROS: negative  Cardiovascular ROS: negative  Gastrointestinal ROS: positive for - abdominal pain and nausea    Visit Vitals  /79 (BP 1 Location: Right arm, BP Patient Position: At rest)   Pulse 71   Temp 97.8 °F (36.6 °C)   Resp 16   Ht 5' 11\" (1.803 m)   Wt 200 lb 9.9 oz (91 kg)   SpO2 97%   BMI 27.98 kg/m²         Physical Exam:    Gen:  NAD  Pulm:  Unlabored  Abd:  S/ND/mild TTP in RUQ    Recent Results (from the past 24 hour(s))   CBC WITH AUTOMATED DIFF    Collection Time: 02/10/19  6:01 AM   Result Value Ref Range    WBC 5.4 4.1 - 11.1 K/uL    RBC 3.44 (L) 4.10 - 5.70 M/uL    HGB 10.6 (L) 12.1 - 17.0 g/dL    HCT 33.0 (L) 36.6 - 50.3 %    MCV 95.9 80.0 - 99.0 FL    MCH 30.8 26.0 - 34.0 PG    MCHC 32.1 30.0 - 36.5 g/dL    RDW 18.5 (H) 11.5 - 14.5 %    PLATELET 304 (L) 993 - 400 K/uL    MPV 11.4 8.9 - 12.9 FL    NRBC 0.0 0  WBC    ABSOLUTE NRBC 0.00 0.00 - 0.01 K/uL    NEUTROPHILS 91 (H) 32 - 75 %    BAND NEUTROPHILS 1 0 - 6 %    LYMPHOCYTES 5 (L) 12 - 49 %    MONOCYTES 2 (L) 5 - 13 %    EOSINOPHILS 0 0 - 7 %    BASOPHILS 0 0 - 1 %    MYELOCYTES 1 (H) 0 %    IMMATURE GRANULOCYTES 0 %    ABS. NEUTROPHILS 5.0 1.8 - 8.0 K/UL    ABS. LYMPHOCYTES 0.3 (L) 0.8 - 3.5 K/UL    ABS. MONOCYTES 0.1 0.0 - 1.0 K/UL    ABS. EOSINOPHILS 0.0 0.0 - 0.4 K/UL    ABS. BASOPHILS 0.0 0.0 - 0.1 K/UL    ABS. IMM.  GRANS. 0.0 K/UL    DF MANUAL      PLATELET COMMENTS Large Platelets      RBC COMMENTS ANISOCYTOSIS  1+        RBC COMMENTS OVALOCYTES  PRESENT        RBC COMMENTS TARGET CELLS  PRESENT       METABOLIC PANEL, BASIC    Collection Time: 02/10/19  6:01 AM   Result Value Ref Range    Sodium 139 136 - 145 mmol/L    Potassium 3.5 3.5 - 5.1 mmol/L    Chloride 110 (H) 97 - 108 mmol/L    CO2 21 21 - 32 mmol/L    Anion gap 8 5 - 15 mmol/L    Glucose 155 (H) 65 - 100 mg/dL    BUN 15 6 - 20 MG/DL    Creatinine 0.76 0.70 - 1.30 MG/DL    BUN/Creatinine ratio 20 12 - 20      GFR est AA >60 >60 ml/min/1.73m2 GFR est non-AA >60 >60 ml/min/1.73m2    Calcium 8.4 (L) 8.5 - 10.1 MG/DL   GLUCOSE, POC    Collection Time: 02/10/19  6:05 AM   Result Value Ref Range    Glucose (POC) 152 (H) 65 - 100 mg/dL    Performed by [facility] (CON)    GLUCOSE, POC    Collection Time: 02/10/19 11:41 AM   Result Value Ref Range    Glucose (POC) 191 (H) 65 - 100 mg/dL    Performed by Lucina Nix, POC    Collection Time: 02/10/19  4:17 PM   Result Value Ref Range    Glucose (POC) 153 (H) 65 - 100 mg/dL    Performed by Darrin Farrell        AP:  62 yo man consulted for cholelithiasis    - Cholelithiasis:  No acute indication for operation.   Given low CD4 count and an otherwise normal HIDA, would not recommend cholecystectomy at this time.    - Recommend low fat diet  - If abdominal pain symptoms persist then can offer non-emergent lap cholecystectomy as outpatient

## 2019-02-11 NOTE — PROGRESS NOTES
Bedside and Verbal shift change report given to [facility] (oncoming nurse) by Gideon Rojo (offgoing nurse). Report included the following information SBAR, Kardex, MAR and Recent Results.

## 2019-02-11 NOTE — PROGRESS NOTES
Chart reviewed. CRM called the [employer] CM [employer] at [phone] and LVM to follow up on clinicals that were faxed on Friday 2/8 to [phone]. CRM will continue to follow for discharge planning/determine if this case will be covered by workers comp. CHRISTIANO    3:06 pm CRM updated the patient and his wife regarding the above and left another message for the [employer] CM. Dr. Meme Garcia plans to add the IV orders to the chart.  SHANKART

## 2019-02-11 NOTE — PROGRESS NOTES
Former[facility] General Surgery    Subjective     No acute issues, no RUQ pain right now    Objective     Patient Vitals for the past 24 hrs:   Temp Pulse Resp BP SpO2   02/11/19 0847 97 °F (36.1 °C) 71 16 147/82 97 %   02/11/19 0227 97.6 °F (36.4 °C) 68 16 142/80 98 %   02/10/19 2118 97.8 °F (36.6 °C) 71 16 131/79 97 %   02/10/19 1450 97.6 °F (36.4 °C) 66 16 150/81 99 %   02/10/19 1138 97.6 °F (36.4 °C) 72 20 131/86 98 %       Date 02/10/19 0700 - 02/11/19 0659 02/11/19 0700 - 02/12/19 0659   Shift 8873-2418 4737-2292 24 Hour Total 2537-5927 7930-8296 24 Hour Total   INTAKE   P.O.    240  240     P. O.    240  240   Shift Total(mL/kg)    240(2.6)  240(2.6)   OUTPUT   Urine(mL/kg/hr) 325(0.3)  325(0.1)        Urine Voided 325  325        Urine Occurrence(s)    5 x  5 x   Shift Total(mL/kg) 325(3.6)  325(3.6)      NET -325  -325 240  240   Weight (kg) 91 91 91 91 91 91       PE  Pulm - CTAB  CV - RRR  Abd - soft, ND, BS present, NTTP    Labs  Recent Results (from the past 12 hour(s))   CBC WITH AUTOMATED DIFF    Collection Time: 02/11/19  6:18 AM   Result Value Ref Range    WBC 5.7 4.1 - 11.1 K/uL    RBC 3.59 (L) 4.10 - 5.70 M/uL    HGB 11.4 (L) 12.1 - 17.0 g/dL    HCT 33.7 (L) 36.6 - 50.3 %    MCV 93.9 80.0 - 99.0 FL    MCH 31.8 26.0 - 34.0 PG    MCHC 33.8 30.0 - 36.5 g/dL    RDW 17.7 (H) 11.5 - 14.5 %    PLATELET 387 (L) 359 - 400 K/uL    MPV 11.1 8.9 - 12.9 FL    NRBC 0.0 0  WBC    ABSOLUTE NRBC 0.00 0.00 - 0.01 K/uL    NEUTROPHILS 86 (H) 32 - 75 %    LYMPHOCYTES 5 (L) 12 - 49 %    MONOCYTES 7 5 - 13 %    EOSINOPHILS 1 0 - 7 %    BASOPHILS 0 0 - 1 %    IMMATURE GRANULOCYTES 1 (H) 0.0 - 0.5 %    ABS. NEUTROPHILS 4.8 1.8 - 8.0 K/UL    ABS. LYMPHOCYTES 0.3 (L) 0.8 - 3.5 K/UL    ABS. MONOCYTES 0.4 0.0 - 1.0 K/UL    ABS. EOSINOPHILS 0.1 0.0 - 0.4 K/UL    ABS. BASOPHILS 0.0 0.0 - 0.1 K/UL    ABS. IMM.  GRANS. 0.1 (H) 0.00 - 0.04 K/UL    DF SMEAR SCANNED      PLATELET COMMENTS Large Platelets      RBC COMMENTS ANISOCYTOSIS  1+       METABOLIC PANEL, BASIC    Collection Time: 02/11/19  6:18 AM   Result Value Ref Range    Sodium 139 136 - 145 mmol/L    Potassium 3.2 (L) 3.5 - 5.1 mmol/L    Chloride 106 97 - 108 mmol/L    CO2 26 21 - 32 mmol/L    Anion gap 7 5 - 15 mmol/L    Glucose 81 65 - 100 mg/dL    BUN 12 6 - 20 MG/DL    Creatinine 0.79 0.70 - 1.30 MG/DL    BUN/Creatinine ratio 15 12 - 20      GFR est AA >60 >60 ml/min/1.73m2    GFR est non-AA >60 >60 ml/min/1.73m2    Calcium 8.5 8.5 - 10.1 MG/DL         Assessment     Alexa Foster is a 63 y. o.yr old male with biliary colic, L knee septic arthritis, HIV    Plan     No plans for cholecystectomy on this admission, no cholecystitis  It would be better to have the CD4 counts higher before having cholecystectomy  Follow up as OP    Layla Welch MD

## 2019-02-11 NOTE — PROGRESS NOTES
Hospitalist Progress Note  Glorya Nageotte, MD  Answering service: [phone] from in house phone      Date of Service:  2019  NAME:  Loan Ledesma  :  1960  MRN:  [id number]      Admission Summary:   Loan Ledesma is a 63 y.o. male who was admitted on 2019 for left septic knee. Patient is known to have chron;s diease and was on Imuran until about a month ago when it was stopped and started on prednisone. He is recently diagnosed with HIV,not on treatment yet as work up is underway. He says he has h/o low platelet,sleep apnea. Interval history / Subjective:   Pt seen in followup of right knee arthritis  Denies fever  Reports rash and flushing of face and upper trunk is resolved  So far no reactions to ancef  Anxious about being in hospital   Supportive counseling given and explained about plan of care     Assessment & Plan:     1. Left Knee Septic arthritis - MSSA - on ancef. Off vanc - supected redmans syndrome. ID consulted D/W Dr Natalie Carrasco - Would need PICC line, ordered it. ID to place op Abx. 2. Recent Diagnosis of HIV- not on HAART. cd4 count - <50 -  on bactrim and zithromax for ppx. D/w ID. Iona Kilgore Obtain Records from [facility]. Needs to stay on same dose of zithromax and bactrim on D/c. Side effect profile explained     3. Elevated LFT with gallstones  - GI consulted, monitor LFT. No pain at present. HIDA scan neg. Surgery consult - D/W primary. - no intervetion per surgery    4. Rash on body  - ?due to vancomycin - off vac. S/P steroids, benadryl and pepcid. -resolved. 5. S/p I and D of the Right knee - Monitor For Post Op blood losses    6. Pancytopenia - could be related to HIV/AIDS status    7.  Iron Def Anemia - ordered IV iron,could benefit from po iron on d/c     Code status: Full       DVT prophylaxis: SCD    Risk of deterioration: high      Total time spent with patient: [facility] discussed with: Patient/Family, Nurse and   Disposition: Per primary   Can d/c from medical standpoint  [address] [employer]  Date Reviewed: 2/7/2019          Codes Class Noted POA    Infection of total right knee replacement ([facility] [address] [address] ICD-10-CM: T07.78AG  ICD-9-CM: 996.66, V43.65  2/5/2019 Unknown        Infection of total knee replacement ([facility] [address] [address] ICD-10-CM: T84.59XA, Z96.659  ICD-9-CM: 996.66, V43.65  2/5/2019 Unknown                Review of Systems:   A comprehensive review of systems was negative. Vital Signs:    Last 24hrs VS reviewed since prior progress note. Most recent are:  Visit Vitals  /79 (BP 1 Location: Left arm)   Pulse 76   Temp 98.2 °F (36.8 °C)   Resp 16   Ht 5' 11\" (1.803 m)   Wt 91 kg (200 lb 9.9 oz)   SpO2 97%   BMI 27.98 kg/m²         Intake/Output Summary (Last 24 hours) at 2/11/2019 1805  Last data filed at 2/11/2019 2274  Gross per 24 hour   Intake 240 ml   Output --   Net 240 ml        Physical Examination:             Constitutional:  No acute distress, cooperative, pleasant    ENT:  Oral mucous moist, oropharynx benign. Neck supple,    Resp:  CTA bilaterally. No wheezing/rhonchi/rales. No accessory muscle use   CV:  Regular rhythm, normal rate, no murmurs, gallops, rubs    GI:  Soft, non distended, non tender. normoactive bowel sounds, no hepatosplenomegaly     Musculoskeletal:  No edema, warm, 2+ pulses throughout,RT knee Drain removed. Neurologic:  Moves all extremities.   AAOx3, CN II-XII reviewed     Skin:  Good turgor, no rashes or ulcers        Data Review:    Review and/or order of clinical lab test  Review and/or order of tests in the radiology section of CPT  Review and/or order of tests in the medicine section of CPT      Labs:     Recent Labs     02/11/19 0618 02/10/19  0601   WBC 5.7 5.4   HGB 11.4* 10.6*   HCT 33.7* 33.0*   * 126*     Recent Labs     02/11/19  0618 02/10/19  0601 02/09/19  0527    139 141   K 3.2* 3.5 3.9   CL 106 110* 110*   CO2 26 21 21   BUN 12 15 10   CREA 0.79 0.76 0.96   GLU 81 155* 142*   CA 8.5 8.4* 8.2*     No results for input(s): SGOT, GPT, ALT, AP, TBIL, TBILI, TP, ALB, GLOB, GGT, AML, LPSE in the last 72 hours. No lab exists for component: AMYP, HLPSE  No results for input(s): INR, PTP, APTT in the last 72 hours. No lab exists for component: INREXT, INREXT   Recent Labs     02/09/19  0527   TIBC 175*   PSAT 12*      No results found for: FOL, RBCF   No results for input(s): PH, PCO2, PO2 in the last 72 hours. No results for input(s): CPK, CKNDX, TROIQ in the last 72 hours.     No lab exists for component: CPKMB  Lab Results   Component Value Date/Time    Cholesterol, total 164 06/04/2013 01:39 PM    HDL Cholesterol 32 (L) 06/04/2013 01:39 PM    LDL, calculated 87 06/04/2013 01:39 PM    Triglyceride 227 (H) 06/04/2013 01:39 PM     Lab Results   Component Value Date/Time    Glucose (POC) 153 (H) 02/10/2019 04:17 PM    Glucose (POC) 191 (H) 02/10/2019 11:41 AM    Glucose (POC) 152 (H) 02/10/2019 06:05 AM    Glucose (POC) 156 (H) 02/09/2019 09:18 PM    Glucose (POC) 170 (H) 02/09/2019 04:56 PM     Lab Results   Component Value Date/Time    Color ELSA 02/07/2019 01:21 PM    Appearance CLOUDY (A) 02/07/2019 01:21 PM    Specific gravity >1.030 (H) 02/07/2019 01:21 PM    Specific gravity 1.021 07/23/2018 11:30 AM    pH (UA) 5.5 02/07/2019 01:21 PM    Protein 100 (A) 02/07/2019 01:21 PM    Glucose NEGATIVE  02/07/2019 01:21 PM    Ketone TRACE (A) 02/07/2019 01:21 PM    Bilirubin NEGATIVE  07/23/2018 11:30 AM    Urobilinogen 0.2 02/07/2019 01:21 PM    Nitrites NEGATIVE  02/07/2019 01:21 PM    Leukocyte Esterase SMALL (A) 02/07/2019 01:21 PM    Epithelial cells FEW 02/07/2019 01:21 PM    Bacteria 1+ (A) 02/07/2019 01:21 PM    WBC 20-50 02/07/2019 01:21 PM    RBC 10-20 02/07/2019 01:21 PM         Medications Reviewed:     Current Facility-Administered Medications   Medication Dose Route Frequency    alteplase (CATHFLO) 1 mg in sterile water (preservative free) 1 mL injection  1 mg InterCATHeter PRN    iron sucrose (VENOFER) 200 mg in 0.9% sodium chloride 100 mL IVPB  200 mg IntraVENous Q24H    trimethoprim-sulfamethoxazole (BACTRIM DS, SEPTRA DS) 160-800 mg per tablet 1 Tab  1 Tab Oral DAILY    azithromycin (ZITHROMAX) tablet 1,000 mg  1,000 mg Oral Q7D    0.9% sodium chloride infusion  125 mL/hr IntraVENous CONTINUOUS    aluminum-magnesium hydroxide (MAALOX) oral suspension 15 mL  15 mL Oral QID PRN    LORazepam (ATIVAN) injection 1 mg  1 mg IntraVENous Q6H PRN    ceFAZolin (ANCEF) 2 g/20 mL in sterile water IV syringe  2 g IntraVENous Q8H    diphenhydrAMINE (BENADRYL) capsule 25 mg  25 mg Oral Q6H PRN    0.9% sodium chloride infusion  75 mL/hr IntraVENous CONTINUOUS    oxyCODONE IR (ROXICODONE) tablet 5-10 mg  5-10 mg Oral Q4H PRN    cyclobenzaprine (FLEXERIL) tablet 5 mg  5 mg Oral TID PRN    nystatin (MYCOSTATIN) 100,000 unit/mL oral suspension 500,000 Units  500,000 Units Oral QID    predniSONE (DELTASONE) tablet 10 mg  10 mg Oral DAILY WITH BREAKFAST    cetirizine (ZYRTEC) tablet 10 mg  10 mg Oral DAILY PRN    pramipexole (MIRAPEX) tablet 0.5 mg  0.5 mg Oral QHS    sodium chloride (NS) flush 5-40 mL  5-40 mL IntraVENous Q8H    sodium chloride (NS) flush 5-40 mL  5-40 mL IntraVENous PRN    acetaminophen (TYLENOL) tablet 650 mg  650 mg Oral Q6H    ondansetron (ZOFRAN ODT) tablet 4 mg  4 mg Oral Q6H PRN    hydrOXYzine HCl (ATARAX) tablet 10 mg  10 mg Oral Q8H PRN    famotidine (PEPCID) tablet 20 mg  20 mg Oral BID    senna-docusate (PERICOLACE) 8.6-50 mg per tablet 1 Tab  1 Tab Oral BID    polyethylene glycol (MIRALAX) packet 17 g  17 g Oral DAILY    bisacodyl (DULCOLAX) suppository 10 mg  10 mg Rectal DAILY PRN    oxyCODONE IR (ROXICODONE) tablet 10 mg  10 mg Oral Q3H PRN    fentaNYL citrate (PF) injection 25 mcg  25 mcg IntraVENous Q4H PRN    rivaroxaban (XARELTO) tablet 10 mg  10 mg Oral DAILY WITH LUNCH    naloxone (NARCAN) injection 0.4 mg  0.4 mg IntraVENous PRN    ondansetron (ZOFRAN) injection 4 mg  4 mg IntraVENous Q8H PRN    sodium chloride (NS) flush 5-40 mL  5-40 mL IntraVENous Q8H    sodium chloride (NS) flush 5-40 mL  5-40 mL IntraVENous PRN     ______________________________________________________________________  EXPECTED LENGTH OF STAY: 2d 12h  ACTUAL LENGTH OF STAY:          6                 Yaw Spencer MD   Patient has given Verbal permission to discuss medical care with   persons present in the room and and also with contact as listed on face sheet.

## 2019-02-11 NOTE — PROGRESS NOTES
ID Progress Note  2019    Subjective:     Feeling better today    Objective:     Antibiotics:  1. Vancomycin   2. Cefepime       Vitals:   Visit Vitals  /82   Pulse 71   Temp 97 °F (36.1 °C)   Resp 16   Ht 5' 11\" (1.803 m)   Wt 91 kg (200 lb 9.9 oz) Comment: rechecked, previous wt incorrect   SpO2 97%   BMI 27.98 kg/m²        Tmax:  Temp (24hrs), Av.5 °F (36.4 °C), Min:97 °F (36.1 °C), Max:97.8 °F (36.6 °C)      Exam:  Lungs:  clear to auscultation bilaterally  Heart:  regular rate and rhythm  Abdomen:  soft, non-tender. Bowel sounds normal. No masses,  no organomegaly  Wound is dressed    Labs:      Recent Labs     02/11/19  0618 02/10/19  0601 02/09/19  0527 02/08/19  2001   WBC 5.7 5.4  --  3.6*   HGB 11.4* 10.6*  --  12.1   * 126*  --  130*   BUN 12 15 10 7   CREA 0.79 0.76 0.96 0.80       Cultures:     No results found for: SDES  Lab Results   Component Value Date/Time    Culture result: NO GROWTH 3 DAYS 2019 08:01 PM    Culture result: NO GROWTH 1 DAY 2019 01:21 PM    Culture result: NO ANAEROBES ISOLATED 2019 05:48 PM    Culture result: LIGHT STAPHYLOCOCCUS AUREUS (A) 2019 05:48 PM    Culture result: NO FUNGUS ISOLATED 5 DAYS 2019 05:48 PM       Radiology:     Line/Insert Date:           Assessment:     1. Septic knee--MSSA from culture  2. HIV--advanced with CD4 of 43 (11%)--will need HAART, but would like to get him over the acute knee infection first  3. Gallbladder disease--would like this addressed if at all possible, while he is here    Objective:     1. Change to cefazolin  2. Will need PICC, etc  3. ?surgical opinion for gallbladder  4. Extensive discussion regarding HIV--Rx given for Biktarvy--he will need Rx for both azithromycin and Bactrim upon discharge  5.  HH orders on chart    Fernando Peguero MD

## 2019-02-12 LAB
ANION GAP SERPL CALC-SCNC: 9 MMOL/L (ref 5–15)
BASOPHILS # BLD: 0 K/UL (ref 0–0.1)
BASOPHILS NFR BLD: 1 % (ref 0–1)
BUN SERPL-MCNC: 14 MG/DL (ref 6–20)
BUN/CREAT SERPL: 14 (ref 12–20)
CALCIUM SERPL-MCNC: 8.8 MG/DL (ref 8.5–10.1)
CHLORIDE SERPL-SCNC: 99 MMOL/L (ref 97–108)
CO2 SERPL-SCNC: 27 MMOL/L (ref 21–32)
CREAT SERPL-MCNC: 0.98 MG/DL (ref 0.7–1.3)
DIFFERENTIAL METHOD BLD: ABNORMAL
EOSINOPHIL # BLD: 0.1 K/UL (ref 0–0.4)
EOSINOPHIL NFR BLD: 4 % (ref 0–7)
ERYTHROCYTE [DISTWIDTH] IN BLOOD BY AUTOMATED COUNT: 17.2 % (ref 11.5–14.5)
GLUCOSE SERPL-MCNC: 80 MG/DL (ref 65–100)
HCT VFR BLD AUTO: 37.1 % (ref 36.6–50.3)
HGB BLD-MCNC: 12.7 G/DL (ref 12.1–17)
IMM GRANULOCYTES # BLD AUTO: 0.2 K/UL (ref 0–0.04)
IMM GRANULOCYTES NFR BLD AUTO: 6 % (ref 0–0.5)
LYMPHOCYTES # BLD: 0.3 K/UL (ref 0.8–3.5)
LYMPHOCYTES NFR BLD: 8 % (ref 12–49)
MCH RBC QN AUTO: 31.4 PG (ref 26–34)
MCHC RBC AUTO-ENTMCNC: 34.2 G/DL (ref 30–36.5)
MCV RBC AUTO: 91.6 FL (ref 80–99)
METAMYELOCYTES NFR BLD MANUAL: 1 %
MONOCYTES # BLD: 0.3 K/UL (ref 0–1)
MONOCYTES NFR BLD: 8 % (ref 5–13)
MYELOCYTES NFR BLD MANUAL: 1 %
NEUTS BAND NFR BLD MANUAL: 1 %
NEUTS SEG # BLD: 2.6 K/UL (ref 1.8–8)
NEUTS SEG NFR BLD: 70 % (ref 32–75)
NRBC # BLD: 0 K/UL (ref 0–0.01)
NRBC BLD-RTO: 0 PER 100 WBC
PLATELET # BLD AUTO: 143 K/UL (ref 150–400)
PLATELET COMMENTS,PCOM: ABNORMAL
PMV BLD AUTO: 11 FL (ref 8.9–12.9)
POTASSIUM SERPL-SCNC: 3.5 MMOL/L (ref 3.5–5.1)
RBC # BLD AUTO: 4.05 M/UL (ref 4.1–5.7)
RBC MORPH BLD: ABNORMAL
RBC MORPH BLD: ABNORMAL
SODIUM SERPL-SCNC: 135 MMOL/L (ref 136–145)
WBC # BLD AUTO: 3.7 K/UL (ref 4.1–11.1)

## 2019-02-12 PROCEDURE — 74011250637 HC RX REV CODE- 250/637: Performed by: PHYSICIAN ASSISTANT

## 2019-02-12 PROCEDURE — 85025 COMPLETE CBC W/AUTO DIFF WBC: CPT

## 2019-02-12 PROCEDURE — 80048 BASIC METABOLIC PNL TOTAL CA: CPT

## 2019-02-12 PROCEDURE — 74011000258 HC RX REV CODE- 258: Performed by: HOSPITALIST

## 2019-02-12 PROCEDURE — 74011250636 HC RX REV CODE- 250/636: Performed by: HOSPITALIST

## 2019-02-12 PROCEDURE — 36415 COLL VENOUS BLD VENIPUNCTURE: CPT

## 2019-02-12 PROCEDURE — 74011250637 HC RX REV CODE- 250/637: Performed by: HOSPITALIST

## 2019-02-12 PROCEDURE — 65270000029 HC RM PRIVATE

## 2019-02-12 PROCEDURE — 74011636637 HC RX REV CODE- 636/637: Performed by: HOSPITALIST

## 2019-02-12 RX ORDER — AZITHROMYCIN 500 MG/1
1000 TABLET, FILM COATED ORAL
Qty: 30 TAB | Refills: 0 | Status: SHIPPED | OUTPATIENT
Start: 2019-02-12 | End: 2019-02-13

## 2019-02-12 RX ORDER — SULFAMETHOXAZOLE AND TRIMETHOPRIM 800; 160 MG/1; MG/1
1 TABLET ORAL DAILY
Qty: 30 TAB | Refills: 0 | Status: SHIPPED | OUTPATIENT
Start: 2019-02-13 | End: 2019-02-13

## 2019-02-12 RX ADMIN — Medication 2 G: at 07:30

## 2019-02-12 RX ADMIN — Medication 10 ML: at 07:30

## 2019-02-12 RX ADMIN — Medication 10 ML: at 23:04

## 2019-02-12 RX ADMIN — Medication 2 G: at 16:38

## 2019-02-12 RX ADMIN — Medication 10 ML: at 16:39

## 2019-02-12 RX ADMIN — Medication 2 G: at 23:05

## 2019-02-12 RX ADMIN — Medication 10 ML: at 14:07

## 2019-02-12 RX ADMIN — FAMOTIDINE 20 MG: 20 TABLET ORAL at 17:12

## 2019-02-12 RX ADMIN — SULFAMETHOXAZOLE AND TRIMETHOPRIM 1 TABLET: 800; 160 TABLET ORAL at 09:40

## 2019-02-12 RX ADMIN — FAMOTIDINE 20 MG: 20 TABLET ORAL at 09:40

## 2019-02-12 RX ADMIN — RIVAROXABAN 10 MG: 10 TABLET, FILM COATED ORAL at 14:06

## 2019-02-12 RX ADMIN — PRAMIPEXOLE DIHYDROCHLORIDE 0.5 MG: 1 TABLET ORAL at 20:47

## 2019-02-12 RX ADMIN — Medication 20 ML: at 22:00

## 2019-02-12 RX ADMIN — IRON SUCROSE 200 MG: 20 INJECTION, SOLUTION INTRAVENOUS at 16:44

## 2019-02-12 RX ADMIN — PREDNISONE 10 MG: 10 TABLET ORAL at 07:30

## 2019-02-12 NOTE — PROGRESS NOTES
Bedside shift change report given to Laurie Herzog (oncoming nurse) by James Sandhu (offgoing nurse). Report included the following information SBAR, Kardex, Intake/Output and MAR.

## 2019-02-12 NOTE — PROGRESS NOTES
[address]  [address] [place], [address]  [phone]                GI PROGRESS NOTE  Richard Wells AGACNP-BC      NAME:   Bunny Gonzalez   :    1960   MRN:    [id number]     Assessment/Plan     1. Crohn's colitis - appears stable with current regimen  - Continue prednisone   2. RUQ pain - suspect related to symptomatic cholelithiasis, has upcoming cholecystectomy w/ liver biopsy scheduled, pain does not appear to be significant issue as of current, LFTs normal. Repeat ultrasound with stones, sludge and GB wall thickening.   - HIDA normal  3. Septic R knee - management per Ortho  4. HIV - ID consulted      Patient Active Problem List   Diagnosis Code    Primary osteoarthritis of right knee M17.11    Infection of total right knee replacement (HCC) T84.53XA    Infection of total knee replacement (HCC) T84.59XA, Z96.659           Subjective:     Feeling well. No GI complaints. States he is being discharged today. Objective:     VITALS:   Last 24hrs VS reviewed since prior hospitalist progress note. Most recent are:  Visit Vitals  /76   Pulse 63   Temp 97.5 °F (36.4 °C)   Resp 16   Ht 5' 11\" (1.803 m)   Wt 91 kg (200 lb 9.9 oz)   SpO2 97%   BMI 27.98 kg/m²     No intake or output data in the 24 hours ending 19 1350     PHYSICAL EXAM:  General   well developed, well nourished, in no acute distress  EENT  Normocephalic, Atraumatic, PERRLA, EOMI, sclera clear  Respiratory   Clear To Auscultation bilaterally - no wheezes, rales, rhonchi, or crackles  Cardiology  Regular Rate and Rythmn  - no murmurs, rubs or gallops  Abdominal  Soft, non-tender, non-distended, positive bowel sounds  Neurological  No focal neurological deficits noted  Psychological  Oriented x 3. Normal affect.        Lab Data   Recent Results (from the past 12 hour(s))   METABOLIC PANEL, BASIC    Collection Time: 19  7:29 AM   Result Value Ref Range    Sodium 135 (L) 136 - 145 mmol/L    Potassium 3.5 3.5 - 5.1 mmol/L    Chloride 99 97 - 108 mmol/L    CO2 27 21 - 32 mmol/L    Anion gap 9 5 - 15 mmol/L    Glucose 80 65 - 100 mg/dL    BUN 14 6 - 20 MG/DL    Creatinine 0.98 0.70 - 1.30 MG/DL    BUN/Creatinine ratio 14 12 - 20      GFR est AA >60 >60 ml/min/1.73m2    GFR est non-AA >60 >60 ml/min/1.73m2    Calcium 8.8 8.5 - 10.1 MG/DL   CBC WITH AUTOMATED DIFF    Collection Time: 02/12/19  7:29 AM   Result Value Ref Range    WBC 3.7 (L) 4.1 - 11.1 K/uL    RBC 4.05 (L) 4.10 - 5.70 M/uL    HGB 12.7 12.1 - 17.0 g/dL    HCT 37.1 36.6 - 50.3 %    MCV 91.6 80.0 - 99.0 FL    MCH 31.4 26.0 - 34.0 PG    MCHC 34.2 30.0 - 36.5 g/dL    RDW 17.2 (H) 11.5 - 14.5 %    PLATELET 560 (L) 523 - 400 K/uL    MPV 11.0 8.9 - 12.9 FL    NRBC 0.0 0  WBC    ABSOLUTE NRBC 0.00 0.00 - 0.01 K/uL    NEUTROPHILS 70 32 - 75 %    BAND NEUTROPHILS 1 %    LYMPHOCYTES 8 (L) 12 - 49 %    MONOCYTES 8 5 - 13 %    EOSINOPHILS 4 0 - 7 %    BASOPHILS 1 0 - 1 %    METAMYELOCYTES 1 %    MYELOCYTES 1 %    IMMATURE GRANULOCYTES 6 (H) 0.0 - 0.5 %    ABS. NEUTROPHILS 2.6 1.8 - 8.0 K/UL    ABS. LYMPHOCYTES 0.3 (L) 0.8 - 3.5 K/UL    ABS. MONOCYTES 0.3 0.0 - 1.0 K/UL    ABS. EOSINOPHILS 0.1 0.0 - 0.4 K/UL    ABS. BASOPHILS 0.0 0.0 - 0.1 K/UL    ABS. IMM.  GRANS. 0.2 (H) 0.00 - 0.04 K/UL    DF MANUAL      PLATELET COMMENTS Large Platelets      RBC COMMENTS ANISOCYTOSIS  1+        RBC COMMENTS OVALOCYTES  PRESENT             Medications: Reviewed    PMH/SH reviewed - no change compared to H&P  Mid-Level Provider: Edilia Zhao NP   Date/Time:  2/12/2019

## 2019-02-12 NOTE — PROGRESS NOTES
Problem: Falls - Risk of  Goal: *Absence of Falls  Document Owen Fall Risk and appropriate interventions in the flowsheet.   Outcome: Progressing Towards Goal  Fall Risk Interventions:  Mobility Interventions: Patient to call before getting OOB         Medication Interventions: Patient to call before getting OOB    Elimination Interventions: Call light in reach

## 2019-02-12 NOTE — ROUTINE PROCESS
Bedside shift change report given to Sarah Rehman (oncoming nurse) by Trisha Sprague (offgoing nurse). Report included the following information SBAR.

## 2019-02-12 NOTE — PROGRESS NOTES
Hospitalist Progress Note  Michael Coello MD  Answering service: [phone] from in house phone      Date of Service:  2019  NAME:  Aracelis Du  :  1960  MRN:  [id number]      Admission Summary:   Aracelis Du is a 63 y.o. male who was admitted on 2019 for left septic knee. Patient is known to have chron;s diease and was on Imuran until about a month ago when it was stopped and started on prednisone. He is recently diagnosed with HIV,not on treatment yet as work up is underway. He says he has h/o low platelet,sleep apnea. Interval history / Subjective:   Pt seen in followup of right knee arthritis  Denies fever  Wants to go home     Assessment & Plan:     1. Left Knee Septic arthritis - MSSA - on ancef. Off vanc - supected redmans syndrome. ID consulted D/W Dr Hugo Kellogg - Would need PICC line, ordered it. ID to place op Abx - Ancef. 2. Recent Diagnosis of HIV- not on HAART. cd4 count - <50 -  on bactrim and zithromax for ppx. D/w ID. Minor Ronde Obtain Records from [facility]. Needs to stay on same dose of zithromax and bactrim on D/c. Side effect profile explained. Started on HAART per ID.     3. Elevated LFT with gallstones  - GI consulted, monitor LFT. No pain at present. HIDA scan neg. Surgery consult - D/W primary. - no intervetion per surgery    4. Rash on body  - ?due to vancomycin - off vac. S/P steroids, benadryl and pepcid. -resolved. 5. S/p I and D of the Right knee - Monitor For Post Op blood losses    6. Pancytopenia - could be related to HIV/AIDS status    7.  Iron Def Anemia - ordered IV iron,could benefit from po iron on d/c     Code status: Full       DVT prophylaxis: SCD    Risk of deterioration: high      Total time spent with patient: 28  [address] discussed with: Patient/Family, Nurse and   Disposition: Per primary   Can d/c from medical standpoint  Ordered home health Hospital Problems  Date Reviewed: 2/7/2019          Codes Class Noted POA    Infection of total right knee replacement ([facility] [address] ICD-10-CM: X05.76TH  ICD-9-CM: 996.66, V43.65  2/5/2019 Unknown        Infection of total knee replacement ([facility] [address] ICD-10-CM: T84.59XA, Z96.659  ICD-9-CM: 996.66, V43.65  2/5/2019 Unknown                Review of Systems:   A comprehensive review of systems was negative. Vital Signs:    Last 24hrs VS reviewed since prior progress note. Most recent are:  Visit Vitals  /76   Pulse 63   Temp 97.5 °F (36.4 °C)   Resp 16   Ht 5' 11\" (1.803 m)   Wt 91 kg (200 lb 9.9 oz)   SpO2 97%   BMI 27.98 kg/m²       No intake or output data in the 24 hours ending 02/12/19 1202     Physical Examination:             Constitutional:  No acute distress, cooperative, pleasant    ENT:  Oral mucous moist, oropharynx benign. Neck supple,    Resp:  CTA bilaterally. No wheezing/rhonchi/rales. No accessory muscle use   CV:  Regular rhythm, normal rate, no murmurs, gallops, rubs    GI:  Soft, non distended, non tender. normoactive bowel sounds, no hepatosplenomegaly     Musculoskeletal:  No edema, warm, 2+ pulses throughout,RT knee Drain removed. Neurologic:  Moves all extremities. AAOx3, CN II-XII reviewed     Skin:  Good turgor, no rashes or ulcers        Data Review:    Review and/or order of clinical lab test  Review and/or order of tests in the radiology section of CPT  Review and/or order of tests in the medicine section of CPT      Labs:     Recent Labs     02/12/19  0729 02/11/19  0618   WBC 3.7* 5.7   HGB 12.7 11.4*   HCT 37.1 33.7*   * 146*     Recent Labs     02/12/19  0729 02/11/19  0618 02/10/19  0601   * 139 139   K 3.5 3.2* 3.5   CL 99 106 110*   CO2 27 26 21   BUN 14 12 15   CREA 0.98 0.79 0.76   GLU 80 81 155*   CA 8.8 8.5 8.4*     No results for input(s): SGOT, GPT, ALT, AP, TBIL, TBILI, TP, ALB, GLOB, GGT, AML, LPSE in the last 72 hours.     No lab exists for component: AMYP, HLPSE  No results for input(s): INR, PTP, APTT in the last 72 hours. No lab exists for component: INREXT, INREXT   No results for input(s): FE, TIBC, PSAT, FERR in the last 72 hours. No results found for: FOL, RBCF   No results for input(s): PH, PCO2, PO2 in the last 72 hours. No results for input(s): CPK, CKNDX, TROIQ in the last 72 hours.     No lab exists for component: CPKMB  Lab Results   Component Value Date/Time    Cholesterol, total 164 06/04/2013 01:39 PM    HDL Cholesterol 32 (L) 06/04/2013 01:39 PM    LDL, calculated 87 06/04/2013 01:39 PM    Triglyceride 227 (H) 06/04/2013 01:39 PM     Lab Results   Component Value Date/Time    Glucose (POC) 153 (H) 02/10/2019 04:17 PM    Glucose (POC) 191 (H) 02/10/2019 11:41 AM    Glucose (POC) 152 (H) 02/10/2019 06:05 AM    Glucose (POC) 156 (H) 02/09/2019 09:18 PM    Glucose (POC) 170 (H) 02/09/2019 04:56 PM     Lab Results   Component Value Date/Time    Color ELSA 02/07/2019 01:21 PM    Appearance CLOUDY (A) 02/07/2019 01:21 PM    Specific gravity >1.030 (H) 02/07/2019 01:21 PM    Specific gravity 1.021 07/23/2018 11:30 AM    pH (UA) 5.5 02/07/2019 01:21 PM    Protein 100 (A) 02/07/2019 01:21 PM    Glucose NEGATIVE  02/07/2019 01:21 PM    Ketone TRACE (A) 02/07/2019 01:21 PM    Bilirubin NEGATIVE  07/23/2018 11:30 AM    Urobilinogen 0.2 02/07/2019 01:21 PM    Nitrites NEGATIVE  02/07/2019 01:21 PM    Leukocyte Esterase SMALL (A) 02/07/2019 01:21 PM    Epithelial cells FEW 02/07/2019 01:21 PM    Bacteria 1+ (A) 02/07/2019 01:21 PM    WBC 20-50 02/07/2019 01:21 PM    RBC 10-20 02/07/2019 01:21 PM         Medications Reviewed:     Current Facility-Administered Medications   Medication Dose Route Frequency    alteplase (CATHFLO) 1 mg in sterile water (preservative free) 1 mL injection  1 mg InterCATHeter PRN    iron sucrose (VENOFER) 200 mg in 0.9% sodium chloride 100 mL IVPB  200 mg IntraVENous Q24H    trimethoprim-sulfamethoxazole (BACTRIM DS, SEPTRA DS) 160-800 mg per tablet 1 Tab  1 Tab Oral DAILY    azithromycin (ZITHROMAX) tablet 1,000 mg  1,000 mg Oral Q7D    0.9% sodium chloride infusion  125 mL/hr IntraVENous CONTINUOUS    aluminum-magnesium hydroxide (MAALOX) oral suspension 15 mL  15 mL Oral QID PRN    LORazepam (ATIVAN) injection 1 mg  1 mg IntraVENous Q6H PRN    ceFAZolin (ANCEF) 2 g/20 mL in sterile water IV syringe  2 g IntraVENous Q8H    diphenhydrAMINE (BENADRYL) capsule 25 mg  25 mg Oral Q6H PRN    0.9% sodium chloride infusion  75 mL/hr IntraVENous CONTINUOUS    oxyCODONE IR (ROXICODONE) tablet 5-10 mg  5-10 mg Oral Q4H PRN    cyclobenzaprine (FLEXERIL) tablet 5 mg  5 mg Oral TID PRN    nystatin (MYCOSTATIN) 100,000 unit/mL oral suspension 500,000 Units  500,000 Units Oral QID    predniSONE (DELTASONE) tablet 10 mg  10 mg Oral DAILY WITH BREAKFAST    cetirizine (ZYRTEC) tablet 10 mg  10 mg Oral DAILY PRN    pramipexole (MIRAPEX) tablet 0.5 mg  0.5 mg Oral QHS    sodium chloride (NS) flush 5-40 mL  5-40 mL IntraVENous Q8H    sodium chloride (NS) flush 5-40 mL  5-40 mL IntraVENous PRN    acetaminophen (TYLENOL) tablet 650 mg  650 mg Oral Q6H    ondansetron (ZOFRAN ODT) tablet 4 mg  4 mg Oral Q6H PRN    hydrOXYzine HCl (ATARAX) tablet 10 mg  10 mg Oral Q8H PRN    famotidine (PEPCID) tablet 20 mg  20 mg Oral BID    senna-docusate (PERICOLACE) 8.6-50 mg per tablet 1 Tab  1 Tab Oral BID    polyethylene glycol (MIRALAX) packet 17 g  17 g Oral DAILY    bisacodyl (DULCOLAX) suppository 10 mg  10 mg Rectal DAILY PRN    oxyCODONE IR (ROXICODONE) tablet 10 mg  10 mg Oral Q3H PRN    fentaNYL citrate (PF) injection 25 mcg  25 mcg IntraVENous Q4H PRN    rivaroxaban (XARELTO) tablet 10 mg  10 mg Oral DAILY WITH LUNCH    naloxone (NARCAN) injection 0.4 mg  0.4 mg IntraVENous PRN    ondansetron (ZOFRAN) injection 4 mg  4 mg IntraVENous Q8H PRN    sodium chloride (NS) flush 5-40 mL  5-40 mL IntraVENous Q8H    sodium chloride (NS) flush 5-40 mL  5-40 mL IntraVENous PRN     ______________________________________________________________________  EXPECTED LENGTH OF STAY: 2d 12h  ACTUAL LENGTH OF STAY:          7                 Julissa Solis MD   Patient has given Verbal permission to discuss medical care with   persons present in the room and and also with contact as listed on face sheet.

## 2019-02-12 NOTE — PROGRESS NOTES
Bedside and Verbal shift change report given to Jonathan Becerra RN (oncoming nurse) by Sundar Ambriz RN (offgoing nurse). Report included the following information SBAR.

## 2019-02-12 NOTE — PROGRESS NOTES
ID Progress Note  2019    Subjective:     Feeling better today    Objective:     Antibiotics:  1. Vancomycin   2. Cefepime       Vitals:   Visit Vitals  /66   Pulse 84   Temp 98.2 °F (36.8 °C)   Resp 16   Ht 5' 11\" (1.803 m)   Wt 91 kg (200 lb 9.9 oz) Comment: rechecked, previous wt incorrect   SpO2 97%   BMI 27.98 kg/m²        Tmax:  Temp (24hrs), Av.1 °F (36.7 °C), Min:97.5 °F (36.4 °C), Max:98.4 °F (36.9 °C)      Exam:  Lungs:  clear to auscultation bilaterally  Heart:  regular rate and rhythm  Abdomen:  soft, non-tender. Bowel sounds normal. No masses,  no organomegaly  Wound is dressed    Labs:      Recent Labs     19  0729 19  0618 02/10/19  0601   WBC 3.7* 5.7 5.4   HGB 12.7 11.4* 10.6*   * 146* 126*   BUN 14 12 15   CREA 0.98 0.79 0.76       Cultures:     No results found for: SDES  Lab Results   Component Value Date/Time    Culture result: NO GROWTH 4 DAYS 2019 08:01 PM    Culture result: NO GROWTH 1 DAY 2019 01:21 PM    Culture result: NO ANAEROBES ISOLATED 2019 05:48 PM    Culture result: LIGHT STAPHYLOCOCCUS AUREUS (A) 2019 05:48 PM    Culture result: NO FUNGUS ISOLATED 5 DAYS 2019 05:48 PM       Radiology:     Line/Insert Date:           Assessment:     1. Septic knee--MSSA from culture  2. HIV--advanced with CD4 of 43 (11%)--will need HAART, but would like to get him over the acute knee infection first  3. Gallbladder disease--would like this addressed if at all possible, while he is here    Objective:     1. Change to cefazolin  2. Will need PICC, etc  3. ?surgical opinion for gallbladder  4. Extensive discussion regarding HIV--Rx given for Biktarvy--he will need Rx for both azithromycin and Bactrim upon discharge  5.  HH orders on chart    Solange Mahajan MD

## 2019-02-12 NOTE — ADT AUTH CERT NOTES
Utilization Reviews        LOC:Acute Adult-Extended Stay (2/11/2019) by Antoni Bowen        Review Status Review Entered   In Primary 2/12/2019 13:11      Criteria Review   REVIEW SUMMARY     Patient: ITZEL DUNCAN  Review Number: [id number]  Review Status:  In Primary     Condition Specific: Yes     Condition Level Of Care Code: ACUTE  Condition Level Of Care Description: Acute        OUTCOMES  Outcome Type: Primary           REVIEW DETAILS     Service Date: 02/11/2019  Admit Date: 02/05/2019  Product: [employer] Adult  Subset: Extended Stay      (Symptom or finding within 24h)         (Excludes PO medications unless noted)          Select Level of Care, One:              [ ] ACUTE, >= One:                  [ ] Infection, One:                      [ ] Partial responder, not clinically stable for discharge and requires continued stay, >= One:                      ~--Admin, IQ Admin Admin on 02- 01:11 PM--~                      BP  131/79                       Pulse          71                      Temp          97.8                       Resp           16                      SpO2          97%                                            Abnormal labs                       RBC: 3.59 (L)                      HGB: 11.4 (L)                       Potassium: 3.2 (L)                      HCT: 33.7 (L)                                                                  Meds-                      Bactrim DS, Septra DS Pox1                      Xarelto 10mg PO x1                      Prednisone 10mg PO x1                      Mirapex 0.5mg Pox1                      Nystatin 187112 units Pox2                      Pepcid 20mg PO x2                      Ancef 2gIVx3                      [email or web address]                      Iron sucrose 200mg IVx1                                                                  Interval history / Subjective:                      Pt seen in followup of right knee arthritis                      Denies fever                      Reports rash and flushing of face and upper trunk is resolved                      So far no reactions to ancef                      Anxious about being in hospital                       Supportive counseling given and explained about plan of care                                             Assessment & Plan:                                             1.   Left Knee Septic arthritis - MSSA - on ancef. Off vanc - supected redmans syndrome. ID consulted D/W Dr June Schwartz - Would need PICC line, ordered it. ID to place op Abx. 2.   Recent Diagnosis of HIV- not on HAART. cd4 count - <50 - 2/8 on bactrim and zithromax for ppx. D/w ID. Sondra Quiles Obtain Records from [facility]. Needs to stay on same dose of zithromax and bactrim on D/c. Side effect profile explained                                              3.   Elevated LFT with gallstones  - GI consulted, monitor LFT. No pain at present. HIDA scan neg. Surgery consult - D/W primary. - no intervetion per surgery                                             4.   Rash on body 2/8 - ?due to vancomycin - off vac. S/P steroids, benadryl and pepcid. -resolved.                                               5.   S/p I and D of the Right knee - Monitor For Post Op blood losses                                             6.   Pancytopenia - could be related to HIV/AIDS status                                             7.   Iron Def Anemia - ordered IV iron,could benefit from po iron on d/c                                              Code status: Full                           DVT prophylaxis: SCD                                             Risk of deterioration: high                                                        Total time spent with patient: [address] discussed with: Patient/Family, Nurse and                       Disposition: Per primary Can d/c from medical standpoint                      Ordered home health                                            General Surgical Note                      Subjective                                              No acute issues, no RUQ pain right now                      PE                      Pulm - CTAB                      CV - RRR                      Abd - soft, ND, BS present, NTTP                      Assessment                                              Jayde Alegre is a 63 y. o.yr old male with biliary colic, L knee septic arthritis, HIV                                             Plan                                              No plans for cholecystectomy on this admission, no cholecystitis                      It would be better to have the CD4 counts higher before having cholecystectomy                      Follow up as OP                                             Ortho Note                      5 Days Post-Op                        Procedure(s): RIGHT KNEE INCISION AND DRAINAGE WITH POLYETHYLENE EXCHANGE                      Plan:                                             No plan for cholecystectomy                      PICC in place                      ABX per ID                      DVT prophylaxisXarelto                      Weight bearing restrictionWBAT                      Pain Control:no current joint or muscle symptoms, essentially pain-free                      F/U with Dr. Ebb Goltz in 2 weeks                      DRESSING: dressing changed                      SWELLING: mild                      NEUROLOGICAL: intact                      PULSE:yes                       GENERAL:      alert, cooperative, no distress, appears stated age                      WOUND:         No Drainage and Wound Intact                      DVT Exam:      No evidence of DVT seen on physical exam.                                            ID Note                      Subjective: Feeling better today                                             Objective:                                             Antibiotics:                      1.   Vancomycin                       2.   Cefepime                                                                      Assessment:                                             1.   Septic knee--MSSA from culture                      2.   HIV--advanced with CD4 of 43 (11%)--will need HAART, but would like to get him over the acute knee infection first                      3.   Gallbladder disease--would like this addressed if at all possible, while he is here                                             Objective:                                             1.   Change to cefazolin                      2.   Will need PICC, etc                      3.   ?surgical opinion for gallbladder                      4.   Extensive discussion regarding HIV--Rx given for Biktarvy--he will need Rx for both azithromycin and Bactrim upon discharge                      5.   HH orders on chart                                                                                            [ ] Infection, actual or suspected, Both:                              [X] Intervention, >= One:                                  [X] Anti-infective <= 5d since initiation                                  ~--Admin, IQ Admin Admin on 02- 01:11 PM--~                                  Ancef 2gIVx3                                                                                                           Version: InterQual® 2018.1  [employer]  © 2018 [employer] and/or one of its [place]. All Rights Reserved. CPT only © 2017 American Medical Association. All Rights Reserved.       [address] Young Adult-Extended Stay (2/10/2019) by Deepika Madsen        Review Status Review Entered   In Primary 2/12/2019 13:01      Criteria Review   REVIEW SUMMARY     Patient: Angy Ellis  Review Number: [id number]  Review Status:  In Primary     Condition Specific: Yes     Condition Level Of Care Code: ACUTE  Condition Level Of Care Description: Acute        OUTCOMES  Outcome Type: Primary           REVIEW DETAILS     Service Date: 02/10/2019  Admit Date: 02/05/2019  Product: [employer] Adult  Subset: Extended Stay      (Symptom or finding within 24h)         (Excludes PO medications unless noted)          Select Level of Care, One:              [ ] ACUTE, >= One:                  [ ] Infection, One:                      [ ] Partial responder, not clinically stable for discharge and requires continued stay, >= One:                      ~--Admin, IQ Admin Admin on 02- 01:01 PM--~                      BP  131/79                       Pulse          71                      Temp          97.8                       Resp           16                      SpO2          97%                                            Abnormal labs                       RBC: 3.44 (L)                      HGB: 10.6 (L)                      HCT: 33.0 (L)                       RDW: 18.5 (H)                      PLATELET: 478 (L)                      MPV: 11.4                      NEUTROPHILS: 91 (H)                      BAND NEUTROPHILS: 1                      LYMPHOCYTES: 5 (L)                                                                                        Meds-                      Bacitracin 500 unit/gram x1                      Bactrim DS, Septra DS Pox1                      Xarelto 10mg PO x1                      Prednisone 10mg PO x1                      Mirapex 0.5mg Pox1                      Nystatin 706214 units Pox4                      Pepcid 20mg PO x2                      Ancef 2gIVx3                      Tylenol 650mg PO x1                      [email or web address]                      Iron sucrose 200mg IVx1                      Ativan 1mg IVx1 Interval history / Subjective:                      Pt seen in followup of right knee arthritis                      Denies fever                      Reports rash and flushing of face and upper trunk is resolved                      So far no reactions to ancef                      Anxious about being in hospital                                             Assessment & Plan:                                             1.   Left Knee Septic arthritis - MSSA - on ancef. Off vanc - supected redmans syndrome. ID consulted D/W Dr Sandy Veliz - Would need PICC line, ordered it. D/W pt about pros and cons of picc and he agrees. 2.   Recent Diagnosis of HIV- not on HAART. cd4 count - <50 - 2/8 on bactrim and zithromax for ppx. D/w ID. Patel Beck Obtain Records from [facility]. 3.   Elevated LFT with gallstones  - GI consulted, monitor LFT. No pain at present. HIDA scan neg. Surgery consult - D/W primary. 4.   Rash on body 2/8 - ?due to vancomycin - off vac. S/P steroids, benadryl and pepcid. -resolved.                                               5.   S/p I and D of the Right knee - Monitor For Post Op blood losses                                             6.   Pancytopenia - could be related to HIV/AIDS status                                             7.   Iron Def Anemia - ordered IV iron                                              Code status: Full                           DVT prophylaxis: SCD                                             Risk of deterioration: high                                                        Total time spent with patient: [address] discussed with: Patient/Family and Nurse                      Disposition: Per primary                                            Constitutional:           No acute distress, cooperative, pleasant ENT:            Oral mucous moist, oropharynx benign. Neck supple,                       Resp:           CTA bilaterally. No wheezing/rhonchi/rales. No accessory muscle use                      CV:              Regular rhythm, normal rate, no murmurs, gallops, rubs                       GI:  Soft, non distended, non tender. normoactive bowel sounds, no hepatosplenomegaly                        Musculoskeletal:      No edema, warm, 2+ pulses throughout,RT knee Drain in place with Bloody discharge in container                       Neurologic:              Moves all extremities. AAOx3, CN II-XII reviewed                                            Skin:  Good turgor, no rashes or ulcers                                             Ortho Note                      Admit Diagnosis: Infection of total right knee replacement (HCC) [T84.53XA]                      Infection of total knee replacement ([address] [T84.59XA, Z96.659]                                             Post Op day: 4 Days Post-Op                                             Subjective:                                             Char Deborah states that his knee has no pain at all. Drain pulled out at some point overnight and is fully discharged now. No new Ortho complaints. ID following and has decreased concerns about Crow's syndrome. Remains on Ancef and Bactrim. Patient and wife have some frustrations regarding plan for gall bladder. Would like Surgery to weigh in.                       Tolerating diet                      Denies N/V/SOB or CP                      PT/OT:                                              PATIENT MOBILITY                                             Bed Mobility Training                      Supine to Sit: Stand-by assistance(HOB elevated)                      Sit to Supine: Stand-by assistance                      Scooting: Stand-by assistance                      Transfer Training                      Sit to Stand: Stand-by assistance                      Stand to Sit: Stand-by assistance                                              Gait Training                      Assistive Device: Gait belt, Walker, rolling                      Ambulation - Level of Assistance: Stand-by assistance                      Distance (ft): 50 Feet (ft)                      Stairs - Level of Assistance: Contact guard assistance                      Number of Stairs Trained: 4                      Rail Use: Left (SPC)                       Weight Bearing Status                      Right Side Weight Bearing: As tolerated                       Assessment:                                             Active Problems:                        Infection of total right knee replacement (HCC) (2/5/2019)                                               Infection of total knee replacement ([address] (2/5/2019)                                                                                           Plan:                                             Pull drains and apply dry dressing with ACE                      Cont current pain management                      Ice packs to knee PRN                      Xarelto for DVT prophylaxis                      Abx per ID team                      Would like to see if Gen Surg can weigh in on gallbladder plan while admitted                      Appreciate Medicine assistance                      Disposition planning                                            General Surgical Note                      Physical Exam:                        Gen:  NAD                      Pulm:  Unlabored                      Abd:  S/ND/mild TTP in RUQ                                                                   Recent Results                      Recent Results (from the past 24 hour(s))                      CBC WITH AUTOMATED DIFF                            Collection Time: 02/10/19  6:01 AM                      Result Value    Ref Range                            WBC     5.4        4.1 - 11.1 K/uL                            RBC      3.44 (L) 4.10 - 5.70 M/uL                            HGB      10.6 (L) 12.1 - 17.0 g/dL                            HCT      33.0 (L) 36.6 - 50.3 %                            MCV     95.9      80.0 - 99.0 FL                            MCH     30.8      26.0 - 34.0 PG                            MCHC   32.1      30.0 - 36.5 g/dL                            RDW     18.5 (H)             11.5 - 14.5 %                            PLATELET        314 (L)  150 - 400 K/uL                            MPV      11.4      8.9 - 12.9 FL                            NRBC   0.0        0  WBC                            ABSOLUTE NRBC         0.00      0.00 - 0.01 K/uL                            NEUTROPHILS 91 (H)   32 - 75 %                            BAND NEUTROPHILS   1           0 - 6 %                            LYMPHOCYTES            5 (L)      12 - 49 %                            MONOCYTES   2 (L)      5 - 13 %                            EOSINOPHILS  0           0 - 7 %                            BASOPHILS      0           0 - 1 %                            MYELOCYTES  1 (H)     0 %                            IMMATURE GRANULOCYTES  0           %                            ABS. NEUTROPHILS     5.0        1.8 - 8.0 K/UL                            ABS. LYMPHOCYTES   0.3 (L)   0.8 - 3.5 K/UL                            ABS. MONOCYTES       0.1        0.0 - 1.0 K/UL                            ABS. EOSINOPHILS      0.0        0.0 - 0.4 K/UL                            ABS. BASOPHILS          0.0        0.0 - 0.1 K/UL                            ABS. IMM.  GRANS.       0.0        K/UL                            DF         MANUAL                                        PLATELET COMMENTS            Large Platelets                               RBC COMMENTS          ANISOCYTOSIS                      1+ RBC COMMENTS          OVALOCYTES                      PRESENT                                                         RBC COMMENTS          TARGET CELLS                      PRESENT                                                   METABOLIC PANEL, BASIC                            Collection Time: 02/10/19  6:01 AM                      Result         Value    Ref Range                            Sodium 139       136 - 145 mmol/L                            Potassium          3.5        3.5 - 5.1 mmol/L                            Chloride             110 (H) 97 - 108 mmol/L                            CO2      21         21 - 32 mmol/L                            Anion gap          8           5 - 15 mmol/L                            Glucose             155 (H) 65 - 100 mg/dL                            BUN      15         6 - 20 MG/DL                            Creatinine          0.76      0.70 - 1.30 MG/DL                            BUN/Creatinine ratio      20         12 - 20                              GFR est AA       >60       >60 ml/min/1.73m2                            GFR est non-AA             >60       >60 ml/min/1.73m2                            Calcium             8.4 (L)   8.5 - 10.1 MG/DL                      GLUCOSE, POC                            Collection Time: 02/10/19  6:05 AM                      Result         Value    Ref Range                            Glucose (POC)  152 (H) 65 - 100 mg/dL                            Performed by     Hermann Severance M (YORDAN)                             GLUCOSE, POC                            Collection Time: 02/10/19 11:41 AM                      Result         Value    Ref Range                            Glucose (POC)  191 (H) 65 - 100 mg/dL                            Performed by     Monika Brar, POC                            Collection Time: 02/10/19  4:17 PM Result         Value    Ref Range                            Glucose (POC)  153 (H) 65 - 100 mg/dL                            Performed by     Ian Hoff                                                                                                       AP:  64 yo man consulted for cholelithiasis                                             - Cholelithiasis:  No acute indication for operation. Given low CD4 count and an otherwise normal HIDA, would not recommend cholecystectomy at this time.                        - Recommend low fat diet                      - If abdominal pain symptoms persist then can offer non-emergent lap cholecystectomy as outpatient                                                                                            [ ] Infection, actual or suspected, Both:                              [X] Intervention, >= One:                                  [X] Anti-infective <= 5d since initiation                                  ~--Admin, IQ Admin Admin on 02- 01:01 PM--~                                  Ancef 2gIVx3                                                                                                           Version: InterQual® 2018.1  Marlo Hansen  © 2018 [employer] and/or one of its [place]. All Rights Reserved. CPT only © 2017 American Medical Association. All Rights Reserved. [facility] Adult-Extended Stay (2/9/2019) by Jatinder Human        Review Status Review Entered   In Primary 2/12/2019 12:53      Criteria Review   REVIEW SUMMARY     Patient: Meg Haddad  Review Number: [id number]  Review Status:  In Primary     Condition Specific: Yes     Condition Level Of Care Code: ACUTE  Condition Level Of Care Description: Acute        OUTCOMES  Outcome Type: Primary           REVIEW DETAILS     Service Date: 02/09/2019  Admit Date: 02/05/2019  Product: Terrance Peek Adult  Subset: Extended Stay      (Symptom or finding within 24h)         (Excludes PO medications unless noted)          Select Level of Care, One:              [ ] ACUTE, >= One:                  [ ] Infection, One:                      [ ] Partial responder, not clinically stable for discharge and requires continued stay, >= One:                          [ ] Infection, actual or suspected, Both:                              [ ] Finding, >= One:                              ~--Admin, IQ Admin Admin on 02- 12:53 PM--~                              BP       125/89                               Pulse  66                              Temp  98.0                              Resp   18                              SpO2  100%                                                                                          Abnormal labs-                              Chloride: 110 (H)                               BUN/Creatinine ratio: 10 (L)                              Calcium: 8.2 (L)                                                            Meds-                              Solumedrol 60mg IVx4                              Bactrim DS, Septra DS Pox1                              Pericolace 1 tab                              Xarelto 10mg PO x1                              Prednisone 10mg PO x1                              Mirapex 0.5mg Pox1                              Nystatin 108135 units Pox4                              Pepcid 20mg PO x2                              Ancef 2gIVx3                              Tylenol 650mg PO x4                              [email or web address]                                                            Interval history / Subjective:                              Pt seen in followup of right knee arthritis                              Denies fever                              Reports rash and flushing of face and upper trunk is resolved                              So far no reactions to ancef                              Denies fevers or chills Assessment & Plan:                                                             1.        Left Knee Septic arthritis - MSSA - on ancef. Off vanc - supected redmans syndrome. 2.        ID consulted D/W Dr Dayne Goldberg - Would need PICC line, ordered it. D/W pt about pros and cons of picc and he agrees. Recent Diagnosis of HIV- not on HAART. cd4 count - <50 - 2/8 on bactrim and zithromax for ppx. D/w ID. Naty Mcdaniel Obtain Records from [facility]. 3.        Elevated LFT with gallstones  - GI consulted, monitor LFT. No pain at present. HIDA scan neg. Consider surgery consult if ok with primary. 4.        Rash on body - ?due to vancomycin - off vac. On steroids, benadryl and pepcid. -resolved. Monitor on IMCU.                              5.        S/p I and D of the Right knee - Monitor For Post Op blood losses                              6.        Pancytopenia - could be related to HIV/AIDS status, Check b12 and iron panel                              Code status: Full                                   DVT prophylaxis: SCD                                                             Risk of deterioration: high                                                                        Total time spent with patient: [address] [place] [address] discussed with: Patient/Family and Nurse                              Disposition: Per primary                                                            Ortho Note                              Subjective:                                Neisha Slade states that he is feeling well overall. 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 Erythema improved since having steroids and benadryl while stopping the Vanc and changing to Ancef. Remains on bactrim due to low CD4 count. No knee pain complaints. Drain output 170 yesterday. Tolerating diet                              Denies N/V/SOB or CP                                                             PT/OT:                               Gait:  Gait                              Base of Support: Widened                              Stance: Right decreased                              Gait Abnormalities: Antalgic, Step to gait                              Ambulation - Level of Assistance: Stand-by assistance                              Distance (ft): 50 Feet (ft)                              Assistive Device: Gait belt, Walker, rolling                              Rail Use: Left (SPC)                              Stairs - Level of Assistance: Contact guard assistance                              Number of Stairs Trained: 4                              Plan:                                                             Cont PT/OT as needed - WBAT                              Abx per ID and Medicine team                              Leave drain in place for now                              Appreciate other services input                              Question if need for Gen Surg input for gallbladder                              Looking at discharge Mon if all stays the same                              3 Days Post-Op                                                             Procedure:  Procedure(s):                              RIGHT KNEE INCISION AND DRAINAGE WITH POLYETHYLENE EXCHANGE                                                             Subjective:                                                             Patient doing well. Rash resolved after moving to intermediate care unit and stopping vancomycin. Patient states he feels better in terms of pain and motion. Denies CP, SOB, fevers, chills, calf pain, N/V/D, rash.                                                             Plan/Recommendations/Medical Decision Making: Will continue per plan and will continue to monitor. PT to work with patient on ROM and ambulation today. ID Note                              He is resting comfortably. Denies HA, SOB                              Exam:  General appearance: alert, no distress                              Lungs: clear to auscultation bilaterally                              Heart: regular rate and rhythm                              Abdomen: soft, non-tender.  Bowel sounds normal. No masses,  no organomegaly                              Right knee: dressed                              Assessment:                                                             1.        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  HIV -- advanced with CD4 of 43 (11%)                                                             3.  Gallbladder disease--would like this addressed if at all possible, while he is here                                                             4.  Recent erythroderma -- he thinks this has been going on intermittently for weeks or months and predates Vanc and Bactrim                                                                                            Plan:                                                             1.  Continue Ancef                                                             2.  Continue Bactrim                                                                                                                        [X] Intervention, >= One:                                  [X] Anti-infective <= 5d since initiation                                  ~--Admin, IQ Admin Admin on 02- 12:53 PM--~                                  Ancef [id number]    Version: InterQual® 2018.1  Coleman Wells  © 2018 [employer] and/or one of its subsidiaries. All Rights Reserved. CPT only © 2017 American Medical Association. All Rights Reserved.

## 2019-02-12 NOTE — PROGRESS NOTES
CRM called and spoke with the patient's workers comp [employer] at [phone]. She stated that she is waiting for Dr. Susan Dove office to review and sign a letter stating that the patient's infection is related to his past knee surgery. The letter was faxed over to Dr. Susan Dove office. Parisa Beckford informed VELVET that the Nurse  for this case will be [facility] [phone]. Parisa Beckford requested for VELVET to email the HH/infusion orders to [email or web address]. VELVET sent the email with the orders. The patient was updated. CRM will follow. KJT    3:07pm Suaypa gillis RN confirmed that she received the [place] and infusion referrals and she has forwarded them on to the agencies that will set up the home care. CRM will follow. SHANKART

## 2019-02-12 NOTE — PROGRESS NOTES
Ortho Progress Note  6 Days Post-Op  Procedure(s):  RIGHT KNEE INCISION AND DRAINAGE WITH POLYETHYLENE EXCHANGE    Subjective: Pt without complaints. Ready to d/c. Currently waiting on WC to determine placement (home vs SNF for IV abx). Physical Exam:   Visit Vitals  /76   Pulse 63   Temp 97.5 °F (36.4 °C)   Resp 16   Ht 5' 11\" (1.803 m)   Wt 91 kg (200 lb 9.9 oz) Comment: rechecked, previous wt incorrect   SpO2 97%   BMI 27.98 kg/m²     DRESSING: dressing intact  SWELLING: minimal  NEUROLOGICAL: intact  PULSE: good pedal pulse   GENERAL:      alert, cooperative, no distress, appears stated age  WOUND:         No Drainage and Wound Intact  DVT Exam:      No evidence of DVT seen on physical exam.       Recent Results (from the past 24 hour(s))   METABOLIC PANEL, BASIC    Collection Time: 02/12/19  7:29 AM   Result Value Ref Range    Sodium 135 (L) 136 - 145 mmol/L    Potassium 3.5 3.5 - 5.1 mmol/L    Chloride 99 97 - 108 mmol/L    CO2 27 21 - 32 mmol/L    Anion gap 9 5 - 15 mmol/L    Glucose 80 65 - 100 mg/dL    BUN 14 6 - 20 MG/DL    Creatinine 0.98 0.70 - 1.30 MG/DL    BUN/Creatinine ratio 14 12 - 20      GFR est AA >60 >60 ml/min/1.73m2    GFR est non-AA >60 >60 ml/min/1.73m2    Calcium 8.8 8.5 - 10.1 MG/DL   CBC WITH AUTOMATED DIFF    Collection Time: 02/12/19  7:29 AM   Result Value Ref Range    WBC 3.7 (L) 4.1 - 11.1 K/uL    RBC 4.05 (L) 4.10 - 5.70 M/uL    HGB 12.7 12.1 - 17.0 g/dL    HCT 37.1 36.6 - 50.3 %    MCV 91.6 80.0 - 99.0 FL    MCH 31.4 26.0 - 34.0 PG    MCHC 34.2 30.0 - 36.5 g/dL    RDW 17.2 (H) 11.5 - 14.5 %    PLATELET 713 (L) 515 - 400 K/uL    MPV 11.0 8.9 - 12.9 FL    NRBC 0.0 0  WBC    ABSOLUTE NRBC 0.00 0.00 - 0.01 K/uL    NEUTROPHILS 70 32 - 75 %    BAND NEUTROPHILS 1 %    LYMPHOCYTES 8 (L) 12 - 49 %    MONOCYTES 8 5 - 13 %    EOSINOPHILS 4 0 - 7 %    BASOPHILS 1 0 - 1 %    METAMYELOCYTES 1 %    MYELOCYTES 1 %    IMMATURE GRANULOCYTES 6 (H) 0.0 - 0.5 %    ABS.  NEUTROPHILS 2.6 1.8 - 8.0 K/UL    ABS. LYMPHOCYTES 0.3 (L) 0.8 - 3.5 K/UL    ABS. MONOCYTES 0.3 0.0 - 1.0 K/UL    ABS. EOSINOPHILS 0.1 0.0 - 0.4 K/UL    ABS. BASOPHILS 0.0 0.0 - 0.1 K/UL    ABS. IMM.  GRANS. 0.2 (H) 0.00 - 0.04 K/UL    DF MANUAL      PLATELET COMMENTS Large Platelets      RBC COMMENTS ANISOCYTOSIS  1+        RBC COMMENTS OVALOCYTES  PRESENT           Assessment:  Stable Post Op    Plan:  No plan for cholecystectomy  PICC in place  ABX per ID  DVT prophylaxisXarelto  Weight bearing restriction: WBAT  Pain Control:no current joint or muscle symptoms, pt is pain-free  Recent HIV diagnosis, no indication of progression to AIDS, is in process of having work-up  F/U with Dr. Joseline Cesar in 2 weeks

## 2019-02-13 LAB
BACTERIA SPEC CULT: NORMAL
SERVICE CMNT-IMP: NORMAL

## 2019-02-13 PROCEDURE — 65270000029 HC RM PRIVATE

## 2019-02-13 PROCEDURE — 74011250637 HC RX REV CODE- 250/637: Performed by: PHYSICIAN ASSISTANT

## 2019-02-13 PROCEDURE — 74011250637 HC RX REV CODE- 250/637: Performed by: HOSPITALIST

## 2019-02-13 PROCEDURE — 74011636637 HC RX REV CODE- 636/637: Performed by: HOSPITALIST

## 2019-02-13 PROCEDURE — 74011250636 HC RX REV CODE- 250/636: Performed by: HOSPITALIST

## 2019-02-13 RX ORDER — SULFAMETHOXAZOLE AND TRIMETHOPRIM 800; 160 MG/1; MG/1
1 TABLET ORAL DAILY
Qty: 30 TAB | Refills: 1 | Status: SHIPPED | OUTPATIENT
Start: 2019-02-13 | End: 2019-02-14

## 2019-02-13 RX ORDER — AZITHROMYCIN 500 MG/1
1000 TABLET, FILM COATED ORAL
Qty: 30 TAB | Refills: 0 | Status: SHIPPED
Start: 2019-02-13 | End: 2019-02-14

## 2019-02-13 RX ORDER — AZITHROMYCIN 500 MG/1
1000 TABLET, FILM COATED ORAL
Qty: 30 TAB | Refills: 1 | Status: SHIPPED | OUTPATIENT
Start: 2019-02-13 | End: 2019-02-13

## 2019-02-13 RX ORDER — IRON POLYSACCHARIDE COMPLEX 150 MG
150 CAPSULE ORAL 2 TIMES DAILY
Qty: 60 CAP | Refills: 1 | Status: SHIPPED | OUTPATIENT
Start: 2019-02-13 | End: 2020-01-15

## 2019-02-13 RX ADMIN — Medication 10 ML: at 07:02

## 2019-02-13 RX ADMIN — SULFAMETHOXAZOLE AND TRIMETHOPRIM 1 TABLET: 800; 160 TABLET ORAL at 09:20

## 2019-02-13 RX ADMIN — Medication 10 ML: at 21:51

## 2019-02-13 RX ADMIN — FAMOTIDINE 20 MG: 20 TABLET ORAL at 09:16

## 2019-02-13 RX ADMIN — Medication 2 G: at 16:36

## 2019-02-13 RX ADMIN — FAMOTIDINE 20 MG: 20 TABLET ORAL at 19:19

## 2019-02-13 RX ADMIN — PREDNISONE 10 MG: 10 TABLET ORAL at 09:16

## 2019-02-13 RX ADMIN — RIVAROXABAN 10 MG: 10 TABLET, FILM COATED ORAL at 12:08

## 2019-02-13 RX ADMIN — PRAMIPEXOLE DIHYDROCHLORIDE 0.5 MG: 1 TABLET ORAL at 21:50

## 2019-02-13 RX ADMIN — Medication 10 ML: at 19:20

## 2019-02-13 RX ADMIN — Medication 2 G: at 07:02

## 2019-02-13 NOTE — PROGRESS NOTES
Orthopedic Joint Progress Note    2019  Admit Date: 2019  Admit Diagnosis: Infection of total right knee replacement ([facility] [address] [T84.53XA]  Infection of total knee replacement ([facility] [address] [T84.59XA, Z96.659]    Post Op day: 7 Days Post-Op    Subjective:     Chayo Barroso states that his knee feels fine with no pain. Is somewhat frustrated about not being discharged yet. Says he wants to go home. Awaiting Workers Comp to make discharge agency arrangements. Tolerating diet  Denies N/VSOB or CP      Objective:     PT/OT:     PATIENT MOBILITY    Bed Mobility Training  Supine to Sit: Stand-by assistance(HOB elevated)  Sit to Supine: Stand-by assistance  Scooting: Stand-by assistance  Transfer Training  Sit to Stand: Stand-by assistance  Stand to Sit: Stand-by assistance      Gait Training  Assistive Device: Gait belt, Walker, rolling  Ambulation - Level of Assistance: Stand-by assistance  Distance (ft): 50 Feet (ft)  Stairs - Level of Assistance: Contact guard assistance  Number of Stairs Trained: 4  Rail Use: Left (SPC)   Weight Bearing Status  Right Side Weight Bearing: As tolerated        Vital Signs:    Blood pressure 116/74, pulse 84, temperature 98.3 °F (36.8 °C), resp. rate 16, height 5' 11\" (1.803 m), weight 91 kg (200 lb 9.9 oz), SpO2 97 %.   Temp (24hrs), Av.3 °F (36.8 °C), Min:97.9 °F (36.6 °C), Max:98.8 °F (37.1 °C)      Pain Control:   Pain Assessment  Pain Scale 1: Numeric (0 - 10)  Pain Intensity 1: 0  Pain Onset 1: post op  Pain Location 1: Leg  Pain Orientation 1: Right  Pain Description 1: Sore  Pain Intervention(s) 1: Medication (see MAR)    Meds:  Current Facility-Administered Medications   Medication Dose Route Frequency    alteplase (CATHFLO) 1 mg in sterile water (preservative free) 1 mL injection  1 mg InterCATHeter PRN    trimethoprim-sulfamethoxazole (BACTRIM DS, SEPTRA DS) 160-800 mg per tablet 1 Tab  1 Tab Oral DAILY    azithromycin (ZITHROMAX) tablet 1,000 mg  1,000 mg Oral Q7D  0.9% sodium chloride infusion  125 mL/hr IntraVENous CONTINUOUS    aluminum-magnesium hydroxide (MAALOX) oral suspension 15 mL  15 mL Oral QID PRN    LORazepam (ATIVAN) injection 1 mg  1 mg IntraVENous Q6H PRN    ceFAZolin (ANCEF) 2 g/20 mL in sterile water IV syringe  2 g IntraVENous Q8H    diphenhydrAMINE (BENADRYL) capsule 25 mg  25 mg Oral Q6H PRN    0.9% sodium chloride infusion  75 mL/hr IntraVENous CONTINUOUS    oxyCODONE IR (ROXICODONE) tablet 5-10 mg  5-10 mg Oral Q4H PRN    cyclobenzaprine (FLEXERIL) tablet 5 mg  5 mg Oral TID PRN    nystatin (MYCOSTATIN) 100,000 unit/mL oral suspension 500,000 Units  500,000 Units Oral QID    predniSONE (DELTASONE) tablet 10 mg  10 mg Oral DAILY WITH BREAKFAST    cetirizine (ZYRTEC) tablet 10 mg  10 mg Oral DAILY PRN    pramipexole (MIRAPEX) tablet 0.5 mg  0.5 mg Oral QHS    sodium chloride (NS) flush 5-40 mL  5-40 mL IntraVENous Q8H    sodium chloride (NS) flush 5-40 mL  5-40 mL IntraVENous PRN    acetaminophen (TYLENOL) tablet 650 mg  650 mg Oral Q6H    ondansetron (ZOFRAN ODT) tablet 4 mg  4 mg Oral Q6H PRN    hydrOXYzine HCl (ATARAX) tablet 10 mg  10 mg Oral Q8H PRN    famotidine (PEPCID) tablet 20 mg  20 mg Oral BID    senna-docusate (PERICOLACE) 8.6-50 mg per tablet 1 Tab  1 Tab Oral BID    polyethylene glycol (MIRALAX) packet 17 g  17 g Oral DAILY    bisacodyl (DULCOLAX) suppository 10 mg  10 mg Rectal DAILY PRN    oxyCODONE IR (ROXICODONE) tablet 10 mg  10 mg Oral Q3H PRN    fentaNYL citrate (PF) injection 25 mcg  25 mcg IntraVENous Q4H PRN    rivaroxaban (XARELTO) tablet 10 mg  10 mg Oral DAILY WITH LUNCH    naloxone (NARCAN) injection 0.4 mg  0.4 mg IntraVENous PRN    ondansetron (ZOFRAN) injection 4 mg  4 mg IntraVENous Q8H PRN    sodium chloride (NS) flush 5-40 mL  5-40 mL IntraVENous Q8H    sodium chloride (NS) flush 5-40 mL  5-40 mL IntraVENous PRN        LAB:    Lab Results   Component Value Date/Time    INR 1.1 02/07/2019 05:24 AM    INR 1.0 07/23/2018 11:30 AM     Lab Results   Component Value Date/Time    HGB 12.7 02/12/2019 07:29 AM    HGB 11.4 (L) 02/11/2019 06:18 AM    HGB 10.6 (L) 02/10/2019 06:01 AM         Dressing:      Wound:       Physical Exam:    Dressing is clean, dry, and intact  Neurovascular checks within normal limits  Orientation:  Oriented    Assessment:      Active Problems:    Infection of total right knee replacement ([address] (2/5/2019)      Infection of total knee replacement ([address] (2/5/2019)         Plan:     Continue PT/OT/Rehab  Cont current pain management  Ice packs to knee PRN  Xaerlto for DVT prophylaxis  Appreciate Medicine, ID and Surgery input  Questions regarding Workers Comp involvement in current setting. Patient with hematogenous seeding of previous existing total knee replacement from unknown source. HIV status and gall bladder issues unrelated to current condition but HIV clearly a complicating factor. The knee replacement was completed as part of a WC claim and as such an infection of this joint would be covered by as well based on the fact that had he not had a knee replacement he would have been unlikely to have seeded an infection there.     Discharge To:  home health hopefully today       Signed By: Erasto Frye PA-C   Orthopedic Trauma Service  Formerly [facility]

## 2019-02-13 NOTE — PROGRESS NOTES
CRM received a VM form Andres with workers comp ([employer]) regarding the [facility] orders for PT and Home Infusion. [phone]. CRM called back and spoke with Petra. She state that Alfredo Wright will call CRM back when she arrives in the office with an update. KJT    VELVET spoke with Basilia Saul with workers comp # above. The patient has been set up with [facility] and [phone]. [facility] [phone] will deliver the IV drug tomorrow morning to the patient's home (they cannot deliver before then) The patient will receive his 7am dose tomorrow and then discharge. Home care will be out to his home to provide IV teaching and start the IV infusion for the 4pm dose/future doses. The patient has been informed and in agreement with the plan. CRM will send referral with orders to [facility] via [employer]. CRM will follow for discharge tomorrow. Wife will transport home.  CHRISTIANO

## 2019-02-13 NOTE — PROGRESS NOTES
ID Progress Note  2019    Subjective:     Feeling better today    Objective:     Antibiotics:  1. Vancomycin   2. Cefepime    3. Cefazolin     Vitals:   Visit Vitals  /73 (BP 1 Location: Left arm, BP Patient Position: At rest)   Pulse 77   Temp 98.8 °F (37.1 °C)   Resp 16   Ht 5' 11\" (1.803 m)   Wt 91 kg (200 lb 9.9 oz) Comment: rechecked, previous wt incorrect   SpO2 95%   BMI 27.98 kg/m²        Tmax:  Temp (24hrs), Av.5 °F (36.9 °C), Min:97.9 °F (36.6 °C), Max:98.8 °F (37.1 °C)      Exam:  Lungs:  clear to auscultation bilaterally  Heart:  regular rate and rhythm  Abdomen:  soft, non-tender. Bowel sounds normal. No masses,  no organomegaly  Wound is dressed    Labs:      Recent Labs     19  0729 19  0618   WBC 3.7* 5.7   HGB 12.7 11.4*   * 146*   BUN 14 12   CREA 0.98 0.79       Cultures:     No results found for: SDES  Lab Results   Component Value Date/Time    Culture result: NO GROWTH 5 DAYS 2019 08:01 PM    Culture result: NO GROWTH 1 DAY 2019 01:21 PM    Culture result: NO ANAEROBES ISOLATED 2019 05:48 PM    Culture result: LIGHT STAPHYLOCOCCUS AUREUS (A) 2019 05:48 PM    Culture result: NO FUNGUS ISOLATED 5 DAYS 2019 05:48 PM       Radiology:     Line/Insert Date:           Assessment:     1. Septic knee--MSSA from culture  2. HIV--advanced with CD4 of 43 (11%)--will need HAART, but would like to get him over the acute knee infection first  3. Gallbladder disease--would like this addressed if at all possible, while he is here    Objective:     1. Change to cefazolin  2. Will need PICC, etc  3. ?surgical opinion for gallbladder  4. Extensive discussion regarding HIV--Rx given for Biktarvy--he will need Rx for both azithromycin and Bactrim upon discharge  5.   orders on chart    Franco Mccarty MD

## 2019-02-13 NOTE — CDMP QUERY
The documentation in this patients record indicates that the patient has \"HIV infection. \"  Unless HIV is documented as asymptomatic in provider documentation, ICD 10 indexing considers this to be synonymous with and reported as HIV with progression to AIDS. In this instance, the patient record does not indicate that the patient has any current or historical diagnosis of any HIV related illnesses or opportunistic infections that are generally indicators of the progression  from an asymptomatic HIV infection or HIV positive status, to a symptomatic HIV infection/AIDS diagnosis. Please clarify this patient's HIV status as: 
 
=> Asymptomatic HIV Infection/Disease (e.g. no indication of progression to AIDS) 
=> HIV Disease with progression to AIDS (e.g. current or historical diagnosis of a HIV related illness/opportunistic infection) => HIV positive by serology only =>Clinically Undetermined (no explanation for clinical findings) REFERENCE: 
HIV w/ a Major-Related Condition:  
Sepsis, TB, Herpes, Candidiasis, Pneumocystis (PCP) , Kaposi's Sarcoma, Lymphoma, Pre-senile dementia/OBS, Encephalopathy, Endocarditis, Pneumonia HIV w/ a Minor-Related Condition: 
Malnutrition, Hypovolemia, Anemia, Specified types of Pneumonia, GE d/t chemotherapy or radiation, Failure to Thrive, Fever, Fatigue, Enlarged lymph nodes, Dyspnea Thank you, Margarette Gamino, KARYN, [facility] 
[phone]

## 2019-02-13 NOTE — ADT AUTH CERT NOTES
[facility] (2/12/2019) by Tray Andrade RN        Review Status Review Entered   In Primary 2/13/2019 10:29      Criteria Review   REVIEW SUMMARY     Patient: Fly Cleary  Review Number: [id number]  Review Status:  In Primary     Condition Specific: Yes     Condition Level Of Care Code: ACUTE  Condition Level Of Care Description: Acute        OUTCOMES  Outcome Type: Primary           REVIEW DETAILS     Service Date: 02/12/2019  Admit Date: 02/05/2019  Product: [facility]  Subset: Extended Stay      (Symptom or finding within 24h)      ~--Admin, IQ Admin Admin on 02- 10:29 AM--~      Visit Vitals      BP     127/76      Pulse             63      Temp             97.5 °F (36.4 °C)      Resp 16      Ht      5' 11\" (1.803 m)      Wt     91 kg (200 lb 9.9 oz)      SpO2             97% RA      BMI   27.98 kg/m²                           (Excludes PO medications unless noted)          Select Level of Care, One:              [ ] ACUTE, >= One:              ~--Admin, IQ Admin Admin on 02- 10:25 AM--~              LABS: WBC 3.7, ,                             WOUND CULTURE FINAL RESULT: LIGHT STAPHYLOCOCCUS AUREUS                            MEDS:                             ceFAZolin (ANCEF) 2 g/20 mL in sterile water IV syringe               Dose: 2 g              Freq: EVERY 8 HOURS Route: IV                            famotidine (PEPCID) tablet 20 mg               Dose: 20 mg              Freq: 2 TIMES DAILY Route: PO                            pramipexole (MIRAPEX) tablet 0.5 mg               Dose: 0.5 mg              Freq: EVERY BEDTIME Route: PO                            pramipexole (MIRAPEX) tablet 0.5 mg               Dose: 0.5 mg              Freq: EVERY BEDTIME Route: PO                            predniSONE (DELTASONE) tablet 10 mg               Dose: 10 mg              Freq: DAILY WITH BREAKFAST Route: PO                            rivaroxaban (XARELTO) tablet 10 mg               Dose: 10 mg              Freq: DAILY WITH LUNCH Route: PO                            trimethoprim-sulfamethoxazole (BACTRIM DS, SEPTRA DS) 160-800 mg per tablet 1 Tab               Dose: 1 Tab              Freq: DAILY Route: PO                            iron sucrose (VENOFER) 200 mg in 0.9% sodium chloride 100 mL IVPB               Dose: 200 mg              Freq: EVERY 24 HOURS Route: IV                                          ~--Admin, IQ Admin Admin on 02- 10:20 AM--~              ORTHO PROGRESS NOTE:              Assessment:              Stable Post Op                             Plan:              No plan for cholecystectomy              PICC in place              ABX per ID              DVT prophylaxisXarelto              Weight bearing restriction: WBAT              Pain Control:no current joint or muscle symptoms, pt is pain-free              F/U with Dr. Can Peters in 2 weeks                                          ~--Admin, IQ Admin Admin on 02- 10:20 AM--~              MEDICAL PROGRESS NOTE:              Assessment & Plan:                             1.           Left Knee Septic arthritis - MSSA - on ancef. Off vanc - supected redmans syndrome. ID consulted D/W Dr Soham Vasquez - Would need PICC line, ordered it. ID to place op Abx - Ancef.                              2.           Recent Diagnosis of HIV- not on HAART. cd4 count - <50 - 2/8 on bactrim and zithromax for ppx. D/w ID. Chacha Fernandez Obtain Records from [facility]. Needs to stay on same dose of zithromax and bactrim on D/c. Side effect profile explained. Started on HAART per ID.                              3.           Elevated LFT with gallstones  - GI consulted, monitor LFT. No pain at present. HIDA scan neg. Surgery consult - D/W primary. - no intervetion per surgery                             4.           Rash on body 2/8 - ?due to vancomycin - off vac.  S/P steroids, benadryl and pepcid. -resolved.                 5.           S/p I and D of the Right knee - Monitor For Post Op blood losses                             6.           Pancytopenia - could be related to HIV/AIDS status                             7.           Iron Def Anemia - ordered IV iron,could benefit from po iron on d/c                                          ~--Admin, IQ Admin Admin on 02- 10:19 AM--~              GI PROGRESS NOTE:              Assessment/Plan                             1. Crohn's colitis - appears stable with current regimen              - Continue prednisone               2. RUQ pain - suspect related to symptomatic cholelithiasis, has upcoming cholecystectomy w/ liver biopsy scheduled, pain does not appear to be significant issue as of current, LFTs normal. Repeat ultrasound with stones, sludge and GB wall thickening.               - HIDA normal              3. Septic R knee - management per Ortho              4. HIV - ID consulted                                          ~--Admin, IQ Admin Admin on 02- 10:19 AM--~              INFECTIOUS DISEASE PROGRESS NOTE:              Assessment:                             1.           Septic knee--MSSA from culture              2.           HIV--advanced with CD4 of 43 (11%)--will need HAART, but would like to get him over the acute knee infection first              3.           Gallbladder disease--would like this addressed if at all possible, while he is here                             Objective:                             1.           Change to cefazolin              2.           Will need PICC, etc              3.           ?surgical opinion for gallbladder              4.           Extensive discussion regarding HIV--Rx given for Biktarvy--he will need Rx for both azithromycin and Bactrim upon discharge              5.           HH orders on chart                                               Version: InterQual® 2018.1  Coleman Wells  © 2018 [employer] and/or one of its subsidiaries. All Rights Reserved. CPT only © 2017 [employer]. All Rights Reserved.

## 2019-02-13 NOTE — PROGRESS NOTES
Bedside shift change report given to Radha(oncoming nurse) by Sylvie Neely (offgoing nurse). Report included the following information SBAR, Kardex, Intake/Output and MAR.

## 2019-02-13 NOTE — PROGRESS NOTES
Hospitalist Progress Note  Maria A Elmore MD  Answering service: [phone] OR [phone] from in house phone      Date of Service:  2019  NAME:  Tianna Parks  :  1960  MRN:  [id number]      Admission Summary:   Tianna Parks is a 63 y.o. male who was admitted on 2019 for left septic knee. Patient is known to have chron;s diease and was on Imuran until about a month ago when it was stopped and started on prednisone. He is recently diagnosed with HIV,not on treatment yet as work up is underway. He says he has h/o low platelet,sleep apnea. Interval history / Subjective:   Pt seen in followup of right knee arthritis  Denies fever  Wants to go home  Denies Suicidal or homicidal ideation  Having stress from recent diagnosis  Advised in detail about disease process and treatment     Assessment & Plan:     1. Left Knee Septic arthritis - MSSA - on ancef. Off vanc - supected redmans syndrome. ID consulted D/W Dr Rashid Leahy - S/p PICC line. ID  placed op Abx - Ancef. 2. Recent Diagnosis of HIV positive per serology - not on HAART prior to admit. cd4 count - <50 -  on bactrim and zithromax for ppx. D/w ID. Shavon Cruz Obtain Records from [facility]. Needs to stay on same dose of zithromax and bactrim on D/c. Side effect profile explained. Started on HAART per ID. Advised on Protective barriers during sexual contact including condoms. Spouse to get tested . 3. Elevated LFT with gallstones  - GI consulted, monitor LFT. No pain at present. HIDA scan neg. Surgery consult - D/W primary. - no intervetion per surgery    4. Rash on body  - ?due to vancomycin - off vac. S/P steroids, benadryl and pepcid. -resolved. 5. S/p I and D of the Right knee - Monitor For Post Op blood losses    6. Pancytopenia - could be related to HIV/AIDS status    7.  Iron Def Anemia - ordered IV iron, po iron on d/c     Code status: Full       DVT prophylaxis: SCD    Risk of deterioration: high      Total time spent with patient: 28  [address] discussed with: Patient/Family, Nurse and   Disposition: Per primary   Can d/c from medical standpoint  Have Given prescriptions for bactrim, Azithromycin and Iron      Hospital Problems  Date Reviewed: 2/7/2019          Codes Class Noted POA    Infection of [facility] right [facility] ([facility] ICD-10-CM: P35.39IU  ICD-9-CM: 996.66, V43.65  2/5/2019 Unknown        Infection of total knee replacement ([facility] ICD-10-CM: T84.59XA, Z96.659  ICD-9-CM: 996.66, V43.65  2/5/2019 Unknown                Review of Systems:   A comprehensive review of systems was negative. Vital Signs:    Last 24hrs VS reviewed since prior progress note. Most recent are:  Visit Vitals  /73 (BP 1 Location: Left arm, BP Patient Position: At rest)   Pulse 77   Temp 98.8 °F (37.1 °C)   Resp 16   Ht 5' 11\" (1.803 m)   Wt 91 kg (200 lb 9.9 oz)   SpO2 95%   BMI 27.98 kg/m²       No intake or output data in the 24 hours ending 02/13/19 1423     Physical Examination:             Constitutional:  No acute distress, cooperative, pleasant    ENT:  Oral mucous moist, oropharynx benign. Neck supple,    Resp:  CTA bilaterally. No wheezing/rhonchi/rales. No accessory muscle use   CV:  Regular rhythm, normal rate, no murmurs, gallops, rubs    GI:  Soft, non distended, non tender. normoactive bowel sounds, no hepatosplenomegaly     Musculoskeletal:  No edema, warm, 2+ pulses throughout,RT knee Drain removed. Neurologic:  Moves all extremities.   AAOx3, CN II-XII reviewed     Skin:  Good turgor, no rashes or ulcers        Data Review:    Review and/or order of clinical lab test  Review and/or order of tests in the radiology section of CPT  Review and/or order of tests in the medicine section of CPT      Labs:     Recent Labs     02/12/19  0729 02/11/19  0618   WBC 3.7* 5.7   HGB 12.7 11.4*   HCT 37.1 33.7*   * 146*     Recent Labs     02/12/19  0729 02/11/19  0618   * 139   K 3.5 3.2*   CL 99 106   CO2 27 26   BUN 14 12   CREA 0.98 0.79   GLU 80 81   CA 8.8 8.5     No results for input(s): SGOT, GPT, ALT, AP, TBIL, TBILI, TP, ALB, GLOB, GGT, AML, LPSE in the last 72 hours. No lab exists for component: AMYP, HLPSE  No results for input(s): INR, PTP, APTT in the last 72 hours. No lab exists for component: INREXT, INREXT   No results for input(s): FE, TIBC, PSAT, FERR in the last 72 hours. No results found for: FOL, RBCF   No results for input(s): PH, PCO2, PO2 in the last 72 hours. No results for input(s): CPK, CKNDX, TROIQ in the last 72 hours.     No lab exists for component: CPKMB  Lab Results   Component Value Date/Time    Cholesterol, total 164 06/04/2013 01:39 PM    HDL Cholesterol 32 (L) 06/04/2013 01:39 PM    LDL, calculated 87 06/04/2013 01:39 PM    Triglyceride 227 (H) 06/04/2013 01:39 PM     Lab Results   Component Value Date/Time    Glucose (POC) 153 (H) 02/10/2019 04:17 PM    Glucose (POC) 191 (H) 02/10/2019 11:41 AM    Glucose (POC) 152 (H) 02/10/2019 06:05 AM    Glucose (POC) 156 (H) 02/09/2019 09:18 PM    Glucose (POC) 170 (H) 02/09/2019 04:56 PM     Lab Results   Component Value Date/Time    Color ELSA 02/07/2019 01:21 PM    Appearance CLOUDY (A) 02/07/2019 01:21 PM    Specific gravity >1.030 (H) 02/07/2019 01:21 PM    Specific gravity 1.021 07/23/2018 11:30 AM    pH (UA) 5.5 02/07/2019 01:21 PM    Protein 100 (A) 02/07/2019 01:21 PM    Glucose NEGATIVE  02/07/2019 01:21 PM    Ketone TRACE (A) 02/07/2019 01:21 PM    Bilirubin NEGATIVE  07/23/2018 11:30 AM    Urobilinogen 0.2 02/07/2019 01:21 PM    Nitrites NEGATIVE  02/07/2019 01:21 PM    Leukocyte Esterase SMALL (A) 02/07/2019 01:21 PM    Epithelial cells FEW 02/07/2019 01:21 PM    Bacteria 1+ (A) 02/07/2019 01:21 PM    WBC 20-50 02/07/2019 01:21 PM    RBC 10-20 02/07/2019 01:21 PM         Medications Reviewed:     Current Facility-Administered Medications   Medication Dose Route Frequency    alteplase (CATHFLO) 1 mg in sterile water (preservative free) 1 mL injection  1 mg InterCATHeter PRN    trimethoprim-sulfamethoxazole (BACTRIM DS, SEPTRA DS) 160-800 mg per tablet 1 Tab  1 Tab Oral DAILY    azithromycin (ZITHROMAX) tablet 1,000 mg  1,000 mg Oral Q7D    aluminum-magnesium hydroxide (MAALOX) oral suspension 15 mL  15 mL Oral QID PRN    LORazepam (ATIVAN) injection 1 mg  1 mg IntraVENous Q6H PRN    ceFAZolin (ANCEF) 2 g/20 mL in sterile water IV syringe  2 g IntraVENous Q8H    diphenhydrAMINE (BENADRYL) capsule 25 mg  25 mg Oral Q6H PRN    0.9% sodium chloride infusion  75 mL/hr IntraVENous CONTINUOUS    oxyCODONE IR (ROXICODONE) tablet 5-10 mg  5-10 mg Oral Q4H PRN    cyclobenzaprine (FLEXERIL) tablet 5 mg  5 mg Oral TID PRN    nystatin (MYCOSTATIN) 100,000 unit/mL oral suspension 500,000 Units  500,000 Units Oral QID    predniSONE (DELTASONE) tablet 10 mg  10 mg Oral DAILY WITH BREAKFAST    cetirizine (ZYRTEC) tablet 10 mg  10 mg Oral DAILY PRN    pramipexole (MIRAPEX) tablet 0.5 mg  0.5 mg Oral QHS    sodium chloride (NS) flush 5-40 mL  5-40 mL IntraVENous Q8H    sodium chloride (NS) flush 5-40 mL  5-40 mL IntraVENous PRN    acetaminophen (TYLENOL) tablet 650 mg  650 mg Oral Q6H    ondansetron (ZOFRAN ODT) tablet 4 mg  4 mg Oral Q6H PRN    hydrOXYzine HCl (ATARAX) tablet 10 mg  10 mg Oral Q8H PRN    famotidine (PEPCID) tablet 20 mg  20 mg Oral BID    senna-docusate (PERICOLACE) 8.6-50 mg per tablet 1 Tab  1 Tab Oral BID    polyethylene glycol (MIRALAX) packet 17 g  17 g Oral DAILY    bisacodyl (DULCOLAX) suppository 10 mg  10 mg Rectal DAILY PRN    oxyCODONE IR (ROXICODONE) tablet 10 mg  10 mg Oral Q3H PRN    fentaNYL citrate (PF) injection 25 mcg  25 mcg IntraVENous Q4H PRN    rivaroxaban (XARELTO) tablet 10 mg  10 mg Oral DAILY WITH LUNCH    naloxone (NARCAN) injection 0.4 mg  0.4 mg IntraVENous PRN    ondansetron (ZOFRAN) injection 4 mg  4 mg IntraVENous Q8H PRN    sodium chloride (NS) flush 5-40 mL  5-40 mL IntraVENous Q8H    sodium chloride (NS) flush 5-40 mL  5-40 mL IntraVENous PRN     ______________________________________________________________________  EXPECTED LENGTH OF STAY: 4d 0h  ACTUAL LENGTH OF STAY:          8                 Kevin Gómez MD   Patient has given Verbal permission to discuss medical care with   persons present in the room and and also with contact as listed on face sheet.

## 2019-02-14 VITALS
BODY MASS INDEX: 28.09 KG/M2 | OXYGEN SATURATION: 98 % | HEART RATE: 87 BPM | RESPIRATION RATE: 16 BRPM | TEMPERATURE: 97.6 F | SYSTOLIC BLOOD PRESSURE: 105 MMHG | HEIGHT: 71 IN | WEIGHT: 200.62 LBS | DIASTOLIC BLOOD PRESSURE: 70 MMHG

## 2019-02-14 LAB
ANION GAP SERPL CALC-SCNC: 7 MMOL/L (ref 5–15)
BASOPHILS # BLD: 0 K/UL (ref 0–0.1)
BASOPHILS NFR BLD: 0 % (ref 0–1)
BUN SERPL-MCNC: 18 MG/DL (ref 6–20)
BUN/CREAT SERPL: 17 (ref 12–20)
CALCIUM SERPL-MCNC: 8.9 MG/DL (ref 8.5–10.1)
CHLORIDE SERPL-SCNC: 101 MMOL/L (ref 97–108)
CO2 SERPL-SCNC: 26 MMOL/L (ref 21–32)
CREAT SERPL-MCNC: 1.08 MG/DL (ref 0.7–1.3)
DIFFERENTIAL METHOD BLD: ABNORMAL
EOSINOPHIL # BLD: 0.1 K/UL (ref 0–0.4)
EOSINOPHIL NFR BLD: 3 % (ref 0–7)
ERYTHROCYTE [DISTWIDTH] IN BLOOD BY AUTOMATED COUNT: 16.9 % (ref 11.5–14.5)
GLUCOSE SERPL-MCNC: 95 MG/DL (ref 65–100)
HCT VFR BLD AUTO: 39.5 % (ref 36.6–50.3)
HGB BLD-MCNC: 13.5 G/DL (ref 12.1–17)
IMM GRANULOCYTES # BLD AUTO: 0.2 K/UL (ref 0–0.04)
IMM GRANULOCYTES NFR BLD AUTO: 5 % (ref 0–0.5)
LYMPHOCYTES # BLD: 0.4 K/UL (ref 0.8–3.5)
LYMPHOCYTES NFR BLD: 10 % (ref 12–49)
MCH RBC QN AUTO: 31.8 PG (ref 26–34)
MCHC RBC AUTO-ENTMCNC: 34.2 G/DL (ref 30–36.5)
MCV RBC AUTO: 92.9 FL (ref 80–99)
MONOCYTES # BLD: 0.3 K/UL (ref 0–1)
MONOCYTES NFR BLD: 7 % (ref 5–13)
NEUTS SEG # BLD: 3.3 K/UL (ref 1.8–8)
NEUTS SEG NFR BLD: 75 % (ref 32–75)
NRBC # BLD: 0 K/UL (ref 0–0.01)
NRBC BLD-RTO: 0 PER 100 WBC
PLATELET # BLD AUTO: 98 K/UL (ref 150–400)
PLATELET COMMENTS,PCOM: ABNORMAL
PMV BLD AUTO: 12.4 FL (ref 8.9–12.9)
POTASSIUM SERPL-SCNC: 3.7 MMOL/L (ref 3.5–5.1)
RBC # BLD AUTO: 4.25 M/UL (ref 4.1–5.7)
RBC MORPH BLD: ABNORMAL
SODIUM SERPL-SCNC: 134 MMOL/L (ref 136–145)
WBC # BLD AUTO: 4.3 K/UL (ref 4.1–11.1)

## 2019-02-14 PROCEDURE — 74011250636 HC RX REV CODE- 250/636: Performed by: HOSPITALIST

## 2019-02-14 PROCEDURE — 80048 BASIC METABOLIC PNL TOTAL CA: CPT

## 2019-02-14 PROCEDURE — 85025 COMPLETE CBC W/AUTO DIFF WBC: CPT

## 2019-02-14 PROCEDURE — 74011250637 HC RX REV CODE- 250/637: Performed by: PHYSICIAN ASSISTANT

## 2019-02-14 PROCEDURE — 74011636637 HC RX REV CODE- 636/637: Performed by: HOSPITALIST

## 2019-02-14 PROCEDURE — 74011250637 HC RX REV CODE- 250/637: Performed by: HOSPITALIST

## 2019-02-14 PROCEDURE — 36415 COLL VENOUS BLD VENIPUNCTURE: CPT

## 2019-02-14 RX ORDER — SULFAMETHOXAZOLE AND TRIMETHOPRIM 800; 160 MG/1; MG/1
1 TABLET ORAL DAILY
Qty: 30 TAB | Refills: 1 | Status: SHIPPED | OUTPATIENT
Start: 2019-02-14 | End: 2020-01-15

## 2019-02-14 RX ORDER — AZITHROMYCIN 500 MG/1
1000 TABLET, FILM COATED ORAL
Qty: 30 TAB | Refills: 0 | Status: SHIPPED | OUTPATIENT
Start: 2019-02-14 | End: 2019-07-10

## 2019-02-14 RX ORDER — TRAMADOL HYDROCHLORIDE 50 MG/1
50 TABLET ORAL
Qty: 20 TAB | Refills: 0 | Status: SHIPPED | OUTPATIENT
Start: 2019-02-14 | End: 2020-01-15

## 2019-02-14 RX ADMIN — SULFAMETHOXAZOLE AND TRIMETHOPRIM 1 TABLET: 800; 160 TABLET ORAL at 09:29

## 2019-02-14 RX ADMIN — Medication 2 G: at 08:06

## 2019-02-14 RX ADMIN — RIVAROXABAN 10 MG: 10 TABLET, FILM COATED ORAL at 11:47

## 2019-02-14 RX ADMIN — Medication 10 ML: at 08:06

## 2019-02-14 RX ADMIN — PREDNISONE 10 MG: 10 TABLET ORAL at 08:06

## 2019-02-14 RX ADMIN — FAMOTIDINE 20 MG: 20 TABLET ORAL at 09:29

## 2019-02-14 RX ADMIN — Medication 2 G: at 00:16

## 2019-02-14 NOTE — PROGRESS NOTES
Hospitalist Progress Note  Dawit Felton MD  Answering service: [phone] from in house phone  Cell: [phone]      Date of Service:  2019 8  NAME:  Martin Ford  :  1960  MRN:  [id number]  Admission date: 2019 10:48 PM    Admission Summary:   Martin Ford 63 y.o. male recently diagnosed with HIV (not on HAART yet), total knee replacement 2018 admitted with R artificial knee MSSA infection. Had debridement and Vancomycin spacer placed 19. Also was evaluated by GI and general surgery for RUQ pain. Acute cholecystitis was ruled out. Interval history / Subjective:     No acute events. Denies chest pain, dyspnea, fever. Pain is minimal in the knee. Assessment & Plan:   R artificial knee joint MSSA arthritis. S/p debridement 19.   -Follow up with ortho () in 2 weeks   -Cefazolin IV q 8 hrs for 6 weeks by [facility]   -CBC, BMP, ESR every Mon and Thu   -ID appointment in 2 weeks  Cholelithiasis w/o cholecystitis   -No indications for cholecystectomy now. HIV   -Follow up with ID as above to start HAART   -On Bactrim and azithromycin    Code status  DVT prophylaxis: Xarelto  Disposition:       Hospital Problems  Date Reviewed: 2019          Codes Class Noted POA    Infection of total right knee replacement ([facility] ICD-10-CM: B16.57GE  ICD-9-CM: 996.66, V43.65  2019 Unknown        Infection of total knee replacement ([facility] ICD-10-CM: T84.59XA, Z96.659  ICD-9-CM: 996.66, V43.65  2019 Unknown                Review of Systems:   Pertinent items are noted in HPI.        Vital Signs:     /73 (BP 1 Location: Left arm, BP Patient Position: At rest)   Pulse 77   Temp 98.8 °F (37.1 °C)   Resp 16   Ht 5' 11\" (1.803 m)   Wt 91 kg (200 lb 9.9 oz) Comment: rechecked, previous wt incorrect  SpO2 95%   BMI 27.98 kg/m²     Physical Examination:         Constitutional:  No acute distress, cooperative, pleasant    ENT:  Oral mucous moist, oropharynx benign. Neck supple,    Resp:  CTA bilaterally. No wheezing/rhonchi/rales. No accessory muscle use   CV:  Regular rhythm, normal rate, no murmurs, gallops, rubs    GI:  Soft, non distended, non tender. normoactive bowel sounds, no hepatosplenomegaly     Musculoskeletal:  No edema, warm, 2+ pulses throughout. Knee with dressing intact    Neurologic:  Moves all extremities.   AAOx3, CN II-XII reviewed          Data Review:   WBC 5.7               Caroline Stevenson MD

## 2019-02-14 NOTE — PROGRESS NOTES
Pt was d/c home via wheelchair. Pt was received hard rxs. Pt was brought to the ED where his wife was waiting to be admitted. Pt moved IV abx teaching time to 16:00. 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 Pt's belongings were packed by the pt.

## 2019-02-14 NOTE — DISCHARGE INSTRUCTIONS
After Hospital Care Plan:    Discharge Instructions Knee Replacement-Dr. Gerri Yeung    Patient Name  Marion Baker  Date of procedure  2/6/2019    Procedure  Procedure(s):  RIGHT KNEE INCISION AND DRAINAGE WITH POLYETHYLENE EXCHANGE  Surgeon  Surgeon(s) and Role:     * Jose Umanzor MD - Primary  Date of discharge: No discharge date for patient encounter. PCP: Connie Lamb MD    Follow up appointments  -follow up with Dr. Gerri Yeung in 2 weeks. Call [phone] to make an appointment. Home Health Agency: _________________________   phone: ___________________  The agency will contact you to arrange dates/times for visits. Please call them if you do not hear from them within 24 hours after you are discharged. When to call your Orthopaedic Surgeon:  -unrelieved pain  -Signs of infection-if your incision is red; continues to have drainage; drainage has a foul odor or if you have a persistent fever over 101 degrees  -Signs of a blood clot in your leg-calf pain, tenderness, redness, swelling of lower leg  When to call your Primary Care Physician:  -Concerns about medical conditions such as diabetes, high blood pressure, asthma, congestive heart failure  -Call if blood sugars are elevated, persistent headache or dizziness, coughing or congestion, constipation or diarrhea, burning with urination, abnormal heart rate  When to call 911and go to the nearest emergency room  -acute onset of chest pain, shortness of breath, difficulty breathing    Activity  -weight bearing as tolerated with your walker or crutches. Refer to pages 23-31 of your handbook for instructions and pictures.   -20 repetitions of each exercise as instructed by therapist 3 times each day.   Refer to pages 32-40 of your handbook for exercise instructions and pictures  -get up every one hour and walk (except at night when sleeping)  -do not drive or operate heavy machinery    Incision Care  -you will have staples in your knee incision; they will be removed at the surgeons office visit in 2 weeks  -Keep a dressing (ABD and paper tape) on your incision and change daily. Wash your hands thoroughly before changing the dressing. Once you incision is not draining, you may leave it open to air  -You may shower and get your incision wet but do not submerge your incision under water in a bath tub, hot tub or swimming pool for 6 weeks after surgery. Preventing blood clots  -take Xarelto at 12 noon daily as prescribed by Dr. Destiny Lacey    Pain management  -take pain medication as prescribed; decrease the amount you use as your pain lessens  -avoid alcoholic beverages while taking pain medication  -Please be aware that many medications contain Tylenol. We do not want you to over medicate so please read the information below as a guide. Do not take more than 4 Grams of Tylenol in a 24 hour period. (There are 1000 milligrams in one Gram)  Percocet contains 325 mg of Tylenol per tablet (do not take more than 12 tablets in 24 hours)  Lortab contains 500 mg of Tylenol per tablet (do not take more than 8 tablets in 24 hours)  Norco contains 325 mg of Tylenol per tablet (do not take more than 12 tablets in 24 hours). -You may place an ice bag on your knee for 15-20 minutes after exercising    Diet  -resume usual diet; drink plenty of fluids; eat foods high in fiber  -you may want to take a stool softener (such as Senokot-S or Colace) to prevent constipation while you are taking pain medication.   If constipation occurs, take a laxative (such as Dulcolax tablets, Milk of Magnesia, or a suppository)    [facility] Protocol (to be followed by [address])  Nursing-per physicians order  -complete head to toe assessment, vital signs  -staples will be removed in the surgeons office at 2 week visit  -medication reconciliation  -review pain management  -manage chronic medical conditions    Physical Therapy-per physicians order  Weight bearing status:        Right Side Weight Bearing: As tolerated  ? Mobility Status:  Supine to Sit: Stand-by assistance(HOB elevated)  Sit to Stand: Stand-by assistance  Sit to Supine: Stand-by assistance     Gait:  Distance (ft): 50 Feet (ft)  Ambulation - Level of Assistance: Stand-by assistance  Assistive Device: Gait belt, Walker, rolling  Gait Abnormalities: Antalgic, Step to gait  ADL status overall composite:            Toilet Transfer : Supervision    -Home Therapy  -assessment and evaluation-bed mobility; functional transfers (bed, chair, bathroom, stairs); ambulation with equipment, car transfers, shower transfers, safety and ability to get out of house in the event of an emergency  -review weight bearing as tolerated, wean from walker or crutches as tolerated  -discuss pain management  -review how to do ADLs    -Home Exercise Program-refer to [id number] of the patient handbook for exercises  -supine exercises-ankle pumps, hamstring sets, quad sets, heel slides, short arc quad sets, long arc quads-prop heel on pillow for stretch, straight leg raise-sitting exercises-active knee flexion, passive knee flexion, active knee extension, ankle pumps, bicep curls (use weights as appropriate), triceps pushups, shoulder flexion (use weights as appropriate)  -standing exercises holding onto supportive counter-toe raises, heel raises, mini-squats, heel/toe touches with knee bend, marching in place, hamstring curls

## 2019-02-14 NOTE — PROGRESS NOTES
Bedside and Verbal shift change report given to Azul (oncoming nurse) by Sanam Dugan RN (offgoing nurse). Report included the following information SBAR, Kardex and MAR.

## 2019-02-14 NOTE — DISCHARGE SUMMARY
Procedure(s):  RIGHT KNEE INCISION AND DRAINAGE WITH POLYETHYLENE EXCHANGE Operative Report      Date of Surgery: 2/6/2019     Preoperative Diagnosis:  INFECTED RIGHT TOTAL KNEE    Postoperative Diagnosis: INFECTED RIGHT TOTAL KNEE    Procedure: Procedure(s):  RIGHT KNEE INCISION AND DRAINAGE WITH POLYETHYLENE EXCHANGE     Surgeon: Mandeep Israel PA-C    History and Hospital Course:  Neisha Slade is a pleasant 63y.o. year old male who was transferred here from [facility] with complaints of right knee pain and swelling and having been found to have a likely TKA infection. Joint replacement in August with subsequent good recovery but now complicated by recent history of ongoing UTI, Crohn's and cholecystitis in setting of recent HIV diagnosis. Patient had joint washout and poly exchange and has been followed by the ID team for his infection as well as his HIV status which was found to be quite advanced with a CD4 count in the 40s. GI also has been following for Crohn's and cholycysitis. Post-opertively he has done well in regards to his knee with minimal pain and ability ambulate freel. Drain was left in place until POD #4. Abx have been continued per ID team who has recommended a total of 6 weeks of IV Abx and starting HAART therapy. Surgery has given input related to gall bladder disease and recommends follow-up once his knee infection is treated more fully. Home Health services were established by [facility] and patient is set up for home Abx infusions. He is being discharged home today (POD #8) in good condition with scheduled outpatient follow-up with Ortho, ID and GI.     Signed By: Mandeep Israel PA-C   Orthopedic Trauma Service  [facility]

## 2019-02-19 NOTE — ADT AUTH CERT NOTES
PLEASE CALL JEWELL ESPANA WITH STATUS OF APPROVAL FOR THIS PATIENT PHONE # [phone]       Patient Demographics     Patient Name  Maite Womack  [id number] Sex  Male   1960 Address  [place] [address] Phone  [phone] [facility])   Discharge Information     Discharge Provider Date/Time Disposition Doug Davison MD / [phone] [address] [facility] [employer]   Comments   (none)

## 2019-03-11 LAB
BACTERIA SPEC CULT: NORMAL
SERVICE CMNT-IMP: NORMAL

## 2019-07-06 ENCOUNTER — APPOINTMENT (OUTPATIENT)
Dept: GENERAL RADIOLOGY | Age: 59
DRG: 467 | End: 2019-07-06
Attending: NURSE PRACTITIONER
Payer: OTHER MISCELLANEOUS

## 2019-07-06 ENCOUNTER — HOSPITAL ENCOUNTER (INPATIENT)
Age: 59
LOS: 4 days | Discharge: HOME HEALTH | DRG: 467 | End: 2019-07-10
Attending: EMERGENCY MEDICINE | Admitting: HOSPITALIST
Payer: OTHER MISCELLANEOUS

## 2019-07-06 ENCOUNTER — APPOINTMENT (OUTPATIENT)
Dept: CT IMAGING | Age: 59
DRG: 467 | End: 2019-07-06
Attending: NURSE PRACTITIONER
Payer: OTHER MISCELLANEOUS

## 2019-07-06 ENCOUNTER — APPOINTMENT (OUTPATIENT)
Dept: GENERAL RADIOLOGY | Age: 59
DRG: 467 | End: 2019-07-06
Attending: HOSPITALIST
Payer: OTHER MISCELLANEOUS

## 2019-07-06 DIAGNOSIS — M25.561 ACUTE PAIN OF RIGHT KNEE: Primary | ICD-10-CM

## 2019-07-06 DIAGNOSIS — T84.53XD INFECTION OF TOTAL RIGHT KNEE REPLACEMENT, SUBSEQUENT ENCOUNTER: ICD-10-CM

## 2019-07-06 DIAGNOSIS — R50.9 FEVER, UNSPECIFIED FEVER CAUSE: ICD-10-CM

## 2019-07-06 PROBLEM — M00.9 SEPTIC ARTHRITIS (HCC): Status: ACTIVE | Noted: 2019-07-06

## 2019-07-06 LAB
ALBUMIN SERPL-MCNC: 3.6 G/DL (ref 3.5–5)
ALBUMIN/GLOB SERPL: 0.7 {RATIO} (ref 1.1–2.2)
ALP SERPL-CCNC: 87 U/L (ref 45–117)
ALT SERPL-CCNC: 30 U/L (ref 12–78)
ANION GAP SERPL CALC-SCNC: 6 MMOL/L (ref 5–15)
APPEARANCE UR: CLEAR
AST SERPL-CCNC: 36 U/L (ref 15–37)
BACTERIA URNS QL MICRO: NEGATIVE /HPF
BASOPHILS # BLD: 0 K/UL (ref 0–0.1)
BASOPHILS NFR BLD: 0 % (ref 0–1)
BILIRUB SERPL-MCNC: 2 MG/DL (ref 0.2–1)
BILIRUB UR QL: NEGATIVE
BUN SERPL-MCNC: 10 MG/DL (ref 6–20)
BUN/CREAT SERPL: 8 (ref 12–20)
CALCIUM SERPL-MCNC: 8.9 MG/DL (ref 8.5–10.1)
CHLORIDE SERPL-SCNC: 105 MMOL/L (ref 97–108)
CO2 SERPL-SCNC: 28 MMOL/L (ref 21–32)
COLOR UR: ABNORMAL
COMMENT, HOLDF: NORMAL
CREAT SERPL-MCNC: 1.27 MG/DL (ref 0.7–1.3)
CRP SERPL HS-MCNC: >9.5 MG/L
DIFFERENTIAL METHOD BLD: NORMAL
EOSINOPHIL # BLD: 0.1 K/UL (ref 0–0.4)
EOSINOPHIL NFR BLD: 1 % (ref 0–7)
EPITH CASTS URNS QL MICRO: ABNORMAL /LPF
ERYTHROCYTE [DISTWIDTH] IN BLOOD BY AUTOMATED COUNT: 12.7 % (ref 11.5–14.5)
ERYTHROCYTE [SEDIMENTATION RATE] IN BLOOD: 8 MM/HR (ref 0–20)
GLOBULIN SER CALC-MCNC: 5.2 G/DL (ref 2–4)
GLUCOSE SERPL-MCNC: 102 MG/DL (ref 65–100)
GLUCOSE UR STRIP.AUTO-MCNC: NEGATIVE MG/DL
HCT VFR BLD AUTO: 45.8 % (ref 36.6–50.3)
HGB BLD-MCNC: 15.5 G/DL (ref 12.1–17)
HGB UR QL STRIP: ABNORMAL
HYALINE CASTS URNS QL MICRO: ABNORMAL /LPF (ref 0–5)
IMM GRANULOCYTES # BLD AUTO: 0 K/UL (ref 0–0.04)
IMM GRANULOCYTES NFR BLD AUTO: 0 % (ref 0–0.5)
KETONES UR QL STRIP.AUTO: ABNORMAL MG/DL
LACTATE SERPL-SCNC: 0.6 MMOL/L (ref 0.4–2)
LEUKOCYTE ESTERASE UR QL STRIP.AUTO: NEGATIVE
LIPASE SERPL-CCNC: 63 U/L (ref 73–393)
LYMPHOCYTES # BLD: 1.7 K/UL (ref 0.8–3.5)
LYMPHOCYTES NFR BLD: 21 % (ref 12–49)
MCH RBC QN AUTO: 31.4 PG (ref 26–34)
MCHC RBC AUTO-ENTMCNC: 33.8 G/DL (ref 30–36.5)
MCV RBC AUTO: 92.9 FL (ref 80–99)
MONOCYTES # BLD: 0.5 K/UL (ref 0–1)
MONOCYTES NFR BLD: 6 % (ref 5–13)
NEUTS SEG # BLD: 5.8 K/UL (ref 1.8–8)
NEUTS SEG NFR BLD: 72 % (ref 32–75)
NITRITE UR QL STRIP.AUTO: NEGATIVE
NRBC # BLD: 0 K/UL (ref 0–0.01)
NRBC BLD-RTO: 0 PER 100 WBC
PH UR STRIP: 6 [PH] (ref 5–8)
PLATELET # BLD AUTO: 150 K/UL (ref 150–400)
PMV BLD AUTO: 9.5 FL (ref 8.9–12.9)
POTASSIUM SERPL-SCNC: 3.5 MMOL/L (ref 3.5–5.1)
PROT SERPL-MCNC: 8.8 G/DL (ref 6.4–8.2)
PROT UR STRIP-MCNC: ABNORMAL MG/DL
RBC # BLD AUTO: 4.93 M/UL (ref 4.1–5.7)
RBC #/AREA URNS HPF: ABNORMAL /HPF (ref 0–5)
SAMPLES BEING HELD,HOLD: NORMAL
SODIUM SERPL-SCNC: 139 MMOL/L (ref 136–145)
SP GR UR REFRACTOMETRY: 1.01 (ref 1–1.03)
UR CULT HOLD, URHOLD: NORMAL
UROBILINOGEN UR QL STRIP.AUTO: 1 EU/DL (ref 0.2–1)
WBC # BLD AUTO: 8.2 K/UL (ref 4.1–11.1)
WBC URNS QL MICRO: ABNORMAL /HPF (ref 0–4)

## 2019-07-06 PROCEDURE — 87077 CULTURE AEROBIC IDENTIFY: CPT

## 2019-07-06 PROCEDURE — 73562 X-RAY EXAM OF KNEE 3: CPT

## 2019-07-06 PROCEDURE — 85652 RBC SED RATE AUTOMATED: CPT

## 2019-07-06 PROCEDURE — 80053 COMPREHEN METABOLIC PANEL: CPT

## 2019-07-06 PROCEDURE — 89050 BODY FLUID CELL COUNT: CPT

## 2019-07-06 PROCEDURE — 20610 DRAIN/INJ JOINT/BURSA W/O US: CPT

## 2019-07-06 PROCEDURE — 74011250636 HC RX REV CODE- 250/636: Performed by: NURSE PRACTITIONER

## 2019-07-06 PROCEDURE — 87205 SMEAR GRAM STAIN: CPT

## 2019-07-06 PROCEDURE — 87040 BLOOD CULTURE FOR BACTERIA: CPT

## 2019-07-06 PROCEDURE — 0S9C3ZX DRAINAGE OF RIGHT KNEE JOINT, PERCUTANEOUS APPROACH, DIAGNOSTIC: ICD-10-PCS | Performed by: PHYSICIAN ASSISTANT

## 2019-07-06 PROCEDURE — 74011250637 HC RX REV CODE- 250/637: Performed by: NURSE PRACTITIONER

## 2019-07-06 PROCEDURE — 36415 COLL VENOUS BLD VENIPUNCTURE: CPT

## 2019-07-06 PROCEDURE — 74011250636 HC RX REV CODE- 250/636: Performed by: HOSPITALIST

## 2019-07-06 PROCEDURE — 81001 URINALYSIS AUTO W/SCOPE: CPT

## 2019-07-06 PROCEDURE — 83605 ASSAY OF LACTIC ACID: CPT

## 2019-07-06 PROCEDURE — 86141 C-REACTIVE PROTEIN HS: CPT

## 2019-07-06 PROCEDURE — 74011000258 HC RX REV CODE- 258: Performed by: RADIOLOGY

## 2019-07-06 PROCEDURE — 73701 CT LOWER EXTREMITY W/DYE: CPT

## 2019-07-06 PROCEDURE — 83690 ASSAY OF LIPASE: CPT

## 2019-07-06 PROCEDURE — 87186 SC STD MICRODIL/AGAR DIL: CPT

## 2019-07-06 PROCEDURE — 96361 HYDRATE IV INFUSION ADD-ON: CPT

## 2019-07-06 PROCEDURE — 65660000000 HC RM CCU STEPDOWN

## 2019-07-06 PROCEDURE — 89060 EXAM SYNOVIAL FLUID CRYSTALS: CPT

## 2019-07-06 PROCEDURE — 99284 EMERGENCY DEPT VISIT MOD MDM: CPT

## 2019-07-06 PROCEDURE — 85025 COMPLETE CBC W/AUTO DIFF WBC: CPT

## 2019-07-06 PROCEDURE — 74011250637 HC RX REV CODE- 250/637: Performed by: HOSPITALIST

## 2019-07-06 PROCEDURE — 74011000250 HC RX REV CODE- 250: Performed by: PHYSICIAN ASSISTANT

## 2019-07-06 PROCEDURE — 74011636320 HC RX REV CODE- 636/320: Performed by: RADIOLOGY

## 2019-07-06 PROCEDURE — 71046 X-RAY EXAM CHEST 2 VIEWS: CPT

## 2019-07-06 PROCEDURE — 96374 THER/PROPH/DIAG INJ IV PUSH: CPT

## 2019-07-06 RX ORDER — ONDANSETRON 2 MG/ML
4 INJECTION INTRAMUSCULAR; INTRAVENOUS
Status: COMPLETED | OUTPATIENT
Start: 2019-07-06 | End: 2019-07-06

## 2019-07-06 RX ORDER — CETIRIZINE HCL 10 MG
10 TABLET ORAL
Status: DISCONTINUED | OUTPATIENT
Start: 2019-07-06 | End: 2019-07-10 | Stop reason: HOSPADM

## 2019-07-06 RX ORDER — SODIUM CHLORIDE 0.9 % (FLUSH) 0.9 %
5-40 SYRINGE (ML) INJECTION AS NEEDED
Status: DISCONTINUED | OUTPATIENT
Start: 2019-07-06 | End: 2019-07-10 | Stop reason: HOSPADM

## 2019-07-06 RX ORDER — SODIUM CHLORIDE 0.9 % (FLUSH) 0.9 %
10 SYRINGE (ML) INJECTION
Status: COMPLETED | OUTPATIENT
Start: 2019-07-06 | End: 2019-07-06

## 2019-07-06 RX ORDER — PANTOPRAZOLE SODIUM 40 MG/1
40 TABLET, DELAYED RELEASE ORAL
Status: DISCONTINUED | OUTPATIENT
Start: 2019-07-07 | End: 2019-07-10 | Stop reason: HOSPADM

## 2019-07-06 RX ORDER — CEFAZOLIN SODIUM/WATER 2 G/20 ML
2 SYRINGE (ML) INTRAVENOUS EVERY 8 HOURS
Status: DISCONTINUED | OUTPATIENT
Start: 2019-07-07 | End: 2019-07-10 | Stop reason: HOSPADM

## 2019-07-06 RX ORDER — NALOXONE HYDROCHLORIDE 0.4 MG/ML
0.4 INJECTION, SOLUTION INTRAMUSCULAR; INTRAVENOUS; SUBCUTANEOUS AS NEEDED
Status: DISCONTINUED | OUTPATIENT
Start: 2019-07-06 | End: 2019-07-09 | Stop reason: SDUPTHER

## 2019-07-06 RX ORDER — MORPHINE SULFATE 2 MG/ML
4 INJECTION, SOLUTION INTRAMUSCULAR; INTRAVENOUS ONCE
Status: COMPLETED | OUTPATIENT
Start: 2019-07-06 | End: 2019-07-06

## 2019-07-06 RX ORDER — ACETAMINOPHEN 325 MG/1
650 TABLET ORAL
Status: COMPLETED | OUTPATIENT
Start: 2019-07-06 | End: 2019-07-06

## 2019-07-06 RX ORDER — SODIUM CHLORIDE 9 MG/ML
75 INJECTION, SOLUTION INTRAVENOUS CONTINUOUS
Status: DISCONTINUED | OUTPATIENT
Start: 2019-07-06 | End: 2019-07-10 | Stop reason: HOSPADM

## 2019-07-06 RX ORDER — OXYCODONE AND ACETAMINOPHEN 7.5; 325 MG/1; MG/1
1 TABLET ORAL
Status: DISCONTINUED | OUTPATIENT
Start: 2019-07-06 | End: 2019-07-09 | Stop reason: SDUPTHER

## 2019-07-06 RX ORDER — LIDOCAINE HYDROCHLORIDE AND EPINEPHRINE 10; 10 MG/ML; UG/ML
10 INJECTION, SOLUTION INFILTRATION; PERINEURAL ONCE
Status: COMPLETED | OUTPATIENT
Start: 2019-07-06 | End: 2019-07-06

## 2019-07-06 RX ORDER — SODIUM CHLORIDE 0.9 % (FLUSH) 0.9 %
5-40 SYRINGE (ML) INJECTION EVERY 8 HOURS
Status: DISCONTINUED | OUTPATIENT
Start: 2019-07-07 | End: 2019-07-10 | Stop reason: HOSPADM

## 2019-07-06 RX ORDER — ONDANSETRON 2 MG/ML
4 INJECTION INTRAMUSCULAR; INTRAVENOUS
Status: DISCONTINUED | OUTPATIENT
Start: 2019-07-06 | End: 2019-07-10 | Stop reason: HOSPADM

## 2019-07-06 RX ORDER — TRAMADOL HYDROCHLORIDE 50 MG/1
50 TABLET ORAL
Status: DISCONTINUED | OUTPATIENT
Start: 2019-07-06 | End: 2019-07-09 | Stop reason: SDUPTHER

## 2019-07-06 RX ADMIN — Medication 10 ML: at 18:31

## 2019-07-06 RX ADMIN — ONDANSETRON 4 MG: 2 INJECTION INTRAMUSCULAR; INTRAVENOUS at 20:21

## 2019-07-06 RX ADMIN — IOPAMIDOL 100 ML: 612 INJECTION, SOLUTION INTRAVENOUS at 18:31

## 2019-07-06 RX ADMIN — SODIUM CHLORIDE 100 ML: 900 INJECTION, SOLUTION INTRAVENOUS at 18:32

## 2019-07-06 RX ADMIN — OXYCODONE HYDROCHLORIDE AND ACETAMINOPHEN 1 TABLET: 7.5; 325 TABLET ORAL at 21:36

## 2019-07-06 RX ADMIN — SODIUM CHLORIDE 125 ML/HR: 900 INJECTION, SOLUTION INTRAVENOUS at 23:55

## 2019-07-06 RX ADMIN — SODIUM CHLORIDE 1000 ML: 900 INJECTION, SOLUTION INTRAVENOUS at 19:19

## 2019-07-06 RX ADMIN — ACETAMINOPHEN 650 MG: 325 TABLET ORAL at 19:18

## 2019-07-06 RX ADMIN — LIDOCAINE HYDROCHLORIDE,EPINEPHRINE BITARTRATE 100 MG: 10; .01 INJECTION, SOLUTION INFILTRATION; PERINEURAL at 22:22

## 2019-07-06 RX ADMIN — MORPHINE SULFATE 4 MG: 2 INJECTION, SOLUTION INTRAMUSCULAR; INTRAVENOUS at 19:18

## 2019-07-06 NOTE — ED PROVIDER NOTES
This is a 60-year-old male who presents by way of wheelchair to the emergency room complains of acute onset of right knee swelling. Patient was sent by Dr. Lizet Che from orthopedics to the emergency room for evaluation. Patient had a total knee replacement in August of 7220 complicated by a staph infection in February 2019. Per patient he finished his antibiotics 4 days ago. Patient states he was at work today when he developed an achy feeling in his right knee followed by swelling and pain. Patient denies any further injuries to the knee. Denies any open cuts to the knee. Denies chest pain, shortness of breath, dizziness. Positive fever, positive chills. No nausea or vomiting. No further complaints at this time. Katarina Dudley MD  Past Medical History:  No date: Asthma  No date: [facility]      Comment:  CROHNS DISEASE  No date: Chronic pain      Comment:  KNEES AND BACK  No date: Fatty liver  No date: GERD (gastroesophageal reflux disease)  No date: HTN (hypertension)      Comment:  NO MEDS  No date: Hyperlipidemia  No date: Ill-defined condition      Comment:  HIATAL HERNIA, HIV  No date: Migraine  No date: Sleep apnea      Comment:  USES CPAP  Past Surgical History:  1986: HX HERNIA REPAIR; Left      Comment:  INGUINAL  2010: HX KNEE ARTHROSCOPY;  Right             Past Medical History:   Diagnosis Date    Asthma     [facility]     CROHNS DISEASE    Chronic pain     KNEES AND BACK    Fatty liver     GERD (gastroesophageal reflux disease)     HTN (hypertension)     NO MEDS    Hyperlipidemia     Ill-defined condition     HIATAL HERNIA, HIV    Migraine     Sleep apnea     USES CPAP       Past Surgical History:   Procedure Laterality Date    HX HERNIA REPAIR Left 1986    INGUINAL    HX KNEE ARTHROSCOPY Right 2010         Family History:   Problem Relation Age of Onset    Diabetes Mother     Heart Disease Mother     Kidney Disease Mother         POLYCYSTIC    Heart Surgery Mother     Cancer Father         LUNG    Diabetes Sister     Hypertension Sister     Heart Disease Sister     Heart Surgery Sister     Kidney Disease Sister         HAD KIDNEY TRANSPLANT    Heart Disease Brother     Kidney Disease Brother         POLYCYSTIC DISEASE HAD 2 TRANSPLANTS    Hypertension Brother     Arthritis-osteo Sister     Hypertension Sister     No Known Problems Brother     Heart Attack Brother     Kidney Disease Brother         POLYCYSTIC DISEASE    Anesth Problems Neg Hx        Social History     Socioeconomic History    Marital status:      Spouse name: Not on file    Number of children: Not on file    Years of education: Not on file    Highest education level: Not on file   Occupational History    Not on file   Social Needs    Financial resource strain: Not on file    Food insecurity:     Worry: Not on file     Inability: Not on file    Transportation needs:     Medical: Not on file     Non-medical: Not on file   Tobacco Use    Smoking status: Never Smoker    Smokeless tobacco: Never Used   Substance and Sexual Activity    Alcohol use: Yes     Comment: OCCASIONALLY    Drug use: No    Sexual activity: Yes   Lifestyle    Physical activity:     Days per week: Not on file     Minutes per session: Not on file    Stress: Not on file   Relationships    Social connections:     Talks on phone: Not on file     Gets together: Not on file     Attends Baptism service: Not on file     Active member of club or organization: Not on file     Attends meetings of clubs or organizations: Not on file     Relationship status: Not on file    Intimate partner violence:     Fear of current or ex partner: Not on file     Emotionally abused: Not on file     Physically abused: Not on file     Forced sexual activity: Not on file   Other Topics Concern    Not on file   Social History Narrative    Not on file         ALLERGIES: Vancomycin    Review of Systems   Constitutional: Negative for activity change, appetite change, chills, fatigue and fever. HENT: Negative for congestion, ear discharge, ear pain, sinus pressure, sinus pain, sore throat and trouble swallowing. Eyes: Negative for photophobia, pain, redness, itching and visual disturbance. Respiratory: Negative for chest tightness and shortness of breath. Cardiovascular: Negative for chest pain and palpitations. Gastrointestinal: Negative for abdominal distention, abdominal pain, nausea and vomiting. Endocrine: Negative. Genitourinary: Negative for difficulty urinating, frequency and urgency. Musculoskeletal: Positive for arthralgias (right knee pain and swelling) and gait problem (pain with weight bearing). Negative for back pain, neck pain and neck stiffness. Skin: Negative for color change, pallor, rash and wound. Allergic/Immunologic: Negative. Neurological: Negative for dizziness, syncope, weakness and headaches. Hematological: Does not bruise/bleed easily. Psychiatric/Behavioral: Negative for behavioral problems. The patient is not nervous/anxious. Vitals:    07/06/19 1559   Pulse: (!) 110   SpO2: 99%            Physical Exam   Constitutional: He is oriented to person, place, and time. He appears well-developed and well-nourished. No distress. HENT:   Head: Normocephalic and atraumatic. Right Ear: External ear normal.   Left Ear: External ear normal.   Nose: Nose normal.   Mouth/Throat: Oropharynx is clear and moist.   Eyes: Pupils are equal, round, and reactive to light. Conjunctivae and EOM are normal. Right eye exhibits no discharge. Left eye exhibits no discharge. Neck: Normal range of motion. Neck supple. No JVD present. No tracheal deviation present. Cardiovascular: Normal rate, regular rhythm, normal heart sounds and intact distal pulses. Exam reveals no gallop. No murmur heard. Pulmonary/Chest: Effort normal and breath sounds normal. No respiratory distress.  He has no wheezes. He has no rales. He exhibits no tenderness. Abdominal: Soft. Bowel sounds are normal. He exhibits no distension. There is no tenderness. There is no rebound and no guarding. Musculoskeletal: He exhibits edema (swelling to the right knee. Positive warmth to touch) and tenderness (right knee). ROM impaired by pain in the right knee     Neurological: He is alert and oriented to person, place, and time. Skin: Skin is warm and dry. No rash noted. No erythema. No pallor. Warm to right knee     Psychiatric: He has a normal mood and affect. His behavior is normal. Judgment and thought content normal.   Nursing note and vitals reviewed. MDM  Number of Diagnoses or Management Options  Acute pain of right knee: new and requires workup  Fever, unspecified fever cause: new and requires workup  Diagnosis management comments: Plan:  Admit to the hospital for further evaluation and treatment. Patient and family in agreement with plan of care. Amount and/or Complexity of Data Reviewed  Clinical lab tests: ordered and reviewed  Tests in the radiology section of CPT®: ordered and reviewed  Discuss the patient with other providers: yes (Discussed with Dr. Decker Form  )           Procedures    Hospitalist Nicole for Admission  7:59 PM    ED Room Number: ER08/08  Patient Name and age:  Sol Ma 63 y.o.  male  Working Diagnosis:   1. Acute pain of right knee    2.  Fever, unspecified fever cause      Readmission: no  Isolation Requirements:  no  Recommended Level of Care:  med/surg  Code Status:  full  Other:  Sent by Orthopedic surgery for recurrent infection, fever

## 2019-07-06 NOTE — ED TRIAGE NOTES
Pt ambulatory to ED accompanied by wife with c/o right knee pain and swelling. S/P right knee surgery 8/18. Developed staph infection in 2/19. Completed Keflex 4 days ago.

## 2019-07-07 LAB
ALBUMIN SERPL-MCNC: 3 G/DL (ref 3.5–5)
ALBUMIN/GLOB SERPL: 0.7 {RATIO} (ref 1.1–2.2)
ALP SERPL-CCNC: 74 U/L (ref 45–117)
ALT SERPL-CCNC: 25 U/L (ref 12–78)
ANION GAP SERPL CALC-SCNC: 6 MMOL/L (ref 5–15)
APPEARANCE SNV: ABNORMAL
AST SERPL-CCNC: 29 U/L (ref 15–37)
BASOPHILS # BLD: 0 K/UL (ref 0–0.1)
BASOPHILS NFR BLD: 0 % (ref 0–1)
BILIRUB SERPL-MCNC: 2.8 MG/DL (ref 0.2–1)
BODY FLD TYPE: NORMAL
BUN SERPL-MCNC: 11 MG/DL (ref 6–20)
BUN/CREAT SERPL: 9 (ref 12–20)
CALCIUM SERPL-MCNC: 8.1 MG/DL (ref 8.5–10.1)
CHLORIDE SERPL-SCNC: 106 MMOL/L (ref 97–108)
CO2 SERPL-SCNC: 25 MMOL/L (ref 21–32)
COLOR SNV: ABNORMAL
CREAT SERPL-MCNC: 1.21 MG/DL (ref 0.7–1.3)
CRYSTALS FLD MICRO: NORMAL
DIFFERENTIAL METHOD BLD: ABNORMAL
EOSINOPHIL # BLD: 0.3 K/UL (ref 0–0.4)
EOSINOPHIL NFR BLD: 4 % (ref 0–7)
ERYTHROCYTE [DISTWIDTH] IN BLOOD BY AUTOMATED COUNT: 13 % (ref 11.5–14.5)
GLOBULIN SER CALC-MCNC: 4.6 G/DL (ref 2–4)
GLUCOSE SERPL-MCNC: 96 MG/DL (ref 65–100)
HCT VFR BLD AUTO: 43.5 % (ref 36.6–50.3)
HGB BLD-MCNC: 14.4 G/DL (ref 12.1–17)
IMM GRANULOCYTES # BLD AUTO: 0 K/UL
IMM GRANULOCYTES NFR BLD AUTO: 0 %
LYMPHOCYTES # BLD: 1.6 K/UL (ref 0.8–3.5)
LYMPHOCYTES NFR BLD: 19 % (ref 12–49)
LYMPHOCYTES NFR SNV MANUAL: 23 % (ref 0–15)
MAGNESIUM SERPL-MCNC: 1.8 MG/DL (ref 1.6–2.4)
MCH RBC QN AUTO: 31.1 PG (ref 26–34)
MCHC RBC AUTO-ENTMCNC: 33.1 G/DL (ref 30–36.5)
MCV RBC AUTO: 94 FL (ref 80–99)
MONOCYTES # BLD: 1.1 K/UL (ref 0–1)
MONOCYTES NFR BLD: 13 % (ref 5–13)
NEUTROPHILS NFR SNV MANUAL: 77 % (ref 0–20)
NEUTS SEG # BLD: 5.4 K/UL (ref 1.8–8)
NEUTS SEG NFR BLD: 64 % (ref 32–75)
NRBC # BLD: 0 K/UL (ref 0–0.01)
NRBC BLD-RTO: 0 PER 100 WBC
PHOSPHATE SERPL-MCNC: 2.8 MG/DL (ref 2.6–4.7)
PLATELET # BLD AUTO: 143 K/UL (ref 150–400)
PMV BLD AUTO: 9.3 FL (ref 8.9–12.9)
POTASSIUM SERPL-SCNC: 3.2 MMOL/L (ref 3.5–5.1)
PROT SERPL-MCNC: 7.6 G/DL (ref 6.4–8.2)
RBC # BLD AUTO: 4.63 M/UL (ref 4.1–5.7)
RBC # SNV: >100 /CU MM
RBC MORPH BLD: ABNORMAL
RBC MORPH BLD: ABNORMAL
SODIUM SERPL-SCNC: 137 MMOL/L (ref 136–145)
SPECIMEN SOURCE FLD: ABNORMAL
WBC # BLD AUTO: 8.4 K/UL (ref 4.1–11.1)
WBC # SNV: ABNORMAL /CU MM (ref 0–150)

## 2019-07-07 PROCEDURE — 83735 ASSAY OF MAGNESIUM: CPT

## 2019-07-07 PROCEDURE — 74011250637 HC RX REV CODE- 250/637: Performed by: HOSPITALIST

## 2019-07-07 PROCEDURE — 85025 COMPLETE CBC W/AUTO DIFF WBC: CPT

## 2019-07-07 PROCEDURE — 74011250636 HC RX REV CODE- 250/636: Performed by: HOSPITALIST

## 2019-07-07 PROCEDURE — 80053 COMPREHEN METABOLIC PANEL: CPT

## 2019-07-07 PROCEDURE — 65660000000 HC RM CCU STEPDOWN

## 2019-07-07 PROCEDURE — 36415 COLL VENOUS BLD VENIPUNCTURE: CPT

## 2019-07-07 PROCEDURE — 84100 ASSAY OF PHOSPHORUS: CPT

## 2019-07-07 RX ORDER — POLYETHYLENE GLYCOL 3350 17 G/17G
17 POWDER, FOR SOLUTION ORAL DAILY
Status: DISCONTINUED | OUTPATIENT
Start: 2019-07-07 | End: 2019-07-07

## 2019-07-07 RX ORDER — DOCUSATE SODIUM 100 MG/1
200 CAPSULE, LIQUID FILLED ORAL
Status: DISCONTINUED | OUTPATIENT
Start: 2019-07-07 | End: 2019-07-10 | Stop reason: HOSPADM

## 2019-07-07 RX ORDER — TESTOSTERONE CYPIONATE 200 MG/ML
100 INJECTION INTRAMUSCULAR
Status: DISCONTINUED | OUTPATIENT
Start: 2019-07-07 | End: 2019-07-10 | Stop reason: HOSPADM

## 2019-07-07 RX ORDER — POLYETHYLENE GLYCOL 3350 17 G/17G
17 POWDER, FOR SOLUTION ORAL
Status: DISCONTINUED | OUTPATIENT
Start: 2019-07-07 | End: 2019-07-09 | Stop reason: SDUPTHER

## 2019-07-07 RX ORDER — POTASSIUM CHLORIDE 750 MG/1
40 TABLET, FILM COATED, EXTENDED RELEASE ORAL EVERY 4 HOURS
Status: COMPLETED | OUTPATIENT
Start: 2019-07-07 | End: 2019-07-07

## 2019-07-07 RX ORDER — PRAMIPEXOLE DIHYDROCHLORIDE 1 MG/1
0.5 TABLET ORAL
Status: DISCONTINUED | OUTPATIENT
Start: 2019-07-07 | End: 2019-07-10 | Stop reason: HOSPADM

## 2019-07-07 RX ORDER — THERA TABS 400 MCG
1 TAB ORAL DAILY
Status: DISCONTINUED | OUTPATIENT
Start: 2019-07-07 | End: 2019-07-10 | Stop reason: HOSPADM

## 2019-07-07 RX ORDER — ACETAMINOPHEN 325 MG/1
650 TABLET ORAL
Status: DISCONTINUED | OUTPATIENT
Start: 2019-07-07 | End: 2019-07-09 | Stop reason: DRUGHIGH

## 2019-07-07 RX ADMIN — POTASSIUM CHLORIDE 40 MEQ: 750 TABLET, EXTENDED RELEASE ORAL at 07:45

## 2019-07-07 RX ADMIN — Medication 2 G: at 16:08

## 2019-07-07 RX ADMIN — TESTOSTERONE CYPIONATE 100 MG: 200 INJECTION INTRAMUSCULAR at 16:35

## 2019-07-07 RX ADMIN — OXYCODONE HYDROCHLORIDE AND ACETAMINOPHEN 1 TABLET: 7.5; 325 TABLET ORAL at 18:41

## 2019-07-07 RX ADMIN — MESALAMINE 1000 MG: 250 CAPSULE ORAL at 13:35

## 2019-07-07 RX ADMIN — SODIUM CHLORIDE 125 ML/HR: 900 INJECTION, SOLUTION INTRAVENOUS at 23:18

## 2019-07-07 RX ADMIN — Medication 2 G: at 23:36

## 2019-07-07 RX ADMIN — OXYCODONE HYDROCHLORIDE AND ACETAMINOPHEN 1 TABLET: 7.5; 325 TABLET ORAL at 23:35

## 2019-07-07 RX ADMIN — OXYCODONE HYDROCHLORIDE AND ACETAMINOPHEN 1 TABLET: 7.5; 325 TABLET ORAL at 03:43

## 2019-07-07 RX ADMIN — PANTOPRAZOLE SODIUM 40 MG: 40 TABLET, DELAYED RELEASE ORAL at 00:04

## 2019-07-07 RX ADMIN — Medication 2 G: at 07:45

## 2019-07-07 RX ADMIN — PANTOPRAZOLE SODIUM 40 MG: 40 TABLET, DELAYED RELEASE ORAL at 22:03

## 2019-07-07 RX ADMIN — THERA TABS 1 TABLET: TAB at 11:19

## 2019-07-07 RX ADMIN — MESALAMINE 1000 MG: 250 CAPSULE ORAL at 23:35

## 2019-07-07 RX ADMIN — ACETAMINOPHEN 650 MG: 325 TABLET ORAL at 16:04

## 2019-07-07 RX ADMIN — MESALAMINE 1000 MG: 250 CAPSULE ORAL at 18:34

## 2019-07-07 RX ADMIN — MESALAMINE 1000 MG: 250 CAPSULE ORAL at 09:32

## 2019-07-07 RX ADMIN — PRAMIPEXOLE DIHYDROCHLORIDE 0.5 MG: 1 TABLET ORAL at 22:04

## 2019-07-07 RX ADMIN — POTASSIUM CHLORIDE 40 MEQ: 750 TABLET, EXTENDED RELEASE ORAL at 11:19

## 2019-07-07 RX ADMIN — Medication 2 G: at 00:04

## 2019-07-07 RX ADMIN — OXYCODONE HYDROCHLORIDE AND ACETAMINOPHEN 1 TABLET: 7.5; 325 TABLET ORAL at 13:41

## 2019-07-07 RX ADMIN — Medication 1 CAPSULE: at 11:19

## 2019-07-07 NOTE — PROGRESS NOTES
Bedside and Verbal shift change report given to [address] [place] [address] (oncoming nurse) by Ezequiel Soler (offgoing nurse). Report included the following information SBAR, Kardex, ED Summary, Intake/Output, MAR and Recent Results.

## 2019-07-07 NOTE — H&P
History & Physical    Primary Care Provider: Effie Brambila MD  Source of Information: Patient     History of Presenting Illness:   Floyd Louis is a 63 y.o. male who presents with fever and right knee pain     This is a 56-year-old male with HIV infection, not in [address], history of rt knee infection who presents by way of wheelchair to the emergency room complains of acute onset of right knee swelling. Patient was sent by Dr. Holden Colmenares from infectious disease to the emergency room for evaluation. Patient had a total knee replacement in August of 6722 complicated by a MSSA infection in February 2019. S/p RIGHT KNEE INCISION AND DRAINAGE WITH POLYETHYLENE [address] on 2/6/2019. culture was MSSA. He completed a 6 weeks of IV abx ancef via picc line at home. And after that he also completed another one month of po keflex. Per patient,  he finished his oral antibiotics 4 days ago. Patient states he was at work today when he developed an achy feeling in his right knee followed by swelling and pain. Patient denies any further injuries to the knee. Denies any open cuts to the knee. Denies chest pain, shortness of breath, dizziness. Positive fever, positive chills. Temp is 100.8 in ER. No nausea or vomiting. No cough, no dysuria. Review of Systems:  General: HPI, no changes of weight or sleep  HEENT: no headache, no vision changes, no nose discharge, no hearing changes   RES: no wheezing, no cough, no sob  CVS: no cp, no palpitation.   Muscular: hpi   Skin: no rash, no itching   GI: no vomiting, no diarrhea  : no dysuria, no hematuria  Hemo: no gum bleeding, no petechial   Neuro: no sensation changes, no focal weakness   Endo: no polydipsia   Psych: denied depression     Past Medical History:   Diagnosis Date    Asthma     [facility] ([facility]     CROHNS DISEASE    Chronic pain     KNEES AND BACK    Fatty liver     GERD (gastroesophageal reflux disease)     HTN (hypertension) NO MEDS    Hyperlipidemia     Ill-defined condition     HIATAL HERNIA, HIV    Migraine     Sleep apnea     USES CPAP      Past Surgical History:   Procedure Laterality Date    HX HERNIA REPAIR Left 1986    INGUINAL    HX KNEE ARTHROSCOPY Right 2010     Prior to Admission medications    Medication Sig Start Date End Date Taking? Authorizing Provider   rivaroxaban (XARELTO) 10 mg tablet Take 1 Tab by mouth daily (with lunch). 2/14/19   Lis Allen PA-C   traMADol (ULTRAM) 50 mg tablet Take 1 Tab by mouth every six (6) hours as needed for Pain. Max Daily Amount: 200 mg. 2/14/19   Lis Allen PA-C   trimethoprim-sulfamethoxazole (BACTRIM DS, SEPTRA DS) 160-800 mg per tablet Take 1 Tab by mouth daily. 2/14/19   Lis Allen PA-C   azithromycin (ZITHROMAX) 500 mg tab Take 2 Tabs by mouth every seven (7) days. 2/14/19   Lis Allen PA-C   iron polysaccharides (FERREX 150) 150 mg iron capsule Take 1 Cap by mouth two (2) times a day. 2/13/19   Jair Wong MD   testosterone cypionate (DEPOTESTOTERONE CYPIONATE) 200 mg/mL injection 100 mg by IntraMUSCular route every Sunday. Provider, Historical   cetirizine (ZYRTEC) 10 mg tablet Take 10 mg by mouth daily as needed for Allergies or Rhinitis. Provider, Historical   predniSONE (DELTASONE) 10 mg tablet Take 10 mg by mouth daily (with breakfast). Replaces azathioprine (Imuran)    Provider, Historical   therapeutic multivitamin (THERA) tablet Take 1 Tab by mouth daily. Provider, Historical   fish oil-omega-3 fatty acids 340-1,000 mg capsule Take 1 Cap by mouth two (2) times a day. Provider, Historical   pramipexole (MIRAPEX) 0.5 mg tablet Take 0.5 mg by mouth nightly. Provider, Historical   omeprazole (PRILOSEC) 40 mg capsule Take 40 mg by mouth nightly.     Provider, Historical     Allergies   Allergen Reactions    Vancomycin Other (comments)     Red man syndrome with 2 hour administration, requires a 4 hour administration for tolerance      Family History   Problem Relation Age of Onset    Diabetes Mother     Heart Disease Mother     Kidney Disease Mother         POLYCYSTIC    Heart Surgery Mother     Cancer Father         LUNG    Diabetes Sister     Hypertension Sister     Heart Disease Sister     Heart Surgery Sister     Kidney Disease Sister         HAD KIDNEY TRANSPLANT    Heart Disease Brother     Kidney Disease Brother         POLYCYSTIC DISEASE HAD 2 TRANSPLANTS    Hypertension Brother    [facility] Arthritis-osteo Sister     Hypertension Sister     No Known Problems Brother     Heart Attack Brother     Kidney Disease Brother         POLYCYSTIC DISEASE    Anesth Problems Neg Hx         SOCIAL HISTORY:  Patient resides:  Independently x   Assisted Living    SNF    With family care       Smoking history:   None x   Former    Chronic      Alcohol history:   None x   Social    Chronic      Ambulates:   Independently x   w/cane    w/walker    w/wc    CODE STATUS:  DNR    Full x   Other      Objective:     Physical Exam:     Visit Vitals  /83 (BP 1 Location: Right arm, BP Patient Position: At rest)   Pulse (!) 113   Temp 100.1 °F (37.8 °C)   Resp 21   Ht 5' 11\" (1.803 m)   Wt 102.1 kg (225 lb)   SpO2 95%   BMI 31.38 kg/m²      O2 Device: Room air    General:  Alert, cooperative, no distress, appears stated age. Head:  Normocephalic, without obvious abnormality, atraumatic. Eyes:  Conjunctivae/corneas clear. PERRL, EOMs intact. Nose: Nares normal. Septum midline. Mucosa normal. No drainage or sinus tenderness. Throat: Lips, mucosa, and tongue normal. Teeth and gums normal.   Neck: Supple, symmetrical, trachea midline, no adenopathy, thyroid: no enlargement/tenderness/nodules, no carotid bruit and no JVD. Back:   Symmetric, no curvature. ROM normal. No CVA tenderness. Lungs:   Clear to auscultation bilaterally. Chest wall:  No tenderness or deformity.    Heart:  Regular rate and rhythm, S1, S2 normal, no murmur, click, rub or gallop. Abdomen:   Soft, non-tender. Bowel sounds normal. No masses,  No organomegaly. Extremities: Right knee warm, swelling, tender to touch. Left knee normal.    Pulses: 2+ and symmetric all extremities. Skin: Skin color, texture, turgor normal. No rashes or lesions   Neurologic: CNII-XII intact. No focal weakness                Data Review:     Recent Days:  Recent Labs     07/06/19  1749   WBC 8.2   HGB 15.5   HCT 45.8        Recent Labs     07/06/19  1749      K 3.5      CO2 28   *   BUN 10   CREA 1.27   CA 8.9   ALB 3.6   SGOT 36   ALT 30     No results for input(s): PH, PCO2, PO2, HCO3, FIO2 in the last 72 hours. 24 Hour Results:  Recent Results (from the past 24 hour(s))   CBC WITH AUTOMATED DIFF    Collection Time: 07/06/19  5:49 PM   Result Value Ref Range    WBC 8.2 4.1 - 11.1 K/uL    RBC 4.93 4.10 - 5.70 M/uL    HGB 15.5 12.1 - 17.0 g/dL    HCT 45.8 36.6 - 50.3 %    MCV 92.9 80.0 - 99.0 FL    MCH 31.4 26.0 - 34.0 PG    MCHC 33.8 30.0 - 36.5 g/dL    RDW 12.7 11.5 - 14.5 %    PLATELET 156 948 - 225 K/uL    MPV 9.5 8.9 - 12.9 FL    NRBC 0.0 0  WBC    ABSOLUTE NRBC 0.00 0.00 - 0.01 K/uL    NEUTROPHILS 72 32 - 75 %    LYMPHOCYTES 21 12 - 49 %    MONOCYTES 6 5 - 13 %    EOSINOPHILS 1 0 - 7 %    BASOPHILS 0 0 - 1 %    IMMATURE GRANULOCYTES 0 0.0 - 0.5 %    ABS. NEUTROPHILS 5.8 1.8 - 8.0 K/UL    ABS. LYMPHOCYTES 1.7 0.8 - 3.5 K/UL    ABS. MONOCYTES 0.5 0.0 - 1.0 K/UL    ABS. EOSINOPHILS 0.1 0.0 - 0.4 K/UL    ABS. BASOPHILS 0.0 0.0 - 0.1 K/UL    ABS. IMM.  GRANS. 0.0 0.00 - 0.04 K/UL    DF AUTOMATED     METABOLIC PANEL, COMPREHENSIVE    Collection Time: 07/06/19  5:49 PM   Result Value Ref Range    Sodium 139 136 - 145 mmol/L    Potassium 3.5 3.5 - 5.1 mmol/L    Chloride 105 97 - 108 mmol/L    CO2 28 21 - 32 mmol/L    Anion gap 6 5 - 15 mmol/L    Glucose 102 (H) 65 - 100 mg/dL    BUN 10 6 - 20 MG/DL    Creatinine 1.27 0.70 - 1.30 MG/DL BUN/Creatinine ratio 8 (L) 12 - 20      GFR est AA >60 >60 ml/min/1.73m2    GFR est non-AA 58 (L) >60 ml/min/1.73m2    Calcium 8.9 8.5 - 10.1 MG/DL    Bilirubin, total 2.0 (H) 0.2 - 1.0 MG/DL    ALT (SGPT) 30 12 - 78 U/L    AST (SGOT) 36 15 - 37 U/L    Alk. phosphatase 87 45 - 117 U/L    Protein, total 8.8 (H) 6.4 - 8.2 g/dL    Albumin 3.6 3.5 - 5.0 g/dL    Globulin 5.2 (H) 2.0 - 4.0 g/dL    A-G Ratio 0.7 (L) 1.1 - 2.2     LIPASE    Collection Time: 07/06/19  5:49 PM   Result Value Ref Range    Lipase 63 (L) 73 - 393 U/L   LACTIC ACID    Collection Time: 07/06/19  5:49 PM   Result Value Ref Range    Lactic acid 0.6 0.4 - 2.0 MMOL/L   SAMPLES BEING HELD    Collection Time: 07/06/19  5:51 PM   Result Value Ref Range    SAMPLES BEING HELD 1BLUE, 1RED     COMMENT        Add-on orders for these samples will be processed based on acceptable specimen integrity and analyte stability, which may vary by analyte. URINE CULTURE HOLD SAMPLE    Collection Time: 07/06/19  8:23 PM   Result Value Ref Range    Urine culture hold        URINE ON HOLD IN MICROBIOLOGY DEPT FOR 3 DAYS. IF UNPRESERVED URINE IS SUBMITTED, IT CANNOT BE USED FOR ADDITIONAL TESTING AFTER 24 HRS, RECOLLECTION WILL BE REQUIRED. Imaging:   Xr Chest Pa Lat    Result Date: 7/6/2019  IMPRESSION: No acute findings. Ct Low Ext Rt W Cont    Result Date: 7/6/2019  IMPRESSION: Status post knee arthroplasty with a moderate joint effusion and thickening of the synovium    Xr Knee Rt 3 V    Result Date: 7/6/2019  IMPRESSION: 1. Total knee arthroplasty. 2. Soft tissue swelling of the anterior and medial knee. Assessment:     Active Problems:    Septic arthritis ([address] (7/6/2019)           Plan:     1. Fever/right knee pain: highly suspicious of recurrently right prosthetic knee infection. I discussed with ID Dr. Lanny Snowden, will do stat knee tap, synovial fluid send for cultures, cell count. Post tap will restart IV ancef due to the previous MSSA infection. 2. HIV: per patient, he follows this with Dr. Rosa Luna, his most recent CD4 increased to 470 and viral load down to 500. He is not on bactrim or azithromycin. Will consult ID. 3.  SIRS: due to above , ivf and abx        Signed By: Marjorie Rivera MD     July 6, 2019

## 2019-07-07 NOTE — CONSULTS
ORTHO CONSULT NOTE    Subjective:     Date of Consultation:  July 6, 2019  Referring Physician: Lexx Couch is a 63 y.o. HIV+ male who is being seen for right knee pain/swelling. He had a right total knee replacement in August 2018. He developed an infection in February 2019 and had poly exchange, synovectomy, and placement of antibiotic beads. He completed 6 weeks of IV ABX and another month of oral ABX. Today he developed sudden pain and worsening swelling. Also reports fever at home. He reports that he called his ID physician who recommended coming to the ER. He is febrile and tachycardic here today. Blood up has revealed normal WBC (8.4) . Patient is employed and normally walks well without assistive device.      Patient Active Problem List    Diagnosis Date Noted    Septic arthritis ([facility] 07/06/2019    Infection of total right knee replacement ([facility] 02/05/2019    Infection of total knee replacement ([facility] 02/05/2019    Primary osteoarthritis of right knee 08/06/2018     Family History   Problem Relation Age of Onset    Diabetes Mother     Heart Disease Mother     Kidney Disease Mother         POLYCYSTIC    Heart Surgery Mother     Cancer Father         LUNG    Diabetes Sister     Hypertension Sister     Heart Disease Sister     Heart Surgery Sister     Kidney Disease Sister         HAD KIDNEY TRANSPLANT    Heart Disease Brother     Kidney Disease Brother         POLYCYSTIC DISEASE HAD 2 TRANSPLANTS    Hypertension Brother     Arthritis-osteo Sister     Hypertension Sister     No Known Problems Brother     Heart Attack Brother     Kidney Disease Brother         POLYCYSTIC DISEASE    Anesth Problems Neg Hx       Social History     Tobacco Use    Smoking status: Never Smoker    Smokeless tobacco: Never Used   Substance Use Topics    Alcohol use: Yes     Comment: OCCASIONALLY     Past Medical History:   Diagnosis Date    Asthma     Autoimmune disease ([facility]     CROHNS DISEASE    Chronic pain     KNEES AND BACK    Fatty liver     GERD (gastroesophageal reflux disease)     HTN (hypertension)     NO MEDS    Hyperlipidemia     Ill-defined condition     HIATAL HERNIA, HIV    Migraine     Sleep apnea     USES CPAP      Past Surgical History:   Procedure Laterality Date    HX HERNIA REPAIR Left 1986    INGUINAL    HX KNEE ARTHROSCOPY Right 2010      Prior to Admission medications    Medication Sig Start Date End Date Taking? Authorizing Provider   rivaroxaban (XARELTO) 10 mg tablet Take 1 Tab by mouth daily (with lunch). 2/14/19   DOMINIC Rouse-JEFF   traMADol (ULTRAM) 50 mg tablet Take 1 Tab by mouth every six (6) hours as needed for Pain. Max Daily Amount: 200 mg. 2/14/19   Dixon Ware, PA-C   trimethoprim-sulfamethoxazole (BACTRIM DS, SEPTRA DS) 160-800 mg per tablet Take 1 Tab by mouth daily. 2/14/19   Dixon Ware, PA-C   azithromycin (ZITHROMAX) 500 mg tab Take 2 Tabs by mouth every seven (7) days. 2/14/19   DOMINIC Rouse-C   iron polysaccharides (FERREX 150) 150 mg iron capsule Take 1 Cap by mouth two (2) times a day. 2/13/19   Elijah Mclaughlin MD   testosterone cypionate (DEPOTESTOTERONE CYPIONATE) 200 mg/mL injection 100 mg by IntraMUSCular route every Sunday. Provider, Historical   cetirizine (ZYRTEC) 10 mg tablet Take 10 mg by mouth daily as needed for Allergies or Rhinitis. Provider, Historical   predniSONE (DELTASONE) 10 mg tablet Take 10 mg by mouth daily (with breakfast). Replaces azathioprine (Imuran)    Provider, Historical   therapeutic multivitamin (THERA) tablet Take 1 Tab by mouth daily. Provider, Historical   fish oil-omega-3 fatty acids 340-1,000 mg capsule Take 1 Cap by mouth two (2) times a day. Provider, Historical   pramipexole (MIRAPEX) 0.5 mg tablet Take 0.5 mg by mouth nightly. Provider, Historical   omeprazole (PRILOSEC) 40 mg capsule Take 40 mg by mouth nightly.     Provider, Historical     Current Facility-Administered Medications   Medication Dose Route Frequency    0.9% sodium chloride infusion  125 mL/hr IntraVENous CONTINUOUS    oxyCODONE-acetaminophen (PERCOCET 7.5) 7.5-325 mg per tablet 1 Tab  1 Tab Oral Q4H PRN    lidocaine-EPINEPHrine (XYLOCAINE) 1 %-1:100,000 injection 100 mg  10 mL IntraDERMal ONCE     Current Outpatient Medications   Medication Sig    rivaroxaban (XARELTO) 10 mg tablet Take 1 Tab by mouth daily (with lunch).  traMADol (ULTRAM) 50 mg tablet Take 1 Tab by mouth every six (6) hours as needed for Pain. Max Daily Amount: 200 mg.  trimethoprim-sulfamethoxazole (BACTRIM DS, SEPTRA DS) 160-800 mg per tablet Take 1 Tab by mouth daily.  azithromycin (ZITHROMAX) 500 mg tab Take 2 Tabs by mouth every seven (7) days.  iron polysaccharides (FERREX 150) 150 mg iron capsule Take 1 Cap by mouth two (2) times a day.  testosterone cypionate (DEPOTESTOTERONE CYPIONATE) 200 mg/mL injection 100 mg by IntraMUSCular route every Sunday.  cetirizine (ZYRTEC) 10 mg tablet Take 10 mg by mouth daily as needed for Allergies or Rhinitis.  predniSONE (DELTASONE) 10 mg tablet Take 10 mg by mouth daily (with breakfast). Replaces azathioprine (Imuran)    therapeutic multivitamin (THERA) tablet Take 1 Tab by mouth daily.  fish oil-omega-3 fatty acids 340-1,000 mg capsule Take 1 Cap by mouth two (2) times a day.  pramipexole (MIRAPEX) 0.5 mg tablet Take 0.5 mg by mouth nightly.  omeprazole (PRILOSEC) 40 mg capsule Take 40 mg by mouth nightly. Allergies   Allergen Reactions    Vancomycin Other (comments)     Red man syndrome with 2 hour administration, requires a 4 hour administration for tolerance        Review of Systems:  A comprehensive review of systems was negative except for that written in the HPI.     Mental Status: no dementia    Objective:     Patient Vitals for the past 8 hrs:   BP Temp Pulse Resp SpO2 Height Weight   07/06/19 2130 117/74 100.1 °F (37.8 °C) (!) 106 24 95 %     07/06/19  117/83 100.1 °F (37.8 °C) (!) 113 21 95 %     19 1945 122/64 (!) 100.8 °F (38.2 °C) (!) 113 19 97 %     19 1720 142/87 (!) 100.8 °F (38.2 °C) (!) 112 18 97 % 5' 11\" (1.803 m) 102.1 kg (225 lb)   19 1559   (!) 110  99 %       Temp (24hrs), Av.5 °F (38.1 °C), Min:100.1 °F (37.8 °C), Max:100.8 °F (38.2 °C)      PHYSICAL EXAM:   General: 61yo male, complaining of right knee pain, cooperative, no distress, appears stated age  CNS: alert/oriented, normal mood  Skin: well-healed midline incision right knee, erythema and warmth noted  Extremities: right knee swollen/tender, pain with ROM, large effusion  Vascular: strong pedal pulse  Neurologic: no acute motor/sensation deficits    Imaging Review:   XR KNEE RT 3 V 2019 17:59  INDICATION: pain. COMPARISON: None. FINDINGS: Three views of the right knee demonstrate total knee arthroplasty. Soft tissue swelling of the anterior and medial knee. . There is no significant  joint effusion is no visible fracture or hardware failure  IMPRESSION:   1. Total knee arthroplasty. 2. Soft tissue swelling of the anterior and medial knee    CT LOW EXT RT W CONT 2019 18:43  INDICATION: Radiographs same day   COMPARISON: None  CONTRAST: 100 mL of Isovue-300. TECHNIQUE: Helical CT of the right knee during uneventful rapid bolus  intravenous contrast administration. Coronal and Sagittal reformats were  generated. Images reviewed in soft tissue and bone windows. CT dose reduction  was achieved through the use of a standardized protocol tailored for this  examination and automatic exposure control for dose modulation. FINDINGS: The patient is status post total knee arthroplasty. The components  create metallic streak artifact. Components articulate dislocation. No evidence  of loosening. No acute fracture or dislocation. There is a moderate knee joint  effusion. The synovium appears thickened tarsi. The visualized muscles appear  unremarkable.  Soft tissue scarring is seen anterior to the knee. IMPRESSION:   Status post knee arthroplasty with a moderate joint effusion and thickening of  the synovium    Labs:   Recent Results (from the past 24 hour(s))   CBC WITH AUTOMATED DIFF    Collection Time: 07/06/19  5:49 PM   Result Value Ref Range    WBC 8.2 4.1 - 11.1 K/uL    RBC 4.93 4.10 - 5.70 M/uL    HGB 15.5 12.1 - 17.0 g/dL    HCT 45.8 36.6 - 50.3 %    MCV 92.9 80.0 - 99.0 FL    MCH 31.4 26.0 - 34.0 PG    MCHC 33.8 30.0 - 36.5 g/dL    RDW 12.7 11.5 - 14.5 %    PLATELET 116 741 - 555 K/uL    MPV 9.5 8.9 - 12.9 FL    NRBC 0.0 0  WBC    ABSOLUTE NRBC 0.00 0.00 - 0.01 K/uL    NEUTROPHILS 72 32 - 75 %    LYMPHOCYTES 21 12 - 49 %    MONOCYTES 6 5 - 13 %    EOSINOPHILS 1 0 - 7 %    BASOPHILS 0 0 - 1 %    IMMATURE GRANULOCYTES 0 0.0 - 0.5 %    ABS. NEUTROPHILS 5.8 1.8 - 8.0 K/UL    ABS. LYMPHOCYTES 1.7 0.8 - 3.5 K/UL    ABS. MONOCYTES 0.5 0.0 - 1.0 K/UL    ABS. EOSINOPHILS 0.1 0.0 - 0.4 K/UL    ABS. BASOPHILS 0.0 0.0 - 0.1 K/UL    ABS. IMM. GRANS. 0.0 0.00 - 0.04 K/UL    DF AUTOMATED     METABOLIC PANEL, COMPREHENSIVE    Collection Time: 07/06/19  5:49 PM   Result Value Ref Range    Sodium 139 136 - 145 mmol/L    Potassium 3.5 3.5 - 5.1 mmol/L    Chloride 105 97 - 108 mmol/L    CO2 28 21 - 32 mmol/L    Anion gap 6 5 - 15 mmol/L    Glucose 102 (H) 65 - 100 mg/dL    BUN 10 6 - 20 MG/DL    Creatinine 1.27 0.70 - 1.30 MG/DL    BUN/Creatinine ratio 8 (L) 12 - 20      GFR est AA >60 >60 ml/min/1.73m2    GFR est non-AA 58 (L) >60 ml/min/1.73m2    Calcium 8.9 8.5 - 10.1 MG/DL    Bilirubin, total 2.0 (H) 0.2 - 1.0 MG/DL    ALT (SGPT) 30 12 - 78 U/L    AST (SGOT) 36 15 - 37 U/L    Alk.  phosphatase 87 45 - 117 U/L    Protein, total 8.8 (H) 6.4 - 8.2 g/dL    Albumin 3.6 3.5 - 5.0 g/dL    Globulin 5.2 (H) 2.0 - 4.0 g/dL    A-G Ratio 0.7 (L) 1.1 - 2.2     LIPASE    Collection Time: 07/06/19  5:49 PM   Result Value Ref Range    Lipase 63 (L) 73 - 393 U/L   LACTIC ACID Collection Time: 07/06/19  5:49 PM   Result Value Ref Range    Lactic acid 0.6 0.4 - 2.0 MMOL/L   SAMPLES BEING HELD    Collection Time: 07/06/19  5:51 PM   Result Value Ref Range    SAMPLES BEING HELD 1BLUE, 1RED     COMMENT        Add-on orders for these samples will be processed based on acceptable specimen integrity and analyte stability, which may vary by analyte. URINALYSIS W/ RFLX MICROSCOPIC    Collection Time: 07/06/19  8:23 PM   Result Value Ref Range    Color YELLOW/STRAW      Appearance CLEAR CLEAR      Specific gravity 1.010 1.003 - 1.030      pH (UA) 6.0 5.0 - 8.0      Protein TRACE (A) NEG mg/dL    Glucose NEGATIVE  NEG mg/dL    Ketone TRACE (A) NEG mg/dL    Bilirubin NEGATIVE  NEG      Blood MODERATE (A) NEG      Urobilinogen 1.0 0.2 - 1.0 EU/dL    Nitrites NEGATIVE  NEG      Leukocyte Esterase NEGATIVE  NEG      WBC 0-4 0 - 4 /hpf    RBC 10-20 0 - 5 /hpf    Epithelial cells FEW FEW /lpf    Bacteria NEGATIVE  NEG /hpf    Hyaline cast 0-2 0 - 5 /lpf   URINE CULTURE HOLD SAMPLE    Collection Time: 07/06/19  8:23 PM   Result Value Ref Range    Urine culture hold        URINE ON HOLD IN MICROBIOLOGY DEPT FOR 3 DAYS. IF UNPRESERVED URINE IS SUBMITTED, IT CANNOT BE USED FOR ADDITIONAL TESTING AFTER 24 HRS, RECOLLECTION WILL BE REQUIRED.          Impression:     Patient Active Problem List    Diagnosis Date Noted    Septic arthritis ([facility] [address] 07/06/2019    [facility] 02/05/2019    In[facility] of [facility] 02/05/2019    Primary osteoarthritis of right knee 08/06/2018     Active Problems:    Septic arthritis ([facility] [address] (7/6/2019)        Plan:     Highly suspicious for recurrent septic joint  Discussed with Dr. Gayathri Pacheco  Right knee aspirated, 70cc cloudy turbid fluid  Fluid sent for cell count, culture, and gram stain  Ordered ESR, CRP  Keep NPO  Being admitted for IV antibiotics and likely resection arthroplasty (Sunday or Monday)  Dr. Gayathri Pacheco aware and agree with above plan.       DOMINIC Dubose

## 2019-07-07 NOTE — PROGRESS NOTES
Primary Nurse Jaison Brandon and [facility], RN performed a dual skin assessment on this patient No impairment noted  Parker score is 22    Small cut noted on Left wrist. Patient sunburnt from working outside.

## 2019-07-07 NOTE — PROCEDURES
Arthrocentesis without Injection Procedural Report    Indications: Infectious arthritis    Preprocedural Diagnosis: Right knee effusion    Postprocedural Diagnosis: Right knee effusion    Provider: DOMINIC Torres     Anesthesia: Local    Allergy:   Allergies   Allergen Reactions    Vancomycin Other (comments)     Red man syndrome with 2 hour administration, requires a 4 hour administration for tolerance       Procedure Details: The risks,benefits and alternatives of a joint aspiration were explained and consent was obtained for the procedure. The aspiration is being done for diagnostic purposes. The area was cleansed using BetadineAnesthetic using 1% lidocaine with epinephrine was infiltrated locally. Using a 18 gauge needle 70mLs of cloudy fluid was obtained. A ACE wrap was applied. The patient tolerated the procedure well. The fluid was sent to the lab. Orders on Fluid: synovial fluid for cell count; C&S and gram stain; crystal analysis    Estimated Blood Loss:   none    Specimens: synovial fluid         Complications:  None; patient tolerated the procedure well.              Signed By: DOMINIC Torres

## 2019-07-07 NOTE — PROGRESS NOTES
Ortho Progress Note    Right knee aspiration now growing staph  Dr. Neisha Kan aware  Discussed with patient and his wife  Plan at this time remains surgery tomorrow for poly exchange, I&D  May ultimately require resection arthroplasty to eradicate infection      Kerry Blancas PA-C

## 2019-07-07 NOTE — PROGRESS NOTES
TRANSFER - IN REPORT:    Verbal report received from Nkechi Theodore RN(name) on Duc Humphreys  being received from ED(unit) for routine progression of care      Report consisted of patients Situation, Background, Assessment and   Recommendations(SBAR). Information from the following report(s) SBAR, Kardex, ED Summary, Intake/Output, MAR and Recent Results was reviewed with the receiving nurse. Opportunity for questions and clarification was provided. Assessment completed upon patients arrival to unit and care assumed.

## 2019-07-07 NOTE — PROGRESS NOTES
Ortho Progress Note    Knee feels about the same  Effusion recurred overnight  Aspiration: 45K nucleated cells, negative gram stain, no growth  Serum WBC 8.4, ESR 8, CRP 9.5  Discussed with patient, wife, and Dr. Shauna Barton for right knee I&D/poly exchange tomorrow  NPO after midnight    [facility] DRE

## 2019-07-07 NOTE — PROGRESS NOTES
Infectious Diseases Consultation     Please see dictated note     Impression      1. 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 HIV infection -- diagnosed Feb 2019 with CD$ = 43 (10.8%) -- Biktarvy begun Feb 2019 -- CD4 on 6/25/2019 = 533 (25.4%) and VL ~ 500    3. Mildly elevated creatinine of 1.27: His creatinine in Feb 2019 before Biktarvy was 0.7 - 1.08. If the creatinine does not come down we should consider the possibility of nephrotoxicity from tenofovir alafenamide    4. Microscopic hematuria: he says this has been a persistent problem. He should have a cystoscopy since risk of bladder cancer is increased in HIV    5. Crohns disease    6. HTN    7. Hyperlipidemia    8. BRIDGER    9. Fatty liver    10. GERD    11. Hiatal Hernia    12. Asthma      Recommend:      1. Blood and right knee synovial fluid cultures sent    2. Begin Ancef    3. Continue Biktarvy    4.  Watch creatinine       ThanksJusten MD  7/6/2019  11:46 PM

## 2019-07-07 NOTE — CONSULTS
Camron Almanza    Name:  Manasa Carrizales  MR#:  [id number]  :  1960  ACCOUNT #:  [de-identified]  DATE OF SERVICE:  2019    LOCATION:  The patient is in room 558. ATTENDING PHYSICIAN:  Joyce Frank MD    CHIEF COMPLAINT:  Right knee pain and swelling. REASON FOR CONSULTATION:  Possible infection of the right total knee replacement. The consultation was requested by Dr. Joyce Frank. The history is obtained by interviewing the patient and wife and review of the medical records. HISTORY OF PRESENT ILLNESS:  This patient is a 15-year-old white male, who underwent a right total knee replacement on 2018. In 2019, the patient presented with Staph aureus (oxacillin sensitive) infection of the right total knee replacement. On 2019, he underwent open synovectomy, revision of the poly liner, and placement of vancomycin-absorbable beads in the knee. He then received 6 weeks of IV antibiotics, followed by a prolonged course of oral Keflex. He just completed Keflex on 2019 and was doing well at that time. However, today, the patient began to note pain and swelling of his right knee. This progressed through the day. His wife called me this afternoon, and I advised her to bring him to the emergency room for evaluation. In the emergency room, the patient was noted to have significant swelling of the right knee. Arthrocentesis revealed 70 mL of cloudy fluid. He was admitted to the hospital, and we are now asked to see him for further evaluation. At the present time, the patient is otherwise resting comfortably. This patient was also diagnosed with HIV infection in 2019. At that time, his total CD-4 count was only 43 (10.8%). The patient states that he had a very high viral load, but I do not actually have those results. He was started on Biktarvy in 2019.   Recent labs on 2019 revealed a total CD-4 count of 533 (25.4%), and the viral load assay revealed about 500 copies of HIV. PAST MEDICAL HISTORY:  Tobacco, none. Alcohol, occasional social use. MEDICATIONS PRIOR TO ADMISSION:  1. Biktarvy - one p.o. daily. 2.  Azithromycin 1000 mg p.o. weekly. 3.  Zyrtec 10 mg p.o. daily p.r.n.  4.  Fish oil. 5.  Iron supplement. 6.  Omeprazole 40 mg p.o. nightly. 7.  Mirapex 0.5 mg p.o. nightly. 8.  Prednisone 10 mg p.o. q.a.m. 9.  Xarelto 10 mg p.o. daily  10. Testosterone 100 mg IM every week. 11.  Multivitamin. 12.  Tramadol 50 mg p.o. q.6 h. p.r.n. pain. 13.  Bactrim DS - one daily. ALLERGIES:  VANCOMYCIN - THE CHART INDICATES THAT THE PATIENT REQUIRES A FOUR-HOUR INFUSION OF VANCOMYCIN TO BE ABLE TO BE TOLERATE IT. ILLNESSES:  1. HIV infection - diagnosed in 02/2019 with a total CD-4 count of 43 (10.8%), and apparently, a high viral load. He was started on Biktarvy at that time and now has a total CD-4 count of 533 (25.4%) on 06/25/2019 and a viral load of 500 copies. 2.  Crohn's disease - diagnosed in 2016, and currently on prednisone and Pentasa. 3.  Hypertension. 4.  GERD. 5.  Fatty liver. 6.  Hyperlipidemia. 7.  Obstructive sleep apnea syndrome. 8.  Asthma. 9.  Hiatal hernia. SURGERY:  1. Left herniorrhaphy in 1980.  2.  Arthroscopy for a meniscus repair of the right knee in 2011.  3.  Right total knee replacement on 08/06/2018.  4.  Open synovectomy, revision of poly liner, and placement of vancomycin-absorbable beads on 02/06/2019 because of MSSA infection. SOCIAL HISTORY:  The patient lives in [address] [place] [address]. He is . FAMILY HISTORY:  Unremarkable. REVIEW OF SYSTEMS:  CONSTITUTIONAL:  He has not noticed any fever or chills. HEENT:  No headache or visual change, and no sore throat. LYMPHATIC:  No history of adenopathy. NEUROLOGIC:  No recent rashes. RESPIRATORY:  No cough or pleuritic chest pain. CARDIOVASCULAR:  No history of chest pain or heart murmurs.   GI:  No nausea, vomiting, abdominal pain, diarrhea. :  No dysuria, but the patient states she has had recurrent microscopic hematuria. MUSCULOSKELETAL:  No myalgias or arthralgias. NEUROLOGIC:  No history of seizure or stroke. PHYSICAL EXAMINATION:  GENERAL:  No acute distress. VITAL SIGNS:  Blood pressure is 117/74, heart rate 106, respiratory rate 24, T-max was 100.8 in the emergency room. HEENT:  Sclerae and conjunctivae benign, EOMI, oropharynx benign. NECK:  Supple. NODES:  No adenopathy. SKIN:  No rashes. LUNGS:  Clear. CARDIOVASCULAR:  Regular rate and rhythm without murmurs. ABDOMEN:  Soft, nondistended, nontender, no masses or HSM, bowel sounds are present. Left leg unremarkable. Right leg, there is a moderate effusion in the right knee. The right knee is tender to palpation with passive range of motion. Peripheral pulses are intact. NEUROLOGIC:  Alert and oriented. LABORATORY DATA:  CBC revealed a hemoglobin of 15.5, white count 8200, and platelet count 892,319 with sedimentation rate of only 8. Chemistry data reveals BUN 10 and creatinine 0.27. In February, his creatinine was initially 0.69 and phillip up to 1.08 by the time of discharge. Liver function tests today are normal.  Analysis of the right knee synovial fluid is not yet available. Gram stain and culture also pending. IMPRESSION:  1. Presumed relapse of infection of the right total knee replacement - most likely methicillin-sensitive Staphylococcus aureus: This patient received 6 weeks of IV antibiotics, followed by prolonged course of Keflex that just ended on 07/02/2019. He now presents with an apparent relapse that began just 3-4 days after completing the course. I discussed the situation with the patient and his wife. Typically, one would consider resection arthroplasty with placement of an antibiotic-impregnated cemented prosthesis, prolonged antibiotics, and then delayed revision arthroplasty.   Another option if the patient does not wish to pursue removal of this knee joint would be a repeat course of treatment, followed by lifelong suppression. 2.  Human immunodeficiency virus infection - diagnosed in 02/2019 with a total CD-4 count of 43 (10.8%). Murel Backbone was begun in 02/2019, and the CD-4 count is now 533 (25.4%) on 06/25/2019 with a viral load of 500.  3.  Mildly elevated creatinine of 1.27: The patient's creatinine in 02/2019 before Biktarvy was 0.7 up to 1.08. If the creatinine does not come down, we should consider the possibility of nephrotoxicity caused by tenofovir alafenamide. 4.  History of microscopic hematuria:  The patient states that this has been a recurrent problem in the past several months. I think he should have a cystoscopy done if this is the case since the risk of bladder cancer is increased in patients with human immunodeficiency virus. 5.  Crohn's disease. 6.  Hypertension. 7.  Hyperlipidemia. 8.  Obstructive sleep apnea syndrome. 9.  Fatty liver. 10.  Gastroesophageal reflux disease. 11.  Hiatal hernia. 12.  Asthma. PLAN:  1. Blood and right knee synovial fluid cultures are pending. 2.  Began Ancef  3. Continue Biktarvy. 4.  Watch the serum creatinine. Thank you very much for allowing us to see this patient with you. Fabricio Carballo MD      [id number]  D:  07/07/2019 1:06  T:  07/07/2019 1:13  JOB #:  [id number]  CC: MD TRE Jarrett MD

## 2019-07-07 NOTE — ROUTINE PROCESS
TRANSFER - OUT REPORT: 
 
Verbal report given to MARIAH Schroeder(name) on Nathanael Wang  being transferred to (unit) for routine progression of care Report consisted of patients Situation, Background, Assessment and  
Recommendations(SBAR). Information from the following report(s) SBAR, ED Summary, [facility] and Recent Results was reviewed with the receiving nurse. Lines:  
Peripheral IV 07/06/19 Left Forearm (Active) Site Assessment Clean, dry, & intact 7/6/2019  8:22 PM  
Phlebitis Assessment 0 7/6/2019  8:22 PM  
Infiltration Assessment 0 7/6/2019  8:22 PM  
Dressing Status Clean, dry, & intact 7/6/2019  8:22 PM  
Dressing Type Transparent 7/6/2019  8:22 PM  
  
 
Opportunity for questions and clarification was provided. Patient transported with: 
 [facility] Susi Rodriguez RN

## 2019-07-07 NOTE — PROGRESS NOTES
Hospitalist Progress Note  Alicia Bledsoe MD  Answering service: [phone] from in house phone  Cell: [phone]      Date of Service:  2019  NAME:  Svetlana Robertson  :  1960  MRN:  [id number]      Admission Summary: This is a 67-year-old male with HIV infection, not in [address], history of rt knee infection who presents by way of wheelchair to the emergency room complains of acute onset of right knee swelling. Patient was sent by Dr. Get Mendoza from infectious disease to the emergency room for evaluation. Patient had a total knee replacement in 8133 complicated by a MSSA infection in 2019. S/p right knee incision and drainage with polyethylene exchange on 2019. culture was MSSA. He completed a 6 weeks of IV abx ancef via picc line at home. And after that he also completed another one month of po keflex. Per patient,  he finished his oral antibiotics 4 days ago. Patient states he was at work today when he developed an achy feeling in his right knee followed by swelling and pain.  Patient denies any further injuries to the knee    Interval history / Subjective:     Right knee pain 5/10 otherwise he said he feels better     Assessment & Plan:     Fever and right knee pain likely due to recurrent septic arthritis   -CT right ext status post knee arthroplasty with a moderate joint effusion and thickening of  the synovium  -On cefazolin   -s/p right knee aspirate , culture pending  -ESR 8, CRP >9.5  -blood cx pending  -ID and Orthopedic surgeon on board    Hypokalemia   -replace with kcl and repeat k level    Hx of Crohn's disease   -continue mesalamine     HIV infection   -last absolute CD4 count  533 and VL  500  -patient takes Biktarvy -25 1 tab po daily   -patient is advised to bring his home Biktarvy -25     Hx of HTN  -BP normal, not on medication, monitor BP    Microscopic hematuria   -no lower abdominal pain  -Discussed with urologist, recommend outpatient evaluation after discharge, patient said has a urologist and said he will make appointment    Hx of BRIDGER  -CPAP q hs      Code status: Full Code  DVT prophylaxis: SCD    Care Plan discussed with: Patient/Family and Nurse  Disposition: TBD     Hospital Problems  Date Reviewed: 2/7/2019          Codes Class Noted POA    Septic arthritis ([facility] [address] ICD-10-CM: M00.9  ICD-9-CM: 711.00  7/6/2019 Yes        * (Principal) Infection of total right knee replacement ([facility] [address] ICD-10-CM: M69.22BR  ICD-9-CM: 996.66, V43.65  2/5/2019 Yes                Vital Signs:    Last 24hrs VS reviewed since prior progress note. Most recent are:  Visit Vitals  /73 (BP 1 Location: Right arm, BP Patient Position: At rest)   Pulse 91   Temp 99.4 °F (37.4 °C)   Resp 16   Ht 5' 11\" (1.803 m)   Wt 102.1 kg (225 lb)   SpO2 96%   BMI 31.38 kg/m²       No intake or output data in the 24 hours ending 07/07/19 0750     Physical Examination:             Constitutional:  No acute distress, cooperative, pleasant    ENT:  Oral mucous moist, oropharynx benign. Neck supple,    Resp:  CTA bilaterally. No wheezing/rhonchi/rales. No accessory muscle use   CV:  Regular rhythm, normal rate, no murmurs, gallops, rubs    GI:  Soft, non distended, non tender. normoactive bowel sounds, no hepatosplenomegaly     Musculoskeletal:  Right knee swollen, non tender     Neurologic:  Moves all extremities.   AAOx3, CN II-XII reviewed     Skin:  Good turgor, no rashes or ulcers       Data Review:    Review and/or order of clinical lab test  Review and/or order of tests in the radiology section of CPT      Labs:     Recent Labs     07/07/19  0349 07/06/19  1749   WBC 8.4 8.2   HGB 14.4 15.5   HCT 43.5 45.8   * 150     Recent Labs     07/07/19  0349 07/06/19  1749    139   K 3.2* 3.5    105   CO2 25 28   BUN 11 10   CREA 1.21 1.27   GLU 96 102*   CA 8.1* 8.9   MG 1.8  --    PHOS 2.8  --      Recent Labs     07/07/19  0349 07/06/19  1749   SGOT 29 36   ALT 25 30   AP 74 87   TBILI 2.8* 2.0*   TP 7.6 8.8*   ALB 3.0* 3.6   GLOB 4.6* 5.2*   LPSE  --  63*     No results for input(s): INR, PTP, APTT in the last 72 hours. No lab exists for component: INREXT   No results for input(s): FE, TIBC, PSAT, FERR in the last 72 hours. No results found for: FOL, RBCF   No results for input(s): PH, PCO2, PO2 in the last 72 hours. No results for input(s): CPK, CKNDX, TROIQ in the last 72 hours.     No lab exists for component: CPKMB  Lab Results   Component Value Date/Time    Cholesterol, total 164 06/04/2013 01:39 PM    HDL Cholesterol 32 (L) 06/04/2013 01:39 PM    LDL, calculated 87 06/04/2013 01:39 PM    Triglyceride 227 (H) 06/04/2013 01:39 PM     Lab Results   Component Value Date/Time    Glucose (POC) 153 (H) 02/10/2019 04:17 PM    Glucose (POC) 191 (H) 02/10/2019 11:41 AM    Glucose (POC) 152 (H) 02/10/2019 06:05 AM    Glucose (POC) 156 (H) 02/09/2019 09:18 PM    Glucose (POC) 170 (H) 02/09/2019 04:56 PM     Lab Results   Component Value Date/Time    Color YELLOW/STRAW 07/06/2019 08:23 PM    Appearance CLEAR 07/06/2019 08:23 PM    Specific gravity 1.010 07/06/2019 08:23 PM    Specific gravity 1.021 07/23/2018 11:30 AM    pH (UA) 6.0 07/06/2019 08:23 PM    Protein TRACE (A) 07/06/2019 08:23 PM    Glucose NEGATIVE  07/06/2019 08:23 PM    Ketone TRACE (A) 07/06/2019 08:23 PM    Bilirubin NEGATIVE  07/06/2019 08:23 PM    Urobilinogen 1.0 07/06/2019 08:23 PM    Nitrites NEGATIVE  07/06/2019 08:23 PM    Leukocyte Esterase NEGATIVE  07/06/2019 08:23 PM    Epithelial cells FEW 07/06/2019 08:23 PM    Bacteria NEGATIVE  07/06/2019 08:23 PM    WBC 0-4 07/06/2019 08:23 PM    RBC 10-20 07/06/2019 08:23 PM         Medications Reviewed:     Current Facility-Administered Medications   Medication Dose Route Frequency    potassium chloride SR (KLOR-CON 10) tablet 40 mEq  40 mEq Oral Q4H    0.9% sodium chloride infusion  125 mL/hr IntraVENous CONTINUOUS    cetirizine (ZYRTEC) tablet 10 mg  10 mg Oral DAILY PRN    pantoprazole (PROTONIX) tablet 40 mg  40 mg Oral QHS    traMADol (ULTRAM) tablet 50 mg  50 mg Oral Q6H PRN    sodium chloride (NS) flush 5-40 mL  5-40 mL IntraVENous Q8H    sodium chloride (NS) flush 5-40 mL  5-40 mL IntraVENous PRN    naloxone (NARCAN) injection 0.4 mg  0.4 mg IntraVENous PRN    oxyCODONE-acetaminophen (PERCOCET 7.5) 7.5-325 mg per tablet 1 Tab  1 Tab Oral Q4H PRN    ondansetron (ZOFRAN) injection 4 mg  4 mg IntraVENous Q4H PRN    ceFAZolin (ANCEF) 2 g/20 mL in sterile water IV syringe  2 g IntraVENous Q8H    mesalamine (PENTASA) CR capsule 1,000 mg  1 g Oral QID     ______________________________________________________________________  EXPECTED LENGTH OF STAY: - - -  ACTUAL LENGTH OF STAY:          1                 Brenda Harris MD

## 2019-07-07 NOTE — PROGRESS NOTES
A Spiritual Care Partner Volunteer visited patient in Rm 558 on 7/7/2019.   Documented by:  Chaplain Rush MDiv, MS, [facility]  [phone])

## 2019-07-07 NOTE — PROGRESS NOTES
1445:  MEWs score of 4. Patient temperature 101.7, heart rate of 105, respiration rate of 20, O2 sat 95%, and BP of 124/71.  1456: Dr. Norm Kidd notified. Per Dr. Norm Kidd orders would be placed for PRN tylenol. No further orders given. Will continue to monitor. 2014: Bedside shift change report given to Oliver Smith RN (oncoming nurse) by Anastasia Varela RN (offgoing nurse). Report included the following information SBAR, Kardex, Intake/Output, MAR and Recent Results.

## 2019-07-08 ENCOUNTER — ANESTHESIA EVENT (OUTPATIENT)
Dept: SURGERY | Age: 59
DRG: 467 | End: 2019-07-08
Payer: OTHER MISCELLANEOUS

## 2019-07-08 ENCOUNTER — ANESTHESIA (OUTPATIENT)
Dept: SURGERY | Age: 59
DRG: 467 | End: 2019-07-08
Payer: OTHER MISCELLANEOUS

## 2019-07-08 LAB
ANION GAP SERPL CALC-SCNC: 7 MMOL/L (ref 5–15)
BACTERIA SPEC CULT: ABNORMAL
BUN SERPL-MCNC: 9 MG/DL (ref 6–20)
BUN/CREAT SERPL: 8 (ref 12–20)
CALCIUM SERPL-MCNC: 8.3 MG/DL (ref 8.5–10.1)
CHLORIDE SERPL-SCNC: 106 MMOL/L (ref 97–108)
CO2 SERPL-SCNC: 23 MMOL/L (ref 21–32)
CREAT SERPL-MCNC: 1.11 MG/DL (ref 0.7–1.3)
ERYTHROCYTE [DISTWIDTH] IN BLOOD BY AUTOMATED COUNT: 13.2 % (ref 11.5–14.5)
GLUCOSE BLD STRIP.AUTO-MCNC: 74 MG/DL (ref 65–100)
GLUCOSE BLD STRIP.AUTO-MCNC: 75 MG/DL (ref 65–100)
GLUCOSE BLD STRIP.AUTO-MCNC: 88 MG/DL (ref 65–100)
GLUCOSE SERPL-MCNC: 97 MG/DL (ref 65–100)
GRAM STN SPEC: ABNORMAL
GRAM STN SPEC: ABNORMAL
HCT VFR BLD AUTO: 41.2 % (ref 36.6–50.3)
HGB BLD-MCNC: 13.5 G/DL (ref 12.1–17)
MCH RBC QN AUTO: 31.4 PG (ref 26–34)
MCHC RBC AUTO-ENTMCNC: 32.8 G/DL (ref 30–36.5)
MCV RBC AUTO: 95.8 FL (ref 80–99)
NRBC # BLD: 0 K/UL (ref 0–0.01)
NRBC BLD-RTO: 0 PER 100 WBC
PLATELET # BLD AUTO: 128 K/UL (ref 150–400)
PMV BLD AUTO: 8.9 FL (ref 8.9–12.9)
POTASSIUM SERPL-SCNC: 3.4 MMOL/L (ref 3.5–5.1)
RBC # BLD AUTO: 4.3 M/UL (ref 4.1–5.7)
SERVICE CMNT-IMP: ABNORMAL
SERVICE CMNT-IMP: NORMAL
SODIUM SERPL-SCNC: 136 MMOL/L (ref 136–145)
WBC # BLD AUTO: 8 K/UL (ref 4.1–11.1)

## 2019-07-08 PROCEDURE — 74011000250 HC RX REV CODE- 250: Performed by: ORTHOPAEDIC SURGERY

## 2019-07-08 PROCEDURE — 77030011640 HC PAD GRND REM COVD -A: Performed by: ORTHOPAEDIC SURGERY

## 2019-07-08 PROCEDURE — 77030002916 HC SUT ETHLN J&J -A: Performed by: ORTHOPAEDIC SURGERY

## 2019-07-08 PROCEDURE — C1713 ANCHOR/SCREW BN/BN,TIS/BN: HCPCS | Performed by: ORTHOPAEDIC SURGERY

## 2019-07-08 PROCEDURE — 87075 CULTR BACTERIA EXCEPT BLOOD: CPT

## 2019-07-08 PROCEDURE — 77030014077 HC TOWER MX CEM J&J -C: Performed by: ORTHOPAEDIC SURGERY

## 2019-07-08 PROCEDURE — 77030002966 HC SUT PDS J&J -A: Performed by: ORTHOPAEDIC SURGERY

## 2019-07-08 PROCEDURE — 80048 BASIC METABOLIC PNL TOTAL CA: CPT

## 2019-07-08 PROCEDURE — 74011250637 HC RX REV CODE- 250/637: Performed by: HOSPITALIST

## 2019-07-08 PROCEDURE — 74011000250 HC RX REV CODE- 250

## 2019-07-08 PROCEDURE — 74011250636 HC RX REV CODE- 250/636: Performed by: PHYSICIAN ASSISTANT

## 2019-07-08 PROCEDURE — 77030022668 HC TIP SUC IRR PULS STRY –B: Performed by: ORTHOPAEDIC SURGERY

## 2019-07-08 PROCEDURE — 77030036660

## 2019-07-08 PROCEDURE — L1830 KO IMMOB CANVAS LONG PRE OTS: HCPCS | Performed by: ORTHOPAEDIC SURGERY

## 2019-07-08 PROCEDURE — 87205 SMEAR GRAM STAIN: CPT

## 2019-07-08 PROCEDURE — 65270000029 HC RM PRIVATE

## 2019-07-08 PROCEDURE — 77030020782 HC GWN BAIR PAWS FLX 3M -B

## 2019-07-08 PROCEDURE — 77030012879 HC MSK CPAP FLL FAC PHIL -B

## 2019-07-08 PROCEDURE — 76060000037 HC ANESTHESIA 3 TO 3.5 HR: Performed by: ORTHOPAEDIC SURGERY

## 2019-07-08 PROCEDURE — 0SRV0J9 REPLACEMENT OF RIGHT KNEE JOINT, TIBIAL SURFACE WITH SYNTHETIC SUBSTITUTE, CEMENTED, OPEN APPROACH: ICD-10-PCS | Performed by: ORTHOPAEDIC SURGERY

## 2019-07-08 PROCEDURE — 76210000006 HC OR PH I REC 0.5 TO 1 HR: Performed by: ORTHOPAEDIC SURGERY

## 2019-07-08 PROCEDURE — 87102 FUNGUS ISOLATION CULTURE: CPT

## 2019-07-08 PROCEDURE — 77030008684 HC TU ET CUF COVD -B: Performed by: ANESTHESIOLOGY

## 2019-07-08 PROCEDURE — 77030000032 HC CUF TRNQT ZIMM -B: Performed by: ORTHOPAEDIC SURGERY

## 2019-07-08 PROCEDURE — 77030034850: Performed by: ORTHOPAEDIC SURGERY

## 2019-07-08 PROCEDURE — 74011250636 HC RX REV CODE- 250/636: Performed by: HOSPITALIST

## 2019-07-08 PROCEDURE — 77030018846 HC SOL IRR STRL H20 ICUM -A: Performed by: ORTHOPAEDIC SURGERY

## 2019-07-08 PROCEDURE — 36415 COLL VENOUS BLD VENIPUNCTURE: CPT

## 2019-07-08 PROCEDURE — 77030026438 HC STYL ET INTUB CARD -A: Performed by: ANESTHESIOLOGY

## 2019-07-08 PROCEDURE — 74011250637 HC RX REV CODE- 250/637: Performed by: PHYSICIAN ASSISTANT

## 2019-07-08 PROCEDURE — 77030008467 HC STPLR SKN COVD -B: Performed by: ORTHOPAEDIC SURGERY

## 2019-07-08 PROCEDURE — 74011250636 HC RX REV CODE- 250/636: Performed by: ORTHOPAEDIC SURGERY

## 2019-07-08 PROCEDURE — C1776 JOINT DEVICE (IMPLANTABLE): HCPCS | Performed by: ORTHOPAEDIC SURGERY

## 2019-07-08 PROCEDURE — 77030031139 HC SUT VCRL2 J&J -A: Performed by: ORTHOPAEDIC SURGERY

## 2019-07-08 PROCEDURE — 77030018836 HC SOL IRR NACL ICUM -A: Performed by: ORTHOPAEDIC SURGERY

## 2019-07-08 PROCEDURE — 82962 GLUCOSE BLOOD TEST: CPT

## 2019-07-08 PROCEDURE — 77030035236 HC SUT PDS STRATFX BARB J&J -B: Performed by: ORTHOPAEDIC SURGERY

## 2019-07-08 PROCEDURE — 87186 SC STD MICRODIL/AGAR DIL: CPT

## 2019-07-08 PROCEDURE — 85027 COMPLETE CBC AUTOMATED: CPT

## 2019-07-08 PROCEDURE — 77030012406 HC DRN WND PENRS BARD -A: Performed by: ORTHOPAEDIC SURGERY

## 2019-07-08 PROCEDURE — 74011250637 HC RX REV CODE- 250/637: Performed by: ANESTHESIOLOGY

## 2019-07-08 PROCEDURE — 76010000173 HC OR TIME 3 TO 3.5 HR INTENSV-TIER 1: Performed by: ORTHOPAEDIC SURGERY

## 2019-07-08 PROCEDURE — 74011250636 HC RX REV CODE- 250/636

## 2019-07-08 PROCEDURE — 74011250636 HC RX REV CODE- 250/636: Performed by: ANESTHESIOLOGY

## 2019-07-08 PROCEDURE — 87077 CULTURE AEROBIC IDENTIFY: CPT

## 2019-07-08 PROCEDURE — 77030039497 HC CST PAD STERILE CHCS -A: Performed by: ORTHOPAEDIC SURGERY

## 2019-07-08 PROCEDURE — 0SPC0JZ REMOVAL OF SYNTHETIC SUBSTITUTE FROM RIGHT KNEE JOINT, OPEN APPROACH: ICD-10-PCS | Performed by: ORTHOPAEDIC SURGERY

## 2019-07-08 DEVICE — SMARTSET GHV GENTAMICIN HIGH VISCOSITY BONE CEMENT 40G
Type: IMPLANTABLE DEVICE | Site: KNEE | Status: FUNCTIONAL
Brand: SMARTSET

## 2019-07-08 DEVICE — P.F.C. SIGMA TIBIAL COMPONENT ALL POLY CURVED 5 15MM CEMENTED
Type: IMPLANTABLE DEVICE | Site: KNEE | Status: FUNCTIONAL
Brand: P.F.C. SIGMA

## 2019-07-08 RX ORDER — NALOXONE HYDROCHLORIDE 0.4 MG/ML
0.4 INJECTION, SOLUTION INTRAMUSCULAR; INTRAVENOUS; SUBCUTANEOUS AS NEEDED
Status: DISCONTINUED | OUTPATIENT
Start: 2019-07-08 | End: 2019-07-10 | Stop reason: HOSPADM

## 2019-07-08 RX ORDER — FENTANYL CITRATE 50 UG/ML
50 INJECTION, SOLUTION INTRAMUSCULAR; INTRAVENOUS AS NEEDED
Status: DISCONTINUED | OUTPATIENT
Start: 2019-07-08 | End: 2019-07-08 | Stop reason: HOSPADM

## 2019-07-08 RX ORDER — POTASSIUM CHLORIDE 750 MG/1
40 TABLET, FILM COATED, EXTENDED RELEASE ORAL EVERY 4 HOURS
Status: DISPENSED | OUTPATIENT
Start: 2019-07-08 | End: 2019-07-08

## 2019-07-08 RX ORDER — SODIUM CHLORIDE 0.9 % (FLUSH) 0.9 %
5-40 SYRINGE (ML) INJECTION EVERY 8 HOURS
Status: DISCONTINUED | OUTPATIENT
Start: 2019-07-08 | End: 2019-07-08 | Stop reason: HOSPADM

## 2019-07-08 RX ORDER — MORPHINE SULFATE 10 MG/ML
2 INJECTION, SOLUTION INTRAMUSCULAR; INTRAVENOUS
Status: DISCONTINUED | OUTPATIENT
Start: 2019-07-08 | End: 2019-07-08 | Stop reason: HOSPADM

## 2019-07-08 RX ORDER — TOBRAMYCIN 1.2 G/30ML
INJECTION, POWDER, LYOPHILIZED, FOR SOLUTION INTRAVENOUS AS NEEDED
Status: DISCONTINUED | OUTPATIENT
Start: 2019-07-08 | End: 2019-07-08 | Stop reason: HOSPADM

## 2019-07-08 RX ORDER — MORPHINE SULFATE 2 MG/ML
2 INJECTION, SOLUTION INTRAMUSCULAR; INTRAVENOUS
Status: DISPENSED | OUTPATIENT
Start: 2019-07-08 | End: 2019-07-09

## 2019-07-08 RX ORDER — LIDOCAINE HYDROCHLORIDE 20 MG/ML
INJECTION, SOLUTION EPIDURAL; INFILTRATION; INTRACAUDAL; PERINEURAL AS NEEDED
Status: DISCONTINUED | OUTPATIENT
Start: 2019-07-08 | End: 2019-07-08 | Stop reason: HOSPADM

## 2019-07-08 RX ORDER — GLYCOPYRROLATE 0.2 MG/ML
INJECTION INTRAMUSCULAR; INTRAVENOUS AS NEEDED
Status: DISCONTINUED | OUTPATIENT
Start: 2019-07-08 | End: 2019-07-08 | Stop reason: HOSPADM

## 2019-07-08 RX ORDER — ONDANSETRON 2 MG/ML
4 INJECTION INTRAMUSCULAR; INTRAVENOUS AS NEEDED
Status: DISCONTINUED | OUTPATIENT
Start: 2019-07-08 | End: 2019-07-08 | Stop reason: HOSPADM

## 2019-07-08 RX ORDER — SODIUM CHLORIDE 0.9 % (FLUSH) 0.9 %
5-40 SYRINGE (ML) INJECTION EVERY 8 HOURS
Status: DISCONTINUED | OUTPATIENT
Start: 2019-07-08 | End: 2019-07-10 | Stop reason: HOSPADM

## 2019-07-08 RX ORDER — FAMOTIDINE 20 MG/1
20 TABLET, FILM COATED ORAL 2 TIMES DAILY
Status: DISCONTINUED | OUTPATIENT
Start: 2019-07-08 | End: 2019-07-10 | Stop reason: HOSPADM

## 2019-07-08 RX ORDER — MIDAZOLAM HYDROCHLORIDE 1 MG/ML
1 INJECTION, SOLUTION INTRAMUSCULAR; INTRAVENOUS AS NEEDED
Status: DISCONTINUED | OUTPATIENT
Start: 2019-07-08 | End: 2019-07-08 | Stop reason: HOSPADM

## 2019-07-08 RX ORDER — PROPOFOL 10 MG/ML
INJECTION, EMULSION INTRAVENOUS AS NEEDED
Status: DISCONTINUED | OUTPATIENT
Start: 2019-07-08 | End: 2019-07-08 | Stop reason: HOSPADM

## 2019-07-08 RX ORDER — POLYETHYLENE GLYCOL 3350 17 G/17G
17 POWDER, FOR SOLUTION ORAL DAILY
Status: DISCONTINUED | OUTPATIENT
Start: 2019-07-09 | End: 2019-07-10 | Stop reason: HOSPADM

## 2019-07-08 RX ORDER — SODIUM CHLORIDE 9 MG/ML
25 INJECTION, SOLUTION INTRAVENOUS CONTINUOUS
Status: DISCONTINUED | OUTPATIENT
Start: 2019-07-08 | End: 2019-07-08 | Stop reason: HOSPADM

## 2019-07-08 RX ORDER — SODIUM CHLORIDE, SODIUM LACTATE, POTASSIUM CHLORIDE, CALCIUM CHLORIDE 600; 310; 30; 20 MG/100ML; MG/100ML; MG/100ML; MG/100ML
25 INJECTION, SOLUTION INTRAVENOUS CONTINUOUS
Status: DISCONTINUED | OUTPATIENT
Start: 2019-07-08 | End: 2019-07-08 | Stop reason: HOSPADM

## 2019-07-08 RX ORDER — HYDROXYZINE HYDROCHLORIDE 10 MG/1
10 TABLET, FILM COATED ORAL
Status: DISCONTINUED | OUTPATIENT
Start: 2019-07-08 | End: 2019-07-10 | Stop reason: HOSPADM

## 2019-07-08 RX ORDER — DEXMEDETOMIDINE HYDROCHLORIDE 4 UG/ML
INJECTION, SOLUTION INTRAVENOUS AS NEEDED
Status: DISCONTINUED | OUTPATIENT
Start: 2019-07-08 | End: 2019-07-08 | Stop reason: HOSPADM

## 2019-07-08 RX ORDER — AMOXICILLIN 250 MG
1 CAPSULE ORAL 2 TIMES DAILY
Status: DISCONTINUED | OUTPATIENT
Start: 2019-07-08 | End: 2019-07-10 | Stop reason: HOSPADM

## 2019-07-08 RX ORDER — ACETAMINOPHEN 325 MG/1
650 TABLET ORAL ONCE
Status: COMPLETED | OUTPATIENT
Start: 2019-07-08 | End: 2019-07-08

## 2019-07-08 RX ORDER — DEXAMETHASONE SODIUM PHOSPHATE 4 MG/ML
INJECTION, SOLUTION INTRA-ARTICULAR; INTRALESIONAL; INTRAMUSCULAR; INTRAVENOUS; SOFT TISSUE AS NEEDED
Status: DISCONTINUED | OUTPATIENT
Start: 2019-07-08 | End: 2019-07-08 | Stop reason: HOSPADM

## 2019-07-08 RX ORDER — PRAMIPEXOLE DIHYDROCHLORIDE 1 MG/1
0.5 TABLET ORAL
Status: DISCONTINUED | OUTPATIENT
Start: 2019-07-08 | End: 2019-07-08 | Stop reason: SDUPTHER

## 2019-07-08 RX ORDER — MIDAZOLAM HYDROCHLORIDE 1 MG/ML
INJECTION, SOLUTION INTRAMUSCULAR; INTRAVENOUS AS NEEDED
Status: DISCONTINUED | OUTPATIENT
Start: 2019-07-08 | End: 2019-07-08 | Stop reason: HOSPADM

## 2019-07-08 RX ORDER — PHENYLEPHRINE HCL IN 0.9% NACL 0.4MG/10ML
SYRINGE (ML) INTRAVENOUS AS NEEDED
Status: DISCONTINUED | OUTPATIENT
Start: 2019-07-08 | End: 2019-07-08 | Stop reason: HOSPADM

## 2019-07-08 RX ORDER — ROPIVACAINE HYDROCHLORIDE 5 MG/ML
30 INJECTION, SOLUTION EPIDURAL; INFILTRATION; PERINEURAL AS NEEDED
Status: DISCONTINUED | OUTPATIENT
Start: 2019-07-08 | End: 2019-07-08 | Stop reason: HOSPADM

## 2019-07-08 RX ORDER — SODIUM CHLORIDE, SODIUM LACTATE, POTASSIUM CHLORIDE, CALCIUM CHLORIDE 600; 310; 30; 20 MG/100ML; MG/100ML; MG/100ML; MG/100ML
INJECTION, SOLUTION INTRAVENOUS
Status: DISCONTINUED | OUTPATIENT
Start: 2019-07-08 | End: 2019-07-08 | Stop reason: HOSPADM

## 2019-07-08 RX ORDER — SODIUM CHLORIDE 0.9 % (FLUSH) 0.9 %
5-40 SYRINGE (ML) INJECTION AS NEEDED
Status: DISCONTINUED | OUTPATIENT
Start: 2019-07-08 | End: 2019-07-08 | Stop reason: HOSPADM

## 2019-07-08 RX ORDER — OXYCODONE HYDROCHLORIDE 5 MG/1
5 TABLET ORAL AS NEEDED
Status: DISCONTINUED | OUTPATIENT
Start: 2019-07-08 | End: 2019-07-08 | Stop reason: HOSPADM

## 2019-07-08 RX ORDER — MIDAZOLAM HYDROCHLORIDE 1 MG/ML
0.5 INJECTION, SOLUTION INTRAMUSCULAR; INTRAVENOUS
Status: DISCONTINUED | OUTPATIENT
Start: 2019-07-08 | End: 2019-07-08 | Stop reason: HOSPADM

## 2019-07-08 RX ORDER — FENTANYL CITRATE 50 UG/ML
INJECTION, SOLUTION INTRAMUSCULAR; INTRAVENOUS AS NEEDED
Status: DISCONTINUED | OUTPATIENT
Start: 2019-07-08 | End: 2019-07-08 | Stop reason: HOSPADM

## 2019-07-08 RX ORDER — SODIUM CHLORIDE 9 MG/ML
125 INJECTION, SOLUTION INTRAVENOUS CONTINUOUS
Status: DISCONTINUED | OUTPATIENT
Start: 2019-07-08 | End: 2019-07-09

## 2019-07-08 RX ORDER — HYDROMORPHONE HYDROCHLORIDE 2 MG/ML
INJECTION, SOLUTION INTRAMUSCULAR; INTRAVENOUS; SUBCUTANEOUS AS NEEDED
Status: DISCONTINUED | OUTPATIENT
Start: 2019-07-08 | End: 2019-07-08 | Stop reason: HOSPADM

## 2019-07-08 RX ORDER — ACETAMINOPHEN 500 MG
1000 TABLET ORAL EVERY 6 HOURS
Status: DISCONTINUED | OUTPATIENT
Start: 2019-07-09 | End: 2019-07-10 | Stop reason: HOSPADM

## 2019-07-08 RX ORDER — ROCURONIUM BROMIDE 10 MG/ML
INJECTION, SOLUTION INTRAVENOUS AS NEEDED
Status: DISCONTINUED | OUTPATIENT
Start: 2019-07-08 | End: 2019-07-08 | Stop reason: HOSPADM

## 2019-07-08 RX ORDER — OXYCODONE HYDROCHLORIDE 5 MG/1
10 TABLET ORAL
Status: DISCONTINUED | OUTPATIENT
Start: 2019-07-08 | End: 2019-07-10 | Stop reason: HOSPADM

## 2019-07-08 RX ORDER — DIPHENHYDRAMINE HYDROCHLORIDE 50 MG/ML
12.5 INJECTION, SOLUTION INTRAMUSCULAR; INTRAVENOUS AS NEEDED
Status: DISCONTINUED | OUTPATIENT
Start: 2019-07-08 | End: 2019-07-08 | Stop reason: HOSPADM

## 2019-07-08 RX ORDER — ONDANSETRON 4 MG/1
4 TABLET, ORALLY DISINTEGRATING ORAL
Status: DISCONTINUED | OUTPATIENT
Start: 2019-07-08 | End: 2019-07-10 | Stop reason: HOSPADM

## 2019-07-08 RX ORDER — FENTANYL CITRATE 50 UG/ML
25 INJECTION, SOLUTION INTRAMUSCULAR; INTRAVENOUS
Status: COMPLETED | OUTPATIENT
Start: 2019-07-08 | End: 2019-07-08

## 2019-07-08 RX ORDER — NEOSTIGMINE METHYLSULFATE 1 MG/ML
INJECTION INTRAVENOUS AS NEEDED
Status: DISCONTINUED | OUTPATIENT
Start: 2019-07-08 | End: 2019-07-08 | Stop reason: HOSPADM

## 2019-07-08 RX ORDER — MELOXICAM 7.5 MG/1
7.5 TABLET ORAL DAILY
Status: DISCONTINUED | OUTPATIENT
Start: 2019-07-09 | End: 2019-07-10 | Stop reason: HOSPADM

## 2019-07-08 RX ORDER — SODIUM CHLORIDE 0.9 % (FLUSH) 0.9 %
5-40 SYRINGE (ML) INJECTION AS NEEDED
Status: DISCONTINUED | OUTPATIENT
Start: 2019-07-08 | End: 2019-07-10 | Stop reason: HOSPADM

## 2019-07-08 RX ORDER — HYDROMORPHONE HYDROCHLORIDE 1 MG/ML
0.2 INJECTION, SOLUTION INTRAMUSCULAR; INTRAVENOUS; SUBCUTANEOUS
Status: DISCONTINUED | OUTPATIENT
Start: 2019-07-08 | End: 2019-07-08 | Stop reason: HOSPADM

## 2019-07-08 RX ORDER — LIDOCAINE HYDROCHLORIDE 10 MG/ML
0.1 INJECTION, SOLUTION EPIDURAL; INFILTRATION; INTRACAUDAL; PERINEURAL AS NEEDED
Status: DISCONTINUED | OUTPATIENT
Start: 2019-07-08 | End: 2019-07-08 | Stop reason: HOSPADM

## 2019-07-08 RX ORDER — SODIUM CHLORIDE, SODIUM LACTATE, POTASSIUM CHLORIDE, CALCIUM CHLORIDE 600; 310; 30; 20 MG/100ML; MG/100ML; MG/100ML; MG/100ML
100 INJECTION, SOLUTION INTRAVENOUS CONTINUOUS
Status: DISCONTINUED | OUTPATIENT
Start: 2019-07-08 | End: 2019-07-08 | Stop reason: HOSPADM

## 2019-07-08 RX ORDER — OXYCODONE HYDROCHLORIDE 5 MG/1
5 TABLET ORAL
Status: DISCONTINUED | OUTPATIENT
Start: 2019-07-08 | End: 2019-07-10 | Stop reason: HOSPADM

## 2019-07-08 RX ORDER — CEFAZOLIN SODIUM/WATER 2 G/20 ML
2 SYRINGE (ML) INTRAVENOUS EVERY 8 HOURS
Status: COMPLETED | OUTPATIENT
Start: 2019-07-09 | End: 2019-07-09

## 2019-07-08 RX ORDER — ONDANSETRON 2 MG/ML
INJECTION INTRAMUSCULAR; INTRAVENOUS AS NEEDED
Status: DISCONTINUED | OUTPATIENT
Start: 2019-07-08 | End: 2019-07-08 | Stop reason: HOSPADM

## 2019-07-08 RX ORDER — FACIAL-BODY WIPES
10 EACH TOPICAL DAILY PRN
Status: DISCONTINUED | OUTPATIENT
Start: 2019-07-10 | End: 2019-07-10 | Stop reason: HOSPADM

## 2019-07-08 RX ORDER — ONDANSETRON 2 MG/ML
4 INJECTION INTRAMUSCULAR; INTRAVENOUS
Status: DISCONTINUED | OUTPATIENT
Start: 2019-07-08 | End: 2019-07-09 | Stop reason: SDUPTHER

## 2019-07-08 RX ORDER — SUCCINYLCHOLINE CHLORIDE 20 MG/ML
INJECTION INTRAMUSCULAR; INTRAVENOUS AS NEEDED
Status: DISCONTINUED | OUTPATIENT
Start: 2019-07-08 | End: 2019-07-08 | Stop reason: HOSPADM

## 2019-07-08 RX ADMIN — THERA TABS 1 TABLET: TAB at 09:42

## 2019-07-08 RX ADMIN — FENTANYL CITRATE 100 MCG: 50 INJECTION, SOLUTION INTRAMUSCULAR; INTRAVENOUS at 15:36

## 2019-07-08 RX ADMIN — Medication 80 MCG: at 17:36

## 2019-07-08 RX ADMIN — SENNOSIDES, DOCUSATE SODIUM 1 TABLET: 50; 8.6 TABLET, FILM COATED ORAL at 21:11

## 2019-07-08 RX ADMIN — PRAMIPEXOLE DIHYDROCHLORIDE 0.5 MG: 1 TABLET ORAL at 21:04

## 2019-07-08 RX ADMIN — OXYCODONE HYDROCHLORIDE AND ACETAMINOPHEN 1 TABLET: 7.5; 325 TABLET ORAL at 08:31

## 2019-07-08 RX ADMIN — ROCURONIUM BROMIDE 30 MG: 10 INJECTION, SOLUTION INTRAVENOUS at 16:20

## 2019-07-08 RX ADMIN — ROCURONIUM BROMIDE 10 MG: 10 INJECTION, SOLUTION INTRAVENOUS at 16:42

## 2019-07-08 RX ADMIN — ROCURONIUM BROMIDE 10 MG: 10 INJECTION, SOLUTION INTRAVENOUS at 15:37

## 2019-07-08 RX ADMIN — Medication 1 CAPSULE: at 09:42

## 2019-07-08 RX ADMIN — Medication 2 G: at 23:46

## 2019-07-08 RX ADMIN — FENTANYL CITRATE 25 MCG: 50 INJECTION INTRAMUSCULAR; INTRAVENOUS at 19:20

## 2019-07-08 RX ADMIN — TRAMADOL HYDROCHLORIDE 50 MG: 50 TABLET, FILM COATED ORAL at 21:04

## 2019-07-08 RX ADMIN — Medication 2 G: at 16:00

## 2019-07-08 RX ADMIN — PANTOPRAZOLE SODIUM 40 MG: 40 TABLET, DELAYED RELEASE ORAL at 21:04

## 2019-07-08 RX ADMIN — ACETAMINOPHEN 650 MG: 325 TABLET ORAL at 14:57

## 2019-07-08 RX ADMIN — Medication 80 MCG: at 16:07

## 2019-07-08 RX ADMIN — MESALAMINE 1000 MG: 250 CAPSULE ORAL at 12:21

## 2019-07-08 RX ADMIN — SODIUM CHLORIDE 125 ML/HR: 900 INJECTION, SOLUTION INTRAVENOUS at 19:41

## 2019-07-08 RX ADMIN — MIDAZOLAM HYDROCHLORIDE 2 MG: 1 INJECTION, SOLUTION INTRAMUSCULAR; INTRAVENOUS at 15:29

## 2019-07-08 RX ADMIN — DEXAMETHASONE SODIUM PHOSPHATE 8 MG: 4 INJECTION, SOLUTION INTRA-ARTICULAR; INTRALESIONAL; INTRAMUSCULAR; INTRAVENOUS; SOFT TISSUE at 16:09

## 2019-07-08 RX ADMIN — SODIUM CHLORIDE, SODIUM LACTATE, POTASSIUM CHLORIDE, AND CALCIUM CHLORIDE 25 ML/HR: 600; 310; 30; 20 INJECTION, SOLUTION INTRAVENOUS at 14:53

## 2019-07-08 RX ADMIN — MESALAMINE 1000 MG: 250 CAPSULE ORAL at 07:11

## 2019-07-08 RX ADMIN — DEXMEDETOMIDINE HYDROCHLORIDE 4 MCG: 4 INJECTION, SOLUTION INTRAVENOUS at 17:04

## 2019-07-08 RX ADMIN — MORPHINE SULFATE 2 MG: 2 INJECTION, SOLUTION INTRAMUSCULAR; INTRAVENOUS at 21:56

## 2019-07-08 RX ADMIN — FENTANYL CITRATE 25 MCG: 50 INJECTION INTRAMUSCULAR; INTRAVENOUS at 18:50

## 2019-07-08 RX ADMIN — FENTANYL CITRATE 25 MCG: 50 INJECTION INTRAMUSCULAR; INTRAVENOUS at 19:12

## 2019-07-08 RX ADMIN — DEXMEDETOMIDINE HYDROCHLORIDE 4 MCG: 4 INJECTION, SOLUTION INTRAVENOUS at 17:16

## 2019-07-08 RX ADMIN — Medication 80 MCG: at 15:48

## 2019-07-08 RX ADMIN — Medication 2 G: at 07:11

## 2019-07-08 RX ADMIN — SUCCINYLCHOLINE CHLORIDE 180 MG: 20 INJECTION INTRAMUSCULAR; INTRAVENOUS at 15:38

## 2019-07-08 RX ADMIN — GLYCOPYRROLATE 0.2 MG: 0.2 INJECTION INTRAMUSCULAR; INTRAVENOUS at 17:28

## 2019-07-08 RX ADMIN — OXYCODONE HYDROCHLORIDE AND ACETAMINOPHEN 1 TABLET: 7.5; 325 TABLET ORAL at 19:24

## 2019-07-08 RX ADMIN — OXYCODONE HYDROCHLORIDE 10 MG: 5 TABLET ORAL at 23:45

## 2019-07-08 RX ADMIN — HYDROMORPHONE HYDROCHLORIDE 1 MG: 2 INJECTION, SOLUTION INTRAMUSCULAR; INTRAVENOUS; SUBCUTANEOUS at 17:05

## 2019-07-08 RX ADMIN — SODIUM CHLORIDE, SODIUM LACTATE, POTASSIUM CHLORIDE, CALCIUM CHLORIDE: 600; 310; 30; 20 INJECTION, SOLUTION INTRAVENOUS at 17:27

## 2019-07-08 RX ADMIN — NEOSTIGMINE METHYLSULFATE 2 MG: 1 INJECTION INTRAVENOUS at 17:29

## 2019-07-08 RX ADMIN — LIDOCAINE HYDROCHLORIDE 20 MG: 20 INJECTION, SOLUTION EPIDURAL; INFILTRATION; INTRACAUDAL; PERINEURAL at 15:37

## 2019-07-08 RX ADMIN — SODIUM CHLORIDE, SODIUM LACTATE, POTASSIUM CHLORIDE, CALCIUM CHLORIDE: 600; 310; 30; 20 INJECTION, SOLUTION INTRAVENOUS at 14:55

## 2019-07-08 RX ADMIN — FAMOTIDINE 20 MG: 20 TABLET, FILM COATED ORAL at 21:11

## 2019-07-08 RX ADMIN — DEXMEDETOMIDINE HYDROCHLORIDE 4 MCG: 4 INJECTION, SOLUTION INTRAVENOUS at 17:00

## 2019-07-08 RX ADMIN — HYDROMORPHONE HYDROCHLORIDE 1 MG: 2 INJECTION, SOLUTION INTRAMUSCULAR; INTRAVENOUS; SUBCUTANEOUS at 15:53

## 2019-07-08 RX ADMIN — FENTANYL CITRATE 25 MCG: 50 INJECTION INTRAMUSCULAR; INTRAVENOUS at 19:00

## 2019-07-08 RX ADMIN — PROPOFOL 200 MG: 10 INJECTION, EMULSION INTRAVENOUS at 15:37

## 2019-07-08 RX ADMIN — MIDAZOLAM HYDROCHLORIDE 3 MG: 1 INJECTION, SOLUTION INTRAMUSCULAR; INTRAVENOUS at 15:22

## 2019-07-08 RX ADMIN — Medication 80 MCG: at 16:03

## 2019-07-08 RX ADMIN — NEOSTIGMINE METHYLSULFATE 1 MG: 1 INJECTION INTRAVENOUS at 17:34

## 2019-07-08 RX ADMIN — OXYCODONE HYDROCHLORIDE AND ACETAMINOPHEN 1 TABLET: 7.5; 325 TABLET ORAL at 03:45

## 2019-07-08 RX ADMIN — ACETAMINOPHEN 1000 MG: 500 TABLET ORAL at 23:45

## 2019-07-08 RX ADMIN — ONDANSETRON 4 MG: 2 INJECTION INTRAMUSCULAR; INTRAVENOUS at 16:09

## 2019-07-08 RX ADMIN — Medication 10 ML: at 07:11

## 2019-07-08 NOTE — PROGRESS NOTES
Hospitalist Progress Note  Pilo Bravo MD  Answering service: [phone] from in house phone  Cell: [phone]      Date of Service:  2019  NAME:  Brittney Correa  :  1960  MRN:  [id number]      Admission Summary: This is a 71-year-old male with HIV infection, not in [address], history of rt knee infection who presents by way of wheelchair to the emergency room complains of acute onset of right knee swelling. Patient was sent by Dr. Helen Page from infectious disease to the emergency room for evaluation. Patient had a total knee replacement in 4021 complicated by a MSSA infection in 2019. S/p right knee incision and drainage with polyethylene exchange on 2019. culture was MSSA. He completed a 6 weeks of IV abx ancef via picc line at home. And after that he also completed another one month of po keflex. Per patient,  he finished his oral antibiotics 4 days ago. Patient states he was at work today when he developed an achy feeling in his right knee followed by swelling and pain.  Patient denies any further injuries to the knee    Interval history / Subjective:     Right knee pain improving     Assessment & Plan:     Fever and right knee pain likely due to recurrent septic arthritis   -CT right ext status post knee arthroplasty with a moderate joint effusion and thickening of  the synovium  -On cefazolin   -s/p right knee aspirate, culture preliminary report staph aureus   -ESR 8, CRP >9.5  -blood cx no growth so far  -ID and Orthopedic surgeon on board    Hypokalemia   -replace with kcl and repeat k level    Hx of Crohn's disease   -stable, continue mesalamine     HIV infection   -last absolute CD4 count  533 and VL  500  -continue  Biktarvy -25 1 tab po daily, patient brought his meds from home    Hx of HTN  -BP normal, not on medication, monitor BP    Microscopic hematuria   -no lower abdominal pain  -Discussed with urologist, recommend outpatient evaluation after discharge, patient said has a urologist and said he will make appointment    Hx of BRIDGER  -CPAP q hs      Code status: Full Code  DVT prophylaxis: SCD    Care Plan discussed with: Patient/Family and Nurse  Disposition: TBD     Hospital Problems  Date Reviewed: 2/7/2019          Codes Class Noted POA    Septic arthritis ([facility] [address] ICD-10-CM: M00.9  ICD-9-CM: 711.00  7/6/2019 Yes        * (Principal) Infection of total right knee replacement ([facility] [address] ICD-10-CM: L04.66FI  ICD-9-CM: 996.66, V43.65  2/5/2019 Yes                Vital Signs:    Last 24hrs VS reviewed since prior progress note. Most recent are:  Visit Vitals  /77 (BP 1 Location: Left arm, BP Patient Position: At rest)   Pulse 86   Temp 98.5 °F (36.9 °C)   Resp 15   Ht 5' 11\" (1.803 m)   Wt 102.1 kg (225 lb)   SpO2 96%   BMI 31.38 kg/m²         Intake/Output Summary (Last 24 hours) at 7/8/2019 1141  Last data filed at 7/8/2019 0346  Gross per 24 hour   Intake    Output 1150 ml   Net -1150 ml        Physical Examination:             Constitutional:  No acute distress, cooperative, pleasant    ENT:  Oral mucous moist, oropharynx benign. Neck supple,    Resp:  CTA bilaterally. No wheezing/rhonchi/rales. No accessory muscle use   CV:  Regular rhythm, normal rate, no murmurs, gallops, rubs    GI:  Soft, non distended, non tender. normoactive bowel sounds, no hepatosplenomegaly     Musculoskeletal:  Right knee swollen, non tender     Neurologic:  Moves all extremities.   AAOx3, CN II-XII reviewed     Skin:  Good turgor, no rashes or ulcers       Data Review:    Review and/or order of clinical lab test  Review and/or order of tests in the radiology section of CPT      Labs:     Recent Labs     07/08/19 0852 07/07/19 0349   WBC 8.0 8.4   HGB 13.5 14.4   HCT 41.2 43.5   * 143*     Recent Labs     07/08/19  0852 07/07/19 0349 07/06/19  1749    137 139   K 3.4* 3.2* 3.5    106 105 CO2 23 25 28   BUN 9 11 10   CREA 1.11 1.21 1.27   GLU 97 96 102*   CA 8.3* 8.1* 8.9   MG  --  1.8  --    PHOS  --  2.8  --      Recent Labs     07/07/19  0349 07/06/19  1749   SGOT 29 36   ALT 25 30   AP 74 87   TBILI 2.8* 2.0*   TP 7.6 8.8*   ALB 3.0* 3.6   GLOB 4.6* 5.2*   LPSE  --  63*     No results for input(s): INR, PTP, APTT in the last 72 hours. No lab exists for component: INREXT, INREXT   No results for input(s): FE, TIBC, PSAT, FERR in the last 72 hours. No results found for: FOL, RBCF   No results for input(s): PH, PCO2, PO2 in the last 72 hours. No results for input(s): CPK, CKNDX, TROIQ in the last 72 hours.     No lab exists for component: CPKMB  Lab Results   Component Value Date/Time    Cholesterol, total 164 06/04/2013 01:39 PM    HDL Cholesterol 32 (L) 06/04/2013 01:39 PM    LDL, calculated 87 06/04/2013 01:39 PM    Triglyceride 227 (H) 06/04/2013 01:39 PM     Lab Results   Component Value Date/Time    Glucose (POC) 153 (H) 02/10/2019 04:17 PM    Glucose (POC) 191 (H) 02/10/2019 11:41 AM    Glucose (POC) 152 (H) 02/10/2019 06:05 AM    Glucose (POC) 156 (H) 02/09/2019 09:18 PM    Glucose (POC) 170 (H) 02/09/2019 04:56 PM     Lab Results   Component Value Date/Time    Color YELLOW/STRAW 07/06/2019 08:23 PM    Appearance CLEAR 07/06/2019 08:23 PM    Specific gravity 1.010 07/06/2019 08:23 PM    Specific gravity 1.021 07/23/2018 11:30 AM    pH (UA) 6.0 07/06/2019 08:23 PM    Protein TRACE (A) 07/06/2019 08:23 PM    Glucose NEGATIVE  07/06/2019 08:23 PM    Ketone TRACE (A) 07/06/2019 08:23 PM    Bilirubin NEGATIVE  07/06/2019 08:23 PM    Urobilinogen 1.0 07/06/2019 08:23 PM    Nitrites NEGATIVE  07/06/2019 08:23 PM    Leukocyte Esterase NEGATIVE  07/06/2019 08:23 PM    Epithelial cells FEW 07/06/2019 08:23 PM    Bacteria NEGATIVE  07/06/2019 08:23 PM    WBC 0-4 07/06/2019 08:23 PM    RBC 10-20 07/06/2019 08:23 PM         Medications Reviewed:     Current Facility-Administered Medications Medication Dose Route Frequency    mdecgukqy-jrqaxhnj-bannujg ala (BIKTARVY) -25 mg tablet 1 Tab (Patient Supplied)  1 Tab Oral DAILY    potassium chloride SR (KLOR-CON 10) tablet 40 mEq  40 mEq Oral Q4H    testosterone cypionate (DEPOTESTOTERONE CYPIONATE) injection 100 mg (Patient Supplied)  100 mg IntraMUSCular every Sunday    therapeutic multivitamin (THERAGRAN) tablet 1 Tab  1 Tab Oral DAILY    fish oil-omega-3 fatty acids 340-1,000 mg capsule 1 Cap  1 Cap Oral DAILY    pramipexole (MIRAPEX) tablet 0.5 mg  0.5 mg Oral QHS    docusate sodium (COLACE) capsule 200 mg  200 mg Oral BID PRN    polyethylene glycol (MIRALAX) packet 17 g  17 g Oral DAILY PRN    acetaminophen (TYLENOL) tablet 650 mg  650 mg Oral Q6H PRN    0.9% sodium chloride infusion  125 mL/hr IntraVENous CONTINUOUS    cetirizine (ZYRTEC) tablet 10 mg  10 mg Oral DAILY PRN    pantoprazole (PROTONIX) tablet 40 mg  40 mg Oral QHS    traMADol (ULTRAM) tablet 50 mg  50 mg Oral Q6H PRN    sodium chloride (NS) flush 5-40 mL  5-40 mL IntraVENous Q8H    sodium chloride (NS) flush 5-40 mL  5-40 mL IntraVENous PRN    naloxone (NARCAN) injection 0.4 mg  0.4 mg IntraVENous PRN    oxyCODONE-acetaminophen (PERCOCET 7.5) 7.5-325 mg per tablet 1 Tab  1 Tab Oral Q4H PRN    ondansetron (ZOFRAN) injection 4 mg  4 mg IntraVENous Q4H PRN    ceFAZolin (ANCEF) 2 g/20 mL in sterile water IV syringe  2 g IntraVENous Q8H    mesalamine (PENTASA) CR capsule 1,000 mg  1 g Oral QID     ______________________________________________________________________  EXPECTED LENGTH OF STAY: 3d 9h  ACTUAL LENGTH OF STAY:          2                 Octavia Mathias MD

## 2019-07-08 NOTE — ANESTHESIA POSTPROCEDURE EVALUATION
Procedure(s):  RESECTION ARTHROPLASTY RIGHT KNEE, PLACEMENT OF ANTIBIOTIC IMPREGNATED CEMENT SPACER RIGHT KNEE. general    Anesthesia Post Evaluation        Patient location during evaluation: PACU  Patient participation: complete - patient participated  Level of consciousness: awake and alert  Pain management: adequate  Airway patency: patent  Anesthetic complications: no  Cardiovascular status: acceptable  Respiratory status: acceptable  Hydration status: acceptable  Comments: I have seen and evaluated the patient and is ready for discharge.  Anamaria Yoo MD    Post anesthesia nausea and vomiting:  none      Vitals Value Taken Time   /91 7/8/2019  7:30 PM   Temp 36.9 °C (98.5 °F) 7/8/2019  7:43 PM   Pulse 92 7/8/2019  7:31 PM   Resp 13 7/8/2019  7:31 PM   SpO2 96 % 7/8/2019  7:31 PM

## 2019-07-08 NOTE — ROUTINE PROCESS
TRANSFER - IN REPORT: 
 
Verbal report received from [facility]) on Darcy Anne  being received from [address] [place] [address](unit) for ordered procedure Report consisted of patients Situation, Background, Assessment and  
Recommendations(SBAR). Information from the following report(s) SBAR, Kardex, ED Summary, Procedure Summary, Intake/Output, MAR and Recent Results was reviewed with the receiving nurse. Opportunity for questions and clarification was provided. Assessment completed upon patients arrival to unit and care assumed.

## 2019-07-08 NOTE — PROGRESS NOTES
Infectious Diseases Progress Note    Antibiotic Summary:  Ancef   -- present      Subjective:     No new symptoms. Right knee pain is a little better    ROS:  Constitutional: fever today without rigors  HEENT: no HA or change in vision  Resp: no cough or pleuritic CP  CV: no CP or CHF symptoms  GI: no N, V, D  MS: neg except right knee pain  Neuro: no confusion    Objective:     Vitals:   Visit Vitals  /72   Pulse 100   Temp 99.2 °F (37.3 °C)   Resp 16   Ht 5' 11\" (1.803 m)   Wt 102.1 kg (225 lb)   SpO2 95%   BMI 31.38 kg/m²        Tmax:  Temp (24hrs), Av.2 °F (37.3 °C), Min:97.5 °F (36.4 °C), Max:101.7 °F (38.7 °C)      Exam:  General appearance: alert, no distress  Neck: supple  Lungs: clear to auscultation bilaterally  Heart: regular rate and rhythm  Abdomen: soft, non-tender. Bowel sounds normal. No masses,  no organomegaly  Skin: no rash  Right knee: small to mod effusion  Neurologic: A&O    IV Lines: peripheral    Labs:    Recent Labs     19  0349 19  1749   WBC 8.4 8.2   HGB 14.4 15.5   * 150   BUN 11 10   CREA 1.21 1.27   TBILI 2.8* 2.0*   SGOT 29 36   AP 74 87     BLOOD CULTURES:    = NGSF    Right knee synovial fluid on 2019:   Synovial fluid cell count = 45, 248 WBCs   Gram stain = few WBCs; NOS   Culture = heavy probable Staph aureus    Assessment:     1. 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 HIV infection -- diagnosed 2019 with CD$ = 43 (10.8%) -- Biktarvy begun 2019 -- CD4 on 2019 = 533 (25.4%) and VL ~ 500     3. Mildly elevated creatinine of 1.27 on admission and 1.21 on : His creatinine in 2019 before Biktarvy was 0.7 - 1.08. If the creatinine does not come down we should consider the possibility of nephrotoxicity from tenofovir alafenamide     4. Microscopic hematuria: he says this has been a persistent problem.  He should have a cystoscopy since risk of bladder cancer is increased in HIV     5. Crohns disease     6. HTN     7. Hyperlipidemia     8. BRIDGER     9. Fatty liver     10. GERD     11. Hiatal Hernia     12. Asthma        Plan:     1.  Continue Ancef pending sensitivity data -- assume it will be MSSA again    Malia Li MD

## 2019-07-08 NOTE — PROGRESS NOTES
Bedside and Verbal shift change report given to Marianne Shafer RN (oncoming nurse) by Angela Pedersen RN (offgoing nurse). Report included the following information SBAR, Kardex, Intake/Output, MAR and Recent Results.

## 2019-07-08 NOTE — PROGRESS NOTES
Ortho:  Consent updated to I&D versus resection arthroplasty by Dr. Celso Espinoza. Patient understands nature of potential procedures and risks/benefits. Patient and myself signed consent.   DOMINIC Barriga

## 2019-07-08 NOTE — PROGRESS NOTES
Reason for Admission:  Recurrent knee infection                    RRAT Score:        10             Plan for utilizing home health:  TBD                        Current Advanced Directive/Advance Care Plan: Not on file. Transition of Care Plan:    CM met with pt to introduce him to the CM role. This pt lives at home with his wife. He was independent with ADL's and IADL's prior to admission. He stated that he does not have insurance, but he is covered under [employer]. Pt's WC CM is Harris Kim ([phone]) who is covering for the regular CM, Oliver Vuong ([phone]). Pt's reference number for WC is [id number]. Per Tamera's request, CM faxed clinicals to [phone]. CM will follow. [address] Management Interventions  PCP Verified by CM:  Yes  Palliative Care Criteria Met (RRAT>21 & CHF Dx)?: No  Transition of Care Consult (CM Consult): Discharge Planning  MyChart Signup: No  Discharge Durable Medical Equipment: No  Physical Therapy Consult: No  Occupational Therapy Consult: No  Speech Therapy Consult: No  Current Support Network: Lives with Spouse  Confirm Follow Up Transport: Family  Plan discussed with Pt/Family/Caregiver: Yes  Freedom of Choice Offered: Yes   Resource Information Provided?: No

## 2019-07-08 NOTE — PROGRESS NOTES
OR transport here to  patient. Pt is jittery, checked blood sugar. Blood sugar 75. Dr. aFn Malone notified and at bedside. Will continue with transport to OR.

## 2019-07-08 NOTE — PERIOP NOTES
TRANSFER - OUT REPORT:    Verbal report given to Marta Rivera RN(name) on Floyd Coop  being transferred to [facility](unit) for routine post - op       Report consisted of patients Situation, Background, Assessment and   Recommendations(SBAR). Time Pre op antibiotic given:1600  Anesthesia Stop time: 1843  Lamar Present on Transfer to floor:no  Order for Lamar on Chart:no  Discharge Prescriptions with Chart:no    Information from the following report(s) SBAR, OR Summary, Procedure Summary, Intake/Output, MAR, Recent Results, Med Rec Status and Cardiac Rhythm NSR was reviewed with the receiving nurse. Opportunity for questions and clarification was provided. Is the patient on 02? YES       L/Min 3        Other nc    Is the patient on a monitor? NO    Is the nurse transporting with the patient? NO    Surgical Waiting Area notified of patient's transfer from PACU? YES      The following personal items collected during your admission accompanied patient upon transfer:   Dental Appliance: Dental Appliances: None  Vision: Visual Aid: Glasses  Hearing Aid:    Jewelry: Jewelry: Ring(given to wife)  Clothing: Clothing: (no belongings to preop)  Other Valuables:  Other Valuables: Eyeglasses(glasses with wife)  Valuables sent to safe:

## 2019-07-08 NOTE — ROUTINE PROCESS
Patient: Natalya Ceballos MRN: [id number]  SSN: [id number] YOB: 1960  Age: 63 y.o. Sex: male Patient is status post Procedure(s): RESECTION ARTHROPLASTY RIGHT KNEE, PLACEMENT OF ANTIBIOTIC IMPREGNATED CEMENT SPACER RIGHT KNEE. Surgeon(s) and Role: Lachelle Kidd MD - Primary Local/Dose/Irrigation:  
             
Peripheral IV 07/06/19 Left Forearm (Active) Site Assessment Clean, dry, & intact 7/8/2019  8:56 AM  
Phlebitis Assessment 0 7/8/2019  8:56 AM  
Infiltration Assessment 0 7/8/2019  8:56 AM  
Dressing Status Clean, dry, & intact 7/8/2019  8:56 AM  
Dressing Type Transparent 7/8/2019  8:56 AM  
Hub Color/Line Status Infusing 7/8/2019  8:56 AM  
Action Taken Open ports on tubing capped 7/8/2019  8:56 AM  
Alcohol Cap Used Yes 7/8/2019  8:56 AM  
      
Hemovac Right Knee (Active) Airway - Endotracheal Tube 07/08/19 Oral (Active) Dressing/Packing:  Wound Knee Right-Dressing Type: 4 x 4;ABD pad;Cast padding;Elastic bandage;Petroleum gauze; Staples (07/08/19 1734) Splint/Cast: Wound Knee Right-Splint Type/Material: Knee immobilizer] Other:

## 2019-07-08 NOTE — PROGRESS NOTES
TRANSFER - IN REPORT:    Verbal report received from MARIAH Tyson(name) on Ankur Koo  being received from PACU(unit) for routine post - op      Report consisted of patients Situation, Background, Assessment and   Recommendations(SBAR). Information from the following report(s) SBAR, Kardex, OR Summary, Procedure Summary, Intake/Output and MAR was reviewed with the receiving nurse. Opportunity for questions and clarification was provided. Assessment completed upon patients arrival to unit and care assumed.

## 2019-07-09 LAB
ALBUMIN SERPL-MCNC: 2.5 G/DL (ref 3.5–5)
ALBUMIN/GLOB SERPL: 0.5 {RATIO} (ref 1.1–2.2)
ALP SERPL-CCNC: 73 U/L (ref 45–117)
ALT SERPL-CCNC: 19 U/L (ref 12–78)
ANION GAP SERPL CALC-SCNC: 7 MMOL/L (ref 5–15)
AST SERPL-CCNC: 26 U/L (ref 15–37)
BILIRUB SERPL-MCNC: 1.8 MG/DL (ref 0.2–1)
BUN SERPL-MCNC: 12 MG/DL (ref 6–20)
BUN/CREAT SERPL: 11 (ref 12–20)
CALCIUM SERPL-MCNC: 8.1 MG/DL (ref 8.5–10.1)
CHLORIDE SERPL-SCNC: 107 MMOL/L (ref 97–108)
CO2 SERPL-SCNC: 23 MMOL/L (ref 21–32)
CREAT SERPL-MCNC: 1.06 MG/DL (ref 0.7–1.3)
ERYTHROCYTE [DISTWIDTH] IN BLOOD BY AUTOMATED COUNT: 12.8 % (ref 11.5–14.5)
GLOBULIN SER CALC-MCNC: 5.1 G/DL (ref 2–4)
GLUCOSE SERPL-MCNC: 183 MG/DL (ref 65–100)
HCT VFR BLD AUTO: 40 % (ref 36.6–50.3)
HGB BLD-MCNC: 13.1 G/DL (ref 12.1–17)
MCH RBC QN AUTO: 31.1 PG (ref 26–34)
MCHC RBC AUTO-ENTMCNC: 32.8 G/DL (ref 30–36.5)
MCV RBC AUTO: 95 FL (ref 80–99)
NRBC # BLD: 0 K/UL (ref 0–0.01)
NRBC BLD-RTO: 0 PER 100 WBC
PLATELET # BLD AUTO: 144 K/UL (ref 150–400)
PMV BLD AUTO: 9.2 FL (ref 8.9–12.9)
POTASSIUM SERPL-SCNC: 4 MMOL/L (ref 3.5–5.1)
PROT SERPL-MCNC: 7.6 G/DL (ref 6.4–8.2)
RBC # BLD AUTO: 4.21 M/UL (ref 4.1–5.7)
SODIUM SERPL-SCNC: 137 MMOL/L (ref 136–145)
WBC # BLD AUTO: 8.7 K/UL (ref 4.1–11.1)

## 2019-07-09 PROCEDURE — 77030020365 HC SOL INJ SOD CL 0.9% 50ML

## 2019-07-09 PROCEDURE — 74011250637 HC RX REV CODE- 250/637: Performed by: HOSPITALIST

## 2019-07-09 PROCEDURE — 74011250636 HC RX REV CODE- 250/636: Performed by: HOSPITALIST

## 2019-07-09 PROCEDURE — 97161 PT EVAL LOW COMPLEX 20 MIN: CPT

## 2019-07-09 PROCEDURE — 97116 GAIT TRAINING THERAPY: CPT

## 2019-07-09 PROCEDURE — 74011250636 HC RX REV CODE- 250/636: Performed by: INTERNAL MEDICINE

## 2019-07-09 PROCEDURE — 76937 US GUIDE VASCULAR ACCESS: CPT

## 2019-07-09 PROCEDURE — 94660 CPAP INITIATION&MGMT: CPT

## 2019-07-09 PROCEDURE — 74011250637 HC RX REV CODE- 250/637: Performed by: PHYSICIAN ASSISTANT

## 2019-07-09 PROCEDURE — 80053 COMPREHEN METABOLIC PANEL: CPT

## 2019-07-09 PROCEDURE — 5A09357 ASSISTANCE WITH RESPIRATORY VENTILATION, LESS THAN 24 CONSECUTIVE HOURS, CONTINUOUS POSITIVE AIRWAY PRESSURE: ICD-10-PCS | Performed by: HOSPITALIST

## 2019-07-09 PROCEDURE — 36415 COLL VENOUS BLD VENIPUNCTURE: CPT

## 2019-07-09 PROCEDURE — 65270000029 HC RM PRIVATE

## 2019-07-09 PROCEDURE — 77030020847 HC STATLOK BARD -A

## 2019-07-09 PROCEDURE — C1751 CATH, INF, PER/CENT/MIDLINE: HCPCS

## 2019-07-09 PROCEDURE — 74011000258 HC RX REV CODE- 258: Performed by: INTERNAL MEDICINE

## 2019-07-09 PROCEDURE — 74011250636 HC RX REV CODE- 250/636: Performed by: PHYSICIAN ASSISTANT

## 2019-07-09 PROCEDURE — 02HV33Z INSERTION OF INFUSION DEVICE INTO SUPERIOR VENA CAVA, PERCUTANEOUS APPROACH: ICD-10-PCS | Performed by: INTERNAL MEDICINE

## 2019-07-09 PROCEDURE — 97535 SELF CARE MNGMENT TRAINING: CPT

## 2019-07-09 PROCEDURE — 97165 OT EVAL LOW COMPLEX 30 MIN: CPT

## 2019-07-09 PROCEDURE — 36573 INSJ PICC RS&I 5 YR+: CPT | Performed by: INTERNAL MEDICINE

## 2019-07-09 PROCEDURE — 85027 COMPLETE CBC AUTOMATED: CPT

## 2019-07-09 RX ADMIN — OXYCODONE HYDROCHLORIDE 10 MG: 5 TABLET ORAL at 05:50

## 2019-07-09 RX ADMIN — MESALAMINE 1000 MG: 250 CAPSULE ORAL at 23:46

## 2019-07-09 RX ADMIN — MESALAMINE 1000 MG: 250 CAPSULE ORAL at 00:08

## 2019-07-09 RX ADMIN — MESALAMINE 1000 MG: 250 CAPSULE ORAL at 05:50

## 2019-07-09 RX ADMIN — MESALAMINE 1000 MG: 250 CAPSULE ORAL at 12:38

## 2019-07-09 RX ADMIN — PRAMIPEXOLE DIHYDROCHLORIDE 0.5 MG: 1 TABLET ORAL at 21:05

## 2019-07-09 RX ADMIN — Medication 2 G: at 08:09

## 2019-07-09 RX ADMIN — ACETAMINOPHEN 1000 MG: 500 TABLET ORAL at 12:38

## 2019-07-09 RX ADMIN — OXYCODONE HYDROCHLORIDE 10 MG: 5 TABLET ORAL at 09:04

## 2019-07-09 RX ADMIN — Medication 10 ML: at 23:46

## 2019-07-09 RX ADMIN — APIXABAN 2.5 MG: 2.5 TABLET, FILM COATED ORAL at 17:38

## 2019-07-09 RX ADMIN — ACETAMINOPHEN 1000 MG: 500 TABLET ORAL at 05:50

## 2019-07-09 RX ADMIN — ACETAMINOPHEN 1000 MG: 500 TABLET ORAL at 23:46

## 2019-07-09 RX ADMIN — SENNOSIDES, DOCUSATE SODIUM 1 TABLET: 50; 8.6 TABLET, FILM COATED ORAL at 17:38

## 2019-07-09 RX ADMIN — MESALAMINE 1000 MG: 250 CAPSULE ORAL at 18:22

## 2019-07-09 RX ADMIN — CHLORPROMAZINE HYDROCHLORIDE 25 MG: 25 INJECTION INTRAMUSCULAR at 12:38

## 2019-07-09 RX ADMIN — POLYETHYLENE GLYCOL 3350 17 G: 17 POWDER, FOR SOLUTION ORAL at 09:04

## 2019-07-09 RX ADMIN — Medication 2 G: at 15:51

## 2019-07-09 RX ADMIN — OXYCODONE HYDROCHLORIDE 10 MG: 5 TABLET ORAL at 03:04

## 2019-07-09 RX ADMIN — FAMOTIDINE 20 MG: 20 TABLET, FILM COATED ORAL at 17:38

## 2019-07-09 RX ADMIN — OXYCODONE HYDROCHLORIDE 10 MG: 5 TABLET ORAL at 21:05

## 2019-07-09 RX ADMIN — THERA TABS 1 TABLET: TAB at 09:04

## 2019-07-09 RX ADMIN — Medication 1 CAPSULE: at 09:04

## 2019-07-09 RX ADMIN — OXYCODONE HYDROCHLORIDE 10 MG: 5 TABLET ORAL at 15:51

## 2019-07-09 RX ADMIN — SENNOSIDES, DOCUSATE SODIUM 1 TABLET: 50; 8.6 TABLET, FILM COATED ORAL at 09:04

## 2019-07-09 RX ADMIN — APIXABAN 2.5 MG: 2.5 TABLET, FILM COATED ORAL at 05:50

## 2019-07-09 RX ADMIN — Medication 10 ML: at 23:47

## 2019-07-09 RX ADMIN — PANTOPRAZOLE SODIUM 40 MG: 40 TABLET, DELAYED RELEASE ORAL at 21:05

## 2019-07-09 RX ADMIN — Medication 2 G: at 23:46

## 2019-07-09 RX ADMIN — FAMOTIDINE 20 MG: 20 TABLET, FILM COATED ORAL at 09:04

## 2019-07-09 NOTE — PROGRESS NOTES
Occupational Therapy  Attempted to see for OT evaluation, patient declined OOB  Or OT, 6/10 pain, asking for new ice packs and IV fluids, RN notified, family member encouraging mobility, will attempt to see later this Jahkimberly Yi, MS OTR/L

## 2019-07-09 NOTE — PROGRESS NOTES
Bedside and Verbal shift change report given to Joao Perez RN (oncoming nurse) by Zara Dumas (offgoing nurse). Report included the following information SBAR, Kardex, Intake/Output, MAR and Accordion.

## 2019-07-09 NOTE — PROGRESS NOTES
Bedside and Verbal shift change report given to Swetha Saeed RN (oncoming nurse) by Henrik Pavon RN (offgoing nurse). Report included the following information SBAR, Kardex, Intake/Output, MAR and Recent Results.

## 2019-07-09 NOTE — PROGRESS NOTES
Home IV Antibiotic Orders    1. Diagnosis:  MSSA infection right hip--resected 7/8/19  2. Routine PICC care  3. Antibiotic:  Cefazolin 2 grams IV Q 8 hours  4. Lab each Monday:   CBC/diff/platelets   BMP   ESR  5. Lab each Thursday:   CBC/diff/platelets   BMP  6. Fax lab to Dr. Lily Mancilla @ [phone].  7.  Call Dr. Lily Mancilla @ [phone] for wbc under 4 or creatinine over 2  8. Duration of therapy: 6 weeks   Please call Dr. Lily Mancilla @ [phone] before stopping therapy. 9.  Allergies:  Vancomycin   10.  Call [phone] with name of [address] [place] [address] and to arrange follow up appointment in 15 days    Vasiliy Reeves MD

## 2019-07-09 NOTE — PROGRESS NOTES
Problem: Mobility Impaired (Adult and Pediatric)  Goal: *Acute Goals and Plan of Care (Insert Text)  Description  Physical Therapy Goals  Initiated 7/9/2019    1. Patient will move from supine to sit and sit to supine  in bed with modified independence within 4 days. 2. Patient will perform sit to stand with modified independence within 4 days. 3. Patient will ambulate with modified independence for 250 feet with the least restrictive device within 4 days. 4. Patient will ascend/descend 4 stairs with 1 handrail(s) with modified independence within 4 days. 5. Patient will perform home exercise program per protocol with modified independence within 4 days. 6. Patient will demonstrate AROM 0-90 degrees in operative joint within 4 days. 7/9/2019 1346 by Randy Mcknight  Outcome: Progressing Towards Goal    PHYSICAL THERAPY TREATMENT  Patient: Millie Villa (83 y.o. 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 ASSESSMENT:  Pt presents w/ decreased functional mobility, an impaired gait pattern, and increased pain as expected s/p procedure. Pt improved from AM session and performed bed mobility w/ stand by assistance and transfers w/ CGA. Pt ambulated 150 ft w/ RW and CGA. Pt shows decreased step clearance and slight limp on R side during gait. Pt is progressing as expected and would benefit from AM session for stair training. If progress as expected, recommend  PT to increase mobility and strength of R knee to prepare pt for TKA in weeks to come. Progression toward goals:  ?    Improving appropriately and progressing toward goals  ? Improving slowly and progressing toward goals  ? Not making progress toward goals and plan of care will be adjusted     PLAN:  Patient continues to benefit from skilled intervention to address the above impairments. Continue treatment per established plan of care. Discharge Recommendations:  Home Health  Further Equipment Recommendations for Discharge:  none      SUBJECTIVE:   Patient stated I am in a little bit of pain.     OBJECTIVE DATA SUMMARY:   Critical Behavior:  Neurologic State: Alert  Orientation Level: Oriented X4  Cognition: Appropriate decision making, Appropriate for age attention/concentration, Appropriate safety awareness, Follows commands     Functional Mobility Training:  Bed Mobility:  Rolling: Stand-by assistance  Supine to Sit: Stand-by assistance  Sit to Supine: Stand-by assistance  Scooting: Stand-by assistance        Transfers:  Sit to Stand: Contact guard assistance  Stand to Sit: Contact guard assistance        Bed to Chair: Supervision                    Balance:  Sitting: Intact  Sitting - Static: Good (unsupported)  Sitting - Dynamic: Fair (occasional)  Standing: Impaired  Standing - Static: Good  Standing - Dynamic : Fair  Ambulation/Gait Training:  Distance (ft): 150 Feet (ft)  Assistive Device: Walker, rolling;Gait belt  Ambulation - Level of Assistance: Contact guard assistance     Gait Description (WDL): Exceptions to WDL  Gait Abnormalities: Antalgic;Decreased step clearance  Right Side Weight Bearing: As tolerated  Left Side Weight Bearing: Full  Base of Support: Widened  Stance: Right decreased; Left decreased  Speed/Sophia: Delayed  Step Length: Left shortened;Right shortened  Swing Pattern: Left asymmetrical;Right asymmetrical                 Stairs:              Neuro Re-Education:    Therapeutic Exercises:     Pain:  Pain Scale 1: Numeric (0 - 10)  Pain Intensity 1: 7  Pain Location 1: Knee  Pain Orientation 1: Right  Pain Description 1: Aching  Pain Intervention(s) 1: Ice;Distraction  Activity Tolerance:   Good. VSS. Please refer to the flowsheet for vital signs taken during this treatment. After treatment:   ?    Patient left in no apparent distress sitting up in chair  ? Patient left in no apparent distress in bed  ? Call bell left within reach  ? Nursing notified  ? Caregiver present  ? Bed alarm activated    COMMUNICATION/COLLABORATION:   The patients plan of care was discussed with: Physical Therapist, Occupational Therapist and Registered Nurse    Pat Juárez, SPT   Time Calculation: 14 mins     Frandy Mcdaniel, PT, DPT  Regarding student involvement in patient care:  A student participated in this treatment session. Per CMS Medicare statements and APTA guidelines I certify that the following was true:  1. I was present and directly observed the entire session. 2. I made all skilled judgments and clinical decisions regarding care. 3. I am the practitioner responsible for assessment, treatment, and documentation.     \

## 2019-07-09 NOTE — PROGRESS NOTES
ID Progress Note  2019    Subjective:     Seen and examined, chart reviewed     Objective:     Antibiotics:  1. Cefazolin       Vitals:   Visit Vitals  /77 (BP 1 Location: Right arm)   Pulse 97   Temp 97.5 °F (36.4 °C)   Resp 16   Ht 5' 11\" (1.803 m)   Wt 102.1 kg (225 lb)   SpO2 96%   BMI 31.38 kg/m²        Tmax:  Temp (24hrs), Av °F (36.7 °C), Min:97.3 °F (36.3 °C), Max:99.4 °F (37.4 °C)      Exam:  Wound is dressed    Labs:      Recent Labs     19  0301 19  0852 19  0349 19  1749   WBC 8.7 8.0 8.4 8.2   HGB 13.1 13.5 14.4 15.5   * 128* 143* 150   BUN 12 9 11 10   CREA 1.06 1.11 1.21 1.27   SGOT 26  --  29 36   AP 73  --  74 87   TBILI 1.8*  --  2.8* 2.0*       Cultures:     No results found for: SDES  Lab Results   Component Value Date/Time    Culture result: RARE PROBABLE STAPHYLOCOCCUS AUREUS (A) 2019 04:30 PM    Culture result: Culture performed on Fluid swab specimen 2019 04:30 PM    Culture result: FEW PROBABLE STAPHYLOCOCCUS AUREUS (A) 2019 04:30 PM    Culture result: Culture performed on Fluid swab specimen 2019 04:30 PM       Radiology:     Line/Insert Date:           Assessment:     1. HIV--CD4 533 with VL of 500 (vast improvement)  2. MSSA right TKR infection--status post resection arthroplasty    Objective:     1. IV antibiotic therapy for 6 weeks  2.  PICC  3. HH orders on chart    Abeba Fay MD

## 2019-07-09 NOTE — PROCEDURES
PICC Placement Note : Order received and consent obtained from patient after explaining the reason for  PICC placement, the procedure,risks,benefits and potential complications. An opportunity was provided to ask questions and relay concerns. 4 fr Power Solo PICC was placed using sterile technique and max barrier precautions at patients bedside. Modified Seldinger Technique with ultrasound guidance used to locate the vein. Vein accessed with 21G Introducer Safety Needle and guidewire easily thread. Constant Contact PICC tip  device used to determine PICC tip direction. PRE-PROCEDURE VERIFICATION  Correct Procedure: yes  Correct Site:  yes  Temperature: Temp: 97.5 °F (36.4 °C), Temperature Source: Temp Source: Oral  Recent Labs     07/09/19  0301   BUN 12   CREA 1.06   *   WBC 8.7     Allergies: Vancomycin  Education materials, including PICC Booklet, for PICC Care given to patient: yes. See Patient Education activity for further details. PROCEDURE DETAIL  A single lumen PICC line was started for antibiotic therapy and desire for reliable access. The following documentation is in addition to the PICC properties in the lines/airways flowsheet :  Lot #: [id number]  Was xylocaine 1% used intradermally:  yes 1 ml used. Catheter Length: 39 (cm) Circumference 34cm  Vein Selection for PICC:right basilic  Central Line Bundle followed yes  Complication Related to Insertion: none    The placement was verified by ECG technology. PICC is in the SVC per ECG waveform. Waveform documented in the paper chart. Handoff done with [address] [place] [address] RN and PICC placement discussed.     Line is okay to use: yes    Nahid Tavera RN

## 2019-07-09 NOTE — PROGRESS NOTES
Hospitalist Progress Note  Jenna Tejeda MD  Answering service: [phone] from in house phone  Cell: [phone]      Date of Service:  2019  NAME:  Ye Bynum  :  1960  MRN:  [id number]      Admission Summary: This is a 60-year-old male with HIV infection, not in [address], history of rt knee infection who presents by way of wheelchair to the emergency room complains of acute onset of right knee swelling. Patient was sent by Dr. Yany Rolon from infectious disease to the emergency room for evaluation. Patient had a total knee replacement in 6696 complicated by a MSSA infection in 2019. S/p right knee incision and drainage with polyethylene exchange on 2019. culture was MSSA. He completed a 6 weeks of IV abx ancef via picc line at home. And after that he also completed another one month of po keflex. Per patient,  he finished his oral antibiotics 4 days ago. Patient states he was at work today when he developed an achy feeling in his right knee followed by swelling and pain. Patient denies any further injuries to the knee    Interval history / Subjective:     Pt seen for FU of septic arthritis  First encounter  Patient seen and examined by the bedside  Labs, images and notes reviewed  Patient is comfortable, Denies any chest pain, No fevers or chills, No nausea or vomiting  Right knee pain is tolerable. Has hiccoughs, will give a dose of thorazine today  Discussed with nursing staff, no acute issues overnight, orders reviewed.      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  S/P resection arthroplasty, right knee with placement of antibiotics impregnated cement articulating spacer of the knee 19  -CT right ext status post knee arthroplasty with a moderate joint effusion and thickening of  the synovium  -On cefazolin   -s/p right knee aspirate, shows MSSA  - intra Op cx also showing staph, likely MSSA   -ESR 8, CRP >9.5  -blood cx no growth so far  -ID and Orthopedic surgeon on board  - PICC line orders placed, will need final IV antibiotic dosing recs from ID    Hypokalemia   Resolved, repeat labs in am    Hx of Crohn's disease   -stable, continue mesalamine     HIV infection   -last absolute CD4 count  533 and VL  500  -continue  Biktarvy -25 1 tab po daily, patient brought his meds from home    Hx of HTN  -BP normal, not on medication, monitor BP    Microscopic hematuria   -no lower abdominal pain  -Discussed with urologist, recommend outpatient evaluation after discharge, patient said has a urologist and said he will make appointment    Hx of BRIDGER  -CPAP q hs    -GERD: stable, cont home meds  - asthma: mild intermittent, stable, albuterol prn      Code status: Full Code  DVT prophylaxis: Eliquis    Care Plan discussed with: Patient/Family and Nurse  Disposition: TBD     Hospital Problems  Date Reviewed: 7/8/2019          Codes Class Noted POA    Septic arthritis ([facility] [address] ICD-10-CM: M00.9  ICD-9-CM: 711.00  7/6/2019 Yes        * (Principal) Infection of total right knee replacement ([facility] [address] ICD-10-CM: X66.17ZG  ICD-9-CM: 996.66, V43.65  2/5/2019 Yes                Vital Signs:    Last 24hrs VS reviewed since prior progress note. Most recent are:  Visit Vitals  /86 (BP 1 Location: Right arm, BP Patient Position: Sitting)   Pulse 91   Temp 97.5 °F (36.4 °C)   Resp 16   Ht 5' 11\" (1.803 m)   Wt 102.1 kg (225 lb)   SpO2 96%   BMI 31.38 kg/m²         Intake/Output Summary (Last 24 hours) at 7/9/2019 1336  Last data filed at 7/9/2019 1042  Gross per 24 hour   Intake 1400 ml   Output 1850 ml   Net -450 ml        Physical Examination:             Constitutional:  No acute distress, cooperative, pleasant, appears stated age. ENT:  Oral mucous moist, oropharynx benign. Neck supple,    Resp:  CTA bilaterally. No wheezing/rhonchi/rales.  No accessory muscle use   CV:  Regular rhythm, normal rate, no murmurs, gallops, rubs    GI:  Soft, non distended, non tender. normoactive bowel sounds. Musculoskeletal:  Right knee swollen, non tender     Neurologic:  Moves all extremities. AAOx3     Data Review:    Review and/or order of clinical lab test  Review and/or order of tests in the radiology section of [facility]      Labs:     Recent Labs     07/09/19  0301 07/08/19  0852   WBC 8.7 8.0   HGB 13.1 13.5   HCT 40.0 41.2   * 128*     Recent Labs     07/09/19  0301 07/08/19  0852 07/07/19  0349    136 137   K 4.0 3.4* 3.2*    106 106   CO2 23 23 25   BUN 12 9 11   CREA 1.06 1.11 1.21   * 97 96   CA 8.1* 8.3* 8.1*   MG  --   --  1.8   PHOS  --   --  2.8     Recent Labs     07/09/19 0301 07/07/19 0349 07/06/19  1749   SGOT 26 29 36   ALT 19 25 30   AP 73 74 87   TBILI 1.8* 2.8* 2.0*   TP 7.6 7.6 8.8*   ALB 2.5* 3.0* 3.6   GLOB 5.1* 4.6* 5.2*   LPSE  --   --  63*     No results for input(s): INR, PTP, APTT in the last 72 hours. No lab exists for component: INREXT, INREXT   No results for input(s): FE, TIBC, PSAT, FERR in the last 72 hours. No results found for: FOL, RBCF   No results for input(s): PH, PCO2, PO2 in the last 72 hours. No results for input(s): CPK, CKNDX, TROIQ in the last 72 hours.     No lab exists for component: CPKMB  Lab Results   Component Value Date/Time    Cholesterol, total 164 06/04/2013 01:39 PM    HDL Cholesterol 32 (L) 06/04/2013 01:39 PM    LDL, calculated 87 06/04/2013 01:39 PM    Triglyceride 227 (H) 06/04/2013 01:39 PM     Lab Results   Component Value Date/Time    Glucose (POC) 88 07/08/2019 06:57 PM    Glucose (POC) 74 07/08/2019 04:20 PM    Glucose (POC) 75 07/08/2019 02:16 PM    Glucose (POC) 153 (H) 02/10/2019 04:17 PM    Glucose (POC) 191 (H) 02/10/2019 11:41 AM     Lab Results   Component Value Date/Time    Color YELLOW/STRAW 07/06/2019 08:23 PM    Appearance CLEAR 07/06/2019 08:23 PM    Specific gravity 1.010 07/06/2019 08:23 PM Specific gravity 1.021 07/23/2018 11:30 AM    pH (UA) 6.0 07/06/2019 08:23 PM    Protein TRACE (A) 07/06/2019 08:23 PM    Glucose NEGATIVE  07/06/2019 08:23 PM    Ketone TRACE (A) 07/06/2019 08:23 PM    Bilirubin NEGATIVE  07/06/2019 08:23 PM    Urobilinogen 1.0 07/06/2019 08:23 PM    Nitrites NEGATIVE  07/06/2019 08:23 PM    Leukocyte Esterase NEGATIVE  07/06/2019 08:23 PM    Epithelial cells FEW 07/06/2019 08:23 PM    Bacteria NEGATIVE  07/06/2019 08:23 PM    WBC 0-4 07/06/2019 08:23 PM    RBC 10-20 07/06/2019 08:23 PM         Medications Reviewed:     Current Facility-Administered Medications   Medication Dose Route Frequency    chlorproMAZINE (THORAZINE) 25 mg in 0.9% sodium chloride 50 mL ivpb  25 mg IntraVENous ONCE    eyquusjpy-xtzxgxpo-hffczum ala (BIKTARVY) -25 mg tablet 1 Tab (Patient Supplied)  1 Tab Oral DAILY    . PHARMACY TO SUBSTITUTE PER PROTOCOL (Reordered from: iron polysaccharides (FERREX 150) 150 mg iron capsule)    Per Protocol    sodium chloride 0.9 % bolus infusion 500 mL  500 mL IntraVENous ONCE PRN    sodium chloride (NS) flush 5-40 mL  5-40 mL IntraVENous Q8H    sodium chloride (NS) flush 5-40 mL  5-40 mL IntraVENous PRN    acetaminophen (TYLENOL) tablet 1,000 mg  1,000 mg Oral Q6H    naloxone (NARCAN) injection 0.4 mg  0.4 mg IntraVENous PRN    prochlorperazine (COMPAZINE) with saline injection 5 mg  5 mg IntraVENous Q6H PRN    ondansetron (ZOFRAN ODT) tablet 4 mg  4 mg Oral Q6H PRN    hydrOXYzine HCl (ATARAX) tablet 10 mg  10 mg Oral Q8H PRN    famotidine (PEPCID) tablet 20 mg  20 mg Oral BID    senna-docusate (PERICOLACE) 8.6-50 mg per tablet 1 Tab  1 Tab Oral BID    polyethylene glycol (MIRALAX) packet 17 g  17 g Oral DAILY    [START ON 7/10/2019] bisacodyl (DULCOLAX) suppository 10 mg  10 mg Rectal DAILY PRN    oxyCODONE IR (ROXICODONE) tablet 5 mg  5 mg Oral Q3H PRN    oxyCODONE IR (ROXICODONE) tablet 10 mg  10 mg Oral Q3H PRN    morphine injection 2 mg  2 mg IntraVENous Q3H PRN    apixaban (ELIQUIS) tablet 2.5 mg  2.5 mg Oral BID    meloxicam (MOBIC) tablet 7.5 mg  7.5 mg Oral DAILY    testosterone cypionate (DEPOTESTOTERONE CYPIONATE) injection 100 mg (Patient Supplied)  100 mg IntraMUSCular every Sunday    therapeutic multivitamin (THERAGRAN) tablet 1 Tab  1 Tab Oral DAILY    fish oil-omega-3 fatty acids 340-1,000 mg capsule 1 Cap  1 Cap Oral DAILY    pramipexole (MIRAPEX) tablet 0.5 mg  0.5 mg Oral QHS    docusate sodium (COLACE) capsule 200 mg  200 mg Oral BID PRN    polyethylene glycol (MIRALAX) packet 17 g  17 g Oral DAILY PRN    acetaminophen (TYLENOL) tablet 650 mg  650 mg Oral Q6H PRN    0.9% sodium chloride infusion  75 mL/hr IntraVENous CONTINUOUS    cetirizine (ZYRTEC) tablet 10 mg  10 mg Oral DAILY PRN    pantoprazole (PROTONIX) tablet 40 mg  40 mg Oral QHS    traMADol (ULTRAM) tablet 50 mg  50 mg Oral Q6H PRN    sodium chloride (NS) flush 5-40 mL  5-40 mL IntraVENous Q8H    sodium chloride (NS) flush 5-40 mL  5-40 mL IntraVENous PRN    naloxone (NARCAN) injection 0.4 mg  0.4 mg IntraVENous PRN    oxyCODONE-acetaminophen (PERCOCET 7.5) 7.5-325 mg per tablet 1 Tab  1 Tab Oral Q4H PRN    ondansetron (ZOFRAN) injection 4 mg  4 mg IntraVENous Q4H PRN    ceFAZolin (ANCEF) 2 g/20 mL in sterile water IV syringe  2 g IntraVENous Q8H    mesalamine (PENTASA) CR capsule 1,000 mg  1 g Oral QID     ______________________________________________________________________  EXPECTED LENGTH OF STAY: 3d 9h  ACTUAL LENGTH OF STAY:          3                 Davian Herring MD

## 2019-07-09 NOTE — PROGRESS NOTES
Occupational Therapy    Chart reviewed, patient seen for OT, cleared for home, no needs. Formal note to follow,    Nhi Yi MS OTR/L

## 2019-07-09 NOTE — PROGRESS NOTES
OCCUPATIONAL THERAPY EVALUATION WITH DISCHARGE: Knee  Patient: Floyd Louis (85 y.o. male)  Date: 7/9/2019  Primary Diagnosis: Septic arthritis ([facility] [address] [M00.9]  Procedure(s) (LRB):  RESECTION ARTHROPLASTY RIGHT KNEE, PLACEMENT OF ANTIBIOTIC IMPREGNATED CEMENT SPACER RIGHT KNEE (Right) 1 Day Post-Op   Precautions:   Fall, WBAT(knee immobilizer)    ASSESSMENT :  Based on the objective data described below, patient presents with Setup upper body ADLs, Setup lower body ADLs, and Modified independent functional mobility in room and bathroom this session s/p POD1 R debridement/resection with antiobiotic spacer inserted. Currently SBA to mod I with RW, setup for all ADls, wife to assist as needed. No home needs. Patinet to use shower chair and RW from home, AE as needed, donned/doffed knee immobilizer and ice without assist, educated on safety in home and fall prevention. The following are barriers to ADL independence while in acute care:   - Cognitive and/or behavioral: safety awareness with RW use  - Medical condition: functional reach and medical history    - Other:       Discharge recommendations: None     Equipment recommendations for successful discharge (if) home: has all needed DME     PLAN :  Further skilled acute occupational therapy services are not indicated at this time. SUBJECTIVE:   Patient stated I want to do it on my own.     OBJECTIVE DATA SUMMARY:   HISTORY:   Past Medical History:   Diagnosis Date    Asthma     Autoimmune disease ([facility] [address]     CROHNS DISEASE    Chronic pain     KNEES AND BACK    Fatty liver     GERD (gastroesophageal reflux disease)     HTN (hypertension)     NO MEDS    Hyperlipidemia     Ill-defined condition     HIATAL HERNIA, HIV    Migraine     Sleep apnea     USES CPAP     Past Surgical History:   Procedure Laterality Date    HX HERNIA REPAIR Left 1986    INGUINAL    HX KNEE ARTHROSCOPY Right 2010       Prior Level of Function/Environment/Context: lives with wife and son, was independent with knee pain and no AD  Occupations in which the patient is/was successful, what are the barriers preventing that success:   Performance Patterns (routines, roles, habits, and rituals):   Personal Interests and/or values:   Expanded or extensive additional review of patient history:     Home Situation  Home Environment: Private residence  # Steps to Enter: 4  Rails to Enter: Yes  Hand Rails : Right  One/Two Story Residence: One story  Living Alone: No  Support Systems: Spouse/Significant Other/Partner, Child(nathan), Family member(s)  Patient Expects to be Discharged to[de-identified] Private residence  Current DME Used/Available at Home: Monet Sauger, straight, Walker, rolling, CPAP, Shower chair, Commode, bedside  Tub or Shower Type: Tub/Shower combination    Hand dominance: Right    EXAMINATION OF PERFORMANCE DEFICITS:  Cognitive/Behavioral Status:  Neurologic State: Alert  Orientation Level: Oriented X4  Cognition: Appropriate decision making; Appropriate for age attention/concentration; Appropriate safety awareness; Follows commands             Skin: intact, ace wrap    Edema: mild R LE    Hearing: Auditory  Auditory Impairment: None    Vision/Perceptual:                           Acuity: Able to read normal print without difficulty         Range of Motion:  B UE WFL                   Strength:  B UE 5/5                Coordination:  Coordination: Generally decreased, functional  Fine Motor Skills-Upper: Left Intact; Right Intact         Tone & Sensation:  B UE  Tone: Normal  Sensation: Intact                      Balance:  Sitting: Intact  Sitting - Static: Good (unsupported)  Sitting - Dynamic: Fair (occasional)  Standing: Intact  Standing - Static: Good  Standing - Dynamic : Fair    Functional Mobility and Transfers for ADLs:  Bed Mobility:  Rolling: Stand-by assistance  Supine to Sit: Stand-by assistance  Sit to Supine: Modified independent  Scooting: Stand-by assistance    Transfers:  Sit to Stand: Supervision  Stand to Sit: Supervision  Bed to Chair: Supervision  Bathroom Mobility: Modified independent  Toilet Transfer : Modified independent    ADL Assessment:  Feeding: Independent    Oral Facial Hygiene/Grooming: Setup    Bathing: Setup    Upper Body Dressing: Setup    Lower Body Dressing: Modified independent; Additional time    Toileting: Modified independent                ADL Intervention and task modifications:  Feeding  Feeding Assistance: Independent  Container Management: Independent  Cutting Food: Independent  Utensil Management: Independent  Food to Mouth: Independent  Drink to Mouth: Independent    Grooming  Grooming Assistance: Set-up              Upper [address]: Set-up    Lower Body Dressing Assistance  Socks: Set-up  Slip on Shoes with Back: Supervision  Leg Crossed Method Used: No  Position Performed: Bending forward method;Seated in chair  Cues: Don;Verbal cues provided    Toileting  Toileting Assistance: Modified independent  Bladder Hygiene: Independent  Bowel Hygiene: Independent  Clothing Management: Modified independent         Bathing: Patient instructed and indicated understanding when bathing to not submerge wound in water, stand to shower or sponge bathe, cover wound with plastic and tape to ensure no water reaches bandage/wound without cues. Dressing joint: Patient instructed and demonstrated understanding to don/doff Right LE first/last with Setup. Patient instructed and demonstrated to don all clothing while sitting prior to standing, doff all clothing to knees while standing, then sit to doff clothing off from knees to feet in order to facilitate fall prevention, pain management, and energy conservation with Modified independent. Dressing joint reach exercise:  To increase independence with lower body dressing, patient instructed and demonstrated to reach down Right LE in a seated position slowly to prevent tearing/shearing until slight pull is felt, hold at end range for 10 seconds, then return to starting upright position with Independent. Patient instructed to complete three sets of three repetitions each daily. Home safety: Patient instructed and indicated understanding on home modifications and safety (raise height of ADL objects, appropriate height of chair surfaces, recliner safety, change of floor surfaces, clear pathways) to increase independence and fall prevention. Standing: Patient instructed and demonstrated during ADLs to walk up to sink/counter top/surfaces, step into walker to increase safety of joint and fall prevention with Modified independent. Patient educated about knee anatomy and educated to avoid rotation of Right LE. Instructed to apply concept to ADLs within the home (no twisting of knee during reaching across body, square off while using objects, slide objects along surfaces). Patient instructed and indicated understanding to increase amount of time standing, observe standing position during ADLs in order to increase even weight bearing through bilateral LEs in order to increase independence with ADLs. Goal to be reached 30 days post - op, per orthopedic surgeon or per PT. Tub/shower transfer: Patient instructed and indicated understanding regarding when it is safe to begin transfer into tub/shower (complete stairs with PT, advance exercises with PT high enough to clear tub/shower height). Patient instructed to use the same technique as used with stairs when entering and exiting tub/shower (\"up with the non-surgical, down with the surgical leg\"). Therapeutic Exercise:  encouraged OOB and full participation with ADLs to improve strength and endurance.      Functional Measure:  Barthel Index:    Bathin  Bladder: 10  Bowels: 10  Groomin  Dressing: 10  Feeding: 10  Mobility: 10  Stairs: 5  Toilet Use: 10  Transfer (Bed to Chair and Back): 10  Total: 85/100        Percentage of impairment   0%   1-19%   20-39% 40-59%   60-79%   80-99%   100%   Barthel Score 0-100 100 99-80 79-60 59-40 20-39 1-19   0     The Barthel ADL Index: Guidelines  1. The index should be used as a record of what a patient does, not as a record of what a patient could do. 2. The main aim is to establish degree of independence from any help, physical or verbal, however minor and for whatever reason. 3. The need for supervision renders the patient not independent. 4. A patient's performance should be established using the best available evidence. Asking the patient, friends/relatives and nurses are the usual sources, but direct observation and common sense are also important. However direct testing is not needed. 5. Usually the patient's performance over the preceding 24-48 hours is important, but occasionally longer periods will be relevant. 6. Middle categories imply that the patient supplies over 50 per cent of the effort. 7. Use of aids to be independent is allowed. Yosi Dominguez., Barthel, D.W. ([id number]). Functional evaluation: the Barthel Index. [address] [facility] (14)2. DUYEN Reeves, Salome Silver., Sean Saldana., [place], [facility] (1999). Measuring the change indisability after inpatient rehabilitation; comparison of the responsiveness of the Barthel Index and Functional [place] Measure. Journal of Neurology, Neurosurgery, and Psychiatry, 66(4), 097-642. SHERIF Garibay.GURINDER, TOMASA Groves, & Erin Lorenzana MTraeA. (2004.) Assessment of post-stroke quality of life in cost-effectiveness studies: The usefulness of the Barthel Index and the EuroQoL-5D.  Quality of Life Research, 15, 681-84         Occupational Therapy Evaluation Charge Determination   History Examination Decision-Making   LOW Complexity : Brief history review  LOW Complexity : 1-3 performance deficits relating to physical, cognitive , or psychosocial skils that result in activity limitations and / or participation restrictions  LOW Complexity : No comorbidities that affect functional and no verbal or physical assistance needed to complete eval tasks       Based on the above components, the patient evaluation is determined to be of the following complexity level: LOW   Pain:    Post treatment:  2/10   Location: R knee   Description:pain   Aggravating factors: Activity Tolerance:   good and requires rest breaks  Please refer to the flowsheet for vital signs taken during this treatment. After treatment patient left:   Up in chair  Caregiver at bedside  Call light within reach  RN notified   COMMUNICATION/EDUCATION:   The patients evaluation was discussed with: Physical Therapist, Registered Nurse and PT student.     Thank you for this referral.  Amberly Yi, OT  Time Calculation: 34 mins

## 2019-07-09 NOTE — OP NOTES
[address] [facility]  OPERATIVE REPORT    Name:  Larry Bautista  MR#:  [id number]  :  1960  ACCOUNT #:  [de-identified]  DATE OF SERVICE:  2019    PREOPERATIVE DIAGNOSIS:  Chronically infected right total knee replacement. POSTOPERATIVE DIAGNOSIS:  Chronically infected right total knee replacement. PROCEDURE PERFORMED:  Resection arthroplasty, right knee with placement of antibiotic impregnated cement articulating spacer of knee. SURGEON:  Mirta Reed MD    ASSISTANT:  Vidhya Terry PA-C    ANESTHESIA:  General.    COMPLICATIONS:  None. SPECIMENS REMOVED:  Old components. IMPLANTS:  DePuy antibiotic impregnated cement articulating spacer, knee. ESTIMATED BLOOD LOSS:  150 mL. INDICATIONS:  This is a 28-year-old gentleman who I did a right total knee replacement on approximately 1 year ago. He did extremely well for approximately 6 months. However, he presented with acute onset pain and swelling back in February. Workup included aspiration which grew out staph aureus. He was also found to be HIV positive at that time. He underwent irrigation and debridement with exchange of the polyethylene liner. He went through a course of 6 weeks of IV antibiotics followed by a prolonged course of p.o. antibiotics. He has done extremely well since that time and has been back at work with no pain or swelling. He presents to the emergency room with acute onset recurrent pain and swelling. He discontinued his p.o. antibiotics approximately 5 days ago. Aspiration in the emergency room showed an elevated white count and cultures are growing staph. It was therefore assumed that the infection is chronic and has been present since February. He is now taken to the operating room for the above-listed procedure. PROCEDURE:  The patient was taken to the operating room and underwent general inhalational anesthesia. The right knee and leg were prepped and draped in usual fashion. The leg was elevated for several minutes and tourniquet inflated to 300 mmHg. The original incision was utilized and taken down through skin and subcutaneous tissue. A medial parapatellar arthrotomy was performed and extended proximally along the medial border of the quadriceps tendon just along the medial border of insertion of patellar tendon. Medial release was performed. Scar tissue was excised. Cultures were obtained. Patella was mobilized and subluxated laterally and the knee was flexed to 90 degrees. Again, further releases were performed to gain exposure. The polyethylene liner was removed. The components were not grossly loose. Osteotome was used to go around the femoral component and the femoral component was removed with relative ease. The tibial component appeared well fixed. Osteotome was used to go around the tibial component and this was removed. All remaining cement was removed. The patellar component was removed along with cement. Old scar tissue and nonviable tissue as well as any remaining cement were meticulously removed. All bony surfaces were cleansed using pulsatile lavage system with antibiotic solution. The tobramycin impregnated cement was utilized and mixed. Trial reduction was performed and a size 15 polyethylene liner was chosen. Size 15 polyethylene liner was cemented in with the antibiotic cement. The mold was used to form a femoral component and this was cemented on to the femur. The knee was placed in full extension until the cement hardened. After the cement had hardened, I removed the plastic mold. The knee was reduced. All the excess cement was removed. Tourniquet was let down after a tourniquet time of 62 minutes. A medium Hemovac drain was placed. The arthrotomy was repaired using #1 Stratafix suture in a running fashion. Skin edges were approximated using stapling apparatus.   Bulky sterile dressing was applied and the patient was awakened, extubated, and transferred to recovery room in stable condition. There were no complications. The physician assistant was critical throughout the case in assisting with manipulation and positioning of the leg as well as retraction and wound closure.       Connie Morgan MD      [id number]  D:  07/08/2019 19:08  T:  07/08/2019 19:13  JOB #:  [id number]

## 2019-07-09 NOTE — PROGRESS NOTES
VANI- D/c plan will likely be pt going home with IV antibiotics and home health PT/OT. Awaiting ID consult and recs. Jessica Mcfarland CM faxed additional clinicals to [facility] to [phone]. CM will follow.  Digna Stafford

## 2019-07-09 NOTE — PROGRESS NOTES
Problem: Mobility Impaired (Adult and Pediatric)  Goal: *Acute Goals and Plan of Care (Insert Text)  Description  Physical Therapy Goals  Initiated 7/9/2019    1. Patient will move from supine to sit and sit to supine  in bed with modified independence within 4 days. 2. Patient will perform sit to stand with modified independence within 4 days. 3. Patient will ambulate with modified independence for 250 feet with the least restrictive device within 4 days. 4. Patient will ascend/descend 4 stairs with 1 handrail(s) with modified independence within 4 days. 5. Patient will perform home exercise program per protocol with modified independence within 4 days. Outcome: Progressing Towards Goal   PHYSICAL THERAPY EVALUATION  Patient: Ankur Koo (7 y.o. male)  Date: 7/9/2019  Primary Diagnosis: Septic arthritis ([facility] [address] [M00.9]  Procedure(s) (LRB):  RESECTION ARTHROPLASTY RIGHT KNEE, PLACEMENT OF ANTIBIOTIC IMPREGNATED CEMENT SPACER RIGHT KNEE (Right) 1 Day Post-Op   Precautions:  Fall, WBAT(knee immobilizer)    ASSESSMENT :  Based on the objective data described below, the patient presents with s/p R debridement/resection of R knee. Pt shows decreased functional mobility, an impaired gait pattern, and increased pain as expected post procedure. Pt has PMH of HIV and R TKA in August of 2018, and MRSA infection Feb 2019. PLOF pt lives in one story home w/ wife and 24 yo son w/ 4 steps to enter the home, independent in ADL's and ambulation w/o an AD . Although he has used AD's in the past due to his past TKA. Pt performed bed mobility w/ min A to manage RLE and transfers w/ CGA. Pt ambulated 79 ft w/ RW and CGA and showed an antalgic gait pattern w/ decreased step clearance. Pt was returned to bed in supine position at end of therapy session w/ wife in room. Pt will benefit from stair training in PM session to prepare for D/C home.  If pt progresses as expected, recommend HH PT to maintain strength and motion of joint prior to his next TKA procedure. Patient will benefit from skilled intervention to address the above impairments. Patients rehabilitation potential is considered to be Good  Factors which may influence rehabilitation potential include:   ? None noted  ? Mental ability/status  ? Medical condition  ? Home/family situation and support systems  ? Safety awareness  ? Pain tolerance/management  ? Other:      PLAN :  Recommendations and Planned Interventions:  ?           Bed Mobility Training             ? Neuromuscular Re-Education  ? Transfer Training                   ? Orthotic/Prosthetic Training  ? Gait Training                         ? Modalities  ? Therapeutic Exercises           ? Edema Management/Control  ? Therapeutic Activities            ? Patient and Family Training/Education  ? Other (comment):    Frequency/Duration: Patient will be followed by physical therapy  twice daily to address goals. Discharge Recommendations: Home Health  Further Equipment Recommendations for Discharge: already has RW and SPC      SUBJECTIVE:   Patient stated I name all of my assistive devices.     OBJECTIVE DATA SUMMARY:   HISTORY:    Past Medical History:   Diagnosis Date    Asthma     Autoimmune disease ([facility] [address]     CROHNS DISEASE    Chronic pain     KNEES AND BACK    Fatty liver     GERD (gastroesophageal reflux disease)     HTN (hypertension)     NO MEDS    Hyperlipidemia     Ill-defined condition     HIATAL HERNIA, HIV    Migraine     Sleep apnea     USES CPAP     Past Surgical History:   Procedure Laterality Date    HX HERNIA REPAIR Left 1986    INGUINAL    HX KNEE ARTHROSCOPY Right 2010     Prior Level of Function/Home Situation: independent in ADLs and ambulation, community Ambulator, lives w/ wife and son in one story home w/ four steps to enter, works in construction  Personal factors and/or comorbidities impacting plan of care: none    Home Situation  Home Environment: Private residence  # Steps to Enter: 4  Rails to Enter: Yes  Hand Rails : Right  One/Two Story Residence: One story  Living Alone: No  Support Systems: Spouse/Significant Other/Partner, Child(nathan), Family member(s)  Patient Expects to be Discharged to[de-identified] Private residence  Current DME Used/Available at Home: Rocio Rising, straight, Walker, rolling, CPAP, Shower chair, Commode, bedside  Tub or Shower Type: Tub/Shower combination    EXAMINATION/PRESENTATION/DECISION MAKING:   Critical Behavior:  Neurologic State: Alert, Appropriate for age  Orientation Level: Oriented X4  Cognition: Appropriate decision making, Appropriate for age attention/concentration, Appropriate safety awareness, Follows commands     Hearing:   Auditory  Auditory Impairment: None  Skin:    Edema:   Range Of Motion:  AROM: Generally decreased, functional           PROM: Generally decreased, functional           Strength:    Strength: Generally decreased, functional                    Tone & Sensation:   Tone: Normal              Sensation: Intact               Coordination:  Coordination: Generally decreased, functional  Vision:      Functional Mobility:  Bed Mobility:  Rolling: Stand-by assistance  Supine to Sit: min A  Sit to Supine: min A  Scooting: Stand-by assistance  Transfers:  Sit to Stand: Contact guard assistance  Stand to Sit: Contact guard assistance        Balance:   Sitting: Impaired  Sitting - Static: Good (unsupported)  Sitting - Dynamic: Fair (occasional)  Standing: Impaired  Standing - Static: Fair  Standing - Dynamic : Fair  Ambulation/Gait Training:  Distance (ft): 70 Feet (ft)  Assistive Device: Walker, rolling;Cane, straight  Ambulation - Level of Assistance: Contact guard assistance     Gait Description (WDL): Exceptions to WDL  Gait Abnormalities: Antalgic;Decreased step clearance  Right Side Weight Bearing: As tolerated  Left Side Weight Bearing: Full  Base of Support: Widened;Center of gravity altered  Stance: Left decreased;Right decreased  Speed/Sophia: Delayed  Step Length: Left shortened;Right shortened       Functional Measure:  Barthel Index:    Bathin  Bladder: 10  Bowels: 10  Groomin  Dressin  Feeding: 10  Mobility: 10  Stairs: 5  Toilet Use: 10  Transfer (Bed to Chair and Back): 10  Total: 80/100       Percentage of impairment   0%   1-19%   20-39%   40-59%   60-79%   80-99%   100%   Barthel Score 0-100 100 99-80 79-60 59-40 20-39 1-19   0     The Barthel ADL Index: Guidelines  1. The index should be used as a record of what a patient does, not as a record of what a patient could do. 2. The main aim is to establish degree of independence from any help, physical or verbal, however minor and for whatever reason. 3. The need for supervision renders the patient not independent. 4. A patient's performance should be established using the best available evidence. Asking the patient, friends/relatives and nurses are the usual sources, but direct observation and common sense are also important. However direct testing is not needed. 5. Usually the patient's performance over the preceding 24-48 hours is important, but occasionally longer periods will be relevant. 6. Middle categories imply that the patient supplies over 50 per cent of the effort. 7. Use of aids to be independent is allowed. Ely Day., Barthel, D.W. (7011). Functional evaluation: the Barthel Index. [facility] (14)2. [facility] DUYEN Hirsch, [facility]., Nona Frankie.[address] (). Measuring the change indisability after inpatient rehabilitation; comparison of the responsiveness of the Barthel Index and Functional [place] Measure. Journal of Neurology, Neurosurgery, and Psychiatry, 66(4), 583-615.   Gissel Apodaca, N.J.A, TOMASA Groves, & Roberto Trammell M.A. (2004.) Assessment of post-stroke quality of life in cost-effectiveness studies: The usefulness of the Barthel Index and the EuroQoL-5D. Quality of Life Research, 13, 895-85               Based on the above components, the patient evaluation is determined to be of the following complexity level: LOW     Pain:  Pain Scale 1: Numeric (0 - 10)  Pain Intensity 1: 7  Pain Location 1: Knee  Pain Orientation 1: Right  Pain Description 1: Aching  Pain Intervention(s) 1: Ice;Distraction  Activity Tolerance:   Good. VSS  Vitals:    07/09/19 0438 07/09/19 0813 07/09/19 0831 07/09/19 0836   BP:  133/72 124/82 131/86   BP 1 Location:  Right arm Right arm Right arm   BP Patient Position:  At rest Supine;Pre-activity Sitting   Pulse:  91     Resp:  16     Temp:  97.5 °F (36.4 °C)     SpO2: 95% 96%     Weight:       Height:           Please refer to the flowsheet for vital signs taken during this treatment. After treatment:   ?         Patient left in no apparent distress sitting up in chair  ? Patient left in no apparent distress in bed  ? Call bell left within reach  ? Nursing notified  ? Caregiver present  ? Bed alarm activated    COMMUNICATION/EDUCATION:   The patients plan of care was discussed with: Physical Therapist, Occupational Therapist and Registered Nurse. ?         Fall prevention education was provided and the patient/caregiver indicated understanding. ? Patient/family have participated as able in goal setting and plan of care. ?         Patient/family agree to work toward stated goals and plan of care. ?         Patient understands intent and goals of therapy, but is neutral about his/her participation. ? Patient is unable to participate in goal setting and plan of care. Thank you for this referral.  Dionicio Lindsey, SPT   Time Calculation: 21 mins     Na Montana, PT, DPT    Regarding student involvement in patient care:  A student participated in this treatment session.  Per CMS Medicare statements and APTA guidelines I certify that the following was true:  1. I was present and directly observed the entire session. 2. I made all skilled judgments and clinical decisions regarding care. 3. I am the practitioner responsible for assessment, treatment, and documentation.

## 2019-07-10 VITALS
DIASTOLIC BLOOD PRESSURE: 84 MMHG | RESPIRATION RATE: 15 BRPM | BODY MASS INDEX: 31.5 KG/M2 | HEIGHT: 71 IN | HEART RATE: 76 BPM | WEIGHT: 225 LBS | OXYGEN SATURATION: 97 % | TEMPERATURE: 97.9 F | SYSTOLIC BLOOD PRESSURE: 134 MMHG

## 2019-07-10 LAB
BACTERIA SPEC CULT: ABNORMAL
BACTERIA SPEC CULT: NORMAL
BACTERIA SPEC CULT: NORMAL
GRAM STN SPEC: ABNORMAL
SERVICE CMNT-IMP: ABNORMAL
SERVICE CMNT-IMP: ABNORMAL
SERVICE CMNT-IMP: NORMAL
SERVICE CMNT-IMP: NORMAL

## 2019-07-10 PROCEDURE — 74011250637 HC RX REV CODE- 250/637: Performed by: HOSPITALIST

## 2019-07-10 PROCEDURE — 97530 THERAPEUTIC ACTIVITIES: CPT

## 2019-07-10 PROCEDURE — 74011250636 HC RX REV CODE- 250/636: Performed by: HOSPITALIST

## 2019-07-10 PROCEDURE — 74011250637 HC RX REV CODE- 250/637: Performed by: PHYSICIAN ASSISTANT

## 2019-07-10 PROCEDURE — 94660 CPAP INITIATION&MGMT: CPT

## 2019-07-10 PROCEDURE — 97116 GAIT TRAINING THERAPY: CPT

## 2019-07-10 PROCEDURE — 5A09357 ASSISTANCE WITH RESPIRATORY VENTILATION, LESS THAN 24 CONSECUTIVE HOURS, CONTINUOUS POSITIVE AIRWAY PRESSURE: ICD-10-PCS | Performed by: INTERNAL MEDICINE

## 2019-07-10 RX ORDER — CEFAZOLIN SODIUM/WATER 2 G/20 ML
2 SYRINGE (ML) INTRAVENOUS EVERY 8 HOURS
Qty: 1 ML | Refills: 0 | Status: SHIPPED
Start: 2019-07-10 | End: 2020-01-15

## 2019-07-10 RX ORDER — AMOXICILLIN 250 MG
1 CAPSULE ORAL 2 TIMES DAILY
Qty: 30 TAB | Refills: 0 | Status: SHIPPED | OUTPATIENT
Start: 2019-07-10 | End: 2020-01-15

## 2019-07-10 RX ORDER — OXYCODONE HYDROCHLORIDE 5 MG/1
5 TABLET ORAL
Qty: 60 TAB | Refills: 0 | Status: SHIPPED | OUTPATIENT
Start: 2019-07-10 | End: 2019-07-20

## 2019-07-10 RX ORDER — MELOXICAM 7.5 MG/1
7.5 TABLET ORAL DAILY
Qty: 30 TAB | Refills: 0 | Status: SHIPPED | OUTPATIENT
Start: 2019-07-10 | End: 2019-07-10

## 2019-07-10 RX ADMIN — Medication 10 ML: at 07:31

## 2019-07-10 RX ADMIN — Medication 10 ML: at 13:49

## 2019-07-10 RX ADMIN — Medication 10 ML: at 13:48

## 2019-07-10 RX ADMIN — Medication 2 G: at 07:31

## 2019-07-10 RX ADMIN — ACETAMINOPHEN 1000 MG: 500 TABLET ORAL at 12:32

## 2019-07-10 RX ADMIN — Medication 1 CAPSULE: at 09:01

## 2019-07-10 RX ADMIN — POLYETHYLENE GLYCOL 3350 17 G: 17 POWDER, FOR SOLUTION ORAL at 09:01

## 2019-07-10 RX ADMIN — OXYCODONE HYDROCHLORIDE 10 MG: 5 TABLET ORAL at 07:31

## 2019-07-10 RX ADMIN — MESALAMINE 1000 MG: 250 CAPSULE ORAL at 05:44

## 2019-07-10 RX ADMIN — Medication 2 G: at 13:49

## 2019-07-10 RX ADMIN — APIXABAN 2.5 MG: 2.5 TABLET, FILM COATED ORAL at 09:01

## 2019-07-10 RX ADMIN — ACETAMINOPHEN 1000 MG: 500 TABLET ORAL at 05:44

## 2019-07-10 RX ADMIN — FAMOTIDINE 20 MG: 20 TABLET, FILM COATED ORAL at 09:01

## 2019-07-10 RX ADMIN — MESALAMINE 1000 MG: 250 CAPSULE ORAL at 12:32

## 2019-07-10 RX ADMIN — THERA TABS 1 TABLET: TAB at 09:01

## 2019-07-10 RX ADMIN — SENNOSIDES, DOCUSATE SODIUM 1 TABLET: 50; 8.6 TABLET, FILM COATED ORAL at 09:01

## 2019-07-10 NOTE — PROGRESS NOTES
Krysta Young RN reviewed discharge instructions with the patient and spouse. The patient and spouse verbalized understanding. Pt received hard rxs.

## 2019-07-10 NOTE — PROGRESS NOTES
Hospital follow-up PCP transitional care appointment has been scheduled with Dr. Kieran Man  for Wednesday, 7/17/19 at 11:00 a.m. Pending patient discharge.   Janine Singh, Care Management Specialist.

## 2019-07-10 NOTE — PROGRESS NOTES
Bedside and Verbal shift change report given to Rian Wheatley RN (oncoming nurse) by Sha Ahmadi RN (offgoing nurse). Report included the following information SBAR, Kardex, OR Summary, Procedure Summary, Intake/Output, MAR, Accordion and Recent Results.

## 2019-07-10 NOTE — DISCHARGE INSTRUCTIONS
Discharge Instructions Knee Replacement  Dr. Chelsea Wilson    Patient Name  Al Sen  Date of procedure  7/8/2019    Procedure  Procedure(s):  RESECTION ARTHROPLASTY RIGHT KNEE, PLACEMENT OF ANTIBIOTIC IMPREGNATED CEMENT SPACER RIGHT KNEE  Surgeon  Surgeon(s) and Role:     Apple Stokes MD - Primary  Date of discharge: 7/10/19  PCP: Valentín Mojica MD    Follow up appointments   Follow up with Dr. Chelsea Wilson in 2 weeks. Call [phone] to make an appointment.  If home health has been arranged for you the agency will contact you to arrange dates/times for visits. Please call them if you do not hear from them within 24 hours after you are discharged    When to call your Orthopaedic Surgeon: Call [phone]. If you call after 5pm or on a weekend, the on call physician will be contacted   Unrelieved pain   Signs of infection-if your incision is red; continues to have drainage; drainage has a foul odor or if you have a persistent fever over 101 degrees   Signs of a blood clot in your leg-calf pain, tenderness, redness, swelling of lower leg    When to call your Primary Care Physician:   Concerns about medical conditions such as diabetes, high blood pressure, asthma, congestive heart failure   Call if blood sugars are elevated, persistent headache or dizziness, coughing or congestion, constipation or diarrhea, burning with urination, abnormal heart rate    When to call [id number] go to the nearest emergency room   Acute onset of chest pain, shortness of breath, difficulty breathing      Activity   Weight bearing as tolerated with walker or crutches.  Refer to your patient handbook for instructions and pictures   Complete your Home Exercise Program daily as instructed by your therapist.  Refer to your patient handbook for instructions and pictures   Get up every one hour and walk (except at night when sleeping)   Do not drive or operate heavy machinery      Incision Care   You will have staples in your knee incision; they will be removed at the surgeons office visit in 2 weeks   Keep a dressing (ABD and paper tape) on your incision and change daily. Wash your hands thoroughly before changing the dressing. Once you incision is not draining, you may leave it open to air   You may shower and get your incision wet but do not submerge your incision under water in a bath tub, hot tub or swimming pool for 6 weeks after surgery. Preventing blood clots   Take Eliquis 2.5 mg as prescribed    Pain management   Keep ice wrap in place except when walking; changing gel packs every 4 hours   Lie down and elevate your leg on pillows for about 30 minutes after walking to decrease swelling and pain   Do ankle pumps (10 repetitions) every hour while awake and get up frequently to walk   Take Tylenol 1000 mg every 6 hours for 2 weeks    Take narcotic pain pill as prescribed if needed. Take with food; avoid alcohol while taking pain medication. Decrease the amount of narcotic pain medication as your pain lessens     Diet   Resume usual diet; drink plenty of fluids; eat foods high in fiber   Take over-the-counter stool softeners and laxatives to prevent constipation    Home IV Antibiotic Orders     1. Diagnosis:  MSSA infection right hip--resected 7/8/19  2. Routine PICC care  3. Antibiotic:  Cefazolin 2 grams IV Q 8 hours  4. Lab each Monday:             CBC/diff/platelets             BMP             ESR  5. Lab each Thursday:             CBC/diff/platelets             BMP  6. Fax lab to Dr. Angelica Cadena @ [phone].  7.  Call Dr. Angelica Cadena @ [phone] for wbc under 4 or creatinine over 2  8. Duration of therapy: 6 weeks             Please call Dr. Angelica Cadena @ [phone] before stopping therapy. 9.  Allergies:  Vancomycin   10.  Call [phone] with name of [address] [place] [address] and to arrange follow up appointment in 14 days     Caryle Breed, MD       Discharge Instructions       PATIENT ID: Ross Stiles  MRN: [id number]   YOB: 1960    DATE OF ADMISSION: 7/6/2019  5:14 PM    DATE OF DISCHARGE: 7/10/2019    PRIMARY CARE PROVIDER: Elle Perry MD     ATTENDING PHYSICIAN: Antonio Montana MD  DISCHARGING PROVIDER: Judy Kenny MD    To contact this individual call [phone] and ask the  to page. If unavailable ask to be transferred the Adult Hospitalist Department. DISCHARGE DIAGNOSES   Recurrent MSSA septic arthritis of the right knee  S/P resection arthroplasty, right knee with placement of antibiotics impregnated cement articulating spacer of the knee 7/8/19  PICC placed  cotn Cefazolin 2gm IV Q8 for 6 weeks, FU with Dr. Ansley Montes De Oca    Microscopic Hematuria: please F/U with Urologist for further work up    [address] [place] [address] Street: [address] [place] [address] Road TO ORTHOPEDIC SURGERY    PROCEDURES/SURGERIES: Procedure(s):  RESECTION ARTHROPLASTY RIGHT KNEE, PLACEMENT OF ANTIBIOTIC IMPREGNATED CEMENT SPACER RIGHT KNEE    PENDING TEST RESULTS:   At the time of discharge the following test results are still pending: n/a    FOLLOW UP APPOINTMENTS:   Follow-up Information     Follow up With Specialties Details Why Contact Info    Elle Perry MD General Practice On 7/17/2019 Hospital f/u PCP appointment Wednesday, 7/17/19 @ 11:00 a.m.  [address] [facility] 110 9860 [facility] /Please call this agency when you get home. [address] [facility] [address]    [facility]   This pharmacy will be delivering your IV medications.  Felipe Conner MD Orthopedic Surgery Schedule an appointment as soon as possible for a visit as recommended [address]      Debbie Pearson MD Infectious Diseases Schedule an appointment as soon as possible for a visit as recommended [address] [facility] [address] [address]  [phone]             ADDITIONAL CARE RECOMMENDATIONS:   · It is important that you take the medication exactly as they are prescribed. · Keep your medication in the bottles provided by the pharmacist and keep a list of the medication names, dosages, and times to be taken in your wallet. · Do not take other medications without consulting your doctor. · No drinking alcohol or driving car or operating machinery if you are on narcotic pain medications. Donot take sedating mediations if you are sleepy or confused. · Fall Precautions  · Keep Well Hydrated  · Report to your medical provider if you feel you have  developed allergies to medications  · Follow up with your PCP or Consultant for medication adjustments and refills  · Monitor for signs of fevers,chills,bleeding,chest pain and seek medical attention if you do so. DIET: DIET REGULAR    ACTIVITY: PT/OT per [address]: as per Ortho    EQUIPMENT needed: PICC line care      DISCHARGE MEDICATIONS:   See Medication Reconciliation Form    · It is important that you take the medication exactly as they are prescribed. · Keep your medication in the bottles provided by the pharmacist and keep a list of the medication names, dosages, and times to be taken in your wallet. · Do not take other medications without consulting your doctor. NOTIFY YOUR PHYSICIAN FOR ANY OF THE FOLLOWING:   Fever over 101 degrees for 24 hours. Chest pain, shortness of breath, fever, chills, nausea, vomiting, diarrhea, change in mentation, falling, weakness, bleeding. Severe pain or pain not relieved by medications. Or, any other signs or symptoms that you may have questions about. DISPOSITION:   x Home With:   OT x PT x HH  RN       SNF/Inpatient Rehab/LTAC    Independent/assisted living    Hospice    Other:         Information obtained by :   I understand that if any problems occur once I am at home I am to contact my physician.     I understand and acknowledge receipt of the instructions indicated above.                                                                                                                                              [de-identified] or R.N.'s Signature                                                                  Date/Time                                                                                                                                              Patient or Representative Signature                                                          Date/Time          Signed:   Godfrey Snow MD  7/10/2019  12:38 PM

## 2019-07-10 NOTE — PROGRESS NOTES
Bedside and Verbal shift change report given to Edison Bernstein (oncoming nurse) by Frankie Arzola RN (offgoing nurse). Report included the following information SBAR, Kardex, Intake/Output, MAR, Accordion and Recent Results.

## 2019-07-10 NOTE — PROGRESS NOTES
ANKIT received a call from Harris Kim ([phone]) from [employer]. She stated that her computer system has not forwarded all of the clinicals that ANKIT sent her yesterday, but it should get to her shortly. She said that we will be using [facility] for home health and [facility] for the IV meds. Ankit sent a referral to [facility] via [employer] with all of the hh and IV orders attached. CM informed pt and wife of above.  Shantell Blue

## 2019-07-10 NOTE — PROGRESS NOTES
Micah received a call from Tiange from [employer] asking for the IV antibx and home health orders to be faxed to [phone]. CM faxed these out to him.  Gerhard Magdaleno

## 2019-07-10 NOTE — PROGRESS NOTES
Micah spoke with Hansa Jones with [facility] (Pharmacy side of [facility]). She informed CM that pt's medication will be delivered to his home by 5pm. CM informed pt and wife. He plans to leave after his next dose of IV antibx.  Abbey Rojas

## 2019-07-10 NOTE — PROGRESS NOTES
CM received another call from Hansa Jacques of [facility] along with the pharmacist from [facility] (home IV agency). They wanted to know when pt's next IV dose is due and CM told them 3:30pm. They wanted to also know if the pt feels confident that he can administer the IV medication himself at home for the 11:30pm dose. ANKIT walked in the pt's room with the phone with both reps listening ([facility] and [facility]) and asked pt this question. He and his wife verbally agreed that they feel comfortable administering the IV medication this evening at 11:30pm. The [facility] pharmacist is going to call this CM when she knows that pt's medication can be delivered to his home. ANKIT spoke with [facility] with [facility]. She has accepted this referral and this pt will be seen in the next day or so. ANKIT placed this information on pt's AVS and updated the attending.  Gerhard Magdaleno

## 2019-07-10 NOTE — PROGRESS NOTES
Problem: Mobility Impaired (Adult and Pediatric)  Goal: *Acute Goals and Plan of Care (Insert Text)  Description  Physical Therapy Goals  Initiated 7/9/2019    1. Patient will move from supine to sit and sit to supine  in bed with modified independence within 4 days. 2. Patient will perform sit to stand with modified independence within 4 days. 3. Patient will ambulate with modified independence for 250 feet with the least restrictive device within 4 days. 4. Patient will ascend/descend 4 stairs with 1 handrail(s) with modified independence within 4 days. 5. Patient will perform home exercise program per protocol with modified independence within 4 days. 6. Patient will demonstrate AROM 0-90 degrees in operative joint within 4 days. Outcome: Progressing Towards Goal   PHYSICAL THERAPY TREATMENT  Patient: Haroon Fay (16 y.o. 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 ASSESSMENT:  Patient mobilizing very well post-op with modified independent gait with his knee immobilizer x200'. Cues required to slow down and maintain the walker further from his base of support. He responded well to education with improved liu and mechanics. Stair training completed x4 stairs modified independently with 1 rail. The patient's wife was present throughout for education. Patient is clear for discharge home with family and HHPT  Progression toward goals:  ?    Improving appropriately and progressing toward goals  ? Improving slowly and progressing toward goals  ?     Not making progress toward goals and plan of care will be adjusted     PLAN:  Patient is ready for discharge home when medically stable. Discharge Recommendations:  Home Health  Further Equipment Recommendations for Discharge:  None      SUBJECTIVE:   Patient stated ? When do I have to wear this  brace? ? Reinforced that patient requires the knee immobilizer any time he is OOB  OBJECTIVE DATA SUMMARY:   Critical Behavior:  Neurologic State: Alert  Orientation Level: Oriented X4  Cognition: Appropriate for age attention/concentration     Functional Mobility Training:  Bed Mobility:  Rolling: Supervision  Supine to Sit: Supervision  Sit to Supine: Supervision  Scooting: Supervision        Transfers:  Sit to Stand: Supervision                                Balance:  Sitting: Intact  Sitting - Static: Good (unsupported)  Sitting - Dynamic: Good (unsupported)  Standing: Impaired  Standing - Static: Good  Standing - Dynamic : Fair  Ambulation/Gait Training:  Distance (ft): 200 Feet (ft)  Assistive Device: Walker, rolling  Ambulation - Level of Assistance: Modified independent        Gait Abnormalities: Antalgic;Decreased step clearance        Base of Support: Widened  Stance: Right decreased; Left decreased  Speed/Sophia: Delayed  Step Length: Left shortened;Right shortened  Swing Pattern: Left asymmetrical;Right asymmetrical                 Stairs:  Number of Stairs Trained: 4  Stairs - Level of Assistance: Modified independent   Rail Use: Left     Neuro Re-Education:    Therapeutic Exercises:     Pain:  Pain Scale 1: Numeric (0 - 10)  Pain Intensity 1: 3  Pain Location 1: Knee  Pain Orientation 1: Right  Pain Description 1: Aching  Pain Intervention(s) 1: Repositioned  Activity Tolerance:     Please refer to the flowsheet for vital signs taken during this treatment. After treatment:   ?    Patient left in no apparent distress sitting up in chair  ? Patient left in no apparent distress in bed  ? Call bell left within reach  ? Nursing notified  ? Caregiver present  ?     Bed alarm activated    COMMUNICATION/COLLABORATION:   The patient?s plan of care was discussed with: Registered Nurse    Judie Carrasco, PT, DPT   Time Calculation: 26 mins

## 2019-07-10 NOTE — PROGRESS NOTES
Ortho Daily Progress Note    7/10/2019  9:20 AM    POD:  2 Days Post-Op  S/P:  Procedure(s):  RESECTION ARTHROPLASTY RIGHT KNEE, PLACEMENT OF ANTIBIOTIC IMPREGNATED CEMENT SPACER RIGHT KNEE    Afebrile/VSS,NAD, A&O x 3  Doing well without complaints of nausea  Pain well controlled  Calves soft/NTTP Bilaterally  Incision OK, no drainage or dehiscence. Staples intact. Hemovac removed, reapplied dressing  Dressing clean and dry  Moving lower extremities well. Neurocirculatory exam intact and within normal range. PICC line RUE  Lab Results   Component Value Date/Time    HGB 13.1 07/09/2019 03:01 AM    INR 1.1 02/07/2019 05:24 AM     Recent Labs     07/09/19  0301 07/08/19  0852 07/07/19  0349 07/06/19  1749 02/14/19  0811 02/12/19  0729 02/11/19  0618 02/10/19  0601 02/09/19  0527 02/08/19 2001   CREA 1.06 1.11 1.21 1.27 1.08 0.98 0.79 0.76 0.96 0.80   BUN 12 9 11 10 18 14 12 15 10 7     Estimated Creatinine Clearance: 92.4 mL/min (based on SCr of 1.06 mg/dL).     PLAN: DR. Jona Alva has entered his antibiotic orders and DC instructions  DVT prophylaxis: Eliquis 2.5 mg bid  WBAT with PT-mobilization with knee immobilizer on for support  Pain Control- oxycodone, tylenol, ice  Plan to D/C home today      Sri Zheng, [phone]

## 2019-07-11 LAB
BACTERIA SPEC CULT: NORMAL
SERVICE CMNT-IMP: NORMAL

## 2019-08-05 LAB
BACTERIA SPEC CULT: NORMAL
BACTERIA SPEC CULT: NORMAL
SERVICE CMNT-IMP: NORMAL
SERVICE CMNT-IMP: NORMAL

## 2020-01-15 ENCOUNTER — HOSPITAL ENCOUNTER (OUTPATIENT)
Dept: PREADMISSION TESTING | Age: 60
Discharge: HOME | End: 2020-01-15
Payer: OTHER MISCELLANEOUS

## 2020-01-15 VITALS
WEIGHT: 223.25 LBS | HEIGHT: 71 IN | BODY MASS INDEX: 31.25 KG/M2 | DIASTOLIC BLOOD PRESSURE: 92 MMHG | SYSTOLIC BLOOD PRESSURE: 148 MMHG | HEART RATE: 75 BPM | RESPIRATION RATE: 18 BRPM | TEMPERATURE: 97.9 F

## 2020-01-15 LAB
ABO + RH BLD: NORMAL
ANION GAP SERPL CALC-SCNC: 6 MMOL/L (ref 5–15)
APPEARANCE UR: CLEAR
BACTERIA URNS QL MICRO: NEGATIVE /HPF
BILIRUB UR QL: NEGATIVE
BLOOD GROUP ANTIBODIES SERPL: NORMAL
BUN SERPL-MCNC: 10 MG/DL (ref 6–20)
BUN/CREAT SERPL: 9 (ref 12–20)
CALCIUM SERPL-MCNC: 9.3 MG/DL (ref 8.5–10.1)
CHLORIDE SERPL-SCNC: 104 MMOL/L (ref 97–108)
CO2 SERPL-SCNC: 29 MMOL/L (ref 21–32)
COLOR UR: ABNORMAL
CREAT SERPL-MCNC: 1.15 MG/DL (ref 0.7–1.3)
EPITH CASTS URNS QL MICRO: ABNORMAL /LPF
ERYTHROCYTE [DISTWIDTH] IN BLOOD BY AUTOMATED COUNT: 16 % (ref 11.5–14.5)
EST. AVERAGE GLUCOSE BLD GHB EST-MCNC: 111 MG/DL
GLUCOSE SERPL-MCNC: 89 MG/DL (ref 65–100)
GLUCOSE UR STRIP.AUTO-MCNC: NEGATIVE MG/DL
HBA1C MFR BLD: 5.5 % (ref 4–5.6)
HCT VFR BLD AUTO: 47.6 % (ref 36.6–50.3)
HGB BLD-MCNC: 15.3 G/DL (ref 12.1–17)
HGB UR QL STRIP: ABNORMAL
HYALINE CASTS URNS QL MICRO: ABNORMAL /LPF (ref 0–5)
INR PPP: 1 (ref 0.9–1.1)
KETONES UR QL STRIP.AUTO: NEGATIVE MG/DL
LEUKOCYTE ESTERASE UR QL STRIP.AUTO: NEGATIVE
MCH RBC QN AUTO: 29.4 PG (ref 26–34)
MCHC RBC AUTO-ENTMCNC: 32.1 G/DL (ref 30–36.5)
MCV RBC AUTO: 91.4 FL (ref 80–99)
NITRITE UR QL STRIP.AUTO: NEGATIVE
NRBC # BLD: 0 K/UL (ref 0–0.01)
NRBC BLD-RTO: 0 PER 100 WBC
PH UR STRIP: 7 [PH] (ref 5–8)
PLATELET # BLD AUTO: 153 K/UL (ref 150–400)
PMV BLD AUTO: 10 FL (ref 8.9–12.9)
POTASSIUM SERPL-SCNC: 4 MMOL/L (ref 3.5–5.1)
PROT UR STRIP-MCNC: NEGATIVE MG/DL
PROTHROMBIN TIME: 10 SEC (ref 9–11.1)
RBC # BLD AUTO: 5.21 M/UL (ref 4.1–5.7)
RBC #/AREA URNS HPF: ABNORMAL /HPF (ref 0–5)
SODIUM SERPL-SCNC: 139 MMOL/L (ref 136–145)
SP GR UR REFRACTOMETRY: 1.02 (ref 1–1.03)
SPECIMEN EXP DATE BLD: NORMAL
UA: UC IF INDICATED,UAUC: ABNORMAL
UROBILINOGEN UR QL STRIP.AUTO: 1 EU/DL (ref 0.2–1)
WBC # BLD AUTO: 4.5 K/UL (ref 4.1–11.1)
WBC URNS QL MICRO: ABNORMAL /HPF (ref 0–4)

## 2020-01-15 PROCEDURE — 80048 BASIC METABOLIC PNL TOTAL CA: CPT

## 2020-01-15 PROCEDURE — 81001 URINALYSIS AUTO W/SCOPE: CPT

## 2020-01-15 PROCEDURE — 85610 PROTHROMBIN TIME: CPT

## 2020-01-15 PROCEDURE — 83036 HEMOGLOBIN GLYCOSYLATED A1C: CPT

## 2020-01-15 PROCEDURE — 85027 COMPLETE CBC AUTOMATED: CPT

## 2020-01-15 PROCEDURE — 93005 ELECTROCARDIOGRAM TRACING: CPT

## 2020-01-15 PROCEDURE — 86900 BLOOD TYPING SEROLOGIC ABO: CPT

## 2020-01-16 PROBLEM — Z22.321 MSSA (METHICILLIN-SUSCEPTIBLE STAPH AUREUS) CARRIER: Status: ACTIVE | Noted: 2020-01-16

## 2020-01-16 LAB
ATRIAL RATE: 67 BPM
CALCULATED P AXIS, ECG09: 62 DEGREES
CALCULATED R AXIS, ECG10: -7 DEGREES
CALCULATED T AXIS, ECG11: 39 DEGREES
DIAGNOSIS, 93000: NORMAL
P-R INTERVAL, ECG05: 206 MS
Q-T INTERVAL, ECG07: 398 MS
QRS DURATION, ECG06: 96 MS
QTC CALCULATION (BEZET), ECG08: 420 MS
VENTRICULAR RATE, ECG03: 67 BPM

## 2020-01-16 RX ORDER — MUPIROCIN 20 MG/G
OINTMENT TOPICAL 2 TIMES DAILY
Qty: 22 G | Refills: 0 | Status: SHIPPED | OUTPATIENT
Start: 2020-01-16 | End: 2020-01-21

## 2020-01-16 RX ORDER — CHLORHEXIDINE GLUCONATE 4 G/100ML
60-120 SOLUTION TOPICAL DAILY
Qty: 840 ML | Refills: 0 | Status: SHIPPED | OUTPATIENT
Start: 2020-01-16 | End: 2020-01-21

## 2020-01-16 NOTE — ADVANCED PRACTICE NURSE
Patient was called (identity confirmed with 2 patient identifiers) and informed of positive prelim MSSA (surgery 1/20/20), instructed to obtain mupirocin prescription and Chlorhexidine liquid soap from pharmacy per protocol. Written instructions given at time of Preadmission Testing were reinforced with patient. Patient was given an opportunity to have questions answered and contact information if needed. 1/17- Called patient to inform final result MRSA and provided education regarding prevention of infection.

## 2020-01-17 PROBLEM — Z22.322 MRSA CARRIER: Status: ACTIVE | Noted: 2020-01-16

## 2020-01-17 LAB
BACTERIA SPEC CULT: ABNORMAL
BACTERIA SPEC CULT: ABNORMAL
SERVICE CMNT-IMP: ABNORMAL

## 2020-01-19 ENCOUNTER — ANESTHESIA EVENT (OUTPATIENT)
Dept: SURGERY | Age: 60
DRG: 467 | End: 2020-01-19
Payer: OTHER MISCELLANEOUS

## 2020-01-20 ENCOUNTER — HOSPITAL ENCOUNTER (INPATIENT)
Age: 60
LOS: 1 days | Discharge: HOME HEALTH | DRG: 467 | End: 2020-01-21
Attending: ORTHOPAEDIC SURGERY | Admitting: ORTHOPAEDIC SURGERY
Payer: OTHER MISCELLANEOUS

## 2020-01-20 ENCOUNTER — ANESTHESIA (OUTPATIENT)
Dept: SURGERY | Age: 60
DRG: 467 | End: 2020-01-20
Payer: OTHER MISCELLANEOUS

## 2020-01-20 DIAGNOSIS — Z89.529 ACQUIRED ABSENCE OF KNEE JOINT FOLLOWING REMOVAL OF JOINT PROSTHESIS WITH PRESENCE OF ANTIBIOTIC-IMPREGNATED CEMENT SPACER: Primary | ICD-10-CM

## 2020-01-20 PROBLEM — T84.7XXS: Status: ACTIVE | Noted: 2020-01-20

## 2020-01-20 LAB
GLUCOSE BLD STRIP.AUTO-MCNC: 85 MG/DL (ref 65–100)
SERVICE CMNT-IMP: NORMAL

## 2020-01-20 PROCEDURE — 77030014077 HC TOWER MX CEM J&J -C: Performed by: ORTHOPAEDIC SURGERY

## 2020-01-20 PROCEDURE — 74011000258 HC RX REV CODE- 258: Performed by: INTERNAL MEDICINE

## 2020-01-20 PROCEDURE — 64447 NJX AA&/STRD FEMORAL NRV IMG: CPT

## 2020-01-20 PROCEDURE — 77030012935 HC DRSG AQUACEL BMS -B: Performed by: ORTHOPAEDIC SURGERY

## 2020-01-20 PROCEDURE — 74011250636 HC RX REV CODE- 250/636: Performed by: INTERNAL MEDICINE

## 2020-01-20 PROCEDURE — 74011250636 HC RX REV CODE- 250/636: Performed by: NURSE ANESTHETIST, CERTIFIED REGISTERED

## 2020-01-20 PROCEDURE — C1713 ANCHOR/SCREW BN/BN,TIS/BN: HCPCS | Performed by: ORTHOPAEDIC SURGERY

## 2020-01-20 PROCEDURE — 77030018846 HC SOL IRR STRL H20 ICUM -A: Performed by: ORTHOPAEDIC SURGERY

## 2020-01-20 PROCEDURE — 82962 GLUCOSE BLOOD TEST: CPT

## 2020-01-20 PROCEDURE — 65270000029 HC RM PRIVATE

## 2020-01-20 PROCEDURE — 74011250636 HC RX REV CODE- 250/636: Performed by: ORTHOPAEDIC SURGERY

## 2020-01-20 PROCEDURE — 77030039497 HC CST PAD STERILE CHCS -A: Performed by: ORTHOPAEDIC SURGERY

## 2020-01-20 PROCEDURE — 77030035236 HC SUT PDS STRATFX BARB J&J -B: Performed by: ORTHOPAEDIC SURGERY

## 2020-01-20 PROCEDURE — 77030008462 HC STPLR SKN PROX J&J -A: Performed by: ORTHOPAEDIC SURGERY

## 2020-01-20 PROCEDURE — 74011250636 HC RX REV CODE- 250/636: Performed by: PHYSICIAN ASSISTANT

## 2020-01-20 PROCEDURE — 77030005513 HC CATH URETH FOL11 MDII -B: Performed by: ORTHOPAEDIC SURGERY

## 2020-01-20 PROCEDURE — 77030011640 HC PAD GRND REM COVD -A: Performed by: ORTHOPAEDIC SURGERY

## 2020-01-20 PROCEDURE — 87205 SMEAR GRAM STAIN: CPT

## 2020-01-20 PROCEDURE — 77030006822 HC BLD SAW SAG BRSM -B: Performed by: ORTHOPAEDIC SURGERY

## 2020-01-20 PROCEDURE — 3E0T3BZ INTRODUCTION OF ANESTHETIC AGENT INTO PERIPHERAL NERVES AND PLEXI, PERCUTANEOUS APPROACH: ICD-10-PCS | Performed by: ANESTHESIOLOGY

## 2020-01-20 PROCEDURE — 74011000250 HC RX REV CODE- 250: Performed by: ORTHOPAEDIC SURGERY

## 2020-01-20 PROCEDURE — 0SPC0EZ REMOVAL OF ARTICULATING SPACER FROM RIGHT KNEE JOINT, OPEN APPROACH: ICD-10-PCS | Performed by: ORTHOPAEDIC SURGERY

## 2020-01-20 PROCEDURE — 76060000036 HC ANESTHESIA 2.5 TO 3 HR: Performed by: ORTHOPAEDIC SURGERY

## 2020-01-20 PROCEDURE — 74011000258 HC RX REV CODE- 258: Performed by: PHYSICIAN ASSISTANT

## 2020-01-20 PROCEDURE — 76010000172 HC OR TIME 2.5 TO 3 HR INTENSV-TIER 1: Performed by: ORTHOPAEDIC SURGERY

## 2020-01-20 PROCEDURE — 74011250636 HC RX REV CODE- 250/636: Performed by: ANESTHESIOLOGY

## 2020-01-20 PROCEDURE — 77030028907 HC WRP KNEE WO BGS SOLM -B

## 2020-01-20 PROCEDURE — 74011250637 HC RX REV CODE- 250/637: Performed by: PHYSICIAN ASSISTANT

## 2020-01-20 PROCEDURE — 76210000006 HC OR PH I REC 0.5 TO 1 HR: Performed by: ORTHOPAEDIC SURGERY

## 2020-01-20 PROCEDURE — 77030031139 HC SUT VCRL2 J&J -A: Performed by: ORTHOPAEDIC SURGERY

## 2020-01-20 PROCEDURE — C1776 JOINT DEVICE (IMPLANTABLE): HCPCS | Performed by: ORTHOPAEDIC SURGERY

## 2020-01-20 PROCEDURE — 77030007866 HC KT SPN ANES BBMI -B: Performed by: ANESTHESIOLOGY

## 2020-01-20 PROCEDURE — 0QSB04Z REPOSITION RIGHT LOWER FEMUR WITH INTERNAL FIXATION DEVICE, OPEN APPROACH: ICD-10-PCS | Performed by: ORTHOPAEDIC SURGERY

## 2020-01-20 PROCEDURE — 87075 CULTR BACTERIA EXCEPT BLOOD: CPT

## 2020-01-20 PROCEDURE — 0SRC0J9 REPLACEMENT OF RIGHT KNEE JOINT WITH SYNTHETIC SUBSTITUTE, CEMENTED, OPEN APPROACH: ICD-10-PCS | Performed by: ORTHOPAEDIC SURGERY

## 2020-01-20 PROCEDURE — 77030018836 HC SOL IRR NACL ICUM -A: Performed by: ORTHOPAEDIC SURGERY

## 2020-01-20 PROCEDURE — 74011000250 HC RX REV CODE- 250: Performed by: PHYSICIAN ASSISTANT

## 2020-01-20 PROCEDURE — 74011000250 HC RX REV CODE- 250: Performed by: ANESTHESIOLOGY

## 2020-01-20 DEVICE — CABLE SURG DIA1.7MM S STL HA CERCLAGE W/ CRMP 29880101S] DEPUY SYNTHES USA]: Type: IMPLANTABLE DEVICE | Site: KNEE | Status: FUNCTIONAL

## 2020-01-20 DEVICE — ATTUNE KNEE SYSTEM REVISION DISTAL FEMORAL AUGMENT 4MM CEMENTED SIZE 8
Type: IMPLANTABLE DEVICE | Site: KNEE | Status: FUNCTIONAL
Brand: ATTUNE

## 2020-01-20 DEVICE — ATTUNE KNEE SYSTEM REVISION FEMORAL SLEEVE POROCOAT FULLY COATED 45MM
Type: IMPLANTABLE DEVICE | Site: KNEE | Status: FUNCTIONAL
Brand: ATTUNE

## 2020-01-20 DEVICE — ATTUNE KNEE SYSTEM REVISION PRESSFIT STEM 16X60MM
Type: IMPLANTABLE DEVICE | Site: KNEE | Status: FUNCTIONAL
Brand: ATTUNE

## 2020-01-20 DEVICE — ATTUNE KNEE SYSTEM REVISION CRS FEMORAL CEMENTED RIGHT SIZE 8
Type: IMPLANTABLE DEVICE | Site: KNEE | Status: FUNCTIONAL
Brand: ATTUNE

## 2020-01-20 DEVICE — SMARTSET GHV GENTAMICIN HIGH VISCOSITY BONE CEMENT 40G
Type: IMPLANTABLE DEVICE | Site: KNEE | Status: FUNCTIONAL
Brand: SMARTSET

## 2020-01-20 DEVICE — ATTUNE KNEE SYSTEM REVISION TIBIAL SLEEVE POROCOAT FULLY COATED 53MM
Type: IMPLANTABLE DEVICE | Site: KNEE | Status: FUNCTIONAL
Brand: ATTUNE

## 2020-01-20 DEVICE — ATTUNE KNEE SYSTEM REVISION ROTATING PLATFORM TIBIAL BASE CEMENTED SIZE 8
Type: IMPLANTABLE DEVICE | Site: KNEE | Status: FUNCTIONAL
Brand: ATTUNE

## 2020-01-20 DEVICE — ATTUNE KNEE SYSTEM REVISION PRESSFIT STEM 18X60MM
Type: IMPLANTABLE DEVICE | Site: KNEE | Status: FUNCTIONAL
Brand: ATTUNE

## 2020-01-20 DEVICE — ATTUNE PATELLA MEDIALIZED DOME 41MM CEMENTED AOX
Type: IMPLANTABLE DEVICE | Site: KNEE | Status: FUNCTIONAL
Brand: ATTUNE

## 2020-01-20 RX ORDER — ONDANSETRON 2 MG/ML
4 INJECTION INTRAMUSCULAR; INTRAVENOUS
Status: DISCONTINUED | OUTPATIENT
Start: 2020-01-20 | End: 2020-01-21 | Stop reason: HOSPADM

## 2020-01-20 RX ORDER — BUPIVACAINE HYDROCHLORIDE 5 MG/ML
INJECTION, SOLUTION EPIDURAL; INTRACAUDAL
Status: COMPLETED | OUTPATIENT
Start: 2020-01-20 | End: 2020-01-20

## 2020-01-20 RX ORDER — HYDROXYZINE HYDROCHLORIDE 10 MG/1
10 TABLET, FILM COATED ORAL
Status: DISCONTINUED | OUTPATIENT
Start: 2020-01-20 | End: 2020-01-21 | Stop reason: HOSPADM

## 2020-01-20 RX ORDER — PROPOFOL 10 MG/ML
INJECTION, EMULSION INTRAVENOUS AS NEEDED
Status: DISCONTINUED | OUTPATIENT
Start: 2020-01-20 | End: 2020-01-20 | Stop reason: HOSPADM

## 2020-01-20 RX ORDER — OXYCODONE HYDROCHLORIDE 5 MG/1
10 TABLET ORAL
Status: DISCONTINUED | OUTPATIENT
Start: 2020-01-20 | End: 2020-01-21 | Stop reason: HOSPADM

## 2020-01-20 RX ORDER — CEFAZOLIN SODIUM/WATER 2 G/20 ML
2 SYRINGE (ML) INTRAVENOUS ONCE
Status: COMPLETED | OUTPATIENT
Start: 2020-01-20 | End: 2020-01-20

## 2020-01-20 RX ORDER — VANCOMYCIN HYDROCHLORIDE 1 G/20ML
INJECTION, POWDER, LYOPHILIZED, FOR SOLUTION INTRAVENOUS AS NEEDED
Status: DISCONTINUED | OUTPATIENT
Start: 2020-01-20 | End: 2020-01-20 | Stop reason: HOSPADM

## 2020-01-20 RX ORDER — VANCOMYCIN/0.9 % SOD CHLORIDE 1.5G/250ML
1500 PLASTIC BAG, INJECTION (ML) INTRAVENOUS ONCE
Status: DISCONTINUED | OUTPATIENT
Start: 2020-01-20 | End: 2020-01-20

## 2020-01-20 RX ORDER — FENTANYL CITRATE 50 UG/ML
25 INJECTION, SOLUTION INTRAMUSCULAR; INTRAVENOUS
Status: DISCONTINUED | OUTPATIENT
Start: 2020-01-20 | End: 2020-01-20 | Stop reason: HOSPADM

## 2020-01-20 RX ORDER — ROPINIROLE 1 MG/1
3 TABLET, FILM COATED ORAL
Status: DISCONTINUED | OUTPATIENT
Start: 2020-01-20 | End: 2020-01-20

## 2020-01-20 RX ORDER — SODIUM CHLORIDE 0.9 % (FLUSH) 0.9 %
5-40 SYRINGE (ML) INJECTION EVERY 8 HOURS
Status: DISCONTINUED | OUTPATIENT
Start: 2020-01-20 | End: 2020-01-20 | Stop reason: HOSPADM

## 2020-01-20 RX ORDER — ONDANSETRON 4 MG/1
4 TABLET, ORALLY DISINTEGRATING ORAL
Status: DISCONTINUED | OUTPATIENT
Start: 2020-01-20 | End: 2020-01-21 | Stop reason: HOSPADM

## 2020-01-20 RX ORDER — OXYCODONE HYDROCHLORIDE 5 MG/1
5 TABLET ORAL
Status: DISCONTINUED | OUTPATIENT
Start: 2020-01-20 | End: 2020-01-21 | Stop reason: HOSPADM

## 2020-01-20 RX ORDER — SODIUM CHLORIDE, SODIUM LACTATE, POTASSIUM CHLORIDE, CALCIUM CHLORIDE 600; 310; 30; 20 MG/100ML; MG/100ML; MG/100ML; MG/100ML
50 INJECTION, SOLUTION INTRAVENOUS CONTINUOUS
Status: DISCONTINUED | OUTPATIENT
Start: 2020-01-20 | End: 2020-01-20 | Stop reason: HOSPADM

## 2020-01-20 RX ORDER — CELECOXIB 200 MG/1
200 CAPSULE ORAL ONCE
Status: COMPLETED | OUTPATIENT
Start: 2020-01-20 | End: 2020-01-20

## 2020-01-20 RX ORDER — CEFAZOLIN SODIUM/WATER 2 G/20 ML
2 SYRINGE (ML) INTRAVENOUS EVERY 8 HOURS
Status: COMPLETED | OUTPATIENT
Start: 2020-01-20 | End: 2020-01-21

## 2020-01-20 RX ORDER — PROPOFOL 10 MG/ML
INJECTION, EMULSION INTRAVENOUS
Status: DISCONTINUED | OUTPATIENT
Start: 2020-01-20 | End: 2020-01-20 | Stop reason: HOSPADM

## 2020-01-20 RX ORDER — SODIUM CHLORIDE 0.9 % (FLUSH) 0.9 %
5-40 SYRINGE (ML) INJECTION AS NEEDED
Status: DISCONTINUED | OUTPATIENT
Start: 2020-01-20 | End: 2020-01-20 | Stop reason: HOSPADM

## 2020-01-20 RX ORDER — AMOXICILLIN 250 MG
1 CAPSULE ORAL 2 TIMES DAILY
Status: DISCONTINUED | OUTPATIENT
Start: 2020-01-20 | End: 2020-01-21 | Stop reason: HOSPADM

## 2020-01-20 RX ORDER — POLYETHYLENE GLYCOL 3350 17 G/17G
17 POWDER, FOR SOLUTION ORAL DAILY
Status: DISCONTINUED | OUTPATIENT
Start: 2020-01-21 | End: 2020-01-21 | Stop reason: HOSPADM

## 2020-01-20 RX ORDER — MELOXICAM 7.5 MG/1
7.5 TABLET ORAL DAILY
Status: DISCONTINUED | OUTPATIENT
Start: 2020-01-21 | End: 2020-01-21

## 2020-01-20 RX ORDER — FACIAL-BODY WIPES
10 EACH TOPICAL DAILY PRN
Status: DISCONTINUED | OUTPATIENT
Start: 2020-01-22 | End: 2020-01-21 | Stop reason: HOSPADM

## 2020-01-20 RX ORDER — LIDOCAINE HYDROCHLORIDE 10 MG/ML
0.1 INJECTION, SOLUTION EPIDURAL; INFILTRATION; INTRACAUDAL; PERINEURAL AS NEEDED
Status: DISCONTINUED | OUTPATIENT
Start: 2020-01-20 | End: 2020-01-20 | Stop reason: HOSPADM

## 2020-01-20 RX ORDER — HYDROMORPHONE HYDROCHLORIDE 1 MG/ML
0.2 INJECTION, SOLUTION INTRAMUSCULAR; INTRAVENOUS; SUBCUTANEOUS
Status: DISCONTINUED | OUTPATIENT
Start: 2020-01-20 | End: 2020-01-20 | Stop reason: HOSPADM

## 2020-01-20 RX ORDER — ONDANSETRON 2 MG/ML
INJECTION INTRAMUSCULAR; INTRAVENOUS AS NEEDED
Status: DISCONTINUED | OUTPATIENT
Start: 2020-01-20 | End: 2020-01-20 | Stop reason: HOSPADM

## 2020-01-20 RX ORDER — ACETAMINOPHEN 500 MG
1000 TABLET ORAL ONCE
Status: COMPLETED | OUTPATIENT
Start: 2020-01-20 | End: 2020-01-20

## 2020-01-20 RX ORDER — ONDANSETRON 2 MG/ML
4 INJECTION INTRAMUSCULAR; INTRAVENOUS AS NEEDED
Status: DISCONTINUED | OUTPATIENT
Start: 2020-01-20 | End: 2020-01-20 | Stop reason: HOSPADM

## 2020-01-20 RX ORDER — DEXAMETHASONE SODIUM PHOSPHATE 4 MG/ML
INJECTION, SOLUTION INTRA-ARTICULAR; INTRALESIONAL; INTRAMUSCULAR; INTRAVENOUS; SOFT TISSUE AS NEEDED
Status: DISCONTINUED | OUTPATIENT
Start: 2020-01-20 | End: 2020-01-20 | Stop reason: HOSPADM

## 2020-01-20 RX ORDER — MIDAZOLAM HYDROCHLORIDE 1 MG/ML
1 INJECTION, SOLUTION INTRAMUSCULAR; INTRAVENOUS AS NEEDED
Status: COMPLETED | OUTPATIENT
Start: 2020-01-20 | End: 2020-01-20

## 2020-01-20 RX ORDER — FENTANYL CITRATE 50 UG/ML
50 INJECTION, SOLUTION INTRAMUSCULAR; INTRAVENOUS AS NEEDED
Status: DISCONTINUED | OUTPATIENT
Start: 2020-01-20 | End: 2020-01-20 | Stop reason: HOSPADM

## 2020-01-20 RX ORDER — MORPHINE SULFATE 2 MG/ML
2 INJECTION, SOLUTION INTRAMUSCULAR; INTRAVENOUS
Status: DISCONTINUED | OUTPATIENT
Start: 2020-01-20 | End: 2020-01-21 | Stop reason: HOSPADM

## 2020-01-20 RX ORDER — ACETAMINOPHEN 500 MG
1000 TABLET ORAL EVERY 6 HOURS
Status: DISCONTINUED | OUTPATIENT
Start: 2020-01-20 | End: 2020-01-21 | Stop reason: HOSPADM

## 2020-01-20 RX ORDER — ROPIVACAINE HYDROCHLORIDE 5 MG/ML
INJECTION, SOLUTION EPIDURAL; INFILTRATION; PERINEURAL
Status: COMPLETED | OUTPATIENT
Start: 2020-01-20 | End: 2020-01-20

## 2020-01-20 RX ORDER — ACETAMINOPHEN 325 MG/1
650 TABLET ORAL ONCE
Status: DISCONTINUED | OUTPATIENT
Start: 2020-01-20 | End: 2020-01-20 | Stop reason: HOSPADM

## 2020-01-20 RX ORDER — ROPINIROLE 1 MG/1
3 TABLET, FILM COATED ORAL
Status: DISCONTINUED | OUTPATIENT
Start: 2020-01-20 | End: 2020-01-21 | Stop reason: HOSPADM

## 2020-01-20 RX ORDER — SODIUM CHLORIDE 0.9 % (FLUSH) 0.9 %
5-40 SYRINGE (ML) INJECTION EVERY 8 HOURS
Status: DISCONTINUED | OUTPATIENT
Start: 2020-01-20 | End: 2020-01-21 | Stop reason: HOSPADM

## 2020-01-20 RX ORDER — SODIUM CHLORIDE 9 MG/ML
125 INJECTION, SOLUTION INTRAVENOUS CONTINUOUS
Status: DISCONTINUED | OUTPATIENT
Start: 2020-01-20 | End: 2020-01-21 | Stop reason: HOSPADM

## 2020-01-20 RX ORDER — SODIUM CHLORIDE 0.9 % (FLUSH) 0.9 %
5-40 SYRINGE (ML) INJECTION AS NEEDED
Status: DISCONTINUED | OUTPATIENT
Start: 2020-01-20 | End: 2020-01-21 | Stop reason: HOSPADM

## 2020-01-20 RX ORDER — PREGABALIN 75 MG/1
75 CAPSULE ORAL ONCE
Status: COMPLETED | OUTPATIENT
Start: 2020-01-20 | End: 2020-01-20

## 2020-01-20 RX ORDER — NALOXONE HYDROCHLORIDE 0.4 MG/ML
0.4 INJECTION, SOLUTION INTRAMUSCULAR; INTRAVENOUS; SUBCUTANEOUS AS NEEDED
Status: DISCONTINUED | OUTPATIENT
Start: 2020-01-20 | End: 2020-01-21 | Stop reason: HOSPADM

## 2020-01-20 RX ORDER — FAMOTIDINE 20 MG/1
20 TABLET, FILM COATED ORAL 2 TIMES DAILY
Status: DISCONTINUED | OUTPATIENT
Start: 2020-01-20 | End: 2020-01-21 | Stop reason: HOSPADM

## 2020-01-20 RX ADMIN — ONDANSETRON HYDROCHLORIDE 4 MG: 2 INJECTION, SOLUTION INTRAMUSCULAR; INTRAVENOUS at 17:12

## 2020-01-20 RX ADMIN — DAPTOMYCIN 607 MG: 500 INJECTION, POWDER, LYOPHILIZED, FOR SOLUTION INTRAVENOUS at 15:17

## 2020-01-20 RX ADMIN — MIDAZOLAM 2 MG: 1 INJECTION INTRAMUSCULAR; INTRAVENOUS at 14:39

## 2020-01-20 RX ADMIN — BUPIVACAINE HYDROCHLORIDE 12.5 MG: 5 INJECTION, SOLUTION EPIDURAL; INTRACAUDAL; PERINEURAL at 15:15

## 2020-01-20 RX ADMIN — SODIUM CHLORIDE 125 ML/HR: 900 INJECTION, SOLUTION INTRAVENOUS at 19:15

## 2020-01-20 RX ADMIN — ROPIVACAINE HYDROCHLORIDE 30 ML: 5 INJECTION, SOLUTION EPIDURAL; INFILTRATION; PERINEURAL at 14:52

## 2020-01-20 RX ADMIN — PROPOFOL 50 MCG/KG/MIN: 10 INJECTION, EMULSION INTRAVENOUS at 15:03

## 2020-01-20 RX ADMIN — SODIUM CHLORIDE, SODIUM LACTATE, POTASSIUM CHLORIDE, AND CALCIUM CHLORIDE 50 ML/HR: 600; 310; 30; 20 INJECTION, SOLUTION INTRAVENOUS at 14:04

## 2020-01-20 RX ADMIN — PROPOFOL 25 MG: 10 INJECTION, EMULSION INTRAVENOUS at 15:38

## 2020-01-20 RX ADMIN — SENNOSIDES AND DOCUSATE SODIUM 1 TABLET: 8.6; 5 TABLET ORAL at 20:05

## 2020-01-20 RX ADMIN — FAMOTIDINE 20 MG: 20 TABLET ORAL at 20:05

## 2020-01-20 RX ADMIN — PREGABALIN 75 MG: 75 CAPSULE ORAL at 14:05

## 2020-01-20 RX ADMIN — Medication 2 G: at 15:17

## 2020-01-20 RX ADMIN — CELECOXIB 200 MG: 200 CAPSULE ORAL at 14:05

## 2020-01-20 RX ADMIN — SODIUM CHLORIDE, SODIUM LACTATE, POTASSIUM CHLORIDE, AND CALCIUM CHLORIDE: 600; 310; 30; 20 INJECTION, SOLUTION INTRAVENOUS at 16:47

## 2020-01-20 RX ADMIN — ACETAMINOPHEN 1000 MG: 500 TABLET ORAL at 20:05

## 2020-01-20 RX ADMIN — DEXAMETHASONE SODIUM PHOSPHATE 4 MG: 4 INJECTION, SOLUTION INTRAMUSCULAR; INTRAVENOUS at 15:44

## 2020-01-20 RX ADMIN — ACETAMINOPHEN 1000 MG: 500 TABLET ORAL at 14:05

## 2020-01-20 RX ADMIN — FENTANYL CITRATE 100 MCG: 50 INJECTION, SOLUTION INTRAMUSCULAR; INTRAVENOUS at 14:39

## 2020-01-20 RX ADMIN — MIDAZOLAM 2 MG: 1 INJECTION INTRAMUSCULAR; INTRAVENOUS at 15:03

## 2020-01-20 RX ADMIN — SODIUM CHLORIDE, SODIUM LACTATE, POTASSIUM CHLORIDE, AND CALCIUM CHLORIDE: 600; 310; 30; 20 INJECTION, SOLUTION INTRAVENOUS at 15:03

## 2020-01-20 NOTE — ANESTHESIA PROCEDURE NOTES
Spinal Block    Start time: 1/20/2020 3:00 PM  End time: 1/20/2020 3:15 PM  Performed by: Barry Chua CRNA  Authorized by: Luisa Thakkar MD     Pre-procedure:  Preanesthetic Checklist: patient identified, risks and benefits discussed, anesthesia consent, site marked, patient being monitored and timeout performed    Timeout Time: 15:00          Spinal Block:   Patient Position:  Seated  Prep Region:  Lumbar  Prep: Betadine      Location:  L3-4  Technique:  Single shot        Needle:   Needle Type:  Quincke  Needle Gauge:  22 G  Attempts:  2      Events: CSF confirmed, no blood with aspiration and no paresthesia        Assessment:  Insertion:  Uncomplicated

## 2020-01-20 NOTE — BRIEF OP NOTE
BRIEF OPERATIVE NOTE    Date of Procedure: 1/20/2020   Preoperative Diagnosis: HISTORY OF INFECTION IF TOTAL JOINT PROSTHESIS OF KNEE  Postoperative Diagnosis: HISTORY OF INFECTION IF TOTAL JOINT PROSTHESIS OF KNEE    Procedure(s):  REVISION RIGHT TOTAL KNEE REPLACEMENT  Surgeon(s) and Role:     * Anel Stubbs MD - Primary         Surgical Assistant: Quin Yates, [facility]    Surgical Staff:  Circ-1: Stiven Hicks  Circ-Relief: Jeremy Sinha RN; Opal Carney  Physician Assistant: Rajiv Faria PA-C  Scrub Tech-1: Shirin Oconnell  Scrub Tech-Relief: Jaswinder Boothe  Scrub RN-Relief: Ursula Estrada RN  Surg Asst-1: Marknikki Clarke  Surg Asst-Relief: Tim FAIRCHILD  Event Time In Time Out   Incision Start 1536    Incision Close 1746      Anesthesia: Spinal   Estimated Blood Loss: 200cc  Specimens:   ID Type Source Tests Collected by Time Destination   1 : Right Knee Joint Fluid Joint Fluid Joint, Knee CULTURE, BODY FLUID, CULTURE, ANAEROBIC, Renee Pereira MD 1/20/2020 1536 Microbiology      Findings: no evidence for infection   Complications: longitudinal fracture of lateral femoral condyle fixed with cable  Implants:   Implant Name Type Inv.  Item Serial No.  Lot No. LRB No. Used Action   CEMENT BNE GENTAMC GHV 40GM -- SMARTSET - SN/A  CEMENT BNE GENTAMC GHV 40GM -- SMARTSET N/A [facility] [employer] [id number] Right 2 Implanted   TIB SLV M/L 53MM FULL POR -- ATTUNE - SN/A  TIB SLV M/L 53MM FULL POR -- ATTUNE N/A [facility] [employer] [id number] Right 1 Implanted   STEM PRESSFIT STR [id number] -- ATTUNE - SN/A  STEM PRESSFIT STR [id number] -- ATTUNE N/A [facility] [employer] [id number] Right 1 Implanted   FEM DSTL AUG SZ 8 4MM -- ATTUNE - SN/A  FEM DSTL AUG SZ 8 4MM -- ATTUNE N/A [facility] [employer] [id number] Right 1 Implanted   FEM DSTL AUG SZ 8 4MM -- ATTUNE - SN/A  FEM DSTL AUG SZ 8 4MM -- ATTUNE N/A [facility] [employer] [id number] Right 1 Implanted   FEM CRS RT SZ 8 KATE -- ATTUNE - SN/A  FEM CRS RT SZ 8 KATE -- ATTUNE N/A [facility] [employer] [id number] Right 1 Implanted   FEM SLV M/L 45MM FULL POR -- ATTUNE - SN/A  FEM SLV M/L 45MM FULL POR -- ATTUNE N/A [facility] [employer] [id number] Right 1 Implanted   STEM PRESSFIT STR [id number] -- ATTUNE - SN/A  STEM PRESSFIT STR [id number] -- ATTUNE N/A [facility] [employer] [id number] Right 1 Implanted   REV RPTIB BASE SZ 8 KATE -- ATTUNE - SN/A  REV RPTIB BASE SZ 8 KATE -- ATTUNE N/A [facility] [employer] [id number] Right 1 Implanted   PAT KATE DOME MEDIAL 41MM -- ATTUNE - SN/A  PAT KATE DOME MEDIAL 41MM -- ATTUNE [id number] Right 1 Implanted   CABLE W CRIMP [id number] SS -- STRL - SN/A  CABLE W CRIMP [id number] SS -- STRL N/A [employer] [place] [id number] Right 1 Implanted   ROTATING PLATFORM  INSERT SZ B  12MM   N/A  [id number] Right 1 Implanted

## 2020-01-20 NOTE — ANESTHESIA PROCEDURE NOTES
Peripheral Block    Start time: 1/20/2020 2:51 PM  End time: 1/20/2020 2:51 PM  Performed by: Radha Mojica MD  Authorized by: Radha Mojica MD       Pre-procedure: Indications: at surgeon's request and post-op pain management    Preanesthetic Checklist: patient identified, risks and benefits discussed, site marked, timeout performed and patient being monitored    Timeout Time: 14:51          Block Type:   Block Type:   Adductor canal  Laterality:  Right  Monitoring:  Standard ASA monitoring, continuous pulse ox, frequent vital sign checks, heart rate, responsive to questions and oxygen  Injection Technique:  Single shot  Procedures: ultrasound guided    Patient Position: supine  Prep: chlorhexidine    Location:  Mid thigh  Needle Type:  Stimuplex  Needle Gauge:  21 G  Needle Localization:  Ultrasound guidance and anatomical landmarks    Assessment:  Number of attempts:  1  Injection Assessment:  Incremental injection every 5 mL, local visualized surrounding nerve on ultrasound, negative aspiration for blood, no paresthesia, no intravascular symptoms and ultrasound image on chart  Patient tolerance:  Patient tolerated the procedure well with no immediate complications

## 2020-01-20 NOTE — PROGRESS NOTES
TRANSFER - IN REPORT:    Verbal report received from [address], RN(name) on Flora Alvarez  being received from [facility]) for routine post - op      Report consisted of patients Situation, Background, Assessment and   Recommendations(SBAR). Information from the following report(s) SBAR, Kardex, OR Summary, Procedure Summary, Intake/Output, MAR and Recent Results was reviewed with the receiving nurse. Opportunity for questions and clarification was provided. Assessment completed upon patients arrival to unit and care assumed.

## 2020-01-20 NOTE — ANESTHESIA POSTPROCEDURE EVALUATION
Procedure(s):  REVISION RIGHT TOTAL KNEE REPLACEMENT.    spinal, regional    Anesthesia Post Evaluation      Multimodal analgesia: multimodal analgesia used between 6 hours prior to anesthesia start to PACU discharge  Patient location during evaluation: bedside  Patient participation: waiting for patient participation  Level of consciousness: awake  Pain management: adequate  Airway patency: patent  Anesthetic complications: no  Cardiovascular status: acceptable  Respiratory status: unassisted  Hydration status: acceptable  Comments: Post-Anesthesia Evaluation and Assessment    I have evaluated the patient and they are ready for PACU discharge. Patient: Flora Alvarez MRN: [id number]  SSN: [id number]   YOB: 1960  Age: 63 y.o. Sex: male      Cardiovascular Function/Vital Signs  /90   Pulse 79   Temp 37 °C (98.6 °F)   Resp 18   Ht 5' 11\" (1.803 m)   Wt 101.2 kg (223 lb)   SpO2 96%   BMI 31.10 kg/m²     Patient is status post Spinal anesthesia for Procedure(s):  REVISION RIGHT TOTAL KNEE REPLACEMENT. Nausea/Vomiting: None    Postoperative hydration reviewed and adequate. Pain:  Pain Scale 1: FLACC (01/20/20 1750)  Pain Intensity 1: 0 (01/20/20 1750)   Managed    Neurological Status:   Neuro (WDL): Exceptions to WDL (01/20/20 1755)  Neuro  Neurologic State: Drowsy (01/20/20 1755)  LUE Motor Response: Purposeful (01/20/20 1755)  LLE Motor Response: Pharmacologically paralyzed (01/20/20 1755)  RUE Motor Response: Purposeful (01/20/20 1755)  RLE Motor Response: Pharmacologically paralyzed (01/20/20 1755)   At baseline    Mental Status, Level of Consciousness: Alert and  oriented to person, place, and time    Pulmonary Status:   O2 Device: CO2 nasal cannula (01/20/20 1753)   Adequate oxygenation and airway patent    Complications related to anesthesia: None    Post-anesthesia assessment completed.  No concerns    Signed By: Ez Chatterjee MD    January 20, 2020                   Vitals Value Taken Time   /90 1/20/2020  6:00 PM   Temp 37 °C (98.6 °F) 1/20/2020  5:53 PM   Pulse 73 1/20/2020  6:12 PM   Resp 14 1/20/2020  6:12 PM   SpO2 97 % 1/20/2020  6:12 PM   Vitals shown include unvalidated device data.

## 2020-01-20 NOTE — ANESTHESIA PREPROCEDURE EVALUATION
Anesthetic History   No history of anesthetic complications            Review of Systems / Medical History  Patient summary reviewed, nursing notes reviewed and pertinent labs reviewed    Pulmonary  Within defined limits      Sleep apnea: CPAP    Asthma : well controlled       Neuro/Psych   Within defined limits           Cardiovascular  Within defined limits  Hypertension: well controlled              Exercise tolerance: <4 METS  Comments: Tachyc., BPs in 110-125 syst   GI/Hepatic/Renal  Within defined limits   GERD: well controlled          Comments: Crohns  cholelith Endo/Other  Within defined limits      Arthritis     Other Findings   Comments: Asthma       CROHNS DISEASE  GERD (gastroesophageal reflux disease)     HIATAL HERNIA   Migraine     Sleep apnea      USES CPAP  HIV +  Infected prosth knee             Physical Exam    Airway  Mallampati: II  TM Distance: > 6 cm  Neck ROM: normal range of motion   Mouth opening: Normal     Cardiovascular  Regular rate and rhythm,  S1 and S2 normal,  no murmur, click, rub, or gallop             Dental    Dentition: Poor dentition     Pulmonary  Breath sounds clear to auscultation               Abdominal  GI exam deferred       Other Findings            Anesthetic Plan    ASA: 3  Anesthesia type: spinal and regional - saphenous block          Induction: Intravenous  Anesthetic plan and risks discussed with: Patient

## 2020-01-20 NOTE — PERIOP NOTES
TRANSFER - OUT REPORT:    Verbal report given to  MARIAH Manrique(name) on Ha Mckinnon  being transferred to [facility](unit) for routine post - op       Report consisted of patients Situation, Background, Assessment and   Recommendations(SBAR). Time Pre op antibiotic given:15:17 Ancef  Anesthesia Stop time: 17:53  Lamar Present on Transfer to floor:no  Order for Lamar on Chart:no  Discharge Prescriptions with Chart:no    Information from the following report(s) SBAR, Kardex, OR Summary, Procedure Summary, Intake/Output, MAR, Recent Results, Med Rec Status and Cardiac Rhythm SR was reviewed with the receiving nurse. Opportunity for questions and clarification was provided. Is the patient on 02? NO       L/Min        Other     Is the patient on a monitor? NO    Is the nurse transporting with the patient? NO    Surgical Waiting Area notified of patient's transfer from PACU? YES      The following personal items collected during your admission accompanied patient upon transfer:   Dental Appliance: Dental Appliances: None  Vision:    Hearing Aid:    Jewelry: Jewelry: None  Clothing: Clothing: (bag of clothes, and walker returned to pt.)  Other Valuables:  Other Valuables: None  Valuables sent to safe:

## 2020-01-21 VITALS
RESPIRATION RATE: 16 BRPM | WEIGHT: 223 LBS | HEART RATE: 74 BPM | BODY MASS INDEX: 31.22 KG/M2 | HEIGHT: 71 IN | DIASTOLIC BLOOD PRESSURE: 90 MMHG | TEMPERATURE: 98 F | SYSTOLIC BLOOD PRESSURE: 142 MMHG | OXYGEN SATURATION: 99 %

## 2020-01-21 LAB
ANION GAP SERPL CALC-SCNC: 5 MMOL/L (ref 5–15)
BUN SERPL-MCNC: 19 MG/DL (ref 6–20)
BUN/CREAT SERPL: 15 (ref 12–20)
CALCIUM SERPL-MCNC: 8.4 MG/DL (ref 8.5–10.1)
CHLORIDE SERPL-SCNC: 110 MMOL/L (ref 97–108)
CO2 SERPL-SCNC: 24 MMOL/L (ref 21–32)
CREAT SERPL-MCNC: 1.25 MG/DL (ref 0.7–1.3)
GLUCOSE SERPL-MCNC: 126 MG/DL (ref 65–100)
HGB BLD-MCNC: 13.5 G/DL (ref 12.1–17)
POTASSIUM SERPL-SCNC: 4.1 MMOL/L (ref 3.5–5.1)
SODIUM SERPL-SCNC: 139 MMOL/L (ref 136–145)

## 2020-01-21 PROCEDURE — 97535 SELF CARE MNGMENT TRAINING: CPT

## 2020-01-21 PROCEDURE — 74011250636 HC RX REV CODE- 250/636: Performed by: PHYSICIAN ASSISTANT

## 2020-01-21 PROCEDURE — 85018 HEMOGLOBIN: CPT

## 2020-01-21 PROCEDURE — 97165 OT EVAL LOW COMPLEX 30 MIN: CPT

## 2020-01-21 PROCEDURE — 97116 GAIT TRAINING THERAPY: CPT

## 2020-01-21 PROCEDURE — 97161 PT EVAL LOW COMPLEX 20 MIN: CPT

## 2020-01-21 PROCEDURE — 36415 COLL VENOUS BLD VENIPUNCTURE: CPT

## 2020-01-21 PROCEDURE — 74011250637 HC RX REV CODE- 250/637: Performed by: ORTHOPAEDIC SURGERY

## 2020-01-21 PROCEDURE — 80048 BASIC METABOLIC PNL TOTAL CA: CPT

## 2020-01-21 PROCEDURE — 74011250637 HC RX REV CODE- 250/637: Performed by: PHYSICIAN ASSISTANT

## 2020-01-21 RX ORDER — ACETAMINOPHEN 500 MG
1000 TABLET ORAL EVERY 6 HOURS
Qty: 120 TAB | Refills: 0 | Status: SHIPPED | OUTPATIENT
Start: 2020-01-21

## 2020-01-21 RX ORDER — MELOXICAM 7.5 MG/1
7.5 TABLET ORAL DAILY
Status: DISCONTINUED | OUTPATIENT
Start: 2020-01-22 | End: 2020-01-21 | Stop reason: HOSPADM

## 2020-01-21 RX ORDER — OXYCODONE HYDROCHLORIDE 5 MG/1
5 TABLET ORAL
Qty: 40 TAB | Refills: 0 | Status: SHIPPED | OUTPATIENT
Start: 2020-01-21 | End: 2020-01-28

## 2020-01-21 RX ORDER — AMOXICILLIN 250 MG
1 CAPSULE ORAL 2 TIMES DAILY
Qty: 20 TAB | Refills: 0 | Status: SHIPPED | OUTPATIENT
Start: 2020-01-21

## 2020-01-21 RX ORDER — MELOXICAM 7.5 MG/1
7.5 TABLET ORAL DAILY
Qty: 15 TAB | Refills: 0 | Status: SHIPPED | OUTPATIENT
Start: 2020-01-22

## 2020-01-21 RX ADMIN — Medication 2 G: at 07:30

## 2020-01-21 RX ADMIN — ACETAMINOPHEN 1000 MG: 500 TABLET ORAL at 04:56

## 2020-01-21 RX ADMIN — APIXABAN 2.5 MG: 2.5 TABLET, FILM COATED ORAL at 07:30

## 2020-01-21 RX ADMIN — ROPINIROLE HYDROCHLORIDE 3 MG: 1 TABLET, FILM COATED ORAL at 00:46

## 2020-01-21 RX ADMIN — OXYCODONE 5 MG: 5 TABLET ORAL at 00:46

## 2020-01-21 RX ADMIN — Medication 2 G: at 00:46

## 2020-01-21 RX ADMIN — OXYCODONE 5 MG: 5 TABLET ORAL at 04:56

## 2020-01-21 RX ADMIN — ACETAMINOPHEN 1000 MG: 500 TABLET ORAL at 11:48

## 2020-01-21 RX ADMIN — OXYCODONE 5 MG: 5 TABLET ORAL at 14:43

## 2020-01-21 NOTE — PROGRESS NOTES
Bedside shift change report given to Pattie Salazar RN (oncoming nurse) by Domingo Kang RN(offgoing nurse). Report given with SBAR.

## 2020-01-21 NOTE — PROGRESS NOTES
Ortho Progress Note  1 Day Post-Op  Procedure(s):  REVISION RIGHT TOTAL KNEE REPLACEMENT    Subjective: Pt doing well today, no complaints. \"This feels better than after my first total knee. \" Pt ready to work with PT and hoping to d/c home today. Denies F/Chill, N/V, lightheadedness or dizziness, SOB, or abdominal pain. Physical Exam:   Visit Vitals  /75   Pulse 88   Temp 97.8 °F (36.6 °C)   Resp 16   Ht 5' 11\" (1.803 m)   Wt 101.2 kg (223 lb)   SpO2 95%   BMI 31.10 kg/m²     General appearance: alert, cooperative, no distress, appears stated age  Abdomen: soft, non-tender.  Bowel sounds normal. No masses,  no organomegaly  Extremities: Homans sign is negative, no sign of DVT, good ROM R knee w/ flexion to 80 degrees, NTTP, calf soft  Pulses: 2+ and symmetric  Wound: bulky dressing c/d/i, no drainage  Neurologic: Grossly normal, ankle dorsi/plantar flexion intact, heel raise intact    Recent Results (from the past 24 hour(s))   GLUCOSE, POC    Collection Time: 01/20/20  2:22 PM   Result Value Ref Range    Glucose (POC) 85 65 - 100 mg/dL    Performed by [address] [place] [address] St S, BODY FLUID W GRAM STAIN    Collection Time: 01/20/20  3:36 PM   Result Value Ref Range    Special Requests: RIGHT KNEE JOINT FLUID     GRAM STAIN OCCASIONAL WBCS SEEN      GRAM STAIN NO ORGANISMS SEEN      Culture result: PENDING    METABOLIC PANEL, BASIC    Collection Time: 01/21/20  5:07 AM   Result Value Ref Range    Sodium 139 136 - 145 mmol/L    Potassium 4.1 3.5 - 5.1 mmol/L    Chloride 110 (H) 97 - 108 mmol/L    CO2 24 21 - 32 mmol/L    Anion gap 5 5 - 15 mmol/L    Glucose 126 (H) 65 - 100 mg/dL    BUN 19 6 - 20 MG/DL    Creatinine 1.25 0.70 - 1.30 MG/DL    BUN/Creatinine ratio 15 12 - 20      GFR est AA >60 >60 ml/min/1.73m2    GFR est non-AA 59 (L) >60 ml/min/1.73m2    Calcium 8.4 (L) 8.5 - 10.1 MG/DL   HEMOGLOBIN    Collection Time: 01/21/20  5:07 AM   Result Value Ref Range    HGB 13.5 12.1 - 17.0 g/dL Assessment:  Stable Post Op    Plan:  DVT Prophylaxis:Eliquis 2.5 BID x 2 weeks  Physical Therapy: WBAT  Discharge Planning: home with home health today if cleared by PT                        F/u in office in 2 weeks  Pain control: tylenol, oxycodone  Obesity: BMI 31.1, chronic, encourage OOB for all meals, use of IS, good pulmonary toilet, may limit pt's ability to progress with PT/OT  Chronic elevated serum crt: preop 1.15, today 1.25                                                        Hold mobic for today, encourage PO intact, monitor I/Os, cont gentle IVF

## 2020-01-21 NOTE — PROGRESS NOTES
Problem: Mobility Impaired (Adult and Pediatric)  Goal: *Acute Goals and Plan of Care (Insert Text)  Description  FUNCTIONAL STATUS PRIOR TO ADMISSION: Patient was independent and active without use of DME.    HOME SUPPORT PRIOR TO ADMISSION: The patient lived with wife but did not require assist.    Physical Therapy Goals  Initiated 1/21/2020    1. Patient will move from supine to sit and sit to supine  in bed with independence within 4 days. 2. Patient will perform sit to stand with independence within 4 days. 3. Patient will ambulate with independence for 300 feet with the least restrictive device within 4 days. 4. Patient will ascend/descend 5 stairs with 1 handrail(s) with independence within 4 days. 5. Patient will perform home exercise program per protocol with independence within 4 days. 6. Patient will demonstrate AROM 0-90 degrees in operative joint within 4 days. 1/21/2020 1555 by Jayant Álvarez PT  Outcome: Progressing Towards Goal   PHYSICAL THERAPY TREATMENT  Patient: Rajan Perez (9 y.o. 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 7XXS]   Acquired absence of knee joint following removal of joint prosthesis with presence of antibiotic-impregnated cement spacer  Procedure(s) (LRB):  REVISION RIGHT TOTAL KNEE REPLACEMENT (Right) 1 Day Post-Op  Precautions: Fall, WBAT  Chart, physical therapy assessment, plan of care and goals were reviewed. ASSESSMENT  Patient continues with skilled PT services and is progressing towards goals. Pt cleared by PT this AM but has yet to discharge. Reports saturation of dressing through ACE bandage this AM after mobilizing. Gait training performed to assess incision integrity prior to discharge home.  Ambulating with antalgic gait and decreased heel strike which improved with verbal cueing. No breakthrough bleeding noted on abd pads or gauze dressing. Continues to be cleared for discharge home today. Current Level of Function Impacting Discharge (mobility/balance): None    Other factors to consider for discharge: None         PLAN :  Patient continues to benefit from skilled intervention to address the above impairments. Continue treatment per established plan of care. to address goals. Recommendation for discharge: (in order for the patient to meet his/her long term goals)  Physical therapy at least 2 days/week in the home     This discharge recommendation:  Has been made in collaboration with the attending provider and/or case management    IF patient discharges home will need the following DME: patient owns DME required for discharge       SUBJECTIVE:   Patient stated Better it starts bleeding here than at home.     OBJECTIVE DATA SUMMARY:   Critical Behavior:  Neurologic State: Alert  Orientation Level: Oriented X4  Cognition: Appropriate decision making  Safety/Judgement: Awareness of environment  Functional Mobility Training:  Bed Mobility:     Supine to Sit: Supervision  Sit to Supine: Supervision  Scooting: Independent        Transfers:  Sit to Stand: Supervision  Stand to Sit: Supervision        Bed to Chair: Supervision                    Balance:  Sitting: Intact  Standing: Intact; With support  Ambulation/Gait Training:  Distance (ft): 200 Feet (ft)  Assistive Device: Gait belt;Walker, rolling  Ambulation - Level of Assistance: Supervision     Gait Description (WDL): Exceptions to WDL  Gait Abnormalities: Antalgic        Base of Support: Widened     Speed/Sophia: Pace decreased (<100 feet/min)  Step Length: Right shortened;Left shortened                   Stairs:  Number of Stairs Trained: 5  Stairs - Level of Assistance: Stand-by assistance   Rail Use: Left         Activity Tolerance:   Good  Please refer to the flowsheet for vital signs taken during this treatment.     After treatment patient left in no apparent distress:   Supine in bed, Call bell within reach, and Caregiver / family present    COMMUNICATION/COLLABORATION:   The patients plan of care was discussed with: Registered Nurse    Joseline Colon, PT   Time Calculation: 18 mins

## 2020-01-21 NOTE — PROGRESS NOTES
Primary Nurse Carey Alva RN and Antonio Chavez RN performed a dual skin assessment on this patient No impairment noted  Parker score is 20

## 2020-01-21 NOTE — PROGRESS NOTES
ANKIT spoke with Nayely Edward from [address] [facility]. He stated that he spoke with the [facility] and he has auth to be able to open pt to services tomorrow.  Eriebrto Marcum

## 2020-01-21 NOTE — PROGRESS NOTES
Reason for Admission:   Removal of antibiotic impregnated cement spacer knee, revision right total knee replacement. RRAT Score:   6                  Plan for utilizing home health: Yes                       Current Advanced Directive/[facility] Care Plan: Not on file. Transition of Care Plan:     CM met with pt and wife. This pt is known to this CM from a past admission. Per pt, he was fairly independent prior to admission. CM informed him that he has home health orders. He has used [facility] in the past through his Workman's [employer] CM. He stated that he will use them again. CM called pt's WC CM, Marshall Woods ([phone]) and left her a voice mail asking her to approve home health with [facility] again. CM also faxed her clinicals to [phone]. Pt's reference number for  is #[id number]. CM also sent a referral to [facility] via [employer]. Will follow. [address] Management Interventions  PCP Verified by CM:  Yes  Palliative Care Criteria Met (RRAT>21 & CHF Dx)?: No  Transition of Care Consult (CM Consult): Discharge Planning  MyChart Signup: No  Discharge Durable Medical Equipment: No  Physical Therapy Consult: Yes  Occupational Therapy Consult: Yes  Speech Therapy Consult: No  Current Support Network: Lives with Spouse  Confirm Follow Up Transport: Family  The Plan for Transition of Care is Related to the Following Treatment Goals : safe discharge  The Patient and/or Patient Representative was Provided with a Choice of Provider and Agrees with the Discharge Plan?: Yes  Freedom of Choice List was Provided with Basic Dialogue that Supports the Patient's Individualized Plan of Care/Goals, Treatment Preferences and Shares the Quality Data Associated with the Providers?: Yes  The [employer] Information Provided?: No

## 2020-01-21 NOTE — PROGRESS NOTES
Contacted Dr. Case Alves doctor on call re:patient's home meds that were not ordered. Received return call from Dr. Kim Lang. Per Dr. Patricia menezes to order Ropinirole (Requip) for patient's restless leg at nighttime.

## 2020-01-21 NOTE — DISCHARGE INSTRUCTIONS
Discharge Instructions Knee Replacement  Dr. Adalid Morejon      Patient Name  Abdoul Herr  Date of procedure  1/20/2020    Procedure  Procedure(s):  REVISION RIGHT TOTAL KNEE REPLACEMENT  Surgeon  Surgeon(s) and Role:     Daniel Paris MD - Primary  Date of discharge: 1/21/2020  PCP: Ba Horton MD    Follow up care   Follow up visit with Dr. Adalid Morejon in 2 weeks. Call [phone] to make an appointment   The staples in your knee incision will be taken out at this follow up appointment    Activity at home   Take a short walk every hour; except at night when sleeping   Do your Home Exercise Program 3 times every day    After exercising lie down and elevate your leg on pillows for 15-30 minutes to decrease swelling   Refer to your patient notebook for more information  Bathing and caring for your incision   You may take a shower and wash your incision with soap and water   Change your knee dressing daily for one week. You may then leave your incision open to air unless you see drainage from your knee. Preventing blood clots   Take Eliquis 2.5 mg twice a day day as prescribed   Call Dr. Adalid Morejon if you have side effects of blood thinning medication: bleeding, bruising, upset stomach, or diarrhea.  Call Dr. Adalid Morejon for signs of a blood clot in your leg: calf pain, tenderness, redness, swelling of lower leg   Preventing lung congestion   Use your incentive spirometer 4 times a day; do 10 repetitions each time   Remember to keep the small blue ball between the two arrows when taking a slow, deep breath   Pain Management   Get up and walk a short distance to relieve pain and stiffness.  Place ice wrap on your knee except when you are walking.  The gel ice packs should be changed about every 4 hours   Elevate your leg on pillows for 15-30 minutes    Take Tylenol 1000mg (take two 500mg tablets) every 6 hours for the next 2 weeks   Take anti-inflammatory medication (NSAID) as prescribed each day.   If needed, take a narcotic pain pill every 4-6 hours as prescribed. Take with a small amount of food.  As your pain decreases, take the narcotics less often or take ½ of a pill   Call Dr. Rico Piedra if you have side effects from your narcotic pain medication: itching, drowsiness, dizziness, upset stomach, dry mouth, constipation or if you medication is not relieving your pain. Diet after surgery   You may resume your normal diet. Include vegetables, fruit, whole grains, lean meats, and low-fat dairy products. Eat food high in fiber    Drink plenty of fluids, including 8 cups of water daily   Take over-the-counter stool softeners and laxatives to prevent constipation    Avoid after surgery   Do not take any over-the-counter medication for pain except Tylenol     Do not take more than 4000 mg (4 Grams) of Tylenol in 24 hours     Do not drink alcoholic beverages   Do not smoke   Do not drive until seen for follow up appointment   Do not place frozen gel pack directly on your skin. It can cause frostbite.  Do not take a tub bath, swim or get in a hot tub for 6 weeks  Prevention of falls and safety at home   Set up an area where you can rest comfortably leaving space around furniture to allow you to walk with your walker   Keep stairs, hallways and bathrooms well lit; especially at night   Arrange for care for your pets   Keep your home free of clutter   Call Dr. Rico Piedra at [phone] for:   Pain that is not relieved by pain medication, ice and activity   Side effects of medications   Increased/spread of bruising   Warning signs of infection:  ? persistent fever greater than 100 degrees  ?  shaking or chills  ? increased redness, tenderness, swelling or drainage from incision  ? increased pain during activity or rest   Warning signs of a blood clot in your leg:  ? increased pain in your calf  ? tenderness or redness  ? increased swelling or knee, calf, ankle or foot    If you call after 5pm or on a weekend, the on call physician will return your phone call  Call your Primary Care Doctor for:    Concerns about your medical conditions such as diabetes, high blood pressure, asthma, congestive heart failure.    Blood sugars greater than 180   Persistent headache or dizziness   Coughing or congestion   Constipation or diarrhea   Burning when you go to the bathroom   Abnormal heart rate (fast or slow)               Call 911 and go to the nearest hospital for:    Sudden increased shortness of breath   Sudden onset of chest pain   Difficulty breathing   Localized chest pain with coughing or taking a deep breath

## 2020-01-21 NOTE — PROGRESS NOTES
OCCUPATIONAL THERAPY EVALUATION/DISCHARGE  Patient: Nelwyn Schlatter (32 y.o. 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 7XXS]  Procedure(s) (LRB):  REVISION RIGHT TOTAL KNEE REPLACEMENT (Right) 1 Day Post-Op   Precautions: WBA       ASSESSMENT  Based on the objective data described below, the patient presents at mod I to independent level with self-care and mobility using RW. Pt has all DME/AE at from previous knee surgeries. Pt lives with supportive wife. Feel pt is safe for d/c once medically cleared by MD.     Current Level of Function (ADLs/self-care): mod I to independent    Functional Outcome Measure: The patient scored 95/100 on the Barthel outcome measure which is indicative of impairment. Other factors to consider for discharge: none noted     PLAN :  Recommend with staff:     Recommendation for discharge: (in order for the patient to meet his/her long term goals)  No skilled occupational therapy/ follow up rehabilitation needs identified at this time. This discharge recommendation:  A follow-up discussion with the attending provider and/or case management is planned    IF patient discharges home will need the following DME: none       SUBJECTIVE:   Patient stated I have some pain.     OBJECTIVE DATA SUMMARY:   HISTORY:   Past Medical History:   Diagnosis Date    Asthma     Chronic pain     KNEES AND BACK    Crohn's disease ([facility] [address]     Fatty liver     GERD (gastroesophageal reflux disease)     HTN (hypertension)     NO MEDS    Hyperlipidemia     Ill-defined condition     HIATAL HERNIA, HIV    Migraine     MRSA carrier 1/16/2020    Sleep apnea     USES CPAP     Past Surgical History:   Procedure Laterality Date    HX COLONOSCOPY      HX HERNIA REPAIR Left 1986    INGUINAL    HX KNEE ARTHROSCOPY Right 2010    HX KNEE REPLACEMENT Right 08/2018       Prior Level of Function/Environment/Context:   Expanded or extensive additional review of patient history:   Home Situation  Home Environment: Private residence  # Steps to Enter: 4  Rails to Enter: Yes  One/Two Story Residence: One story  Living Alone: No  Support Systems: Spouse/Significant Other/Partner  Patient Expects to be Discharged to[de-identified] Private residence  Current DME Used/Available at Home: Cane, straight, Adaptive dressing aides, Raised toilet seat, Walker, rolling  Tub or Shower Type: Tub/Shower combination      EXAMINATION OF PERFORMANCE DEFICITS:  Cognitive/Behavioral Status:  Neurologic State: Alert  Orientation Level: Oriented X4  Cognition: Appropriate decision making        Safety/Judgement: Awareness of environment    Skin: intact    Edema: none noted    Hearing:       Vision/Perceptual:                                     Range of Motion:  WFL                            Strength:  WFL                   Coordination:     Fine Motor Skills-Upper: Left Intact; Right Intact    Gross Motor Skills-Upper: Left Intact; Right Intact    Tone & Sensation:                              Balance:  Sitting: Intact  Standing: Intact; With support    Functional Mobility and Transfers for ADLs:  Bed Mobility:  Supine to Sit: Independent  Sit to Supine: Independent  Scooting: Independent    Transfers:  Sit to Stand: Supervision  Stand to Sit: Supervision  Bed to Chair: Supervision  Bathroom Mobility: Supervision/set up  Toilet Transfer : Supervision    ADL Assessment:  Feeding: Independent    Oral Facial Hygiene/Grooming: Independent    Bathing: Minimum assistance;Assist x1    Upper Body Dressing: Independent    Lower Body Dressing: Modified independent; Additional time    Toileting: Supervision                ADL Intervention and task modifications:          Cognitive Retraining  Safety/Judgement: Awareness of environment      Functional Measure:  Barthel Index:    Bathin  Bladder: 10  Bowels: 10  Groomin  Dressing: 10  Feeding: 10  Mobility: 15  Stairs: 5  Toilet Use: 10  Transfer (Bed to Chair and Back): 15  Total: 95/100        The Barthel ADL Index: Guidelines  1. The index should be used as a record of what a patient does, not as a record of what a patient could do. 2. The main aim is to establish degree of independence from any help, physical or verbal, however minor and for whatever reason. 3. The need for supervision renders the patient not independent. 4. A patient's performance should be established using the best available evidence. Asking the patient, friends/relatives and nurses are the usual sources, but direct observation and common sense are also important. However direct testing is not needed. 5. Usually the patient's performance over the preceding 24-48 hours is important, but occasionally longer periods will be relevant. 6. Middle categories imply that the patient supplies over 50 per cent of the effort. 7. Use of aids to be independent is allowed. Angelita Carmona., Barthel, D.W. (90). Functional evaluation: the Barthel Index. [address] [facility] (14)2. Krystal Ban maria luisa [place], J.J.M.F, Nasir Campo, Moy Horner, [place], [address] [place] [address] (). Measuring the change indisability after inpatient rehabilitation; comparison of the responsiveness of the Barthel Index and Functional Dickenson Measure. Journal of Neurology, Neurosurgery, and Psychiatry, 66(4), 421-842. Gregg Mazariegos, N.J.A, MELINA Groves.RAMILA, & Alexander Joy MTraeA. (2004.) Assessment of post-stroke quality of life in cost-effectiveness studies: The usefulness of the Barthel Index and the EuroQoL-5D.  Quality of Life Research, 15, 086-59       Occupational Therapy Evaluation Charge Determination   History Examination Decision-Making   LOW Complexity : Brief history review  LOW Complexity : 1-3 performance deficits relating to physical, cognitive , or psychosocial skils that result in activity limitations and / or participation restrictions  LOW Complexity : No comorbidities that affect functional and no verbal or physical assistance needed to complete eval tasks       Based on the above components, the patient evaluation is determined to be of the following complexity level: LOW   Pain Rating:  Minimal pain    Activity Tolerance:   Good  Please refer to the flowsheet for vital signs taken during this treatment. After treatment patient left in no apparent distress:    Sitting in chair, caregiver present    COMMUNICATION/EDUCATION:   The patients plan of care was discussed with: Physical Therapist and Registered Nurse.     Thank you for this referral.  Travis Rush OTR/L  Time Calculation: 24 mins

## 2020-01-21 NOTE — PROGRESS NOTES
Patient assessed for readiness to ambulate. Vital Signs  Level of Consciousness: Alert  Temp: 97.8 °F (36.6 °C)  Temp Source: Oral  Pulse (Heart Rate): 81  Heart Rate Source: Monitor  Cardiac Rhythm: Normal sinus rhythm  Resp Rate: 15  BP: 129/85  MAP (Monitor): 94  MAP (Calculated): 100  BP 1 Location: Left arm  BP 1 Method: Automatic  BP Patient Position: At rest  MEWS Score: 1. Patient ambulated with assistance of 2 nurses. Patient ambulated with gait belt and walker. Patient walked to sidestepped to Head of Bed. Patient returned safely to bed.

## 2020-01-21 NOTE — PROGRESS NOTES
Bedside and Verbal shift change report given to MARIAH Singletary (oncoming nurse) by Renea Urbina RN (offgoing nurse). Report included the following information SBAR, Kardex, OR Summary, Procedure Summary, Intake/Output, MAR and Recent Results.

## 2020-01-21 NOTE — OP NOTES
[address]   OPERATIVE REPORT    Name:  Tre Kellogg  MR#:  [id number]  :  1960  ACCOUNT #:  [de-identified]  DATE OF SERVICE:  2020    PREOPERATIVE DIAGNOSES:  Status post resection arthroplasty for deep infection, right total knee replacement. POSTOPERATIVE DIAGNOSES:  Status post resection arthroplasty for deep infection, right total knee replacement. PROCEDURES PERFORMED:  Removal of antibiotic impregnated cement spacer knee, revision right total knee replacement. SURGEON:  Lois Gregg MD    ASSISTANT:  Rosemarie Davey PA-C    ANESTHESIA:  Spinal.    COMPLICATIONS:  Intraoperative longitudinal fracture of the lateral femoral condyle fixed with cerclage cable. SPECIMENS REMOVED:  Old implants. IMPLANTS:  DePuy revision right total knee replacement. ESTIMATED BLOOD LOSS:  150 mL. INDICATIONS:  This is a 72-year-old gentleman, who underwent elective right total knee replacement sometime ago. He presented with postoperative infection and has undergone several procedures. Workup revealed previously undiagnosed HIV infection. He has gone through a course of IV antibiotics and all of his inflammatory markers are normal.  His viral load has dropped down to near zero. He therefore elected to proceed with reimplantation of his right knee. OPERATION:  The patient was taken to the operating room and underwent placement of spinal anesthesia. The right knee and leg were prepped and draped in usual fashion. The leg was exsanguinated with the Esmarch bandage and tourniquet inflated to 300 mmHg. Original incision was utilized, taken down through skin and subcutaneous tissue. A medial parapatellar arthrotomy was performed and extended proximally along the medial border of the quadriceps tendon, distally along the medial border of the insertion of the patellar tendon. Extensive releases were performed to mobilize the knee.   I used an osteotome and removed the antibiotic cement femoral component in a piecemeal fashion. With further releases, I was able to subluxate the tibia anteriorly and remove the tibial insert with relative ease. All remaining antibiotic cement was removed in a piecemeal fashion. All fibrous tissue was removed using a rongeur and a curette. All bony surfaces were cleansed using pulsatile lavage system with antibiotic solution. I began with the tibia. I reamed the tibia up to a size 18 reamer. I broached the proximal tibia up to a size 53 sleeve. With the sleeve in place, I used a saw to cut the level of surface from the tibia. The tibia was sized to a size 8 and I attached the tibial plate to this. I then turned my attention to the femur. Femoral canal was reamed up to a size 16 reamer. The femur was broached up to a size 45 sleeve. The femur was sized to a size 8. With our cutting blocks in place, I assessed my flexion/extension gaps. I was loose in extension, therefore I utilized 4 mm augments medially and laterally. This balanced the knee in flexion/extension. I then did a trial reduction and a 12-mm insert seemed to optimize stability and range of motion. I then everted the patella, I then cleared this of all soft tissue. I re-cut the surface of the patella and removed all fibrous tissue. The patella was sized to a size 41. Drill holes were placed and the trial button applied. Patella tracked normally. All trial components were removed and all components were assembled on the back table. I utilized antibiotic cement with gentamicin and vancomycin. All components were cemented in place, taking care not to allow any cement to come in contact with the porous sleeves. At this point in time, I recognized a longitudinal fracture of the lateral femoral condyle. I therefore used a cable passer and passed a cable around the distal femur.   After inserting my femoral component, I then reduced the fracture and tightened down the cable around my femoral component with good fixation. The 12-mm insert was placed, the patellar button was cemented in place. All excess cement was removed. The knee was maintained in full extension while the cement hardened. Tourniquet was let down after a total tourniquet time of 96 minutes. Hemostasis was obtained using Bovie apparatus. All soft tissue and periosteum were infiltrated with pain solution. The arthrotomy was repaired using #1 Stratafix suture in a running fashion. This was supplemented with #2 Vicryl in interrupted figure-of-eight fashion. Subcutaneous tissue was approximated using 2-0 Vicryl in interrupted fashion. Skin edges were approximated using stapling apparatus. The joint was infiltrated with a gram of TXA. Bulky sterile dressing was applied and the patient was transferred to recovery room in stable condition. The physician assistant was critical throughout the case in assisting with manipulating the leg and positioning and assisting with implantation throughout the case.       Salomón Son MD      [id number]  D:  01/20/2020 18:00  T:  01/21/2020 0:54  JOB #:  [id number]

## 2020-01-21 NOTE — PROGRESS NOTES
Problem: Mobility Impaired (Adult and Pediatric)  Goal: *Acute Goals and Plan of Care (Insert Text)  Description  FUNCTIONAL STATUS PRIOR TO ADMISSION: Patient was independent and active without use of DME.    HOME SUPPORT PRIOR TO ADMISSION: The patient lived with wife but did not require assist.    Physical Therapy Goals  Initiated 1/21/2020    1. Patient will move from supine to sit and sit to supine  in bed with independence within 4 days. 2. Patient will perform sit to stand with independence within 4 days. 3. Patient will ambulate with independence for 300 feet with the least restrictive device within 4 days. 4. Patient will ascend/descend 5 stairs with 1 handrail(s) with independence within 4 days. 5. Patient will perform home exercise program per protocol with independence within 4 days. 6. Patient will demonstrate AROM 0-90 degrees in operative joint within 4 days. Outcome: Progressing Towards Goal   PHYSICAL THERAPY EVALUATION  Patient: Nena Bishop (89 y.o. 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 7XXS]  Procedure(s) (LRB):  REVISION RIGHT TOTAL KNEE REPLACEMENT (Right) 1 Day Post-Op   Precautions:   Fall, WBAT      ASSESSMENT  Based on the objective data described below, the patient presents with decreased ROM, intact strength, balance with UE support and restored sensation following revision R knee replacement POD#1. Pt has undergone multiple knee surgeries and is well aware of activity modifications and acute therapy goals. HEP reviewed with good quad strength demonstrated. Overall SUP-SBA level for mobility to include transfers, gait and stair training x 5 steps with single railing. Pt left sitting up in chair with wife present. Cleared for discharge. Current Level of Function Impacting Discharge (mobility/balance): None    Functional Outcome Measure: The patient scored 95/100 on the Barthel outcome measure. Other factors to consider for discharge: None     Patient will benefit from skilled therapy intervention to address the above noted impairments. PLAN :  Recommendations and Planned Interventions: bed mobility training, transfer training, gait training, therapeutic exercises, neuromuscular re-education, patient and family training/education, and therapeutic activities      Frequency/Duration: Patient will be followed by physical therapy:  twice daily to address goals.     Recommendation for discharge: (in order for the patient to meet his/her long term goals)  Physical therapy at least 2 days/week in the home     This discharge recommendation:  Has been made in collaboration with the attending provider and/or case management    IF patient discharges home will need the following DME: patient owns DME required for discharge         SUBJECTIVE:   Patient stated We will go back to the same OP PT.    OBJECTIVE DATA SUMMARY:   HISTORY:    Past Medical History:   Diagnosis Date    Asthma     Chronic pain     KNEES AND BACK    Crohn's disease ([facility] [address]     Fatty liver     GERD (gastroesophageal reflux disease)     HTN (hypertension)     NO MEDS    Hyperlipidemia     Ill-defined condition     HIATAL HERNIA, HIV    Migraine     MRSA carrier 1/16/2020    Sleep apnea     USES CPAP     Past Surgical History:   Procedure Laterality Date    HX COLONOSCOPY      HX HERNIA REPAIR Left 1986    INGUINAL    HX KNEE ARTHROSCOPY Right 2010    HX KNEE REPLACEMENT Right 08/2018       Personal factors and/or comorbidities impacting plan of care: chronic pain, HTN, MRSA    Home Situation  Home Environment: Private residence  # Steps to Enter: 4  Rails to Enter: Yes  One/Two Story Residence: One story  Living Alone: No  Support Systems: Spouse/Significant Other/Partner  Patient Expects to be Discharged to[de-identified] Private residence  Current DME Used/Available at Home: Cane, straight, Adaptive dressing aides, Raised toilet seat, Walker, rolling  Tub or Shower Type: Tub/Shower combination    EXAMINATION/PRESENTATION/DECISION MAKING:   Critical Behavior:  Neurologic State: Alert  Orientation Level: Oriented X4  Cognition: Appropriate decision making  Safety/Judgement: Awareness of environment  Hearing:     Skin:    Edema:   Range Of Motion:  AROM: Generally decreased, functional                       Strength:    Strength: Within functional limits                    Tone & Sensation:   Tone: Normal                              Coordination:  Coordination: Within functional limits  Vision:      Functional Mobility:  Bed Mobility:     Supine to Sit: Independent  Sit to Supine: Independent  Scooting: Independent  Transfers:  Sit to Stand: Supervision  Stand to Sit: Supervision        Bed to Chair: Supervision              Balance:   Sitting: Intact  Standing: Intact; With support  Ambulation/Gait Training:  Distance (ft): 175 Feet (ft)  Assistive Device: Gait belt;Walker, rolling  Ambulation - Level of Assistance: Supervision     Gait Description (WDL): Exceptions to WDL  Gait Abnormalities: Decreased step clearance        Base of Support: Widened     Speed/Sophia: Pace decreased (<100 feet/min)  Step Length: Right shortened;Left shortened                     Stairs:  Number of Stairs Trained: 5  Stairs - Level of Assistance: Stand-by assistance   Rail Use: Left     Therapeutic Exercises:   Knee HEP    Functional Measure:  Barthel Index:    Bathin  Bladder: 10  Bowels: 10  Groomin  Dressing: 10  Feeding: 10  Mobility: 15  Stairs: 5  Toilet Use: 10  Transfer (Bed to Chair and Back): 15  Total: 95/100       The Barthel ADL Index: Guidelines  1. The index should be used as a record of what a patient does, not as a record of what a patient could do.   2. The main aim is to establish degree of independence from any help, physical or verbal, however minor and for whatever reason. 3. The need for supervision renders the patient not independent. 4. A patient's performance should be established using the best available evidence. Asking the patient, friends/relatives and nurses are the usual sources, but direct observation and common sense are also important. However direct testing is not needed. 5. Usually the patient's performance over the preceding 24-48 hours is important, but occasionally longer periods will be relevant. 6. Middle categories imply that the patient supplies over 50 per cent of the effort. 7. Use of aids to be independent is allowed. Nina Gutierrez., Barthel, D.W. (4064). Functional evaluation: the Barthel Index. [address] [facility] (14)2. DUYEN Maxwell Ace, Jitendra Bach., Ju Chatman., [place], [address] [facility] (). Measuring the change indisability after inpatient rehabilitation; comparison of the responsiveness of the Barthel Index and Functional [place] Measure. Journal of Neurology, Neurosurgery, and Psychiatry, 66(4), 638-264. Kamryn Du, N.J.A, TOMASA Groves, & Jaren Brandt M.A. (2004.) Assessment of post-stroke quality of life in cost-effectiveness studies: The usefulness of the Barthel Index and the EuroQoL-5D.  Quality of Life Research, 15, 386-10   '       Physical Therapy Evaluation Charge Determination   History Examination Presentation Decision-Making   MEDIUM  Complexity : 1-2 comorbidities / personal factors will impact the outcome/ POC  MEDIUM Complexity : 3 Standardized tests and measures addressing body structure, function, activity limitation and / or participation in recreation  LOW Complexity : Stable, uncomplicated  Other outcome measures Barthel  LOW       Based on the above components, the patient evaluation is determined to be of the following complexity level: LOW     Pain Ratin/10    Activity Tolerance: Good  Please refer to the flowsheet for vital signs taken during this treatment. After treatment patient left in no apparent distress:   Sitting in chair, Call bell within reach, and Caregiver / family present    COMMUNICATION/EDUCATION:   The patients plan of care was discussed with: Occupational Therapist and Registered Nurse. Fall prevention education was provided and the patient/caregiver indicated understanding., Patient/family have participated as able in goal setting and plan of care. , and Patient/family agree to work toward stated goals and plan of care.     Thank you for this referral.  Sandra Sosa, PT   Time Calculation: 31 mins

## 2020-01-21 NOTE — PROGRESS NOTES
Problem: Falls - Risk of  Goal: *Absence of Falls  Description  Document Criss Delarosa Fall Risk and appropriate interventions in the flowsheet.   Outcome: Progressing Towards Goal  Note:   Fall Risk Interventions:  Mobility Interventions: Patient to call before getting OOB, PT Consult for mobility concerns    Medication Interventions: Patient to call before getting OOB, Teach patient to arise slowly    Elimination Interventions: Call light in reach, Patient to call for help with toileting needs, Stay With Me (per policy)

## 2020-01-22 NOTE — DISCHARGE SUMMARY
Orthopedic Service Discharge Summary    Patient ID:  Calderon Geiger  [id number]  male  63 y.o.  1960    Admit date: 1/20/2020    Discharge date and time: 1/21/2020  4:50 PM     Admitting Physician: Roshni Bourne MD     Discharge Physician: Roshni Bourne MD    Consulting Physician(s): Treatment Team: Utilization Review: Chung Mcconnell; Care Manager: Allan Patel    Date of Surgery: 1/20/2020     Preoperative Diagnosis:  HISTORY OF INFECTION IF TOTAL JOINT PROSTHESIS OF KNEE    Postoperative Diagnosis: HISTORY OF INFECTION IF TOTAL JOINT PROSTHESIS OF KNEE    Procedure(s): Procedure(s):  REVISION RIGHT TOTAL KNEE REPLACEMENT    Surgeon: Surgeon(s) and Role:     Elif Baca MD - Primary      Anesthesia:  Spinal    Preoperative Medical Clearance:                           HPI:  Pt is a 63 y.o. 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 7XXS]  with pain and limitations of activities of daily living who presents at this time for a right total knee revision following the failure of conservative management. PMH:   Past Medical History:   Diagnosis Date    Asthma     Chronic pain     KNEES AND BACK    Crohn's disease ([address]     Fatty liver     GERD (gastroesophageal reflux disease)     HTN (hypertension)     NO MEDS    Hyperlipidemia     Ill-defined condition     HIATAL HERNIA, HIV    Migraine     MRSA carrier 1/16/2020    Sleep apnea     USES CPAP       Medications upon admission :   Prior to Admission Medications   Prescriptions Last Dose Informant Patient Reported? Taking? cetirizine (ZYRTEC) 10 mg tablet 12/20/2019 at Unknown time  Yes Yes   Sig: Take 10 mg by mouth daily as needed for Allergies or Rhinitis.    chlorhexidine (HIBICLENS) 4 % liquid 2020 at Unknown time  No No   Sig: Apply  mL to affected area daily for 7 days. fish oil-omega-3 fatty acids 340-1,000 mg capsule 2020  Yes No   Sig: Take 1 Cap by mouth two (2) times a day. mupirocin (BACTROBAN) 2 % ointment 2020 at Unknown time  No No   Sig: by Both Nostrils route two (2) times a day for 7 days. pramipexole (MIRAPEX) 0.5 mg tablet 2020 at Unknown time  Yes Yes   Sig: Take 0.5 mg by mouth nightly. testosterone cypionate (DEPOTESTOTERONE CYPIONATE) 200 mg/mL injection 2020  Yes No   Si mg by IntraMUSCular route every . therapeutic multivitamin (THERA) tablet 2020  Yes No   Sig: Take 1 Tab by mouth daily. Facility-Administered Medications: None        Allergies: Allergies   Allergen Reactions    Vancomycin Other (comments)     Red man syndrome with 2 hour administration, requires a 4 hour administration for tolerance        Hospital Course: The patient underwent surgery. Complications:  None; patient tolerated the procedure well. Was taken to the PACU in stable condition and then transferred to the Orthopedics floor. Condition on discharge: good    Perioperative Antibiotics:  Ancef and daptomycin     Postoperative Pain Management:  Acetaminophen and Oxycodone,     DVT Prophylaxis: eliquis scds    Postoperative transfusions:     none Banked PRBCs     Post Op complications: none    Hemoglobin at discharge:    Lab Results   Component Value Date/Time    HGB 13.5 2020 05:07 AM    INR 1.0 01/15/2020 09:51 AM       Dressing was changed on POD # 2. Incision - clean, dry and intact. No significant erythema or swelling. Neurovascular exam within normal limits. Wound appears to be healing without any evidence of infection. Physical Therapy started on the day following surgery and progressed to ambulation with the aid of a rolling walker for distances of 200 feet with supervision. Range of motion  limited by pain.   At the time of discharge, able to go up and down stairs and had understanding of precautions needed following surgery. Discharged to: home with home health. Discharge instructions:  -See Full Summary of discharge instructions attached  -Anticoagulate with eliquis  -Resume pre hospital diet            -Resume home medications   Discharge Medication List as of 1/21/2020  3:34 PM      START taking these medications    Details   acetaminophen (TYLENOL) 500 mg tablet Take 2 Tabs by mouth every six (6) hours. , Print, Disp-120 Tab, R-0      apixaban (ELIQUIS) 2.5 mg tablet Take 1 Tab by mouth every twelve (12) hours every twelve (12) hours. Indications: deep vein thrombosis prevention, Print, Disp-28 Tab, R-0      meloxicam (MOBIC) 7.5 mg tablet Take 1 Tab by mouth daily. , Print, Disp-15 Tab, R-0      oxyCODONE IR (ROXICODONE) 5 mg immediate release tablet Take 1 Tab by mouth every four (4) hours as needed (severe pain) for up to 7 days. Max Daily Amount: 30 mg., Print, Disp-40 Tab, R-0      senna-docusate (PERICOLACE) 8.6-50 mg per tablet Take 1 Tab by mouth two (2) times a day., Print, Disp-20 Tab, R-0         CONTINUE these medications which have NOT CHANGED    Details   cetirizine (ZYRTEC) 10 mg tablet Take 10 mg by mouth daily as needed for Allergies or Rhinitis., Historical Med      pramipexole (MIRAPEX) 0.5 mg tablet Take 0.5 mg by mouth nightly., Historical Med      testosterone cypionate (DEPOTESTOTERONE CYPIONATE) 200 mg/mL injection 100 mg by IntraMUSCular route every Sunday., Historical Med      therapeutic multivitamin (THERA) tablet Take 1 Tab by mouth daily. , Historical Med         STOP taking these medications       mupirocin (BACTROBAN) 2 % ointment Comments:   Reason for Stopping:         chlorhexidine (HIBICLENS) 4 % liquid Comments:   Reason for Stopping:         fish oil-omega-3 fatty acids 340-1,000 mg capsule Comments:   Reason for Stopping:            per medical continuation form  -Discharge activity: Activity as tolerated  -Ambulate with Walkers, Type: Rolling Walker, appropriate total joint protocol  -Wound Care Keep wound clean and dry, Reinforce dressing PRN, Ice to area for comfort and As directed  -Follow up in office in 2 weeks      Signed:  Louie Barber PA-C  1/22/2020  12:23 PM        No att. providers found

## 2020-01-22 NOTE — PROGRESS NOTES
Patient refused to watch Joint replacement video. I have reviewed discharge instructions with the patient. The patient verbalized understanding.

## 2020-02-03 LAB
BACTERIA SPEC CULT: NORMAL
GRAM STN SPEC: NORMAL
GRAM STN SPEC: NORMAL
SERVICE CMNT-IMP: NORMAL
SERVICE CMNT-IMP: NORMAL

## 2021-11-18 ENCOUNTER — TELEPHONE (OUTPATIENT)
Dept: UROLOGY | Age: 61
End: 2021-11-18

## 2022-03-19 PROBLEM — T84.53XA INFECTION OF TOTAL RIGHT KNEE REPLACEMENT (HCC): Status: ACTIVE | Noted: 2019-02-05

## 2022-03-19 PROBLEM — Z89.529 ACQUIRED ABSENCE OF KNEE JOINT FOLLOWING REMOVAL OF JOINT PROSTHESIS WITH PRESENCE OF ANTIBIOTIC-IMPREGNATED CEMENT SPACER: Status: ACTIVE | Noted: 2020-01-20

## 2022-03-19 PROBLEM — Z96.659 INFECTION OF TOTAL KNEE REPLACEMENT (HCC): Status: ACTIVE | Noted: 2019-02-05

## 2022-03-19 PROBLEM — M00.9 SEPTIC ARTHRITIS (HCC): Status: ACTIVE | Noted: 2019-07-06

## 2022-03-19 PROBLEM — T84.59XA INFECTION OF TOTAL KNEE REPLACEMENT (HCC): Status: ACTIVE | Noted: 2019-02-05

## 2022-03-19 PROBLEM — Z22.322 MRSA CARRIER: Status: ACTIVE | Noted: 2020-01-16

## 2022-03-19 PROBLEM — M17.11 PRIMARY OSTEOARTHRITIS OF RIGHT KNEE: Status: ACTIVE | Noted: 2018-08-06

## 2022-03-20 PROBLEM — T84.7XXS: Status: ACTIVE | Noted: 2020-01-20

## 2022-05-04 ENCOUNTER — HOSPITAL ENCOUNTER (OUTPATIENT)
Dept: INFUSION THERAPY | Age: 62
Discharge: HOME | End: 2022-05-04
Payer: COMMERCIAL

## 2022-05-04 VITALS
SYSTOLIC BLOOD PRESSURE: 137 MMHG | OXYGEN SATURATION: 97 % | RESPIRATION RATE: 16 BRPM | TEMPERATURE: 98.1 F | DIASTOLIC BLOOD PRESSURE: 91 MMHG | HEART RATE: 65 BPM

## 2022-05-04 LAB
HCT VFR BLD AUTO: 51.7 % (ref 36.6–50.3)
HGB BLD-MCNC: 16.9 G/DL (ref 12.1–17)

## 2022-05-04 PROCEDURE — 99195 PHLEBOTOMY: CPT

## 2022-05-04 PROCEDURE — 36415 COLL VENOUS BLD VENIPUNCTURE: CPT

## 2022-05-04 PROCEDURE — 85018 HEMOGLOBIN: CPT

## 2023-04-24 NOTE — TELEPHONE ENCOUNTER
lvm for pt to r/s , can be moved to 11/30 at 1:40 or 11/30 at 10:30 61 y M PMH: alcohol use disorder w/ multiple episodes of DTs and prior hospitalizations for alcohol withdrawal, HTN, HLD, and glaucoma, recently discharged for ETOH withdrawal from  presents to the ED with complaints of: ETOH intoxication now clinically stable for discharge home.       Problem/Plan - 1:  ·  Problem: Alcohol dependence with withdrawal.   ·  Plan:   - (4/23) Clinically improving. CIWA this AM is a 6.   (4/24) Clinically improved. Stable for discharge home   - ativan IV taper   -Ativan 2 mg IV PRN for CIWA >8  -Continue thiamine/MV/FA  -SW consult    Problem/Plan - 2:  ·  Problem: Parainfluenza infection.   ·  Plan: -Droplet precaution  -Supportive care (patient asymptomatic at this time).    Problem/Plan - 3:  ·  Problem: HTN (hypertension).   ·  Plan: -Resume amlodipine when BP uptrending (currently normotensive).    Problem/Plan - 4:  ·  Problem: Glaucoma.   ·  Plan: -Continue latanoprost eye drops  -Continue timolol eye drops.    Problem/Plan - 5:  ·  Problem: Transaminitis.   ·  Plan: 2/2 to ETOH   -Trend LFTs.    Problem/Plan - 6:  ·  Problem: Nutrition, metabolism, and development symptoms.   ·  Plan: F: not indicated  E: replete PRN  N: DASH diet    DVT ppx: lovenox    Full Code.   61 y M PMH: alcohol use disorder w/ multiple episodes of DTs and prior hospitalizations for alcohol withdrawal, HTN, HLD, and glaucoma, recently discharged for ETOH withdrawal from [facility] presents to the ED with complaints of: ETOH intoxication now (4/27) clinically stable for discharge home.     Problem/Plan - 1:  ·  Problem: Alcohol dependence with withdrawal.   ·  Plan:   - (4/23) Clinically improving. CIWA this AM is a 6.   (4/24) Clinically improved. Stable for discharge home   - ativan IV taper   -Ativan 2 mg IV PRN for CIWA >8  -Continue thiamine/MV/FA  -SW consult    Problem/Plan - 2:  ·  Problem: Parainfluenza infection.   ·  Plan: -Droplet precaution  -Supportive care (patient asymptomatic at this time).    Problem/Plan - 3:  ·  Problem: HTN (hypertension).   ·  Plan: -Resume amlodipine when BP uptrending (currently normotensive).    Problem/Plan - 4:  ·  Problem: Glaucoma.   ·  Plan: -Continue latanoprost eye drops  -Continue timolol eye drops.    Problem/Plan - 5:  ·  Problem: Transaminitis.   ·  Plan: 2/2 to ETOH   -Trend LFTs.    Problem/Plan - 6:  ·  Problem: Nutrition, metabolism, and development symptoms.   ·  Plan: F: not indicated  E: replete PRN  N: DASH diet    DVT ppx: lovenox    Full Code.

## 2023-05-20 RX ORDER — AMOXICILLIN 250 MG
1 CAPSULE ORAL 2 TIMES DAILY
COMMUNITY
Start: 2020-01-21

## 2023-05-20 RX ORDER — ACETAMINOPHEN 500 MG
1000 TABLET ORAL EVERY 6 HOURS
COMMUNITY
Start: 2020-01-21

## 2023-05-20 RX ORDER — PRAMIPEXOLE DIHYDROCHLORIDE 0.5 MG/1
0.5 TABLET ORAL NIGHTLY
COMMUNITY

## 2023-05-20 RX ORDER — TESTOSTERONE CYPIONATE 200 MG/ML
100 INJECTION, SOLUTION INTRAMUSCULAR
COMMUNITY

## 2023-05-20 RX ORDER — MELOXICAM 7.5 MG/1
7.5 TABLET ORAL DAILY
COMMUNITY
Start: 2020-01-22

## 2023-05-20 RX ORDER — CETIRIZINE HYDROCHLORIDE 10 MG/1
10 TABLET ORAL DAILY PRN
COMMUNITY

## 2023-06-27 ENCOUNTER — HOSPITAL ENCOUNTER (OUTPATIENT)
Facility: HOSPITAL | Age: 63
Discharge: HOME | End: 2023-06-30
Attending: ORTHOPAEDIC SURGERY
Payer: COMMERCIAL

## 2023-06-27 DIAGNOSIS — M19.012 OSTEOARTHRITIS OF LEFT SHOULDER, UNSPECIFIED OSTEOARTHRITIS TYPE: ICD-10-CM

## 2023-06-27 PROCEDURE — 73200 CT UPPER EXTREMITY W/O DYE: CPT

## 2023-10-29 NOTE — PROGRESS NOTES
Problem: Mobility Impaired (Adult and Pediatric)  Goal: *Acute Goals and Plan of Care (Insert Text)  Physical Therapy Goals  Initiated 2/7/2019  1. Patient will move from supine to sit and sit to supine , scoot up and down and roll side to side in bed with modified independence within 7 day(s). 2.  Patient will transfer from bed to chair and chair to bed with modified independence using the least restrictive device within 7 day(s). 3.  Patient will perform sit to stand with modified independence within 7 day(s). 4.  Patient will ambulate with modified independence for 150 feet with the least restrictive device within 7 day(s). 5.  Patient will ascend/descend 4 stairs with 1 handrail(s) with minimal assistance/contact guard assist within 7 day(s). physical Therapy EVALUATION  Patient: Sami Hawthorne (16 y.o. male)  Date: 2/7/2019  Primary Diagnosis: Infection of total right knee replacement (HCC) [T84.53XA]  Infection of total knee replacement ([address] [T84.59XA, Z96.659]  Procedure(s) (LRB):  RIGHT KNEE INCISION AND DRAINAGE WITH POLYETHYLENE EXCHANGE (Right) 1 Day Post-Op   Precautions: WBAT, FALL       ASSESSMENT :  Based on the objective data described below, the patient presents with impaired mobility compared to baseline function s/p R knee incision and drainage with polyethylene exchanged POD1. Chart reviewed, RN approved, patient received lying in bed agreeable to work with therapy. Patient SBA/CGA overall for mobility. Performed bed mobility, sit<>stand transfer, and ambulated 30ft with CGA using RW with antalgic, step-to gait. Patient assisted to/from bathroom and back to bed. Patient impulsive, requiring verbal cues for safety awareness and for using RW during mobility. Patient reports pain is well managed at this time. Anticipate patient will need 1 more session to practice stairs before cleared for d/c from a PT standpoint. Recommending [facility].       Patient will benefit from skilled intervention to address the above impairments. Patients rehabilitation potential is considered to be Good  Factors which may influence rehabilitation potential include:   []         None noted  []         Mental ability/status  [x]         Medical condition  []         Home/family situation and support systems  [x]         Safety awareness  []         Pain tolerance/management  []         Other:      PLAN :  Recommendations and Planned Interventions:  [x]           Bed Mobility Training             []    Neuromuscular Re-Education  [x]           Transfer Training                   []    Orthotic/Prosthetic Training  [x]           Gait Training                         []    Modalities  [x]           Therapeutic Exercises           []    Edema Management/Control  [x]           Therapeutic Activities            [x]    Patient and Family Training/Education  []           Other (comment):    Frequency/Duration: Patient will be followed by physical therapy  twice daily to address goals. Discharge Recommendations: Home Health  Further Equipment Recommendations for Discharge: None     SUBJECTIVE:   Patient stated I dont' need the walker, I can just get in bed from here.     OBJECTIVE DATA SUMMARY:   HISTORY:    Past Medical History:   Diagnosis Date    Asthma     Autoimmune disease ([address] [address]     CROHNS DISEASE    Chronic pain     KNEES AND BACK    Fatty liver     GERD (gastroesophageal reflux disease)     HTN (hypertension)     NO MEDS    Hyperlipidemia     Ill-defined condition     HIATAL HERNIA, HIV    Migraine     Sleep apnea     USES CPAP     Past Surgical History:   Procedure Laterality Date    HX HERNIA REPAIR Left 1986    INGUINAL    HX KNEE ARTHROSCOPY Right 2010     Prior Level of Function/Home Situation: Lives with wife in 1 story home with 4 RAJ with 1 handrail. Patient was independent PTA.     Personal factors and/or comorbidities impacting plan of care: safety awareness, PMH, stairs    Home Situation  Home Environment: Private residence  # Steps to Enter: 4  Rails to Enter: Yes  Hand Rails : Left  One/Two Story Residence: One story  Living Alone: Yes  Support Systems: Spouse/Significant Other/Partner  Current DME Used/Available at Home: Walker, rolling, [address], [place], straight, Shower chair, Commode, bedside  Tub or Shower Type: Tub/Shower combination    EXAMINATION/PRESENTATION/DECISION MAKING:   Critical Behavior:  Neurologic State: Appropriate for age  Orientation Level: Oriented X4  Cognition: Appropriate decision making, Appropriate for age attention/concentration, Appropriate safety awareness, Follows commands     Hearing: Auditory  Auditory Impairment: None  Skin:    Edema:   Range Of Motion:  AROM: Generally decreased, functional           PROM: Generally decreased, functional           Strength:    Strength: Generally decreased, functional                    Tone & Sensation:   Tone: Normal              Sensation: Intact               Coordination:  Coordination: Within functional limits  Vision:   Corrective Lenses: Glasses  Functional Mobility:  Bed Mobility:     Supine to Sit: Stand-by assistance(HOB elevated)  Sit to Supine: Stand-by assistance  Scooting: Stand-by assistance  Transfers:  Sit to Stand: Stand-by assistance  Stand to Sit: Stand-by assistance                       Balance:   Sitting: Intact  Standing: Intact; With support  Ambulation/Gait Training:  Distance (ft): 30 Feet (ft)  Assistive Device: Gait belt;Walker, rolling  Ambulation - Level of Assistance: Stand-by assistance        Gait Abnormalities: Antalgic; Step to gait  Right Side Weight Bearing: As tolerated     Base of Support: Widened  Stance: Right decreased                             Functional Measure:  Barthel Index:    Bathin  Bladder: 10  Bowels: 10  Groomin  Dressin  Feeding: 10  Mobility: 0  Stairs: 0  Toilet Use: 10  Transfer (Bed to Chair and Back): 10  Total: 60         The Barthel ADL Index: Guidelines  1. The index should be used as a record of what a patient does, not as a record of what a patient could do. 2. The main aim is to establish degree of independence from any help, physical or verbal, however minor and for whatever reason. 3. The need for supervision renders the patient not independent. 4. A patient's performance should be established using the best available evidence. Asking the patient, friends/relatives and nurses are the usual sources, but direct observation and common sense are also important. However direct testing is not needed. 5. Usually the patient's performance over the preceding 24-48 hours is important, but occasionally longer periods will be relevant. 6. Middle categories imply that the patient supplies over 50 per cent of the effort. 7. Use of aids to be independent is allowed. Ann Bailey., Barthel, DTraeW. (2165). Functional evaluation: the Barthel Index. [facility] (14)2. Atkins Hai maria luisa DUYEN Hirsch, Ronnie Harman., Meme Esquivel., [place], [address] (1999). Measuring the change indisability after inpatient rehabilitation; comparison of the responsiveness of the Barthel Index and Functional [place] Measure. Journal of Neurology, Neurosurgery, and Psychiatry, 66(4), 878-944. Keith Morales, N.J.A, TOMASA Groves, & April Mcnair MFADI. (2004.) Assessment of post-stroke quality of life in cost-effectiveness studies: The usefulness of the Barthel Index and the EuroQoL-5D.  Quality of Life Research, 15, 406-04            Physical Therapy Evaluation Charge Determination   History Examination Presentation Decision-Making   HIGH Complexity :3+ comorbidities / personal factors will impact the outcome/ POC  HIGH Complexity : 4+ Standardized tests and measures addressing body structure, function, activity limitation and / or participation in recreation  LOW Complexity : Stable, uncomplicated  LOW Complexity : FOTO score of       Based on the above components, the patient evaluation is determined to be of the following complexity level: LOW     Pain:  Pain Scale 1: Numeric (0 - 10)  Pain Intensity 1: 4  Pain Location 1: Knee  Pain Orientation 1: Right  Pain Description 1: Aching  Pain Intervention(s) 1: Medication (see MAR)  Activity Tolerance:   VSS  Please refer to the flowsheet for vital signs taken during this treatment. After treatment:   []         Patient left in no apparent distress sitting up in chair  [x]         Patient left in no apparent distress in bed  [x]         Call bell left within reach  [x]         Nursing notified  [x]         Caregiver present  []         Bed alarm activated    COMMUNICATION/EDUCATION:   The patients plan of care was discussed with: Occupational Therapist and Registered Nurse. [x]         Fall prevention education was provided and the patient/caregiver indicated understanding. [x]         Patient/family have participated as able in goal setting and plan of care. [x]         Patient/family agree to work toward stated goals and plan of care. []         Patient understands intent and goals of therapy, but is neutral about his/her participation. []         Patient is unable to participate in goal setting and plan of care.     Thank you for this referral.  Maegan Newby, PT, DPT   Time Calculation: 23 mins no

## 2024-02-01 NOTE — DISCHARGE SUMMARY
Blood pressures noted. Continue on the increased dose of lisinopril. Make sure has refill for one year to pharmacy of choice.    Inpatient hospitalist discharge summary                Brief Overview    PATIENT ID: Duc Humphreys    MRN: [id number]     YOB: 1960    Admitting Provider: Michelle Tafoya MD    Discharging Provider: Geraldine Madrigal MD   To contact this individual call [phone] and ask the  to page. If unavailable ask to be transferred the Adult Hospitalist Department.       PCP at discharge: Roberto Gonzalez, [address] / [facility] [address]    Admission date: 7/6/2019  Date of Discharge: 07/10/19    Chief complaint:   Chief Complaint   Patient presents with    Knee Swelling     Patient Active Problem List   Diagnosis Code    Primary osteoarthritis of right knee M17.11    Infection of total right knee replacement ([address] T84.53XA    Infection of total knee replacement ([address] T84.59XA, Z96.659    Septic arthritis ([address] M00.9         Discharge diagnosis, hospital course/plan:    Recurrent MSSA septic arthritis of the right knee  S/P resection arthroplasty, right knee with placement of antibiotics impregnated cement articulating spacer of the knee 7/8/19  -CT right ext status post knee arthroplasty with a moderate joint effusion and thickening of  the synovium  -On cefazolin   -s/p right knee aspirate, shows MSSA  - intra Op cx also showing MSSA   -ESR 8, CRP >9.5  -blood cx no growth so far  -ID and Orthopedic surgeon on board  - PICC line placed, Final ABX recs from ID noted     Hypokalemia   Resolved     Hx of Crohn's disease   -stable, continue mesalamine      HIV infection   -last absolute CD4 count  533 and VL  500  -continue  Biktarvy -25 1 tab po daily, patient brought his meds from home     Hx of HTN  -BP normal, not on medication, monitor BP     Microscopic hematuria   -no lower abdominal pain  -Discussed with urologist, recommend outpatient evaluation after discharge, patient said has a urologist and said he will make appointment     Hx of BRIDGER  -CPAP q hs     -GERD: stable, cont home meds  - asthma: mild intermittent, stable, albuterol prn    - Eliquis for DVT ppx as per Ortho    On the date of discharge, diagnostic face to face encounter was performed. Patient was hemodynamically stable, offering no new complaints. Denies any shortness of breath at rest, no fevers or chills, no diarrhea or constipation. Patient is agreeable for discharge. Patient understood and verbalized the understanding of the discharge plan. Patient was advised to seek medical help/ care or return to ED, if symptoms recur, worsen or new symptoms develop. Discharge Disposition:  [facility]    Discharge activity:  Ambulate in house    Code status at discharge:  Full Code     Active issues requiring follow up:  Septic arhtirtis    Outpatient follow up: Follow-up Information     Follow up With Specialties Details Why Contact Info    Sade Frances MD General Practice On 7/17/2019 [facility] f/u PCP appointment Wednesday, 7/17/19 @ 11:00 a.m.  [address] [address] [address] [place] [address] Smoker call this agency when you get home. [address] [facility] [address]    [address] [facility]   This pharmacy will be delivering your IV medications.  Sigrid Valdivia MD Orthopedic Surgery Schedule an appointment as soon as possible for a visit as recommended [address] [facility]  [address]  [address] [place] [address]  [phone]      Gloria Seymour MD Infectious Diseases Schedule an appointment as soon as possible for a visit as recommended [facility]  [phone]            Test results pending upon discharge:  N/A    Operative procedures performed:  Procedure(s):  RESECTION ARTHROPLASTY RIGHT KNEE, PLACEMENT OF ANTIBIOTIC IMPREGNATED CEMENT SPACER RIGHT KNEE    Treatments: IV hydration and antibiotics: Cefazolin    Consults:  IP CONSULT TO INFECTIOUS DISEASES  IP CONSULT TO UROLOGY  IP CONSULT TO ORTHOPEDIC SURGERY    Procedures:    Procedure(s):  RESECTION ARTHROPLASTY RIGHT KNEE, PLACEMENT OF ANTIBIOTIC IMPREGNATED CEMENT SPACER RIGHT KNEE    Diet:  DIET REGULAR    Pertinent test results:  Xr Chest Pa Lat    Result Date: 7/6/2019  EXAM: XR CHEST PA LAT INDICATION: Fever. COMPARISON: Chest x-ray 2/6/2019. Shaunna Jordan FINDINGS: PA and lateral radiographs of the chest demonstrate clear lungs. The cardiac and mediastinal contours and pulmonary vascularity are normal. Atherosclerotic calcifications affect the aortic arch. The chest wall structures and visualized upper abdomen show no acute findings with incidental note of degenerative spine and shoulder changes. IMPRESSION: No acute findings. Ct Low Ext Rt W Cont    Result Date: 7/6/2019  EXAM: CT LOW EXT RT W CONT INDICATION: Radiographs same day COMPARISON: None CONTRAST: 100 mL of Isovue-300. TECHNIQUE: Helical CT of the right knee during uneventful rapid bolus intravenous contrast administration. Coronal and Sagittal reformats were generated. Images reviewed in soft tissue and bone windows. CT dose reduction was achieved through the use of a standardized protocol tailored for this examination and automatic exposure control for dose modulation. FINDINGS: The patient is status post total knee arthroplasty. The components create metallic streak artifact. Components articulate dislocation. No evidence of loosening. No acute fracture or dislocation. There is a moderate knee joint effusion. The synovium appears thickened tarsi. The visualized muscles appear unremarkable. Soft tissue scarring is seen anterior to the knee. IMPRESSION: Status post knee arthroplasty with a moderate joint effusion and thickening of the synovium    Xr Knee Rt 3 V    Result Date: 7/6/2019  EXAM: XR KNEE RT 3 V INDICATION: pain. COMPARISON: None. FINDINGS: Three views of the right knee demonstrate total knee arthroplasty.  Soft tissue swelling of the anterior and medial knee. . There is no significant joint effusion is no visible fracture or hardware failure     IMPRESSION: 1. Total knee arthroplasty. 2. Soft tissue swelling of the anterior and medial knee. Recent Results (from the past 336 hour(s))   CBC WITH AUTOMATED DIFF    Collection Time: 07/06/19  5:49 PM   Result Value Ref Range    WBC 8.2 4.1 - 11.1 K/uL    RBC 4.93 4.10 - 5.70 M/uL    HGB 15.5 12.1 - 17.0 g/dL    HCT 45.8 36.6 - 50.3 %    MCV 92.9 80.0 - 99.0 FL    MCH 31.4 26.0 - 34.0 PG    MCHC 33.8 30.0 - 36.5 g/dL    RDW 12.7 11.5 - 14.5 %    PLATELET 787 798 - 033 K/uL    MPV 9.5 8.9 - 12.9 FL    NRBC 0.0 0  WBC    ABSOLUTE NRBC 0.00 0.00 - 0.01 K/uL    NEUTROPHILS 72 32 - 75 %    LYMPHOCYTES 21 12 - 49 %    MONOCYTES 6 5 - 13 %    EOSINOPHILS 1 0 - 7 %    BASOPHILS 0 0 - 1 %    IMMATURE GRANULOCYTES 0 0.0 - 0.5 %    ABS. NEUTROPHILS 5.8 1.8 - 8.0 K/UL    ABS. LYMPHOCYTES 1.7 0.8 - 3.5 K/UL    ABS. MONOCYTES 0.5 0.0 - 1.0 K/UL    ABS. EOSINOPHILS 0.1 0.0 - 0.4 K/UL    ABS. BASOPHILS 0.0 0.0 - 0.1 K/UL    ABS. IMM. GRANS. 0.0 0.00 - 0.04 K/UL    DF AUTOMATED     METABOLIC PANEL, COMPREHENSIVE    Collection Time: 07/06/19  5:49 PM   Result Value Ref Range    Sodium 139 136 - 145 mmol/L    Potassium 3.5 3.5 - 5.1 mmol/L    Chloride 105 97 - 108 mmol/L    CO2 28 21 - 32 mmol/L    Anion gap 6 5 - 15 mmol/L    Glucose 102 (H) 65 - 100 mg/dL    BUN 10 6 - 20 MG/DL    Creatinine 1.27 0.70 - 1.30 MG/DL    BUN/Creatinine ratio 8 (L) 12 - 20      GFR est AA >60 >60 ml/min/1.73m2    GFR est non-AA 58 (L) >60 ml/min/1.73m2    Calcium 8.9 8.5 - 10.1 MG/DL    Bilirubin, total 2.0 (H) 0.2 - 1.0 MG/DL    ALT (SGPT) 30 12 - 78 U/L    AST (SGOT) 36 15 - 37 U/L    Alk.  phosphatase 87 45 - 117 U/L    Protein, total 8.8 (H) 6.4 - 8.2 g/dL    Albumin 3.6 3.5 - 5.0 g/dL    Globulin 5.2 (H) 2.0 - 4.0 g/dL    A-G Ratio 0.7 (L) 1.1 - 2.2     LIPASE    Collection Time: 07/06/19  5:49 PM Result Value Ref Range    Lipase 63 (L) 73 - 393 U/L   CULTURE, BLOOD, PAIRED    Collection Time: 07/06/19  5:49 PM   Result Value Ref Range    Special Requests: NO SPECIAL REQUESTS      Culture result: NO GROWTH 3 DAYS     LACTIC ACID    Collection Time: 07/06/19  5:49 PM   Result Value Ref Range    Lactic acid 0.6 0.4 - 2.0 MMOL/L   SED RATE (ESR)    Collection Time: 07/06/19  5:49 PM   Result Value Ref Range    Sed rate, automated 8 0 - 20 mm/hr   CRP, HIGH SENSITIVITY    Collection Time: 07/06/19  5:49 PM   Result Value Ref Range    CRP, High sensitivity >9.5 mg/L   SAMPLES BEING HELD    Collection Time: 07/06/19  5:51 PM   Result Value Ref Range    SAMPLES BEING HELD 1BLUE, 1RED     COMMENT        Add-on orders for these samples will be processed based on acceptable specimen integrity and analyte stability, which may vary by analyte. URINALYSIS W/ RFLX MICROSCOPIC    Collection Time: 07/06/19  8:23 PM   Result Value Ref Range    Color YELLOW/STRAW      Appearance CLEAR CLEAR      Specific gravity 1.010 1.003 - 1.030      pH (UA) 6.0 5.0 - 8.0      Protein TRACE (A) NEG mg/dL    Glucose NEGATIVE  NEG mg/dL    Ketone TRACE (A) NEG mg/dL    Bilirubin NEGATIVE  NEG      Blood MODERATE (A) NEG      Urobilinogen 1.0 0.2 - 1.0 EU/dL    Nitrites NEGATIVE  NEG      Leukocyte Esterase NEGATIVE  NEG      WBC 0-4 0 - 4 /hpf    RBC 10-20 0 - 5 /hpf    Epithelial cells FEW FEW /lpf    Bacteria NEGATIVE  NEG /hpf    Hyaline cast 0-2 0 - 5 /lpf   URINE CULTURE HOLD SAMPLE    Collection Time: 07/06/19  8:23 PM   Result Value Ref Range    Urine culture hold        URINE ON HOLD IN MICROBIOLOGY DEPT FOR 3 DAYS. IF UNPRESERVED URINE IS SUBMITTED, IT CANNOT BE USED FOR ADDITIONAL TESTING AFTER 24 HRS, RECOLLECTION WILL BE REQUIRED.    CULTURE, BODY FLUID Aletta Fluke STAIN    Collection Time: 07/06/19 11:02 PM   Result Value Ref Range    Special Requests: KNEE      GRAM STAIN FEW WBCS SEEN      GRAM STAIN NO ORGANISMS SEEN Culture result: HEAVY STAPHYLOCOCCUS AUREUS (A)      Culture result: (A)      PRELIMINARY RESULT OF PROBABLE STAPHYLOCOCCUS AUREUS ISOLATED CALLED TO AND READ BACK BY MARIAH BRISCOE AT 1134, 7/7/19 DB    Culture result: Culture performed on Unspun Fluid         Susceptibility    Staphylococcus aureus - SONY     Ampicillin ($) 8 Resistant ug/mL     Ampicillin/sulbactam ($) <=8/4 Susceptible ug/mL     Cefazolin ($) <=4 Susceptible ug/mL     Clindamycin ($) <=0.5 Susceptible ug/mL     Daptomycin ($$$$$) <=0.5 Susceptible ug/mL     Erythromycin ($$$$) <=0.5 Susceptible ug/mL     Gentamicin ($) <=4 Susceptible ug/mL     Linezolid ($$$$$) 4 Susceptible ug/mL     Oxacillin 0.5 Susceptible ug/mL     Rifampin ($$$$)* <=1 Susceptible ug/mL      * Rifampin is not to be used for mono-therapy.      Tetracycline <=4 Susceptible ug/mL     Trimeth/Sulfa <=0.5/9.5 Susceptible ug/mL     Vancomycin ($) 2 Susceptible ug/mL     Ciprofloxacin ($) <=1 Susceptible ug/mL   CRYSTALS, SYNOVIAL FLUID    Collection Time: 07/06/19 11:02 PM   Result Value Ref Range    FLUID TYPE(7) SYNOVIAL FLUID      Crystals, body fluid NO CRYSTALS SEEN WITH POLARIZED LIGHT     CELL COUNT, SYNOVIAL    Collection Time: 07/06/19 11:02 PM   Result Value Ref Range    SYNOVIAL FLD SITE KNEE     SYN FLUID COLOR STRAW      SYN FLUID APPEARANCE HAZY      SYNOVIAL FLD RBC CT >100 (H) 0 /cu mm    SYNOVIAL FLD WBC CT 45,248 (H) 0 - 150 /cu mm    SYNOVIAL FLD SEGS 77 (H) 0 - 20 %    SYNOVIAL FLD LYMPHS 23 (H) 0 - 15 %   CBC WITH AUTOMATED DIFF    Collection Time: 07/07/19  3:49 AM   Result Value Ref Range    WBC 8.4 4.1 - 11.1 K/uL    RBC 4.63 4.10 - 5.70 M/uL    HGB 14.4 12.1 - 17.0 g/dL    HCT 43.5 36.6 - 50.3 %    MCV 94.0 80.0 - 99.0 FL    MCH 31.1 26.0 - 34.0 PG    MCHC 33.1 30.0 - 36.5 g/dL    RDW 13.0 11.5 - 14.5 %    PLATELET 332 (L) 409 - 400 K/uL    MPV 9.3 8.9 - 12.9 FL    NRBC 0.0 0  WBC    ABSOLUTE NRBC 0.00 0.00 - 0.01 K/uL    NEUTROPHILS 64 32 - 75 % LYMPHOCYTES 19 12 - 49 %    MONOCYTES 13 5 - 13 %    EOSINOPHILS 4 0 - 7 %    BASOPHILS 0 0 - 1 %    IMMATURE GRANULOCYTES 0 %    ABS. NEUTROPHILS 5.4 1.8 - 8.0 K/UL    ABS. LYMPHOCYTES 1.6 0.8 - 3.5 K/UL    ABS. MONOCYTES 1.1 (H) 0.0 - 1.0 K/UL    ABS. EOSINOPHILS 0.3 0.0 - 0.4 K/UL    ABS. BASOPHILS 0.0 0.0 - 0.1 K/UL    ABS. IMM. GRANS. 0.0 K/UL    DF MANUAL      RBC COMMENTS OVALOCYTES  PRESENT        RBC COMMENTS MACROCYTOSIS  1+       METABOLIC PANEL, COMPREHENSIVE    Collection Time: 07/07/19  3:49 AM   Result Value Ref Range    Sodium 137 136 - 145 mmol/L    Potassium 3.2 (L) 3.5 - 5.1 mmol/L    Chloride 106 97 - 108 mmol/L    CO2 25 21 - 32 mmol/L    Anion gap 6 5 - 15 mmol/L    Glucose 96 65 - 100 mg/dL    BUN 11 6 - 20 MG/DL    Creatinine 1.21 0.70 - 1.30 MG/DL    BUN/Creatinine ratio 9 (L) 12 - 20      GFR est AA >60 >60 ml/min/1.73m2    GFR est non-AA >60 >60 ml/min/1.73m2    Calcium 8.1 (L) 8.5 - 10.1 MG/DL    Bilirubin, total 2.8 (H) 0.2 - 1.0 MG/DL    ALT (SGPT) 25 12 - 78 U/L    AST (SGOT) 29 15 - 37 U/L    Alk.  phosphatase 74 45 - 117 U/L    Protein, total 7.6 6.4 - 8.2 g/dL    Albumin 3.0 (L) 3.5 - 5.0 g/dL    Globulin 4.6 (H) 2.0 - 4.0 g/dL    A-G Ratio 0.7 (L) 1.1 - 2.2     MAGNESIUM    Collection Time: 07/07/19  3:49 AM   Result Value Ref Range    Magnesium 1.8 1.6 - 2.4 mg/dL   PHOSPHORUS    Collection Time: 07/07/19  3:49 AM   Result Value Ref Range    Phosphorus 2.8 2.6 - 4.7 MG/DL   METABOLIC PANEL, BASIC    Collection Time: 07/08/19  8:52 AM   Result Value Ref Range    Sodium 136 136 - 145 mmol/L    Potassium 3.4 (L) 3.5 - 5.1 mmol/L    Chloride 106 97 - 108 mmol/L    CO2 23 21 - 32 mmol/L    Anion gap 7 5 - 15 mmol/L    Glucose 97 65 - 100 mg/dL    BUN 9 6 - 20 MG/DL    Creatinine 1.11 0.70 - 1.30 MG/DL    BUN/Creatinine ratio 8 (L) 12 - 20      GFR est AA >60 >60 ml/min/1.73m2    GFR est non-AA >60 >60 ml/min/1.73m2    Calcium 8.3 (L) 8.5 - 10.1 MG/DL   CBC W/O DIFF    Collection Time: 07/08/19  8:52 AM   Result Value Ref Range    WBC 8.0 4.1 - 11.1 K/uL    RBC 4.30 4. 10 - 5.70 M/uL    HGB 13.5 12.1 - 17.0 g/dL    HCT 41.2 36.6 - 50.3 %    MCV 95.8 80.0 - 99.0 FL    MCH 31.4 26.0 - 34.0 PG    MCHC 32.8 30.0 - 36.5 g/dL    RDW 13.2 11.5 - 14.5 %    PLATELET 783 (L) 606 - 400 K/uL    MPV 8.9 8.9 - 12.9 FL    NRBC 0.0 0  WBC    ABSOLUTE NRBC 0.00 0.00 - 0.01 K/uL   GLUCOSE, POC    Collection Time: 07/08/19  2:16 PM   Result Value Ref Range    Glucose (POC) 75 65 - 100 mg/dL    Performed by Freddy SUTTON)    GLUCOSE, POC    Collection Time: 07/08/19  4:20 PM   Result Value Ref Range    Glucose (POC) 74 65 - 100 mg/dL    Performed by Alf Martínez    CULTURE, BODY FLUID Lilly Ruthton STAIN    Collection Time: 07/08/19  4:30 PM   Result Value Ref Range    Special Requests: NO SPECIAL REQUESTS      GRAM STAIN RARE WBCS SEEN      GRAM STAIN NO ORGANISMS SEEN      Culture result: RARE PROBABLE STAPHYLOCOCCUS AUREUS (A)      Culture result: Culture performed on Fluid swab specimen     CULTURE, BODY FLUID W GRAM STAIN    Collection Time: 07/08/19  4:30 PM   Result Value Ref Range    Special Requests: RIGHT KNEE JOINT FLUID NUMBER TWO     GRAM STAIN RARE WBCS SEEN      GRAM STAIN NO ORGANISMS SEEN      Culture result: FEW PROBABLE STAPHYLOCOCCUS AUREUS (A)      Culture result: Culture performed on Fluid swab specimen     GLUCOSE, POC    Collection Time: 07/08/19  6:57 PM   Result Value Ref Range    Glucose (POC) 88 65 - 100 mg/dL    Performed by Nae Turcios    CBC W/O DIFF    Collection Time: 07/09/19  3:01 AM   Result Value Ref Range    WBC 8.7 4.1 - 11.1 K/uL    RBC 4.21 4.10 - 5.70 M/uL    HGB 13.1 12.1 - 17.0 g/dL    HCT 40.0 36.6 - 50.3 %    MCV 95.0 80.0 - 99.0 FL    MCH 31.1 26.0 - 34.0 PG    MCHC 32.8 30.0 - 36.5 g/dL    RDW 12.8 11.5 - 14.5 %    PLATELET 066 (L) 888 - 400 K/uL    MPV 9.2 8.9 - 12.9 FL    NRBC 0.0 0  WBC    ABSOLUTE NRBC 0.00 0.00 - 4.34 K/uL   METABOLIC PANEL, COMPREHENSIVE    Collection Time: 07/09/19  3:01 AM   Result Value Ref Range    Sodium 137 136 - 145 mmol/L    Potassium 4.0 3.5 - 5.1 mmol/L    Chloride 107 97 - 108 mmol/L    CO2 23 21 - 32 mmol/L    Anion gap 7 5 - 15 mmol/L    Glucose 183 (H) 65 - 100 mg/dL    BUN 12 6 - 20 MG/DL    Creatinine 1.06 0.70 - 1.30 MG/DL    BUN/Creatinine ratio 11 (L) 12 - 20      GFR est AA >60 >60 ml/min/1.73m2    GFR est non-AA >60 >60 ml/min/1.73m2    Calcium 8.1 (L) 8.5 - 10.1 MG/DL    Bilirubin, total 1.8 (H) 0.2 - 1.0 MG/DL    ALT (SGPT) 19 12 - 78 U/L    AST (SGOT) 26 15 - 37 U/L    Alk. phosphatase 73 45 - 117 U/L    Protein, total 7.6 6.4 - 8.2 g/dL    Albumin 2.5 (L) 3.5 - 5.0 g/dL    Globulin 5.1 (H) 2.0 - 4.0 g/dL    A-G Ratio 0.5 (L) 1.1 - 2.2             Physical Exam on Discharge:    Discharge condition: good    Vital signs:   Patient Vitals for the past 12 hrs:   Temp Pulse Resp BP SpO2   07/10/19 0844 97.6 °F (36.4 °C) 77 16 127/76 97 %   07/10/19 0332 97.8 °F (36.6 °C) 78 16 119/67 98 %       General appearance: alert, cooperative, no distress, appears stated age  Lungs: clear to auscultation bilaterally    Current Discharge Medication List      START taking these medications    Details   apixaban (ELIQUIS) 2.5 mg tablet Take 1 Tab by mouth two (2) times a day. Indications: Deep Vein Thrombosis Prevention  Qty: 30 Tab, Refills: 0      oxyCODONE IR (ROXICODONE) 5 mg immediate release tablet Take 1 Tab by mouth every four (4) hours as needed for Pain for up to 10 days. Max Daily Amount: 30 mg. Indications: Pain  Qty: 60 Tab, Refills: 0    Associated Diagnoses: Infection of total right knee replacement, subsequent encounter      senna-docusate (PERICOLACE) 8.6-50 mg per tablet Take 1 Tab by mouth two (2) times a day. Indications: constipation  Qty: 30 Tab, Refills: 0      ceFAZolin (ANCEF) in sterile water 2 gram/20 mL 2 g IV syringe 20 mL by IntraVENous route every eight (8) hours.  For 6 weeks as per ID recs  Qty: 1 mL, Refills: 0         CONTINUE these medications which have NOT CHANGED    Details   traMADol (ULTRAM) 50 mg tablet Take 1 Tab by mouth every six (6) hours as needed for Pain. Max Daily Amount: 200 mg. Qty: 20 Tab, Refills: 0    Associated Diagnoses: Primary osteoarthritis of right knee      testosterone cypionate (DEPOTESTOTERONE CYPIONATE) 200 mg/mL injection 100 mg by IntraMUSCular route every Sunday. cetirizine (ZYRTEC) 10 mg tablet Take 10 mg by mouth daily as needed for Allergies or Rhinitis. therapeutic multivitamin (THERA) tablet Take 1 Tab by mouth daily. fish oil-omega-3 fatty acids 340-1,000 mg capsule Take 1 Cap by mouth two (2) times a day. pramipexole (MIRAPEX) 0.5 mg tablet Take 0.5 mg by mouth nightly. omeprazole (PRILOSEC) 40 mg capsule Take 40 mg by mouth nightly. trimethoprim-sulfamethoxazole (BACTRIM DS, SEPTRA DS) 160-800 mg per tablet Take 1 Tab by mouth daily. Qty: 30 Tab, Refills: 1      iron polysaccharides (FERREX 150) 150 mg iron capsule Take 1 Cap by mouth two (2) times a day. Qty: 60 Cap, Refills: 1         STOP taking these medications       rivaroxaban (XARELTO) 10 mg tablet Comments:   Reason for Stopping:         azithromycin (ZITHROMAX) 500 mg tab Comments:   Reason for Stopping:         predniSONE (DELTASONE) 10 mg tablet Comments:   Reason for Stopping:                  Total time spent on discharge planning, counseling and co-ordination of care:   35 minutes    Evgeny Cannon MD  07/10/19  12:39 PM

## 2024-08-21 ENCOUNTER — OFFICE VISIT (OUTPATIENT)
Age: 64
End: 2024-08-21
Payer: COMMERCIAL

## 2024-08-21 VITALS
SYSTOLIC BLOOD PRESSURE: 109 MMHG | BODY MASS INDEX: 33.03 KG/M2 | HEIGHT: 69 IN | HEART RATE: 65 BPM | DIASTOLIC BLOOD PRESSURE: 68 MMHG | WEIGHT: 223 LBS

## 2024-08-21 DIAGNOSIS — Z72.820 POOR SLEEP: ICD-10-CM

## 2024-08-21 DIAGNOSIS — E29.1 HYPOGONADISM IN MALE: ICD-10-CM

## 2024-08-21 DIAGNOSIS — E29.1 HYPOGONADISM IN MALE: Primary | ICD-10-CM

## 2024-08-21 DIAGNOSIS — R68.82 LOW LIBIDO: ICD-10-CM

## 2024-08-21 DIAGNOSIS — N52.9 ERECTILE DYSFUNCTION, UNSPECIFIED ERECTILE DYSFUNCTION TYPE: ICD-10-CM

## 2024-08-21 PROCEDURE — 3017F COLORECTAL CA SCREEN DOC REV: CPT | Performed by: UROLOGY

## 2024-08-21 PROCEDURE — 99204 OFFICE O/P NEW MOD 45 MIN: CPT | Performed by: UROLOGY

## 2024-08-21 PROCEDURE — G8417 CALC BMI ABV UP PARAM F/U: HCPCS | Performed by: UROLOGY

## 2024-08-21 PROCEDURE — 1036F TOBACCO NON-USER: CPT | Performed by: UROLOGY

## 2024-08-21 PROCEDURE — G8427 DOCREV CUR MEDS BY ELIG CLIN: HCPCS | Performed by: UROLOGY

## 2024-08-21 RX ORDER — ELECTROLYTES/DEXTROSE
SOLUTION, ORAL ORAL
COMMUNITY

## 2024-08-21 RX ORDER — LOSARTAN POTASSIUM 50 MG/1
TABLET ORAL
COMMUNITY
End: 2024-08-21

## 2024-08-21 RX ORDER — LOSARTAN POTASSIUM AND HYDROCHLOROTHIAZIDE 12.5; 5 MG/1; MG/1
1 TABLET ORAL DAILY
COMMUNITY

## 2024-08-21 RX ORDER — SYRINGE WITH NEEDLE, 1 ML 25GX5/8"
SYRINGE, EMPTY DISPOSABLE MISCELLANEOUS
COMMUNITY
Start: 2024-05-20

## 2024-08-21 RX ORDER — ROPINIROLE 3 MG/1
TABLET, FILM COATED ORAL
COMMUNITY

## 2024-08-21 RX ORDER — FLUOROURACIL 5 MG/G
CREAM TOPICAL
COMMUNITY
End: 2024-08-21 | Stop reason: ALTCHOICE

## 2024-08-21 RX ORDER — MESALAMINE 1.2 G/1
2.4 TABLET, DELAYED RELEASE ORAL DAILY
COMMUNITY

## 2024-08-21 RX ORDER — PANTOPRAZOLE SODIUM 20 MG/1
TABLET, DELAYED RELEASE ORAL
COMMUNITY

## 2024-08-21 RX ORDER — TADALAFIL 5 MG/1
TABLET ORAL
COMMUNITY

## 2024-08-21 RX ORDER — BICTEGRAVIR SODIUM, EMTRICITABINE, AND TENOFOVIR ALAFENAMIDE FUMARATE 50; 200; 25 MG/1; MG/1; MG/1
1 TABLET ORAL DAILY
COMMUNITY

## 2024-08-21 NOTE — PROGRESS NOTES
HISTORY OF PRESENT ILLNESS  Jason Nguyen is a 64 y.o. male.   has a past medical history of Asthma, Chronic pain, Crohn's disease (HCC), Fatty liver, GERD (gastroesophageal reflux disease), HTN (hypertension), Hyperlipidemia, Ill-defined condition, Migraine, MRSA carrier, and Sleep apnea.  has a past surgical history that includes Colonoscopy; Total knee arthroplasty (Right, 08/2018); hernia repair (Left, 1986); and Knee arthroscopy (Right, 2010).  Chief Complaint   Patient presents with    New Patient     Testerone concerns     He is here with his wife for a second opinion.  They do not feel they have good communication.      He is concerned about his energy and numbers.   He has an elevated testosterone level.  He has been seen by [facility].  Reportedly his testosterone was 1368.  I do not have his records at this time.    Since 2015 he started TRT for a T of 198.  He had a lot of fatigue and low libido.  He was on topical therapy with his PCP.  He then had TRT with Dr. [facility].  He noted improvement but fluctuating symptoms.  On the high side he says he feels great. His wife notes his energy level is low.   He denies depresssion.  He is concerned about jail and his job changes.     He is on TRT 0.18mL (200mg/mL) Mondays and Fridays.  His wife says his levels go from 200 and 1368.  He has had to have phlebotomy for polycythemia.      He works in concrete construction.  He has early mornings and erratic sleep.  He has KAVON and uses a CPAP.      His wife says he has an enlarged prostate and normal PSA.  He has an elevated bilirubin.  He does not know why.   He has a fatty liver.  His LFTs have been ok.          1. Hypogonadism in male  -     Comprehensive Metabolic Panel; Future  -     Testosterone, free, total; Future  -     CBC with Auto Differential; Future  2. Low libido  Assessment & Plan:  He has a low libido despite TRT.    3. Erectile dysfunction, unspecified erectile dysfunction

## 2024-08-21 NOTE — PROGRESS NOTES
Chief Complaint   Patient presents with    New Patient     Testerone concerns     1. Have you been to the ER, urgent care clinic since your last visit?  Hospitalized since your last visit?No    2. Have you seen or consulted any other health care providers outside of the [facility] since your last visit?  Include any pap smears or colon screening. No  /68 (Site: Right Upper Arm, Position: Sitting, Cuff Size: Medium Adult)   Pulse 65   Ht 1.753 m (5' 9\")   Wt 101.2 kg (223 lb)   BMI 32.93 kg/m²

## 2024-08-30 ENCOUNTER — TELEPHONE (OUTPATIENT)
Age: 64
End: 2024-08-30

## 2024-08-30 NOTE — TELEPHONE ENCOUNTER
Pt lvm on nurse libe asking to rs upcoming appt, can one of you call and rs when you have the time?

## 2024-09-10 LAB
ALBUMIN SERPL-MCNC: 4 G/DL (ref 3.9–4.9)
ALP SERPL-CCNC: 67 IU/L (ref 44–121)
ALT SERPL-CCNC: 24 IU/L (ref 0–44)
AST SERPL-CCNC: 27 IU/L (ref 0–40)
BASOPHILS # BLD AUTO: 0 X10E3/UL (ref 0–0.2)
BASOPHILS NFR BLD AUTO: 1 %
BILIRUB SERPL-MCNC: 1 MG/DL (ref 0–1.2)
BUN SERPL-MCNC: 10 MG/DL (ref 8–27)
BUN/CREAT SERPL: 8 (ref 10–24)
CALCIUM SERPL-MCNC: 9.3 MG/DL (ref 8.6–10.2)
CHLORIDE SERPL-SCNC: 101 MMOL/L (ref 96–106)
CO2 SERPL-SCNC: 24 MMOL/L (ref 20–29)
CREAT SERPL-MCNC: 1.27 MG/DL (ref 0.76–1.27)
EGFRCR SERPLBLD CKD-EPI 2021: 63 ML/MIN/1.73
EOSINOPHIL # BLD AUTO: 0.1 X10E3/UL (ref 0–0.4)
EOSINOPHIL NFR BLD AUTO: 3 %
ERYTHROCYTE [DISTWIDTH] IN BLOOD BY AUTOMATED COUNT: 13.5 % (ref 11.6–15.4)
GLOBULIN SER CALC-MCNC: 3 G/DL (ref 1.5–4.5)
GLUCOSE SERPL-MCNC: 102 MG/DL (ref 70–99)
HCT VFR BLD AUTO: 47.4 % (ref 37.5–51)
HGB BLD-MCNC: 15.9 G/DL (ref 13–17.7)
IMM GRANULOCYTES # BLD AUTO: 0 X10E3/UL (ref 0–0.1)
IMM GRANULOCYTES NFR BLD AUTO: 0 %
LYMPHOCYTES # BLD AUTO: 1.4 X10E3/UL (ref 0.7–3.1)
LYMPHOCYTES NFR BLD AUTO: 29 %
MCH RBC QN AUTO: 32.6 PG (ref 26.6–33)
MCHC RBC AUTO-ENTMCNC: 33.5 G/DL (ref 31.5–35.7)
MCV RBC AUTO: 97 FL (ref 79–97)
MONOCYTES # BLD AUTO: 0.3 X10E3/UL (ref 0.1–0.9)
MONOCYTES NFR BLD AUTO: 6 %
NEUTROPHILS # BLD AUTO: 3 X10E3/UL (ref 1.4–7)
NEUTROPHILS NFR BLD AUTO: 61 %
PLATELET # BLD AUTO: 146 X10E3/UL (ref 150–450)
POTASSIUM SERPL-SCNC: 4 MMOL/L (ref 3.5–5.2)
PROT SERPL-MCNC: 7 G/DL (ref 6–8.5)
RBC # BLD AUTO: 4.87 X10E6/UL (ref 4.14–5.8)
SODIUM SERPL-SCNC: 140 MMOL/L (ref 134–144)
WBC # BLD AUTO: 4.9 X10E3/UL (ref 3.4–10.8)

## 2024-09-12 PROBLEM — R79.89 LOW TESTOSTERONE: Status: ACTIVE | Noted: 2024-09-12

## 2024-09-13 LAB
TESTOST FREE SERPL-MCNC: 20.1 PG/ML (ref 6.6–18.1)
TESTOST SERPL-MCNC: 1267 NG/DL (ref 264–916)

## 2024-09-18 ENCOUNTER — OFFICE VISIT (OUTPATIENT)
Age: 64
End: 2024-09-18
Payer: COMMERCIAL

## 2024-09-18 VITALS
OXYGEN SATURATION: 97 % | SYSTOLIC BLOOD PRESSURE: 121 MMHG | BODY MASS INDEX: 32.73 KG/M2 | WEIGHT: 221 LBS | HEART RATE: 67 BPM | HEIGHT: 69 IN | DIASTOLIC BLOOD PRESSURE: 83 MMHG

## 2024-09-18 DIAGNOSIS — R79.89 LOW TESTOSTERONE: Primary | ICD-10-CM

## 2024-09-18 DIAGNOSIS — R68.82 LOW LIBIDO: ICD-10-CM

## 2024-09-18 DIAGNOSIS — N52.9 ERECTILE DYSFUNCTION, UNSPECIFIED ERECTILE DYSFUNCTION TYPE: ICD-10-CM

## 2024-09-18 PROCEDURE — 3017F COLORECTAL CA SCREEN DOC REV: CPT | Performed by: UROLOGY

## 2024-09-18 PROCEDURE — G8427 DOCREV CUR MEDS BY ELIG CLIN: HCPCS | Performed by: UROLOGY

## 2024-09-18 PROCEDURE — 99214 OFFICE O/P EST MOD 30 MIN: CPT | Performed by: UROLOGY

## 2024-09-18 PROCEDURE — 1036F TOBACCO NON-USER: CPT | Performed by: UROLOGY

## 2024-09-18 PROCEDURE — G8417 CALC BMI ABV UP PARAM F/U: HCPCS | Performed by: UROLOGY

## 2024-09-18 RX ORDER — TESTOSTERONE CYPIONATE 200 MG/ML
36 INJECTION, SOLUTION INTRAMUSCULAR
Qty: 2.16 ML | Refills: 0 | Status: SHIPPED | OUTPATIENT
Start: 2024-09-18 | End: 2024-12-17

## 2024-10-31 ENCOUNTER — OFFICE VISIT (OUTPATIENT)
Age: 64
End: 2024-10-31
Payer: COMMERCIAL

## 2024-10-31 VITALS
OXYGEN SATURATION: 98 % | HEIGHT: 69 IN | HEART RATE: 83 BPM | DIASTOLIC BLOOD PRESSURE: 78 MMHG | SYSTOLIC BLOOD PRESSURE: 122 MMHG | BODY MASS INDEX: 32.58 KG/M2 | WEIGHT: 220 LBS

## 2024-10-31 DIAGNOSIS — Z78.9 INTOLERANCE OF CONTINUOUS POSITIVE AIRWAY PRESSURE (CPAP) VENTILATION: ICD-10-CM

## 2024-10-31 DIAGNOSIS — G47.33 OSA (OBSTRUCTIVE SLEEP APNEA): Primary | ICD-10-CM

## 2024-10-31 PROCEDURE — 3017F COLORECTAL CA SCREEN DOC REV: CPT | Performed by: STUDENT IN AN ORGANIZED HEALTH CARE EDUCATION/TRAINING PROGRAM

## 2024-10-31 PROCEDURE — G8484 FLU IMMUNIZE NO ADMIN: HCPCS | Performed by: STUDENT IN AN ORGANIZED HEALTH CARE EDUCATION/TRAINING PROGRAM

## 2024-10-31 PROCEDURE — 1036F TOBACCO NON-USER: CPT | Performed by: STUDENT IN AN ORGANIZED HEALTH CARE EDUCATION/TRAINING PROGRAM

## 2024-10-31 PROCEDURE — G8417 CALC BMI ABV UP PARAM F/U: HCPCS | Performed by: STUDENT IN AN ORGANIZED HEALTH CARE EDUCATION/TRAINING PROGRAM

## 2024-10-31 PROCEDURE — G8427 DOCREV CUR MEDS BY ELIG CLIN: HCPCS | Performed by: STUDENT IN AN ORGANIZED HEALTH CARE EDUCATION/TRAINING PROGRAM

## 2024-10-31 PROCEDURE — 99204 OFFICE O/P NEW MOD 45 MIN: CPT | Performed by: STUDENT IN AN ORGANIZED HEALTH CARE EDUCATION/TRAINING PROGRAM

## 2024-10-31 NOTE — PROGRESS NOTES
Subjective:   Jason Nguyen   64 y.o.   1960     Refered by: OPAL Johnson     New Patient Visit  Chief Compliant: KAVON    History of Present Illness:  Jason Nguyen is a 64 y.o. male with past medical history of HIV - viral count undectable currently on Biktarvy, asthma, GERD, HTN, HLD, KAVON - intolerant of CPAP, who presents today for evaluation of KAVON.     Sleep study (8/9/24) reviewed and sleep medicine notes reviewed:   AHI = 21.6 (3%), O2 emiliano 78%, BMI = 30.2    Patient has been trying CPAP, but struggles to tolerate it. Tends to take mask off overnight. Some claustrophobia issues as well.    Review of Systems  Consitutional: denies fever, excessive weight gain or loss.  Eyes: denies diplopia, eye pain.  Integumentary: denies new concerning skin lesions.  Ears, Nose, Mouth, Throat: denies except as per HPI.  Endocrine: denies hot or cold intolerance, increased thirst.  Respiratory: denies cough, hemoptysis, wheezing  Gastrointestinal: denies trouble swallowing, nausea, emesis, regurgitation  Musculoskeletal: denies muscle weakness or wasting  Cardiovascular: denies chest pain, shortness of breath  Neurologic: denies seizures, numbness or tingling, syncope  Hematologic: denies easy bleeding or bruising       Past Medical History:   Diagnosis Date    Arthritis     Asthma     Chronic pain     KNEES AND BACK    Crohn's disease (HCC)     Fatty liver     GERD (gastroesophageal reflux disease)     Hearing loss     HTN (hypertension)     NO MEDS    Hyperlipidemia     Ill-defined condition     HIATAL HERNIA, HIV    Migraine     MRSA carrier 1/16/2020    Rash     Sleep apnea     USES CPAP     Past Surgical History:   Procedure Laterality Date    COLONOSCOPY      HERNIA REPAIR Left 1986    INGUINAL    KNEE ARTHROSCOPY Right 2010    TOTAL KNEE ARTHROPLASTY Right 08/2018      Family History   Problem Relation Age of Onset    Diabetes Mother     Heart Disease Mother     Kidney Disease Mother         POLYCYSTIC

## 2024-11-04 ENCOUNTER — PREP FOR PROCEDURE (OUTPATIENT)
Age: 64
End: 2024-11-04

## 2024-11-04 DIAGNOSIS — G47.33 OSA (OBSTRUCTIVE SLEEP APNEA): ICD-10-CM

## 2024-11-04 DIAGNOSIS — Z78.9 INTOLERANCE OF CONTINUOUS POSITIVE AIRWAY PRESSURE (CPAP) VENTILATION: ICD-10-CM

## 2024-11-22 RX ORDER — ONDANSETRON 2 MG/ML
4 INJECTION INTRAMUSCULAR; INTRAVENOUS
Status: CANCELLED | OUTPATIENT
Start: 2024-11-22 | End: 2024-11-23

## 2024-11-22 RX ORDER — DIPHENHYDRAMINE HYDROCHLORIDE 50 MG/ML
12.5 INJECTION INTRAMUSCULAR; INTRAVENOUS
Status: CANCELLED | OUTPATIENT
Start: 2024-11-22 | End: 2024-11-23

## 2024-11-22 RX ORDER — NALOXONE HYDROCHLORIDE 0.4 MG/ML
INJECTION, SOLUTION INTRAMUSCULAR; INTRAVENOUS; SUBCUTANEOUS PRN
Status: CANCELLED | OUTPATIENT
Start: 2024-11-22

## 2024-11-22 RX ORDER — SODIUM CHLORIDE, SODIUM LACTATE, POTASSIUM CHLORIDE, CALCIUM CHLORIDE 600; 310; 30; 20 MG/100ML; MG/100ML; MG/100ML; MG/100ML
INJECTION, SOLUTION INTRAVENOUS CONTINUOUS
Status: CANCELLED | OUTPATIENT
Start: 2024-11-22

## 2024-11-25 ENCOUNTER — ANESTHESIA (OUTPATIENT)
Facility: HOSPITAL | Age: 64
End: 2024-11-25
Payer: COMMERCIAL

## 2024-11-25 ENCOUNTER — HOSPITAL ENCOUNTER (OUTPATIENT)
Facility: HOSPITAL | Age: 64
Setting detail: OUTPATIENT SURGERY
Discharge: HOME | End: 2024-11-25
Attending: STUDENT IN AN ORGANIZED HEALTH CARE EDUCATION/TRAINING PROGRAM | Admitting: STUDENT IN AN ORGANIZED HEALTH CARE EDUCATION/TRAINING PROGRAM
Payer: COMMERCIAL

## 2024-11-25 ENCOUNTER — ANESTHESIA EVENT (OUTPATIENT)
Facility: HOSPITAL | Age: 64
End: 2024-11-25
Payer: COMMERCIAL

## 2024-11-25 VITALS
RESPIRATION RATE: 19 BRPM | WEIGHT: 218.48 LBS | BODY MASS INDEX: 31.28 KG/M2 | TEMPERATURE: 97.9 F | HEIGHT: 70 IN | SYSTOLIC BLOOD PRESSURE: 107 MMHG | OXYGEN SATURATION: 96 % | HEART RATE: 61 BPM | DIASTOLIC BLOOD PRESSURE: 85 MMHG

## 2024-11-25 PROCEDURE — 2720000010 HC SURG SUPPLY STERILE: Performed by: STUDENT IN AN ORGANIZED HEALTH CARE EDUCATION/TRAINING PROGRAM

## 2024-11-25 PROCEDURE — 2709999900 HC NON-CHARGEABLE SUPPLY: Performed by: STUDENT IN AN ORGANIZED HEALTH CARE EDUCATION/TRAINING PROGRAM

## 2024-11-25 PROCEDURE — 3600000012 HC SURGERY LEVEL 2 ADDTL 15MIN: Performed by: STUDENT IN AN ORGANIZED HEALTH CARE EDUCATION/TRAINING PROGRAM

## 2024-11-25 PROCEDURE — 7100000011 HC PHASE II RECOVERY - ADDTL 15 MIN: Performed by: STUDENT IN AN ORGANIZED HEALTH CARE EDUCATION/TRAINING PROGRAM

## 2024-11-25 PROCEDURE — 3700000000 HC ANESTHESIA ATTENDED CARE: Performed by: STUDENT IN AN ORGANIZED HEALTH CARE EDUCATION/TRAINING PROGRAM

## 2024-11-25 PROCEDURE — 3700000001 HC ADD 15 MINUTES (ANESTHESIA): Performed by: STUDENT IN AN ORGANIZED HEALTH CARE EDUCATION/TRAINING PROGRAM

## 2024-11-25 PROCEDURE — 3600000002 HC SURGERY LEVEL 2 BASE: Performed by: STUDENT IN AN ORGANIZED HEALTH CARE EDUCATION/TRAINING PROGRAM

## 2024-11-25 PROCEDURE — 7100000010 HC PHASE II RECOVERY - FIRST 15 MIN: Performed by: STUDENT IN AN ORGANIZED HEALTH CARE EDUCATION/TRAINING PROGRAM

## 2024-11-25 PROCEDURE — 42975 DISE EVAL SLP DO BRTH FLX DX: CPT | Performed by: STUDENT IN AN ORGANIZED HEALTH CARE EDUCATION/TRAINING PROGRAM

## 2024-11-25 PROCEDURE — 6370000000 HC RX 637 (ALT 250 FOR IP): Performed by: STUDENT IN AN ORGANIZED HEALTH CARE EDUCATION/TRAINING PROGRAM

## 2024-11-25 PROCEDURE — 6360000002 HC RX W HCPCS: Performed by: NURSE ANESTHETIST, CERTIFIED REGISTERED

## 2024-11-25 RX ORDER — LIDOCAINE HYDROCHLORIDE 40 MG/ML
SOLUTION TOPICAL PRN
Status: DISCONTINUED | OUTPATIENT
Start: 2024-11-25 | End: 2024-11-25 | Stop reason: HOSPADM

## 2024-11-25 RX ORDER — PROPOFOL 10 MG/ML
INJECTION, EMULSION INTRAVENOUS
Status: DISCONTINUED | OUTPATIENT
Start: 2024-11-25 | End: 2024-11-25 | Stop reason: SDUPTHER

## 2024-11-25 RX ORDER — SODIUM CHLORIDE 9 MG/ML
INJECTION, SOLUTION INTRAVENOUS CONTINUOUS
Status: DISCONTINUED | OUTPATIENT
Start: 2024-11-25 | End: 2024-11-25 | Stop reason: HOSPADM

## 2024-11-25 RX ORDER — SODIUM CHLORIDE, SODIUM LACTATE, POTASSIUM CHLORIDE, CALCIUM CHLORIDE 600; 310; 30; 20 MG/100ML; MG/100ML; MG/100ML; MG/100ML
INJECTION, SOLUTION INTRAVENOUS CONTINUOUS
Status: DISCONTINUED | OUTPATIENT
Start: 2024-11-25 | End: 2024-11-25

## 2024-11-25 RX ORDER — MIDAZOLAM HYDROCHLORIDE 2 MG/2ML
2 INJECTION, SOLUTION INTRAMUSCULAR; INTRAVENOUS
Status: DISCONTINUED | OUTPATIENT
Start: 2024-11-25 | End: 2024-11-25 | Stop reason: HOSPADM

## 2024-11-25 RX ORDER — OXYMETAZOLINE HYDROCHLORIDE 0.05 G/100ML
SPRAY NASAL PRN
Status: DISCONTINUED | OUTPATIENT
Start: 2024-11-25 | End: 2024-11-25 | Stop reason: HOSPADM

## 2024-11-25 RX ORDER — LIDOCAINE HYDROCHLORIDE 10 MG/ML
1 INJECTION, SOLUTION EPIDURAL; INFILTRATION; INTRACAUDAL; PERINEURAL
Status: DISCONTINUED | OUTPATIENT
Start: 2024-11-25 | End: 2024-11-25 | Stop reason: HOSPADM

## 2024-11-25 RX ORDER — GLYCOPYRROLATE 0.2 MG/ML
INJECTION INTRAMUSCULAR; INTRAVENOUS
Status: DISCONTINUED | OUTPATIENT
Start: 2024-11-25 | End: 2024-11-25 | Stop reason: SDUPTHER

## 2024-11-25 RX ORDER — FENTANYL CITRATE 50 UG/ML
100 INJECTION, SOLUTION INTRAMUSCULAR; INTRAVENOUS
Status: DISCONTINUED | OUTPATIENT
Start: 2024-11-25 | End: 2024-11-25 | Stop reason: HOSPADM

## 2024-11-25 RX ADMIN — LIDOCAINE HYDROCHLORIDE 100 MG: 20 INJECTION, SOLUTION INFILTRATION; PERINEURAL at 08:10

## 2024-11-25 RX ADMIN — PROPOFOL 40 MG: 10 INJECTION, EMULSION INTRAVENOUS at 08:08

## 2024-11-25 RX ADMIN — GLYCOPYRROLATE 0.2 MG: 0.2 INJECTION INTRAMUSCULAR; INTRAVENOUS at 07:53

## 2024-11-25 RX ADMIN — PROPOFOL 30 MG: 10 INJECTION, EMULSION INTRAVENOUS at 08:14

## 2024-11-25 RX ADMIN — PROPOFOL 50 MCG/KG/MIN: 10 INJECTION, EMULSION INTRAVENOUS at 08:11

## 2024-11-25 ASSESSMENT — PAIN SCALES - GENERAL
PAINLEVEL_OUTOF10: 0
PAINLEVEL_OUTOF10: 0

## 2024-11-25 ASSESSMENT — PAIN - FUNCTIONAL ASSESSMENT: PAIN_FUNCTIONAL_ASSESSMENT: 0-10

## 2024-11-25 NOTE — ANESTHESIA POSTPROCEDURE EVALUATION
Department of Anesthesiology  Postprocedure Note    Patient: Jason Nguyen  MRN: [id number]  YOB: 1960  Date of evaluation: 11/25/2024    Procedure Summary       Date: 11/25/24 Room / Location: [facility] ASU OR  / [facility] AMBULATORY OR    Anesthesia Start: 0805 Anesthesia Stop: 0827    Procedure: DRUG INDUCED SLEEP ENDOSCOPY (Nose) Diagnosis:       KAVON (obstructive sleep apnea)      Intolerance of continuous positive airway pressure (CPAP) ventilation      (KAVON (obstructive sleep apnea) [G47.33])      (Intolerance of continuous positive airway pressure (CPAP) ventilation [Z78.9])    Surgeons: Barbra Palma MD Responsible Provider: Sonia Merino MD    Anesthesia Type: MAC ASA Status: 2            Anesthesia Type: No value filed.    Eladio Phase I:      Eladio Phase II: Eladio Score: 10    Anesthesia Post Evaluation    Level of consciousness: awake  Airway patency: patent  Nausea & Vomiting: no nausea  Cardiovascular status: hemodynamically stable  Respiratory status: acceptable  Hydration status: euvolemic  Pain management: adequate        No notable events documented.

## 2024-11-25 NOTE — PERIOP NOTE
PACU-IN REPORT FROM ANESTHESIA    Verbal report received from   Preston   [] MD/-Anesthesiologist    [x] CRNA   [] with student    CHOICE ANESTHESIA:  [] GENERAL  [x] TIVA  [] MAC  [] LOCAL  [] REGIONAL  [] SPINAL   [] EPIDURAL   **Note the anesthesia record for medications given intraoperatively.**           [] E.R.A.S. PROTOCOL    SURGICAL PROCEDURE: Procedure(s) (LRB):  DRUG INDUCED SLEEP ENDOSCOPY (N/A)     SURGEON: Barbra Palma MD.    Brief Initial Visual Assessment:    Patient Age: [] Infant(1-12mo)       []Pediatric(1-13yrs)    [] Adolescent(13-18yrs)     [x] Adult(18-65yrs)      []Geriatric Adult(>65yrs).                   Patient    [x] Alert           [x]Calm & Cooperative      [] Anxious/Restless      [] Combative  Appearance:  [x] Drowsy      [] Confused/Disoriented     [] Sedated      [] Unresponsive     Oriented x  3            Airway:     [x] Patent          [] \"Difficult Airway\" report by Anesthesia                        [] Obstructs easily/Obstructed on arrival          [] Manual stimulation and/or airway assistance necessary                         [] Airway improved with head/airway repositioning                       Airway Adjuncts Present: [] Oral Airway    [] Nasal Trumpet    [] ETT    [] LMA            Respiratory  [x] Even   [] Labored   [] Shallow   [] Tachypnea (>28 RR/min)  [] Bradypnea (<10 RR/min)  Pattern:    [x] Non-Labored  [] VENT and/or respiratory assistance     being provided.        Skin:     [x] Pink [] Dusky    [] Pale         [x] Warm     [] Hot [] Cool       [] Cold    [x]Dry     [] Moist [] Diaphoretic     Membranes:  [x] Pink    [] Pale        [x] Moist [] Dry     [] Crusty     Pain:   [x] No Acute Discomfort.    0  /10 Scale [x] Verbal Numeric / Visual   [] Moderate Discomfort.      [] V.A.S.    [] Acute Discomfort.                 [] A.N.V.    [] Chronic-Issue Related Discomfort.   [] F.L.A.C.C.   Note E-MAR for medications administered.  []Faces,

## 2024-11-25 NOTE — OP NOTE
Operative Note      Patient: Jason Nguyen  YOB: 1960  MRN: [id number]    Date of Procedure: 11/25/2024    Pre-Op Diagnosis Codes:      * KAVON (obstructive sleep apnea) [G47.33]     * Intolerance of continuous positive airway pressure (CPAP) ventilation [Z78.9]    Post-Op Diagnosis: Same       Procedure(s):  DRUG INDUCED SLEEP ENDOSCOPY    Surgeon(s):  Barbra Palma MD    Assistant:   * No surgical staff found *    Anesthesia: General    Estimated Blood Loss (mL): Minimal    Complications: None    Specimens:   * No specimens in log *    Implants:  * No implants in log *      Drains: * No LDAs found *    Findings:  Infection Present At Time Of Surgery (PATOS) (choose all levels that have infection present):  No infection present  Other Findings:   Velopharynx: 2 A/P collapse  Oropharynx: 0  Base of Tongue: 2 A/P collapse  Epiglottis: 0    Detailed Description of Procedure:   The patient was brought to the operating room and kept supine on the stretcher.  Sterile drape was applied.  Timeout was performed.  I sprayed the bilateral nasal cavities with oxymetazoline spray and then gently packed the bilateral nasal cavities with 4% topical lidocaine on neuro patties.  The anesthesia team then initiated the propofol infusion.  After a few minutes the patient obtained a nonarousable level of deep sleep along with snoring.  Oxygen therapy was provided transorally using a nasal cannula.     I removed the neuro patties and then utilized the nasopharyngoscope through the right nasal cavity.  The scope was advanced at the level of the middle turbinate and posteriorly to the nasopharynx.  At this level we observed no significant lateral wall collapse.  There is notable anteroposterior collapse at the level of the velopharynx.  I then advanced the scope distally and noted severe tongue base collapse posteriorly.  Vocal cords are visualized and normal.     The fiberoptic scope is now removed and then the patient is

## 2024-11-25 NOTE — PERIOP NOTE
POST ANESTHESIA CARE    DISCHARGE / TRANSFER NOTE  Jason Nguyen was:    [x] discharged        via   [x] Wheelchair          to [x] Private Vehicle     [] transferred    [] Carried    [] Taxi / Vehicle \"for Hire\"  [] Walk out   [] Ambulance / Medical Transportation   [] Stretcher   [] Hospital room _**_           [] Bed      Patient was escorted by:      [x] Nurse   [] Volunteer  [] Transporter / Technician  [] Parent      [] Spouse / Family /      Patient verbalized     [x] appreciation and was very pleased with care received   [] frustration with care received       throughout their stay.    Patient was discharged in     [x] pleasant mood  [] sad mood  [] mad mood .     Pain at discharge/transfer was      0  /10.    Discharge, medication and follow-up instructions were verbalized as understood prior to discharge  (if applicable for same-day procedures being discharged.)    All personal belongings have been returned to patient, and patient/family verbally confirm receiving belongings as all present.

## 2024-11-25 NOTE — DISCHARGE INSTRUCTIONS
Non medication changes.  Resume your regular diet.  Dr Palma's office will be in contact regarding next steps        DISCHARGE SUMMARY from Your Nurse    PATIENT INSTRUCTIONS:    AFTER ANESTHESIA & SEDATION, and WHILE TAKING PAIN MEDICINE  After general anesthesia / intravenous sedation and the 24 hours following, and/or while taking prescription Opiates:  Limit your activities  Do not drive and operate hazardous machinery until you have been of all narcotics and sedatives for over 24 hours  Do not make important personal or business decisions  Do  not drink alcoholic beverages  If you have not urinated within 8 hours after discharge, please contact your surgeon on call.        SIGNS OF INFECTION, THINGS TO REPORT TO YOUR DOCTOR  Report the following Signs of Infection or General Problems after surgery to your surgeon:  Redness, swelling, drainage, pus or odor of or around the surgical area  Excessive pain not relieved, or discomfort not improved by the medications prescribed by your doctor  Fever/ temperature over 101; Temperature over 100 if on medications (chemotherapy or medicines which affect your ability to fight infections)  Nausea and vomiting lasting longer than 4 hours or if unable to take medications  Any signs of decreased circulation or nerve impairment to extremity: change in color, persistent  numbness, tingling, coldness or increase pain  If you have any questions.                        Below is information on the medication(s) your doctor is prescribing for you:           The maximum daily dose of acetaminophen was discussed with the patient. He was encouraged not to exceed 3,000 mg of acetaminophen during a 24 hour period and was asked to keep in mind that acetaminophen can also be found in many over-the-counter cold medications as well as narcotics that may be given for pain. The patient expresses understanding of these issues and questions were answered.      4 THINGS ABOUT PAIN MEDICINE I

## 2024-11-25 NOTE — ANESTHESIA PRE PROCEDURE
Department of Anesthesiology  Preprocedure Note       Name:  Jason Nguyen   Age:  64 y.o.  :  1960                                          MRN:  [id number]         Date:  2024      Surgeon: Surgeon(s):  Barbra Palma MD    Procedure: Procedure(s):  DRUG INDUCED SLEEP ENDOSCOPY    Medications prior to admission:   Prior to Admission medications    Medication Sig Start Date End Date Taking? Authorizing Provider   testosterone cypionate (DEPOTESTOTERONE CYPIONATE) 200 MG/ML injection Inject 0.18 mLs into the muscle every 7 days for 90 days. Max Daily Amount: 36 mg 24 Yes Nora Grover APRN - NP   SYRINGE/NEEDLE, DISP, 1 ML (MONOJECT MAGELLAN SYRINGE) 23G X 1\" 1 ML MISC 1 box  by Does not apply route every 6 months 24  Yes Nora Grover APRN - NP   BIKTARVY -25 MG TABS per tablet Take 1 tablet by mouth daily   Yes Damien Herrera MD   losartan-hydroCHLOROthiazide (HYZAAR) 50-12.5 MG per tablet Take 1 tablet by mouth daily   Yes Damien Herrera MD   mesalamine (LIALDA) 1.2 g EC tablet Take 2 tablets by mouth daily   Yes Damien Herrera MD   rOPINIRole (REQUIP) 3 MG tablet TAKE 1 TABLET 1-3 HOURS BEFORE BEDTIME   Yes Damien Herrera MD   tadalafil (CIALIS) 5 MG tablet    Yes Damien Herrera MD   Multiple Vitamin (MULTIVITAMIN ADULT) TABS Taking every other day per gastro doctor    Damien Herrera MD   pantoprazole (PROTONIX) 20 MG tablet TAKE 1 TABLET BY MOUTH EVERY MORNING 30 MIN BEFORE BREAKFAST FOR 90 DAYS  Patient not taking: Reported on 2024    Damien Herrera MD   testosterone cypionate (DEPOTESTOTERONE CYPIONATE) 200 MG/ML injection Inject 0.18 mLs into the muscle Twice a Week.    Automatic Reconciliation, Ar       Current medications:    Current Facility-Administered Medications   Medication Dose Route Frequency Provider Last Rate Last Admin    lidocaine PF 1 % injection 1 mL  1 mL IntraDERmal Once PRN Humsi,

## 2024-11-25 NOTE — DISCHARGE SUMMARY
Discharge Summary:     Jason Nguyen  [id number]  1960    Patient admitted for DISE on 11/25/24. Procedure completed without complication. Patient seen in the recovery room - resting comfortably. Cleared for d/c home once criteria have been met.

## 2024-11-25 NOTE — H&P
bleeding, device failure, need for explant, injury to hypoglossal nerve, injury to marginal mandibular nerve, pneumothorax, and injury to other surrounding structures.  Will need medical clearance prior to actual implant    Orders Placed This Encounter    Vital signs per unit routine     Standing Status:   Standing     Number of Occurrences:   1    Notify provider for     Notify provider for pulse less than 40 or greater than 120, respiratory rate less than 12 or greater than 25, temperature greater than 101.3 F (38.5 C), urinary output less than 120 mL in four hours, systolic BP less than 90 or greater than 170, diastolic BP less than 50 or greater than 100.     Standing Status:   Standing     Number of Occurrences:   1    Pregnancy, urine POCT     Standing Status:   Standing     Number of Occurrences:   1    lidocaine PF 1 % injection 1 mL    fentaNYL (SUBLIMAZE) injection 100 mcg    DISCONTD: lactated ringers infusion    midazolam PF (VERSED) injection 2 mg    0.9 % sodium chloride infusion      No follow-ups on file.     Plan for the patient's medical issues were discussed. Options include observation, medical management, and possible surgical/procedural intervention,  if they fail conservative therapy. The risks and benefits of the considered procedures were discussed with the patient. All questions were answered.     The patient was instructed to return to clinic if they have no improvement or worsening of their symptoms. Concerning signs to watch out for were reviewed with them.    This note was written with the assistance of dictation software.  Errors in dictation may occur despite proofreading.  Please disregard any dictation errors.      Barbra Palma MD   [facility]  [address] [place] [address] [address]  [place] [address]  Office Phone: [phone]

## 2024-11-26 ENCOUNTER — TELEPHONE (OUTPATIENT)
Age: 64
End: 2024-11-26

## 2024-11-26 NOTE — TELEPHONE ENCOUNTER
Called patient to get scheduled for implant. Patient is now scheduled for 12/23 at [facility]. Patient is aware of scheduled procedure date

## 2024-12-03 ENCOUNTER — TELEPHONE (OUTPATIENT)
Age: 64
End: 2024-12-03

## 2024-12-03 NOTE — TELEPHONE ENCOUNTER
Called patient to inform him that we will have to reschedule his implant date to 12/27. Patient didn't answer his phone,a voicemail with this information was left as well as my direct line

## 2024-12-06 ENCOUNTER — SURGICAL CONSULT (OUTPATIENT)
Age: 64
End: 2024-12-06

## 2024-12-15 DIAGNOSIS — R79.89 LOW TESTOSTERONE: ICD-10-CM

## 2024-12-16 RX ORDER — TESTOSTERONE CYPIONATE 200 MG/ML
INJECTION, SOLUTION INTRAMUSCULAR
Qty: 3 ML | Refills: 3 | Status: SHIPPED | OUTPATIENT
Start: 2024-12-16 | End: 2025-03-16

## 2024-12-18 DIAGNOSIS — R79.89 LOW TESTOSTERONE: ICD-10-CM

## 2024-12-23 ENCOUNTER — TELEPHONE (OUTPATIENT)
Age: 64
End: 2024-12-23

## 2024-12-23 NOTE — TELEPHONE ENCOUNTER
Called patient wife to let her know that  has a peer to peer today at 4pm with [facility] to get patient approved for implant. I will be calling her with an update after his peer to peer

## 2024-12-26 ENCOUNTER — ANESTHESIA EVENT (OUTPATIENT)
Facility: HOSPITAL | Age: 64
End: 2024-12-26
Payer: COMMERCIAL

## 2024-12-27 ENCOUNTER — APPOINTMENT (OUTPATIENT)
Facility: HOSPITAL | Age: 64
End: 2024-12-27
Attending: STUDENT IN AN ORGANIZED HEALTH CARE EDUCATION/TRAINING PROGRAM
Payer: COMMERCIAL

## 2024-12-27 ENCOUNTER — HOSPITAL ENCOUNTER (OUTPATIENT)
Facility: HOSPITAL | Age: 64
Setting detail: OUTPATIENT SURGERY
Discharge: HOME | End: 2024-12-27
Attending: STUDENT IN AN ORGANIZED HEALTH CARE EDUCATION/TRAINING PROGRAM | Admitting: STUDENT IN AN ORGANIZED HEALTH CARE EDUCATION/TRAINING PROGRAM
Payer: COMMERCIAL

## 2024-12-27 ENCOUNTER — ANESTHESIA (OUTPATIENT)
Facility: HOSPITAL | Age: 64
End: 2024-12-27
Payer: COMMERCIAL

## 2024-12-27 VITALS
HEART RATE: 88 BPM | TEMPERATURE: 97.6 F | DIASTOLIC BLOOD PRESSURE: 91 MMHG | BODY MASS INDEX: 31.21 KG/M2 | RESPIRATION RATE: 16 BRPM | HEIGHT: 70 IN | WEIGHT: 218.03 LBS | SYSTOLIC BLOOD PRESSURE: 128 MMHG | OXYGEN SATURATION: 97 %

## 2024-12-27 DIAGNOSIS — Z78.9 INTOLERANCE OF CONTINUOUS POSITIVE AIRWAY PRESSURE (CPAP) VENTILATION: ICD-10-CM

## 2024-12-27 DIAGNOSIS — G47.33 OSA (OBSTRUCTIVE SLEEP APNEA): Primary | ICD-10-CM

## 2024-12-27 PROBLEM — Z21 HIV (HUMAN IMMUNODEFICIENCY VIRUS INFECTION) (HCC): Status: ACTIVE | Noted: 2024-12-27

## 2024-12-27 PROBLEM — Z21 HIV (HUMAN IMMUNODEFICIENCY VIRUS INFECTION) (HCC): Chronic | Status: ACTIVE | Noted: 2024-12-27

## 2024-12-27 LAB
ALBUMIN SERPL-MCNC: 4.3 G/DL (ref 3.9–4.9)
ALP SERPL-CCNC: 74 IU/L (ref 44–121)
ALT SERPL-CCNC: 23 IU/L (ref 0–44)
AST SERPL-CCNC: 21 IU/L (ref 0–40)
BASOPHILS # BLD AUTO: 0 X10E3/UL (ref 0–0.2)
BASOPHILS NFR BLD AUTO: 1 %
BILIRUB SERPL-MCNC: 1 MG/DL (ref 0–1.2)
BUN SERPL-MCNC: 18 MG/DL (ref 8–27)
BUN/CREAT SERPL: 12 (ref 10–24)
CALCIUM SERPL-MCNC: 9.4 MG/DL (ref 8.6–10.2)
CHLORIDE SERPL-SCNC: 103 MMOL/L (ref 96–106)
CO2 SERPL-SCNC: 25 MMOL/L (ref 20–29)
CREAT SERPL-MCNC: 1.53 MG/DL (ref 0.76–1.27)
EGFRCR SERPLBLD CKD-EPI 2021: 51 ML/MIN/1.73
EOSINOPHIL # BLD AUTO: 0.1 X10E3/UL (ref 0–0.4)
EOSINOPHIL NFR BLD AUTO: 3 %
ERYTHROCYTE [DISTWIDTH] IN BLOOD BY AUTOMATED COUNT: 13 % (ref 11.6–15.4)
GLOBULIN SER CALC-MCNC: 3.2 G/DL (ref 1.5–4.5)
GLUCOSE SERPL-MCNC: 79 MG/DL (ref 70–99)
HCT VFR BLD AUTO: 45.7 % (ref 37.5–51)
HGB BLD-MCNC: 15.6 G/DL (ref 13–17.7)
IMM GRANULOCYTES # BLD AUTO: 0 X10E3/UL (ref 0–0.1)
IMM GRANULOCYTES NFR BLD AUTO: 0 %
LYMPHOCYTES # BLD AUTO: 2.2 X10E3/UL (ref 0.7–3.1)
LYMPHOCYTES NFR BLD AUTO: 39 %
MCH RBC QN AUTO: 32.6 PG (ref 26.6–33)
MCHC RBC AUTO-ENTMCNC: 34.1 G/DL (ref 31.5–35.7)
MCV RBC AUTO: 96 FL (ref 79–97)
MONOCYTES # BLD AUTO: 0.5 X10E3/UL (ref 0.1–0.9)
MONOCYTES NFR BLD AUTO: 9 %
NEUTROPHILS # BLD AUTO: 2.8 X10E3/UL (ref 1.4–7)
NEUTROPHILS NFR BLD AUTO: 48 %
PLATELET # BLD AUTO: 157 X10E3/UL (ref 150–450)
POTASSIUM SERPL-SCNC: 3.8 MMOL/L (ref 3.5–5.2)
PROT SERPL-MCNC: 7.5 G/DL (ref 6–8.5)
PSA SERPL-MCNC: 1.9 NG/ML (ref 0–4)
RBC # BLD AUTO: 4.78 X10E6/UL (ref 4.14–5.8)
SODIUM SERPL-SCNC: 142 MMOL/L (ref 134–144)
TESTOST FREE SERPL-MCNC: 3.7 PG/ML (ref 6.6–18.1)
TESTOST SERPL-MCNC: 366 NG/DL (ref 264–916)
WBC # BLD AUTO: 5.6 X10E3/UL (ref 3.4–10.8)

## 2024-12-27 PROCEDURE — 2580000003 HC RX 258: Performed by: STUDENT IN AN ORGANIZED HEALTH CARE EDUCATION/TRAINING PROGRAM

## 2024-12-27 PROCEDURE — 7100000011 HC PHASE II RECOVERY - ADDTL 15 MIN: Performed by: STUDENT IN AN ORGANIZED HEALTH CARE EDUCATION/TRAINING PROGRAM

## 2024-12-27 PROCEDURE — 2500000003 HC RX 250 WO HCPCS: Performed by: NURSE ANESTHETIST, CERTIFIED REGISTERED

## 2024-12-27 PROCEDURE — 7100000000 HC PACU RECOVERY - FIRST 15 MIN: Performed by: STUDENT IN AN ORGANIZED HEALTH CARE EDUCATION/TRAINING PROGRAM

## 2024-12-27 PROCEDURE — C1787 PATIENT PROGR, NEUROSTIM: HCPCS | Performed by: STUDENT IN AN ORGANIZED HEALTH CARE EDUCATION/TRAINING PROGRAM

## 2024-12-27 PROCEDURE — 2720000010 HC SURG SUPPLY STERILE: Performed by: STUDENT IN AN ORGANIZED HEALTH CARE EDUCATION/TRAINING PROGRAM

## 2024-12-27 PROCEDURE — 7100000010 HC PHASE II RECOVERY - FIRST 15 MIN: Performed by: STUDENT IN AN ORGANIZED HEALTH CARE EDUCATION/TRAINING PROGRAM

## 2024-12-27 PROCEDURE — 6360000002 HC RX W HCPCS: Performed by: NURSE ANESTHETIST, CERTIFIED REGISTERED

## 2024-12-27 PROCEDURE — 3600000003 HC SURGERY LEVEL 3 BASE: Performed by: STUDENT IN AN ORGANIZED HEALTH CARE EDUCATION/TRAINING PROGRAM

## 2024-12-27 PROCEDURE — 6360000002 HC RX W HCPCS: Performed by: STUDENT IN AN ORGANIZED HEALTH CARE EDUCATION/TRAINING PROGRAM

## 2024-12-27 PROCEDURE — 3700000000 HC ANESTHESIA ATTENDED CARE: Performed by: STUDENT IN AN ORGANIZED HEALTH CARE EDUCATION/TRAINING PROGRAM

## 2024-12-27 PROCEDURE — 2580000003 HC RX 258: Performed by: ANESTHESIOLOGY

## 2024-12-27 PROCEDURE — 3700000001 HC ADD 15 MINUTES (ANESTHESIA): Performed by: STUDENT IN AN ORGANIZED HEALTH CARE EDUCATION/TRAINING PROGRAM

## 2024-12-27 PROCEDURE — 71045 X-RAY EXAM CHEST 1 VIEW: CPT

## 2024-12-27 PROCEDURE — C1767 GENERATOR, NEURO NON-RECHARG: HCPCS | Performed by: STUDENT IN AN ORGANIZED HEALTH CARE EDUCATION/TRAINING PROGRAM

## 2024-12-27 PROCEDURE — C1778 LEAD, NEUROSTIMULATOR: HCPCS | Performed by: STUDENT IN AN ORGANIZED HEALTH CARE EDUCATION/TRAINING PROGRAM

## 2024-12-27 PROCEDURE — 64582 OPN MPLTJ HPGLSL NSTM ARY PG: CPT | Performed by: STUDENT IN AN ORGANIZED HEALTH CARE EDUCATION/TRAINING PROGRAM

## 2024-12-27 PROCEDURE — C1751 CATH, INF, PER/CENT/MIDLINE: HCPCS | Performed by: STUDENT IN AN ORGANIZED HEALTH CARE EDUCATION/TRAINING PROGRAM

## 2024-12-27 PROCEDURE — 7100000001 HC PACU RECOVERY - ADDTL 15 MIN: Performed by: STUDENT IN AN ORGANIZED HEALTH CARE EDUCATION/TRAINING PROGRAM

## 2024-12-27 PROCEDURE — 3600000013 HC SURGERY LEVEL 3 ADDTL 15MIN: Performed by: STUDENT IN AN ORGANIZED HEALTH CARE EDUCATION/TRAINING PROGRAM

## 2024-12-27 PROCEDURE — 2709999900 HC NON-CHARGEABLE SUPPLY: Performed by: STUDENT IN AN ORGANIZED HEALTH CARE EDUCATION/TRAINING PROGRAM

## 2024-12-27 DEVICE — LEAD SENSING RESPIRATORY INSPIRE: Type: IMPLANTABLE DEVICE | Site: CHEST | Status: FUNCTIONAL

## 2024-12-27 DEVICE — LEAD STIMULATION UPPER AIRWAY INSPIRE: Type: IMPLANTABLE DEVICE | Site: NECK | Status: FUNCTIONAL

## 2024-12-27 DEVICE — GENERATOR PULSE IMPLANTABLE INSPIRE: Type: IMPLANTABLE DEVICE | Site: CHEST | Status: FUNCTIONAL

## 2024-12-27 RX ORDER — FAMOTIDINE 10 MG/ML
INJECTION, SOLUTION INTRAVENOUS
Status: DISCONTINUED | OUTPATIENT
Start: 2024-12-27 | End: 2024-12-27 | Stop reason: SDUPTHER

## 2024-12-27 RX ORDER — DIPHENHYDRAMINE HYDROCHLORIDE 50 MG/ML
12.5 INJECTION INTRAMUSCULAR; INTRAVENOUS
Status: DISCONTINUED | OUTPATIENT
Start: 2024-12-27 | End: 2024-12-27 | Stop reason: HOSPADM

## 2024-12-27 RX ORDER — GLYCOPYRROLATE 0.2 MG/ML
INJECTION INTRAMUSCULAR; INTRAVENOUS
Status: DISCONTINUED | OUTPATIENT
Start: 2024-12-27 | End: 2024-12-27 | Stop reason: SDUPTHER

## 2024-12-27 RX ORDER — NOREPINEPHRINE BITARTRATE/D5W 4MG/250ML
PLASTIC BAG, INJECTION (ML) INTRAVENOUS
Status: DISCONTINUED | OUTPATIENT
Start: 2024-12-27 | End: 2024-12-27 | Stop reason: SDUPTHER

## 2024-12-27 RX ORDER — DEXMEDETOMIDINE HYDROCHLORIDE 100 UG/ML
INJECTION, SOLUTION INTRAVENOUS
Status: DISCONTINUED | OUTPATIENT
Start: 2024-12-27 | End: 2024-12-27 | Stop reason: SDUPTHER

## 2024-12-27 RX ORDER — AMOXICILLIN 500 MG/1
500 CAPSULE ORAL 2 TIMES DAILY
Qty: 14 CAPSULE | Refills: 0 | Status: SHIPPED | OUTPATIENT
Start: 2024-12-27 | End: 2025-01-03

## 2024-12-27 RX ORDER — LIDOCAINE HYDROCHLORIDE 10 MG/ML
1 INJECTION, SOLUTION EPIDURAL; INFILTRATION; INTRACAUDAL; PERINEURAL
Status: DISCONTINUED | OUTPATIENT
Start: 2024-12-27 | End: 2024-12-27 | Stop reason: HOSPADM

## 2024-12-27 RX ORDER — SODIUM CHLORIDE 9 MG/ML
INJECTION, SOLUTION INTRAVENOUS CONTINUOUS
Status: DISCONTINUED | OUTPATIENT
Start: 2024-12-27 | End: 2024-12-27 | Stop reason: HOSPADM

## 2024-12-27 RX ORDER — MIDAZOLAM HYDROCHLORIDE 2 MG/2ML
2 INJECTION, SOLUTION INTRAMUSCULAR; INTRAVENOUS
Status: DISCONTINUED | OUTPATIENT
Start: 2024-12-27 | End: 2024-12-27 | Stop reason: HOSPADM

## 2024-12-27 RX ORDER — SUCCINYLCHOLINE/SOD CL,ISO/PF 100 MG/5ML
SYRINGE (ML) INTRAVENOUS
Status: DISCONTINUED | OUTPATIENT
Start: 2024-12-27 | End: 2024-12-27 | Stop reason: SDUPTHER

## 2024-12-27 RX ORDER — NALOXONE HYDROCHLORIDE 0.4 MG/ML
INJECTION, SOLUTION INTRAMUSCULAR; INTRAVENOUS; SUBCUTANEOUS PRN
Status: DISCONTINUED | OUTPATIENT
Start: 2024-12-27 | End: 2024-12-27 | Stop reason: HOSPADM

## 2024-12-27 RX ORDER — SODIUM CHLORIDE, SODIUM LACTATE, POTASSIUM CHLORIDE, CALCIUM CHLORIDE 600; 310; 30; 20 MG/100ML; MG/100ML; MG/100ML; MG/100ML
INJECTION, SOLUTION INTRAVENOUS CONTINUOUS
Status: DISCONTINUED | OUTPATIENT
Start: 2024-12-27 | End: 2024-12-27 | Stop reason: HOSPADM

## 2024-12-27 RX ORDER — HYDROMORPHONE HYDROCHLORIDE 2 MG/ML
INJECTION, SOLUTION INTRAMUSCULAR; INTRAVENOUS; SUBCUTANEOUS
Status: DISCONTINUED | OUTPATIENT
Start: 2024-12-27 | End: 2024-12-27 | Stop reason: SDUPTHER

## 2024-12-27 RX ORDER — ONDANSETRON 2 MG/ML
4 INJECTION INTRAMUSCULAR; INTRAVENOUS
Status: DISCONTINUED | OUTPATIENT
Start: 2024-12-27 | End: 2024-12-27 | Stop reason: HOSPADM

## 2024-12-27 RX ORDER — EPHEDRINE SULFATE 50 MG/ML
INJECTION INTRAVENOUS
Status: DISCONTINUED | OUTPATIENT
Start: 2024-12-27 | End: 2024-12-27 | Stop reason: SDUPTHER

## 2024-12-27 RX ORDER — LIDOCAINE HYDROCHLORIDE 20 MG/ML
INJECTION, SOLUTION EPIDURAL; INFILTRATION; INTRACAUDAL; PERINEURAL
Status: DISCONTINUED | OUTPATIENT
Start: 2024-12-27 | End: 2024-12-27 | Stop reason: SDUPTHER

## 2024-12-27 RX ORDER — ONDANSETRON 2 MG/ML
INJECTION INTRAMUSCULAR; INTRAVENOUS
Status: DISCONTINUED | OUTPATIENT
Start: 2024-12-27 | End: 2024-12-27 | Stop reason: SDUPTHER

## 2024-12-27 RX ORDER — HYDROCODONE BITARTRATE AND ACETAMINOPHEN 5; 325 MG/1; MG/1
1 TABLET ORAL EVERY 6 HOURS PRN
Qty: 15 TABLET | Refills: 0 | Status: SHIPPED | OUTPATIENT
Start: 2024-12-27 | End: 2025-01-01

## 2024-12-27 RX ORDER — LIDOCAINE HYDROCHLORIDE AND EPINEPHRINE 10; 10 MG/ML; UG/ML
INJECTION, SOLUTION INFILTRATION; PERINEURAL PRN
Status: DISCONTINUED | OUTPATIENT
Start: 2024-12-27 | End: 2024-12-27 | Stop reason: HOSPADM

## 2024-12-27 RX ORDER — FENTANYL CITRATE 50 UG/ML
100 INJECTION, SOLUTION INTRAMUSCULAR; INTRAVENOUS
Status: DISCONTINUED | OUTPATIENT
Start: 2024-12-27 | End: 2024-12-27 | Stop reason: HOSPADM

## 2024-12-27 RX ORDER — CEFAZOLIN SODIUM 1 G/3ML
INJECTION, POWDER, FOR SOLUTION INTRAMUSCULAR; INTRAVENOUS
Status: DISCONTINUED | OUTPATIENT
Start: 2024-12-27 | End: 2024-12-27 | Stop reason: SDUPTHER

## 2024-12-27 RX ORDER — PROPOFOL 10 MG/ML
INJECTION, EMULSION INTRAVENOUS
Status: DISCONTINUED | OUTPATIENT
Start: 2024-12-27 | End: 2024-12-27 | Stop reason: SDUPTHER

## 2024-12-27 RX ADMIN — HYDROMORPHONE HYDROCHLORIDE 1 MG: 2 INJECTION, SOLUTION INTRAMUSCULAR; INTRAVENOUS; SUBCUTANEOUS at 09:45

## 2024-12-27 RX ADMIN — GLYCOPYRROLATE 0.2 MG: 0.2 INJECTION INTRAMUSCULAR; INTRAVENOUS at 08:45

## 2024-12-27 RX ADMIN — Medication 25 MG: at 10:00

## 2024-12-27 RX ADMIN — Medication 16 MCG: at 10:12

## 2024-12-27 RX ADMIN — Medication 8 MCG: at 09:15

## 2024-12-27 RX ADMIN — Medication 8 MCG: at 09:10

## 2024-12-27 RX ADMIN — SODIUM CHLORIDE: 9 INJECTION, SOLUTION INTRAVENOUS at 09:25

## 2024-12-27 RX ADMIN — CEFAZOLIN SODIUM 2 G: 1 POWDER, FOR SOLUTION INTRAMUSCULAR; INTRAVENOUS at 09:05

## 2024-12-27 RX ADMIN — DEXMEDETOMIDINE 20 MCG: 100 INJECTION, SOLUTION INTRAVENOUS at 08:45

## 2024-12-27 RX ADMIN — EPHEDRINE SULFATE 10 MG: 50 INJECTION INTRAVENOUS at 09:37

## 2024-12-27 RX ADMIN — PROPOFOL 200 MG: 10 INJECTION, EMULSION INTRAVENOUS at 08:54

## 2024-12-27 RX ADMIN — EPHEDRINE SULFATE 10 MG: 50 INJECTION INTRAVENOUS at 10:58

## 2024-12-27 RX ADMIN — Medication 8 MCG: at 09:25

## 2024-12-27 RX ADMIN — LIDOCAINE HYDROCHLORIDE 100 MG: 20 INJECTION, SOLUTION EPIDURAL; INFILTRATION; INTRACAUDAL; PERINEURAL at 08:53

## 2024-12-27 RX ADMIN — ONDANSETRON 4 MG: 2 INJECTION, SOLUTION INTRAMUSCULAR; INTRAVENOUS at 10:11

## 2024-12-27 RX ADMIN — Medication 100 MG: at 08:54

## 2024-12-27 RX ADMIN — Medication 16 MCG: at 09:55

## 2024-12-27 RX ADMIN — PROPOFOL 20 MG: 10 INJECTION, EMULSION INTRAVENOUS at 10:59

## 2024-12-27 RX ADMIN — Medication 25 MG: at 09:00

## 2024-12-27 RX ADMIN — EPHEDRINE SULFATE 10 MG: 50 INJECTION INTRAVENOUS at 10:20

## 2024-12-27 RX ADMIN — HYDROMORPHONE HYDROCHLORIDE 1 MG: 2 INJECTION, SOLUTION INTRAMUSCULAR; INTRAVENOUS; SUBCUTANEOUS at 08:45

## 2024-12-27 RX ADMIN — FAMOTIDINE 20 MG: 10 INJECTION, SOLUTION INTRAVENOUS at 08:45

## 2024-12-27 RX ADMIN — Medication 16 MCG: at 10:41

## 2024-12-27 RX ADMIN — Medication 8 MCG: at 09:20

## 2024-12-27 RX ADMIN — PROPOFOL 50 MCG/KG/MIN: 10 INJECTION, EMULSION INTRAVENOUS at 08:57

## 2024-12-27 RX ADMIN — SODIUM CHLORIDE: 9 INJECTION, SOLUTION INTRAVENOUS at 08:00

## 2024-12-27 ASSESSMENT — PAIN - FUNCTIONAL ASSESSMENT: PAIN_FUNCTIONAL_ASSESSMENT: NONE - DENIES PAIN

## 2024-12-27 NOTE — ANESTHESIA PRE PROCEDURE
ALKPHOS 74 12/26/2024 03:56 PM    AST 21 12/26/2024 03:56 PM    ALT 23 12/26/2024 03:56 PM       POC Tests: No results for input(s): \"POCGLU\", \"POCNA\", \"POCK\", \"POCCL\", \"POCBUN\", \"POCHEMO\", \"POCHCT\" in the last 72 hours.    Coags:   Lab Results   Component Value Date/Time    PROTIME 10.0 01/15/2020 09:51 AM    INR 1.0 01/15/2020 09:51 AM       HCG (If Applicable): No results found for: \"PREGTESTUR\", \"PREGSERUM\", \"HCG\", \"HCGQUANT\"     ABGs: No results found for: \"PHART\", \"PO2ART\", \"UKV7VFZ\", \"PNB7YCK\", \"BEART\", \"H9NVXJWA\"     Type & Screen (If Applicable):  Lab Results   Component Value Date    ABORH O POSITIVE 01/15/2020    LABANTI NEG 01/15/2020       Drug/Infectious Status (If Applicable):  No results found for: \"HIV\", \"HEPCAB\"    COVID-19 Screening (If Applicable): No results found for: \"COVID19\"        Anesthesia Evaluation     no history of anesthetic complications:   Airway: Mallampati: III  TM distance: >3 FB   Neck ROM: full  Comment: +Beard  Mouth opening: < 3 FB   Dental:    (+) upper dentures and lower dentures      Pulmonary:   (+)     sleep apnea:       asthma:                           ROS comment: Very rare asthma symptoms, pt denies dx   Cardiovascular:    (+) hypertension:, hyperlipidemia    (-) past MI,  angina and  PALACIOS      Rhythm: regular  Rate: normal                    Neuro/Psych:   (+) headaches:, psychiatric history:   (-) seizures and CVA           GI/Hepatic/Renal:   (+) GERD: well controlled, liver disease:     (-) no renal disease      ROS comment: Pt denies hiatal hernia. Hepatic steatosis .   Endo/Other:        (-) diabetes mellitus, hypothyroidism, hyperthyroidism               Abdominal:             Vascular:          Other Findings:             Anesthesia Plan      general     ASA 2             Anesthetic plan and risks discussed with patient.                    Risks, benefits, and alternatives of anesthesia discussed with the patient. Patient expressed understanding and

## 2024-12-27 NOTE — H&P
gastro doctor   Yes Damien Herrera MD   rOPINIRole (REQUIP) 3 MG tablet TAKE 1 TABLET 1-3 HOURS BEFORE BEDTIME   Yes Damien Herrera MD   tadalafil (CIALIS) 5 MG tablet    Yes Damien Herrera MD   pantoprazole (PROTONIX) 20 MG tablet TAKE 1 TABLET BY MOUTH EVERY MORNING 30 MIN BEFORE BREAKFAST FOR 90 DAYS  Patient not taking: Reported on 9/18/2024    Damien Herrera MD   testosterone cypionate (DEPOTESTOTERONE CYPIONATE) 200 MG/ML injection Inject 0.18 mLs into the muscle Twice a Week.    Automatic Reconciliation, Ar      Allergies   Allergen Reactions    Vancomycin Other (See Comments)     Red man syndrome with 2 hour administration, requires a 4 hour administration for tolerance     Objective:     /82   Pulse 64   Temp 98.1 °F (36.7 °C) (Oral)   Resp 20   Ht 1.778 m (5' 10\")   Wt 98.9 kg (218 lb 0.6 oz)   SpO2 98%   BMI 31.28 kg/m²      Physical Exam:   General: Comfortable, pleasant, appears stated age   Voice: Strong, speaking in full sentences, no stridor    Face: No masses or lesions, facial strength symmetric   Ears: External ears unremarkable. Bilateral ear canal clear. Tympanic membrane clear and intact, with visible landmarks. Clear middle ear space   Nose: External nose unremarkable. Dorsum midline. Anterior rhinoscopy demonstrates no lesions. Septum midline. Turbinates without hypertrophy.   Oral Cavity / Oropharynx: No trismus. Mucosa pink and moist. No lesions. Tongue is midline and mobile. Palate elevates symmetrically. Uvula midline. Tonsils unremarkable. Floor of mouth soft.   Neck: Supple. No adenopathy. Thyroid unremarkable. Palpable laryngeal landmarks. Full neck range of motion.   Neurologic: CN II - XI intact. Normal gait   Pulm: wnl  Cards: wnl     DISE:   Velopharynx: 2 A/P collapse  Oropharynx: 0  Base of Tongue: 2 A/P collapse  Epiglottis: 0    Assessment/Plan:   Assessment:   Jason Nguyen is a 64 y.o. male with moderate KAVON - unable to tolerate CPAP

## 2024-12-27 NOTE — DISCHARGE SUMMARY
Discharge Summary:     Jason Nguyen  [id number]  1960    Patient admitted for [employer]ire Implantation on 12/27/24. Procedure completed without complication. Patient seen in the recovery room - resting comfortably. Cleared for d/c home once criteria have been met.

## 2024-12-27 NOTE — DISCHARGE INSTRUCTIONS
Please refer to your discharge instructions in Dr Palma's folder    DISCHARGE SUMMARY from your Nurse      PATIENT INSTRUCTIONS    After general anesthesia or intravenous sedation, for 24 hours or while taking prescription Narcotics:  Limit your activities  Do not drive and operate hazardous machinery  Do not make important personal or business decisions  Do  not drink alcoholic beverages  If you have not urinated within 8 hours after discharge, please contact your surgeon on call.    Report the following to your surgeon:  Excessive pain, swelling, redness or odor of or around the surgical area  Temperature over 100.5  Nausea and vomiting lasting longer than 4 hours or if unable to take medications  Any signs of decreased circulation or nerve impairment to extremity: change in color, persistent  numbness, tingling, coldness or increase pain  Any questions      GOOD HELP TO FIGHT AN INFECTION  Here are a few tip to help reduce the chance of getting an infection after surgery:  Wash Your Hands  Good handwashing is the most important thing you and your caregiver can do.  Wash before and after caring for any wounds.  Dry your hand with a clean towel.  Wash with soap and water for at least 20 seconds. A TIP: sing the \"Happy Birthday\" song through one time while washing to help with the timing.  Use a hand  in between washings.    Shower  When your surgeon says it is OK to take a shower, use a new bar of antibacterial soap (if that is what you use, and keep that bar of soap ONLY for your use), or antibacterial body wash.  Use a clean wash cloth or sponge when you bathe.  Dry off with a clean towel  after every bath - be careful around any wounds, skin staples, sutures or surgical glue over/on wounds.  Do not enter swimming pools, hot tubs, lakes, rivers and/or ocean until wounds are healed and your doctor/surgeon says it is OK.    Use Clean Sheets  Sleep on freshly laundered sheets after your surgery.  Keep the

## 2024-12-27 NOTE — ANESTHESIA POSTPROCEDURE EVALUATION
Department of Anesthesiology  Postprocedure Note    Patient: Jason Nguyen  MRN: [id number]  YOB: 1960  Date of evaluation: 12/27/2024    Procedure Summary       Date: 12/27/24 Room / Location: [facility] ASU OR [address] AMBULATORY OR    Anesthesia Start: 0845 Anesthesia Stop: 1117    Procedure: INSERTION OF HYPOGLOSSAL NERVE NEUROSTIMULATOR  ELECTRODE, GENERATOR AND BREATHING SENSOR ELECTRODE Diagnosis:       KAVON (obstructive sleep apnea)      Intolerance of continuous positive airway pressure (CPAP) ventilation      (KAVON (obstructive sleep apnea) [G47.33])      (Intolerance of continuous positive airway pressure (CPAP) ventilation [Z78.9])    Surgeons: Barbra Palma MD Responsible Provider: Tigre Barton MD    Anesthesia Type: General ASA Status: 2            Anesthesia Type: General    Eladio Phase I: Eladio Score: 10    Eladio Phase II: Eladio Score: 10    Anesthesia Post Evaluation    Patient location during evaluation: PACU  Patient participation: complete - patient participated  Level of consciousness: awake  Airway patency: patent  Nausea & Vomiting: no vomiting and no nausea  Cardiovascular status: hemodynamically stable  Respiratory status: acceptable  Hydration status: stable  Pain management: adequate    No notable events documented.

## 2024-12-27 NOTE — OP NOTE
Operative Note      Patient: Jason Nguyen  YOB: 1960  MRN: [id number]    Date of Procedure: 12/27/2024    Pre-Op Diagnosis Codes:      * KAVON (obstructive sleep apnea) [G47.33]     * Intolerance of continuous positive airway pressure (CPAP) ventilation [Z78.9]    Post-Op Diagnosis: Same       Procedure(s):  INSERTION OF HYPOGLOSSAL NERVE NEUROSTIMULATOR  ELECTRODE, GENERATOR AND BREATHING SENSOR ELECTRODE    Surgeon(s):  Barbra Palma MD    Assistant:   Surgical Assistant: Cata Mercado    Anesthesia: General    Estimated Blood Loss (mL): less than 50     Complications: None    Specimens:   * No specimens in log *    Implants:  Implant Name Type Inv. Item Serial No.  Lot No. LRB No. Used Action   LEAD STIMULATION UPPER AIRWAY INSPIRE - [id number]  LEAD STIMULATION UPPER AIRWAY INSPIRE [id number] [employer] NA N/A 1 Implanted   LEAD SENSING RESPIRATORY INSPIRE - [id number] Implantable long term continuous electrocardiography EKG system LEAD SENSING RESPIRATORY INSPIRE [id number] [employer] NA N/A 1 Implanted   LEAD STIMULATION UPPER AIRWAY INSPIRE - [id number]  LEAD STIMULATION UPPER AIRWAY INSPIRE [id number] [employer] NA N/A 1 Implanted   GENERATOR PULSE IMPLANTABLE INSPIRE - [id number]  GENERATOR PULSE IMPLANTABLE INSPIRE [id number] [employer] NA N/A 1 Implanted         Drains: * No LDAs found *    Findings:  Infection Present At Time Of Surgery (PATOS) (choose all levels that have infection present):  No infection present  Other Findings:   1) normal anterior chest and submandibular anatomy   2) successful insertion of hypoglossal nerve stimulator, minimal stimulation 0.4V, and appropriate sense lead placement     Detailed Description of Procedure:   The patient was brought to the Operating Room, placed supine on the table.  General endotracheal anesthesia is obtained and a timeout is performed.  IV Ancef is given.  The appropriate

## 2025-01-03 ENCOUNTER — OFFICE VISIT (OUTPATIENT)
Age: 65
End: 2025-01-03

## 2025-01-03 ENCOUNTER — OFFICE VISIT (OUTPATIENT)
Age: 65
End: 2025-01-03
Payer: COMMERCIAL

## 2025-01-03 VITALS
DIASTOLIC BLOOD PRESSURE: 68 MMHG | HEIGHT: 70 IN | SYSTOLIC BLOOD PRESSURE: 118 MMHG | OXYGEN SATURATION: 93 % | BODY MASS INDEX: 31.28 KG/M2 | HEART RATE: 65 BPM

## 2025-01-03 VITALS
DIASTOLIC BLOOD PRESSURE: 58 MMHG | HEIGHT: 70 IN | BODY MASS INDEX: 31.21 KG/M2 | HEART RATE: 76 BPM | SYSTOLIC BLOOD PRESSURE: 99 MMHG | WEIGHT: 218 LBS

## 2025-01-03 DIAGNOSIS — N17.9 AKI (ACUTE KIDNEY INJURY) (HCC): ICD-10-CM

## 2025-01-03 DIAGNOSIS — Z78.9 INTOLERANCE OF CONTINUOUS POSITIVE AIRWAY PRESSURE (CPAP) VENTILATION: ICD-10-CM

## 2025-01-03 DIAGNOSIS — R79.89 LOW TESTOSTERONE: ICD-10-CM

## 2025-01-03 DIAGNOSIS — R68.82 LOW LIBIDO: ICD-10-CM

## 2025-01-03 DIAGNOSIS — R79.89 LOW TESTOSTERONE: Primary | ICD-10-CM

## 2025-01-03 DIAGNOSIS — G47.33 OSA (OBSTRUCTIVE SLEEP APNEA): Primary | ICD-10-CM

## 2025-01-03 DIAGNOSIS — N52.8 OTHER MALE ERECTILE DYSFUNCTION: ICD-10-CM

## 2025-01-03 PROCEDURE — 99214 OFFICE O/P EST MOD 30 MIN: CPT | Performed by: UROLOGY

## 2025-01-03 PROCEDURE — 99024 POSTOP FOLLOW-UP VISIT: CPT | Performed by: STUDENT IN AN ORGANIZED HEALTH CARE EDUCATION/TRAINING PROGRAM

## 2025-01-03 RX ORDER — BACITRACIN ZINC AND POLYMYXIN B SULFATE 500; 1000 [USP'U]/G; [USP'U]/G
OINTMENT TOPICAL
Qty: 15 G | Refills: 1 | Status: SHIPPED | OUTPATIENT
Start: 2025-01-03 | End: 2025-01-10

## 2025-01-03 NOTE — PROGRESS NOTES
Chief Complaint   Patient presents with    Follow-up     Low testosterone     1. Have you been to the ER, urgent care clinic since your last visit?  Hospitalized since your last visit?No    2. Have you seen or consulted any other health care providers outside of the [facility] since your last visit?  Include any pap smears or colon screening. No   BP (!) 99/58 (Site: Left Upper Arm, Position: Sitting, Cuff Size: Medium Adult)   Pulse 76   Ht 1.778 m (5' 10\")   Wt 98.9 kg (218 lb)   BMI 31.28 kg/m²     
often unanticipated grammatical, syntax, homophones, and other interpretive errors are inadvertently transcribed by the computer software.  Please disregard these errors.  Please excuse any errors that have escaped final proofreading.  Thank you.

## 2025-01-03 NOTE — ASSESSMENT & PLAN NOTE
He is on 0.18mL once a week, down from twice a week.    He is more tired on this dosing.  We will tweak to 0.2 once weekly.  Plan on Saturday labs.

## 2025-01-03 NOTE — PROGRESS NOTES
Subjective:   Jason Nguyen   64 y.o.   1960     Refered by: OPAL Johnson     New Patient Visit  Chief Compliant: KAVON    History of Present Illness:  Jason Nguyen is a 64 y.o. male with past medical history of HIV - viral count undectable currently on Biktarvy, asthma, GERD, HTN, HLD, KAVON - intolerant of CPAP, who presents today for evaluation of KAVON.     Sleep study (8/9/24) reviewed and sleep medicine notes reviewed:   AHI = 21.6 (3%), O2 emiliano 78%, BMI = 30.2    Patient has been trying CPAP, but struggles to tolerate it. Tends to take mask off overnight. Some claustrophobia issues as well.    Interval Hx:   1/3/24:   S/p DISE -does show that he would be a good candidate for inspire implantation  S/p inspire implant   Doing very well postoperatively.  Minimal pain.  Mild swelling and erythema, expected post surgically    Review of Systems  Consitutional: denies fever, excessive weight gain or loss.  Eyes: denies diplopia, eye pain.  Integumentary: denies new concerning skin lesions.  Ears, Nose, Mouth, Throat: denies except as per HPI.  Endocrine: denies hot or cold intolerance, increased thirst.  Respiratory: denies cough, hemoptysis, wheezing  Gastrointestinal: denies trouble swallowing, nausea, emesis, regurgitation  Musculoskeletal: denies muscle weakness or wasting  Cardiovascular: denies chest pain, shortness of breath  Neurologic: denies seizures, numbness or tingling, syncope  Hematologic: denies easy bleeding or bruising       Past Medical History:   Diagnosis Date    Arthritis     Asthma     Chronic pain     KNEES AND BACK    Crohn's disease (HCC)     Fatty liver     GERD (gastroesophageal reflux disease)     Hearing loss     HIV (human immunodeficiency virus infection) (HCC)     viral count undetectable, on antivirals    HTN (hypertension)     NO MEDS    Hyperlipidemia     Ill-defined condition     HIATAL HERNIA, HIV    Migraine     MRSA carrier 1/16/2020    Rash     Sleep apnea     USES CPAP

## 2025-01-24 ENCOUNTER — TELEPHONE (OUTPATIENT)
Age: 65
End: 2025-01-24

## 2025-03-09 LAB
ALBUMIN SERPL-MCNC: 4 G/DL (ref 3.9–4.9)
ALP SERPL-CCNC: 72 IU/L (ref 44–121)
ALT SERPL-CCNC: 22 IU/L (ref 0–44)
AST SERPL-CCNC: 22 IU/L (ref 0–40)
BILIRUB SERPL-MCNC: 1.1 MG/DL (ref 0–1.2)
BUN SERPL-MCNC: 16 MG/DL (ref 8–27)
BUN/CREAT SERPL: 14 (ref 10–24)
CALCIUM SERPL-MCNC: 9.3 MG/DL (ref 8.6–10.2)
CHLORIDE SERPL-SCNC: 103 MMOL/L (ref 96–106)
CO2 SERPL-SCNC: 25 MMOL/L (ref 20–29)
CREAT SERPL-MCNC: 1.16 MG/DL (ref 0.76–1.27)
EGFRCR SERPLBLD CKD-EPI 2021: 71 ML/MIN/1.73
GLOBULIN SER CALC-MCNC: 2.9 G/DL (ref 1.5–4.5)
GLUCOSE SERPL-MCNC: 94 MG/DL (ref 70–99)
POTASSIUM SERPL-SCNC: 4.2 MMOL/L (ref 3.5–5.2)
PROT SERPL-MCNC: 6.9 G/DL (ref 6–8.5)
SODIUM SERPL-SCNC: 139 MMOL/L (ref 134–144)

## 2025-03-14 ENCOUNTER — OFFICE VISIT (OUTPATIENT)
Age: 65
End: 2025-03-14
Payer: COMMERCIAL

## 2025-03-14 VITALS
WEIGHT: 218 LBS | HEIGHT: 70 IN | HEART RATE: 88 BPM | SYSTOLIC BLOOD PRESSURE: 112 MMHG | BODY MASS INDEX: 31.21 KG/M2 | DIASTOLIC BLOOD PRESSURE: 76 MMHG

## 2025-03-14 DIAGNOSIS — R68.82 LOW LIBIDO: ICD-10-CM

## 2025-03-14 DIAGNOSIS — R79.89 LOW TESTOSTERONE: Primary | ICD-10-CM

## 2025-03-14 DIAGNOSIS — R79.89 LOW TESTOSTERONE: ICD-10-CM

## 2025-03-14 DIAGNOSIS — N52.9 ERECTILE DYSFUNCTION, UNSPECIFIED ERECTILE DYSFUNCTION TYPE: ICD-10-CM

## 2025-03-14 LAB
TESTOST FREE SERPL-MCNC: 3.8 PG/ML (ref 6.6–18.1)
TESTOST SERPL-MCNC: 374 NG/DL (ref 264–916)

## 2025-03-14 PROCEDURE — 99214 OFFICE O/P EST MOD 30 MIN: CPT | Performed by: UROLOGY

## 2025-03-14 RX ORDER — TESTOSTERONE 20.25 MG/1.25G
GEL TOPICAL
Qty: 150 G | Refills: 5 | Status: SHIPPED | OUTPATIENT
Start: 2025-03-14

## 2025-03-14 NOTE — ASSESSMENT & PLAN NOTE
He has low T levels after 7 days.  We discussed going to daily topical therapy.  They will get a compounded dose.  Plan on 1.62% gel 5g daily.

## 2025-03-14 NOTE — PROGRESS NOTES
HISTORY OF PRESENT ILLNESS  Jason Nguyen is a 64 y.o. male.   has a past medical history of Arthritis, Asthma, Chronic pain, Crohn's disease (HCC), Fatty liver, GERD (gastroesophageal reflux disease), Hearing loss, HIV (human immunodeficiency virus infection) (HCC), HTN (hypertension), Hyperlipidemia, Ill-defined condition, Migraine, MRSA carrier, Rash, and Sleep apnea.  has a past surgical history that includes Colonoscopy; Total knee arthroplasty (Right, 08/2018); hernia repair (Left, 1986); Knee arthroscopy (Right, 2010); Total shoulder arthroplasty (Left, 07/2023); Examination under anesthesia (N/A, 11/25/2024); other surgical history (N/A, 12/27/2024); and Stimulator Surgery (N/A, 12/27/2024).  Chief Complaint   Patient presents with    Follow-up     Low testosterone     He had shingles on his right head.  He is doing better now.     He brings in his testosterone needle and indicates he is using 0.5cc q 7 days (100mg).     His wife is concerned about endocrine testing.  He feels fatigue. It was still present when he had a higher testosterone.    He has low libido.  It was not better when his testosterone was higher.      Now he has lost his spontaneous AM erections.              1. Low testosterone  Overview:  HX of low testosterone, on TRT with [facility] (0.18 ml on M/Fri, which is 72 mg/week) with fluctuating serum testosterone levels from 200 to 1368.  He had ongoing fatigue.  His dosing was adjusted on 1/3/25 to 0.2 ml weekly.  Assessment & Plan:  He has low T levels after 7 days.  We discussed going to daily topical therapy.  They will get a compounded dose.  Plan on 1.62% gel 5g daily.    Orders:  -     Testosterone 1.62 % GEL; 5g gel topically daily, Disp-150 g, R-5Print  -     Comprehensive Metabolic Panel; Future  -     Testosterone, free, total; Future  2. Low libido  Overview:  Low libido despite TRT  3. Erectile dysfunction, unspecified erectile dysfunction type  Overview:  Adequate erections on

## 2025-03-14 NOTE — PROGRESS NOTES
Chief Complaint   Patient presents with    Follow-up     Low testosterone     1. Have you been to the ER, urgent care clinic since your last visit?  Hospitalized since your last visit?No    2. Have you seen or consulted any other health care providers outside of the [facility] since your last visit?  Include any pap smears or colon screening. No  /76   Pulse 88   Ht 1.778 m (5' 10\")   Wt 98.9 kg (218 lb)   BMI 31.28 kg/m²

## 2025-04-03 ENCOUNTER — TELEPHONE (OUTPATIENT)
Age: 65
End: 2025-04-03

## 2025-04-03 NOTE — TELEPHONE ENCOUNTER
Pt called stating the pharmacist at [facility] in [place] needs to speak with Dr. Lackey. Called the pharmacy to see why, the person I spoke with was unsure but thinks it has to do with dosing. Maybe he could give them a call tomorrow after clinic? [facility] number is [phone] and the pharmacist assigned to this case is named cholo.

## 2025-04-10 ENCOUNTER — TELEPHONE (OUTPATIENT)
Age: 65
End: 2025-04-10

## 2025-04-10 NOTE — TELEPHONE ENCOUNTER
Pt called stating that he received a call from the [facility] and he was told that the dosage for Testosterone prescription will need to be adjusted due to his recent labs. Didn't see anything in pt's chart for the compounding pharmacy.Please advise.

## 2025-06-08 LAB
ALBUMIN SERPL-MCNC: 3.7 G/DL (ref 3.9–4.9)
ALP SERPL-CCNC: 81 IU/L (ref 44–121)
ALT SERPL-CCNC: 18 IU/L (ref 0–44)
AST SERPL-CCNC: 21 IU/L (ref 0–40)
BILIRUB SERPL-MCNC: 1 MG/DL (ref 0–1.2)
BUN SERPL-MCNC: 14 MG/DL (ref 8–27)
BUN/CREAT SERPL: 13 (ref 10–24)
CALCIUM SERPL-MCNC: 9 MG/DL (ref 8.6–10.2)
CHLORIDE SERPL-SCNC: 106 MMOL/L (ref 96–106)
CO2 SERPL-SCNC: 21 MMOL/L (ref 20–29)
CREAT SERPL-MCNC: 1.09 MG/DL (ref 0.76–1.27)
EGFRCR SERPLBLD CKD-EPI 2021: 76 ML/MIN/1.73
GLOBULIN SER CALC-MCNC: 3.3 G/DL (ref 1.5–4.5)
GLUCOSE SERPL-MCNC: 93 MG/DL (ref 70–99)
POTASSIUM SERPL-SCNC: 4.4 MMOL/L (ref 3.5–5.2)
PROT SERPL-MCNC: 7 G/DL (ref 6–8.5)
SODIUM SERPL-SCNC: 140 MMOL/L (ref 134–144)

## 2025-06-10 ENCOUNTER — TELEPHONE (OUTPATIENT)
Age: 65
End: 2025-06-10

## 2025-06-10 NOTE — TELEPHONE ENCOUNTER
Pt called Stating That He Got His Labs Done On Saturday CMP, and Testosterone But Both labs Aren't In Do He Still Need To Come To His Appt Tomorrow Or R/s

## 2025-06-11 ENCOUNTER — OFFICE VISIT (OUTPATIENT)
Age: 65
End: 2025-06-11
Payer: COMMERCIAL

## 2025-06-11 ENCOUNTER — RESULTS FOLLOW-UP (OUTPATIENT)
Age: 65
End: 2025-06-11

## 2025-06-11 ENCOUNTER — TELEPHONE (OUTPATIENT)
Age: 65
End: 2025-06-11

## 2025-06-11 VITALS
BODY MASS INDEX: 31.21 KG/M2 | HEART RATE: 85 BPM | SYSTOLIC BLOOD PRESSURE: 108 MMHG | HEIGHT: 70 IN | OXYGEN SATURATION: 97 % | DIASTOLIC BLOOD PRESSURE: 72 MMHG | WEIGHT: 218 LBS

## 2025-06-11 DIAGNOSIS — N52.8 OTHER MALE ERECTILE DYSFUNCTION: ICD-10-CM

## 2025-06-11 DIAGNOSIS — Z72.820 POOR SLEEP: ICD-10-CM

## 2025-06-11 DIAGNOSIS — R68.82 LOW LIBIDO: ICD-10-CM

## 2025-06-11 DIAGNOSIS — R79.89 LOW TESTOSTERONE: Primary | ICD-10-CM

## 2025-06-11 LAB
TESTOST FREE SERPL-MCNC: 1.5 PG/ML (ref 6.6–18.1)
TESTOST SERPL-MCNC: 122 NG/DL (ref 264–916)

## 2025-06-11 PROCEDURE — 99214 OFFICE O/P EST MOD 30 MIN: CPT | Performed by: UROLOGY

## 2025-06-11 NOTE — TELEPHONE ENCOUNTER
Called pt to move their 07/03 appt time from 4pm to 3:45pm due to a scheduling issue. Left vm, will try again at a later time. SDF

## 2025-06-11 NOTE — PROGRESS NOTES
Chief Complaint   Patient presents with    Follow-up     1. Have you been to the ER, urgent care clinic since your last visit?  Hospitalized since your last visit?No    2. Have you seen or consulted any other health care providers outside of the [facility] since your last visit?  Include any pap smears or colon screening. No  /72   Pulse 85   Ht 1.778 m (5' 10\")   Wt 98.9 kg (218 lb)   SpO2 97%   BMI 31.28 kg/m²     
LUNG    Diabetes Sister     Hypertension Sister     Heart Disease Sister     Heart Surgery Sister     Kidney Disease Sister         HAD KIDNEY TRANSPLANT    Heart Disease Brother     Kidney Disease Brother         POLYCYSTIC DISEASE HAD 2 TRANSPLANTS    Hypertension Brother     Osteoarthritis Sister     Hypertension Sister     No Known Problems Brother     Heart Attack Brother     Kidney Disease Brother         POLYCYSTIC DISEASE    Anesth Problems Neg Hx        Review of Systems   All other systems reviewed and are negative.      Physical Exam        ASSESSMENT and PLAN  1. Low testosterone  Assessment & Plan:   Symptomatically better on TRT.  I would not adjust his medication dosing based on labs if he feels satisfied.  Continue current dosing.   Orders:  -     Comprehensive Metabolic Panel; Future  -     CBC with Auto Differential; Future  -     Testosterone, free, total; Future  2. Low libido  Assessment & Plan:  Stable.  Not much changed.  Not an issue.   3. Other male erectile dysfunction  Assessment & Plan:   Functional, stable.  Continue TRT and tadalafil.   Orders:  -     Comprehensive Metabolic Panel; Future  -     CBC with Auto Differential; Future  -     Testosterone, free, total; Future  4. Poor sleep  Assessment & Plan:  Much better sleep on TRT.  He has an INSPIRE Feb.  He also notes stress affects him.       Return in about 6 months (around 12/11/2025) for Labs prior.   Mando Lackey MD       Please note that portions of this note was potentially completed with Dragon dictation, the computer voice recognition software.  Quite often unanticipated grammatical, syntax, homophones, and other interpretive errors are inadvertently transcribed by the computer software.  Please disregard these errors.  Please excuse any errors that have escaped final proofreading.  Thank you.

## 2025-06-11 NOTE — ASSESSMENT & PLAN NOTE
Symptomatically better on TRT.  I would not adjust his medication dosing based on labs if he feels satisfied.  Continue current dosing.

## 2025-06-20 ENCOUNTER — TELEMEDICINE (OUTPATIENT)
Age: 65
End: 2025-06-20
Payer: COMMERCIAL

## 2025-06-20 DIAGNOSIS — N52.9 ERECTILE DYSFUNCTION, UNSPECIFIED ERECTILE DYSFUNCTION TYPE: ICD-10-CM

## 2025-06-20 DIAGNOSIS — R68.82 LOW LIBIDO: ICD-10-CM

## 2025-06-20 DIAGNOSIS — R79.89 LOW TESTOSTERONE: Primary | ICD-10-CM

## 2025-06-20 PROCEDURE — 99214 OFFICE O/P EST MOD 30 MIN: CPT | Performed by: UROLOGY

## 2025-06-20 RX ORDER — TESTOSTERONE CYPIONATE 200 MG/ML
100 INJECTION, SOLUTION INTRAMUSCULAR
Qty: 10 ML | Refills: 0 | Status: SHIPPED | OUTPATIENT
Start: 2025-06-20 | End: 2044-08-06

## 2025-06-20 NOTE — ASSESSMENT & PLAN NOTE
He is bothered by this and his wife is frustrated as well.  We will plan on adjusting his testosterone replacement therapy going back to IM therapy.

## 2025-06-20 NOTE — PROGRESS NOTES
HISTORY OF PRESENT ILLNESS  Jason Nguyen is a 64 y.o. male.   has a past medical history of Arthritis, Asthma, Chronic pain, Crohn's disease (HCC), Fatty liver, GERD (gastroesophageal reflux disease), Hearing loss, HIV (human immunodeficiency virus infection) (HCC), HTN (hypertension), Hyperlipidemia, Ill-defined condition, Migraine, MRSA carrier, Rash, and Sleep apnea.  has a past surgical history that includes Colonoscopy; Total knee arthroplasty (Right, 08/2018); hernia repair (Left, 1986); Knee arthroscopy (Right, 2010); Total shoulder arthroplasty (Left, 07/2023); Examination under anesthesia (N/A, 11/25/2024); other surgical history (N/A, 12/27/2024); and Stimulator Surgery (N/A, 12/27/2024).  Chief Complaint   Patient presents with    Follow-up     Low testosterone      Jason Nguyen, was evaluated through a synchronous (real-time) audio-video encounter. The patient (or guardian if applicable) is aware that this is a billable service, which includes applicable co-pays. This Virtual Visit was conducted with patient's (and/or legal guardian's) consent. The visit was conducted pursuant to the emergency declaration under the [place] Act and the National Emergencies Act, 1135 waiver authority and the Coronavirus Preparedness and Response Supplemental Appropriations Act. Patient identification was verified, and a caregiver was present when appropriate. The patient was located in a state where the provider was licensed to provide care.     He was seen with his wife.    He has been on compounded topical testosterone gel since March.  His levels are variable and low.  He says his pharmacist has suggested going up on the concentration of the gel and to consider adding subcu testosterone.  He has a low libido and is not satisfied.  He had previously been on testosterone injections and his levels have been variable.  He want to get away from injections for a while.  He reports difficulty getting his supplies.  He is

## 2025-06-20 NOTE — PROGRESS NOTES
Chief Complaint   Patient presents with    Follow-up     Low testosterone     1. Have you been to the ER, urgent care clinic since your last visit?  Hospitalized since your last visit?No    2. Have you seen or consulted any other health care providers outside of the [facility] since your last visit?  Include any pap smears or colon screening. No

## 2025-06-20 NOTE — ASSESSMENT & PLAN NOTE
His levels are subtherapeutic on testosterone gel.  He has probable multiple factors affecting his testosterone levels.  I suspect he does not absorb well and he may be a fast metabolizer.  We discussed going back to injections.  Will start at 100 mg IM weekly.

## 2025-07-26 DIAGNOSIS — R68.82 LOW LIBIDO: ICD-10-CM

## 2025-07-26 DIAGNOSIS — R79.89 LOW TESTOSTERONE: ICD-10-CM

## 2025-07-26 DIAGNOSIS — N52.9 ERECTILE DYSFUNCTION, UNSPECIFIED ERECTILE DYSFUNCTION TYPE: ICD-10-CM

## (undated) DEVICE — NEEDLE HYPO 22GA L1.5IN BLK S STL HUB POLYPR SHLD REG BVL

## (undated) DEVICE — SUTURE VCRL SZ 2-0 L36IN ABSRB UD L40MM CT 1/2 CIR J957H

## (undated) DEVICE — STERILE POLYISOPRENE POWDER-FREE SURGICAL GLOVES WITH EMOLLIENT COATING: Brand: PROTEXIS

## (undated) DEVICE — SUTURE PERMAHAND SZ 2-0 L18IN NONABSORBABLE BLK L26MM SH C012D

## (undated) DEVICE — PADDING CST 6IN STERILE --

## (undated) DEVICE — CATHETER IV 14GA L1.25IN TEF STR HUB INTROCAN SFTY

## (undated) DEVICE — SUT ETHLN 3-0 18IN PS2 BLK --

## (undated) DEVICE — Device

## (undated) DEVICE — BLADE ASSEMB CLP HAIR FINE --

## (undated) DEVICE — SUTURE VICRYL + SZ 4-0  L27IN RB 1  ABSRB VCP214H

## (undated) DEVICE — SOLUTION IRRIG 3000ML 0.9% SOD CHL FLX CONT 0797208] ICU MEDICAL INC]

## (undated) DEVICE — REM POLYHESIVE ADULT PATIENT RETURN ELECTRODE: Brand: VALLEYLAB

## (undated) DEVICE — SUTURE STRATAFIX SYMMETRIC PDS + SZ 1 L18IN ABSRB VLT L48MM SXPP1A400

## (undated) DEVICE — TOWEL,OR,DSP,ST,BLUE,STD,4/PK,20PK/CS: Brand: MEDLINE

## (undated) DEVICE — STRAP,POSITIONING,KNEE/BODY,FOAM,4X60": Brand: MEDLINE

## (undated) DEVICE — ABDOMINAL PAD: Brand: DERMACEA

## (undated) DEVICE — SWAB CULT DBL W/O CHAR RAYON TIP AMIES GEL CLMN FOR COLL

## (undated) DEVICE — PROBE 8225401 5PK SD-SD BIPOL STIM ROHS

## (undated) DEVICE — GOWN,SIRUS,NONRNF,SETINSLV,2XL,18/CS: Brand: MEDLINE

## (undated) DEVICE — 3M™ TEGADERM™ TRANSPARENT FILM DRESSING FRAME STYLE, 1624W, 2-3/8 IN X 2-3/4 IN (6 CM X 7 CM), 100/CT 4CT/CASE: Brand: 3M™ TEGADERM™

## (undated) DEVICE — BANDAGE COMPR M W6INXL10YD WHT BGE VELC E MTRX HK AND LOOP

## (undated) DEVICE — SYRINGE MED 10ML LUERLOCK TIP W/O SFTY DISP

## (undated) DEVICE — IMMOBILIZER KNEE 3 PNL 19 IN

## (undated) DEVICE — BLADE ES ELASTOMERIC COAT INSUL DURABLE BEND UPTO 90DEG

## (undated) DEVICE — NDL PRT INJ NSAF BLNT 18GX1.5 --

## (undated) DEVICE — BRONCHOSCOPE GS BFLEX 2 SLIM 3.8 SU

## (undated) DEVICE — SCRUB DRY SURG EZ SCRUB BRUSH PREOPERATIVE GRN

## (undated) DEVICE — COVER US PRB W15XL120CM W/ GEL RUBBERBAND TAPE STRP FLD GEN

## (undated) DEVICE — DEVON™ KNEE AND BODY STRAP 60" X 3" (1.5 M X 7.6 CM): Brand: DEVON

## (undated) DEVICE — (D)PREP SKN CHLRAPRP APPL 26ML -- CONVERT TO ITEM 371833

## (undated) DEVICE — TRAY CATH 16F URIN MTR LTX -- CONVERT TO ITEM 363111

## (undated) DEVICE — CONTAINER,SPECIMEN,3OZ,OR STRL: Brand: MEDLINE

## (undated) DEVICE — MARKER,SKIN,WI/RULER AND LABELS: Brand: MEDLINE

## (undated) DEVICE — Z DISCONTINUED USE 2744636  DRESSING AQUACEL 14 IN ALG W3.5XL14IN POLYUR FLM CVR W/ HYDRCOLL

## (undated) DEVICE — SYRINGE MED 20ML STD CLR PLAS LUERLOCK TIP N CTRL DISP

## (undated) DEVICE — SLIM BODY SKIN STAPLER: Brand: APPOSE ULC

## (undated) DEVICE — SPONGE GZ W4XL4IN COT RADPQ HIGHLY ABSRB STERILE

## (undated) DEVICE — SUTURE VCRL SZ 2 L54IN ABSRB UD L65MM TP-1 1/2 CIR J880T

## (undated) DEVICE — SPONGE,PEANUT,XRAY,ST,SM,3/8",5/CARD: Brand: MEDLINE INDUSTRIES, INC.

## (undated) DEVICE — HOOK LOCK LATEX FREE ELASTIC BANDAGE D/L 6INX10YD

## (undated) DEVICE — SYR LR LCK 1ML GRAD NSAF 30ML --

## (undated) DEVICE — FILTER CLP DISP FOR 5513E CLIPVAC

## (undated) DEVICE — SUTURE VICRYL + SZ 3-0 L27IN ABSRB UD L26MM SH 1/2 CIR VCP416H

## (undated) DEVICE — DRAIN KT WND 10FR RND 400ML --

## (undated) DEVICE — 4-PORT MANIFOLD: Brand: NEPTUNE 2

## (undated) DEVICE — TIP SUCT BNE CLN BTTRY PWR DISP INTERPULSE

## (undated) DEVICE — SUTURE PERMA-HAND SZ 2-0 L30IN NONABSORBABLE BLK L26MM SH K833H

## (undated) DEVICE — INTENDED FOR TISSUE SEPARATION, AND OTHER PROCEDURES THAT REQUIRE A SHARP SURGICAL BLADE TO PUNCTURE OR CUT.: Brand: BARD-PARKER ® CARBON RIB-BACK BLADES

## (undated) DEVICE — BLADE SAW W083XL354IN THK0047IN CUT THK0047IN SAG FLR

## (undated) DEVICE — SOLUTION IRRIG 1000ML STRL H2O USP PLAS POUR BTL

## (undated) DEVICE — STRIP,CLOSURE,WOUND,MEDI-STRIP,1/2X4: Brand: MEDLINE

## (undated) DEVICE — BLADE SAW W051XL276IN THK005IN CUT THK005IN REPL SAG FLR

## (undated) DEVICE — T4 HOOD

## (undated) DEVICE — INFECTION CONTROL KIT SYS

## (undated) DEVICE — PIN DRL QUIK HI PERF FOR SIG SYS

## (undated) DEVICE — SUTURE PDS II SZ 1 L54IN ABSRB VLT L65MM TP-1 1/2 CIR Z879G

## (undated) DEVICE — MAGNETIC INSTR DRAPE 20X16: Brand: MEDLINE INDUSTRIES, INC.

## (undated) DEVICE — HANDPIECE SET WITH BONE CLEANING TIP AND SUCTION TUBE: Brand: INTERPULSE

## (undated) DEVICE — SOLUTION IRRIG 1000ML 0.9% SOD CHL USP POUR PLAS BTL

## (undated) DEVICE — 3M™ IOBAN™ 2 ANTIMICROBIAL INCISE DRAPE 6650EZ: Brand: IOBAN™ 2

## (undated) DEVICE — SUTURE VCRL 0 L27IN ABSRB UD CT L40MM 1/2 CIR TAPERPOINT J280H

## (undated) DEVICE — GLOVE ORANGE PI 7   MSG9070

## (undated) DEVICE — CUSTOM CAST PD STR

## (undated) DEVICE — H SOFT CLOTH SURGICAL TAPE, 2860S-6U, 6 IN X 2 YD, 15,2 CM X 1,8 M, SINGLE USE, UNPACKAGED, 16 RL/CASE: Brand: 3M™ MEDIPORE™

## (undated) DEVICE — PENCIL SMK EVAC ALL IN 1 DSGN ENH VISIBILITY IMPROVED AIR

## (undated) DEVICE — SOLUTION IRRIG 1000ML H2O STRL BLT

## (undated) DEVICE — SUTURE MONOCRYL + SZ 5-0 18IN ABSRB UD 19MM PS-2 3/8 CIR PRIM MCP495G

## (undated) DEVICE — 1010 S-DRAPE TOWEL DRAPE 10/BX: Brand: STERI-DRAPE™

## (undated) DEVICE — CARTRIDGE BNE CEM MIX UNIV TWR VAC ROTOR BRK OFF NOZ W/O

## (undated) DEVICE — REMOTE SLEEP PATIENT CONTROL

## (undated) DEVICE — ZIMMER® STERILE DISPOSABLE TOURNIQUET CUFF WITH PLC, DUAL PORT, SINGLE BLADDER, 34 IN. (86 CM)

## (undated) DEVICE — STERILE POLYISOPRENE POWDER-FREE SURGICAL GLOVES: Brand: PROTEXIS

## (undated) DEVICE — PAD,ABDOMINAL,5"X9",ST,LF,25/BX: Brand: MEDLINE INDUSTRIES, INC.

## (undated) DEVICE — JELLY,LUBE,STERILE,FLIP TOP,TUBE,4-OZ: Brand: MEDLINE

## (undated) DEVICE — AGENT HEMOSTATIC SURGIFLOW MATRIX KIT W/THROMBIN

## (undated) DEVICE — SUTURE MONOCRYL SZ 4-0 L27IN ABSRB UD L19MM PS-2 1/2 CIR PRIM Y426H

## (undated) DEVICE — GUIDEPIN ORTH THRD HI PERF HD SIG

## (undated) DEVICE — SKIN PREP TRAY 4 COMPARTM TRAY: Brand: MEDLINE INDUSTRIES, INC.

## (undated) DEVICE — MINOR ENT-SFMCASU: Brand: MEDLINE INDUSTRIES, INC.

## (undated) DEVICE — CATHETER 8591-38 PASSER,38CM

## (undated) DEVICE — SPONGE LAP 18X18IN STRL -- 5/PK

## (undated) DEVICE — CODMAN® SURGICAL PATTIES 1/2" X 3" (1.27CM X 7.62CM): Brand: CODMAN®

## (undated) DEVICE — KIT,ANTI FOG,W/SPONGE & FLUID,SOFT PACK: Brand: MEDLINE

## (undated) DEVICE — TOTAL TRAY, 16FR 10ML SIL FOLEY, URN: Brand: MEDLINE

## (undated) DEVICE — SEALER DIVIDER DISSECTOR ELECTROSURG SHAFT L 10 CM JAW L 12 MM

## (undated) DEVICE — POUCH INSTR W6.75XL11.5IN FRST 2 PKT ADH FOR ORTH AND

## (undated) DEVICE — LOOP VES W13MM THK09MM MINI RED SIL FLD REPELLENT

## (undated) DEVICE — LIQUIBAND RAPID ADHESIVE 36/CS 0.8ML: Brand: MEDLINE

## (undated) DEVICE — ELECTRODE 8227304 5PK PRASS PR 18MM ROHS

## (undated) DEVICE — SPONGE: SPECIALTY PEANUT XR 100/CS: Brand: MEDICAL ACTION INDUSTRIES